# Patient Record
Sex: FEMALE | Race: WHITE | Employment: FULL TIME | URBAN - METROPOLITAN AREA
[De-identification: names, ages, dates, MRNs, and addresses within clinical notes are randomized per-mention and may not be internally consistent; named-entity substitution may affect disease eponyms.]

---

## 2017-12-11 ENCOUNTER — OFFICE VISIT (OUTPATIENT)
Dept: FAMILY MEDICINE CLINIC | Facility: CLINIC | Age: 52
End: 2017-12-11

## 2017-12-11 VITALS
DIASTOLIC BLOOD PRESSURE: 80 MMHG | HEIGHT: 61.5 IN | TEMPERATURE: 98 F | BODY MASS INDEX: 20.13 KG/M2 | OXYGEN SATURATION: 98 % | RESPIRATION RATE: 16 BRPM | SYSTOLIC BLOOD PRESSURE: 110 MMHG | WEIGHT: 108 LBS | HEART RATE: 60 BPM

## 2017-12-11 DIAGNOSIS — Z01.419 WELL FEMALE EXAM WITH ROUTINE GYNECOLOGICAL EXAM: Primary | ICD-10-CM

## 2017-12-11 DIAGNOSIS — K92.1 BLOOD IN STOOL: ICD-10-CM

## 2017-12-11 DIAGNOSIS — Z12.39 SCREENING FOR BREAST CANCER: ICD-10-CM

## 2017-12-11 DIAGNOSIS — Z13.0 SCREENING FOR ENDOCRINE, NUTRITIONAL, METABOLIC AND IMMUNITY DISORDER: ICD-10-CM

## 2017-12-11 DIAGNOSIS — Z13.21 SCREENING FOR ENDOCRINE, NUTRITIONAL, METABOLIC AND IMMUNITY DISORDER: ICD-10-CM

## 2017-12-11 DIAGNOSIS — Z12.4 ENCOUNTER FOR SCREENING FOR CERVICAL CANCER: ICD-10-CM

## 2017-12-11 DIAGNOSIS — Z80.0 FAMILY HISTORY OF COLON CANCER: ICD-10-CM

## 2017-12-11 DIAGNOSIS — Z13.29 SCREENING FOR ENDOCRINE, NUTRITIONAL, METABOLIC AND IMMUNITY DISORDER: ICD-10-CM

## 2017-12-11 DIAGNOSIS — Z13.228 SCREENING FOR ENDOCRINE, NUTRITIONAL, METABOLIC AND IMMUNITY DISORDER: ICD-10-CM

## 2017-12-11 DIAGNOSIS — F33.1 MODERATE EPISODE OF RECURRENT MAJOR DEPRESSIVE DISORDER (HCC): ICD-10-CM

## 2017-12-11 PROCEDURE — 87624 HPV HI-RISK TYP POOLED RSLT: CPT | Performed by: FAMILY MEDICINE

## 2017-12-11 PROCEDURE — 88175 CYTOPATH C/V AUTO FLUID REDO: CPT | Performed by: FAMILY MEDICINE

## 2017-12-11 PROCEDURE — 82272 OCCULT BLD FECES 1-3 TESTS: CPT | Performed by: FAMILY MEDICINE

## 2017-12-11 PROCEDURE — 87625 HPV TYPES 16 & 18 ONLY: CPT | Performed by: FAMILY MEDICINE

## 2017-12-11 PROCEDURE — 99386 PREV VISIT NEW AGE 40-64: CPT | Performed by: FAMILY MEDICINE

## 2017-12-11 RX ORDER — BUPROPION HYDROCHLORIDE 150 MG/1
150 TABLET ORAL DAILY
Qty: 30 TABLET | Refills: 0 | Status: SHIPPED | OUTPATIENT
Start: 2017-12-11 | End: 2018-01-11 | Stop reason: ALTCHOICE

## 2017-12-11 NOTE — PROGRESS NOTES
Anihs Bradley is a 47year old female.     CC:  Patient presents with:  Physical: NP physical, pap smear      HPI:  Tobacco Cessation Counseling 2 Years due on 05/25/1978  Annual Depression Screen due on 05/25/1978  Pneumococcal PPSV23 Medium Risk Ad Diagnosis Date   • Arthritis    • Depression       Past Surgical History:  No date: TUBAL LIGATION   Family History   Problem Relation Age of Onset   • Cancer Father      colon cancer   • Cancer Mother      lung cancer, brain cancer   • Cancer Maternal G Menopause).     GENERAL: well developed, well nourished, in no apparent distress  HEENT: atraumatic, normocephalic,ears and throat are clear  EYES:PERRL, EOMI, conjunctiva are clear  NECK: supple,no adenopathy, no thyromegaly  CHEST: no chest tenderness  BR cancer  - SURGERY - INTERNAL  - BLD OCLT PROXIDASE ACTV QUAL FECES 1 SPEC  patietn needs diagnostic colonoscopy given sx and history  No active bleeding  Will check cbc, cmp        Meds & Refills for this Visit:  Signed Prescriptions Disp Refills    BuPROP

## 2017-12-18 ENCOUNTER — APPOINTMENT (OUTPATIENT)
Dept: LAB | Age: 52
End: 2017-12-18
Attending: FAMILY MEDICINE
Payer: MEDICAID

## 2017-12-18 ENCOUNTER — TELEPHONE (OUTPATIENT)
Dept: OBGYN CLINIC | Facility: CLINIC | Age: 52
End: 2017-12-18

## 2017-12-18 ENCOUNTER — HOSPITAL ENCOUNTER (OUTPATIENT)
Dept: MAMMOGRAPHY | Age: 52
Discharge: HOME | End: 2017-12-18
Attending: FAMILY MEDICINE
Payer: MEDICAID

## 2017-12-18 ENCOUNTER — TELEPHONE (OUTPATIENT)
Dept: FAMILY MEDICINE CLINIC | Facility: CLINIC | Age: 52
End: 2017-12-18

## 2017-12-18 DIAGNOSIS — R87.610 ATYPICAL SQUAMOUS CELLS OF UNDETERMINED SIGNIFICANCE ON CYTOLOGIC SMEAR OF CERVIX (ASC-US): Primary | ICD-10-CM

## 2017-12-18 DIAGNOSIS — Z12.39 SCREENING FOR BREAST CANCER: ICD-10-CM

## 2017-12-18 DIAGNOSIS — B97.7 HPV IN FEMALE: ICD-10-CM

## 2017-12-18 PROCEDURE — 77067 SCR MAMMO BI INCL CAD: CPT | Performed by: FAMILY MEDICINE

## 2017-12-18 NOTE — TELEPHONE ENCOUNTER
PT is being referred to see Dr. Todd Zavala for abnormal PAP. Results in Epic. Please review and advise on what type of appt is needed.     Thanks

## 2017-12-18 NOTE — TELEPHONE ENCOUNTER
Pt notified of Pap & HPV results & below orders. Dr. Luis Eduardo Sawyer office info given. Referral placed in EPIC. All questions answered, pt expresses understanding.      Dr. Diony Burnett [address] [facility]   Phone: [phone]

## 2017-12-18 NOTE — TELEPHONE ENCOUNTER
----- Message from Frieda Webster MD sent at 12/15/2017  2:33 PM CST -----  Results reviewed. Please inform patient abnormal pap - ASCUS and HPV positive - refer to [facility] for colposcopy.

## 2017-12-19 ENCOUNTER — PATIENT MESSAGE (OUTPATIENT)
Dept: FAMILY MEDICINE CLINIC | Facility: CLINIC | Age: 52
End: 2017-12-19

## 2017-12-19 ENCOUNTER — OFFICE VISIT (OUTPATIENT)
Dept: SURGERY | Facility: CLINIC | Age: 52
End: 2017-12-19

## 2017-12-19 VITALS
OXYGEN SATURATION: 97 % | WEIGHT: 110 LBS | RESPIRATION RATE: 16 BRPM | BODY MASS INDEX: 20.77 KG/M2 | SYSTOLIC BLOOD PRESSURE: 110 MMHG | HEART RATE: 63 BPM | DIASTOLIC BLOOD PRESSURE: 70 MMHG | HEIGHT: 61 IN

## 2017-12-19 DIAGNOSIS — R19.4 ENCOUNTER FOR DIAGNOSTIC COLONOSCOPY DUE TO CHANGE IN BOWEL HABITS: Primary | ICD-10-CM

## 2017-12-19 DIAGNOSIS — Z80.0 FAMILY HISTORY OF COLON CANCER REQUIRING SCREENING COLONOSCOPY: ICD-10-CM

## 2017-12-19 PROCEDURE — 99243 OFF/OP CNSLTJ NEW/EST LOW 30: CPT | Performed by: SURGERY

## 2017-12-19 RX ORDER — TRAZODONE HYDROCHLORIDE 50 MG/1
50 TABLET ORAL NIGHTLY
Qty: 30 TABLET | Refills: 0 | Status: SHIPPED | OUTPATIENT
Start: 2017-12-19 | End: 2018-01-11

## 2017-12-19 RX ORDER — POLYETHYLENE GLYCOL 3350, SODIUM CHLORIDE, SODIUM BICARBONATE, POTASSIUM CHLORIDE 420; 11.2; 5.72; 1.48 G/4L; G/4L; G/4L; G/4L
POWDER, FOR SOLUTION ORAL
Qty: 1 BOTTLE | Refills: 0 | Status: SHIPPED | OUTPATIENT
Start: 2017-12-19 | End: 2018-05-10 | Stop reason: ALTCHOICE

## 2017-12-19 NOTE — TELEPHONE ENCOUNTER
Pt had sx prior to starting bupropion  Continue bupropion. Can try trazodone 50mg qhs to help with depression and sleep.  Then follow up in 1 mo  rx sent

## 2017-12-19 NOTE — TELEPHONE ENCOUNTER
Pap smear and mammogram released to My Chart. Please advise on sleep concern for patient. Thank you!

## 2017-12-19 NOTE — TELEPHONE ENCOUNTER
From: Gauri Cho  To: Rich Ortiz MD  Sent: 12/19/2017 6:18 AM CST  Subject: Test Results Question    I know my pap smear results came back I'm just wondering why they're not posted on here yet and also was wondering if you had an opportunity to

## 2017-12-19 NOTE — H&P
New Patient Visit Note       Active Problems      1. Encounter for diagnostic colonoscopy due to change in bowel habits    2.  Family history of colon cancer requiring screening colonoscopy        Chief Complaint   Patient presents with:  Colonoscopy: NW PT Cancer Father      colon cancer   • Cancer Mother      lung cancer, brain cancer   • Breast Cancer Maternal Grandmother    • Cancer Paternal Grandmother      colon cancer   • Breast Cancer Paternal Grandmother      Social History    Marital status: Divorce for behavioral problems and sleep disturbance. Physical Findings   /70   Pulse 63   Resp 16   Ht 61\"   Wt 110 lb   SpO2 97%   BMI 20.78 kg/m²   Physical Exam   Constitutional: She is oriented to person, place, and time.  She appears well-develo Left axillary: No pectoral and no lateral adenopathy present. Right: No inguinal and no supraclavicular adenopathy present. Left: No inguinal and no supraclavicular adenopathy present.    Neurological: She is alert and oriented to person, pl

## 2017-12-20 NOTE — TELEPHONE ENCOUNTER
Called and spoke with pt. Pt informed of MD message below. Pt states understanding and agrees to plan.

## 2018-01-11 ENCOUNTER — LABORATORY ENCOUNTER (OUTPATIENT)
Dept: LAB | Age: 53
End: 2018-01-11
Attending: FAMILY MEDICINE
Payer: MEDICAID

## 2018-01-11 DIAGNOSIS — Z13.29 SCREENING FOR ENDOCRINE, NUTRITIONAL, METABOLIC AND IMMUNITY DISORDER: ICD-10-CM

## 2018-01-11 DIAGNOSIS — Z13.228 SCREENING FOR ENDOCRINE, NUTRITIONAL, METABOLIC AND IMMUNITY DISORDER: ICD-10-CM

## 2018-01-11 DIAGNOSIS — Z13.21 SCREENING FOR ENDOCRINE, NUTRITIONAL, METABOLIC AND IMMUNITY DISORDER: ICD-10-CM

## 2018-01-11 DIAGNOSIS — Z13.0 SCREENING FOR ENDOCRINE, NUTRITIONAL, METABOLIC AND IMMUNITY DISORDER: ICD-10-CM

## 2018-01-11 LAB
25-HYDROXYVITAMIN D (TOTAL): 5.8 NG/ML (ref 30–100)
ALBUMIN SERPL-MCNC: 3.2 G/DL (ref 3.5–4.8)
ALP LIVER SERPL-CCNC: 74 U/L (ref 41–108)
ALT SERPL-CCNC: 21 U/L (ref 14–54)
AST SERPL-CCNC: 16 U/L (ref 15–41)
BASOPHILS # BLD AUTO: 0.03 X10(3) UL (ref 0–0.1)
BASOPHILS NFR BLD AUTO: 0.4 %
BILIRUB SERPL-MCNC: 0.3 MG/DL (ref 0.1–2)
BUN BLD-MCNC: 13 MG/DL (ref 8–20)
CALCIUM BLD-MCNC: 8.7 MG/DL (ref 8.3–10.3)
CHLORIDE: 109 MMOL/L (ref 101–111)
CHOLEST SMN-MCNC: 166 MG/DL (ref ?–200)
CO2: 24 MMOL/L (ref 22–32)
CREAT BLD-MCNC: 0.58 MG/DL (ref 0.55–1.02)
EOSINOPHIL # BLD AUTO: 0.11 X10(3) UL (ref 0–0.3)
EOSINOPHIL NFR BLD AUTO: 1.4 %
ERYTHROCYTE [DISTWIDTH] IN BLOOD BY AUTOMATED COUNT: 13.8 % (ref 11.5–16)
FREE T4: 1.3 NG/DL (ref 0.9–1.8)
GLUCOSE BLD-MCNC: 83 MG/DL (ref 70–99)
HCT VFR BLD AUTO: 41 % (ref 34–50)
HDLC SERPL-MCNC: 42 MG/DL (ref 45–?)
HDLC SERPL: 3.95 {RATIO} (ref ?–4.44)
HGB BLD-MCNC: 13.2 G/DL (ref 12–16)
IMMATURE GRANULOCYTE COUNT: 0.03 X10(3) UL (ref 0–1)
IMMATURE GRANULOCYTE RATIO %: 0.4 %
LDLC SERPL CALC-MCNC: 86 MG/DL (ref ?–130)
LYMPHOCYTES # BLD AUTO: 3.2 X10(3) UL (ref 0.9–4)
LYMPHOCYTES NFR BLD AUTO: 41.6 %
M PROTEIN MFR SERPL ELPH: 7.2 G/DL (ref 6.1–8.3)
MCH RBC QN AUTO: 29.8 PG (ref 27–33.2)
MCHC RBC AUTO-ENTMCNC: 32.2 G/DL (ref 31–37)
MCV RBC AUTO: 92.6 FL (ref 81–100)
MONOCYTES # BLD AUTO: 0.84 X10(3) UL (ref 0.1–0.6)
MONOCYTES NFR BLD AUTO: 10.9 %
NEUTROPHIL ABS PRELIM: 3.49 X10 (3) UL (ref 1.3–6.7)
NEUTROPHILS # BLD AUTO: 3.49 X10(3) UL (ref 1.3–6.7)
NEUTROPHILS NFR BLD AUTO: 45.3 %
NONHDLC SERPL-MCNC: 124 MG/DL (ref ?–130)
PLATELET # BLD AUTO: 427 10(3)UL (ref 150–450)
POTASSIUM SERPL-SCNC: 3.8 MMOL/L (ref 3.6–5.1)
RBC # BLD AUTO: 4.43 X10(6)UL (ref 3.8–5.1)
RED CELL DISTRIBUTION WIDTH-SD: 47.8 FL (ref 35.1–46.3)
SODIUM SERPL-SCNC: 140 MMOL/L (ref 136–144)
TRIGL SERPL-MCNC: 189 MG/DL (ref ?–150)
TSI SER-ACNC: <0.005 MIU/ML (ref 0.35–5.5)
VLDLC SERPL CALC-MCNC: 38 MG/DL (ref 5–40)
WBC # BLD AUTO: 7.7 X10(3) UL (ref 4–13)

## 2018-01-11 PROCEDURE — 82306 VITAMIN D 25 HYDROXY: CPT | Performed by: FAMILY MEDICINE

## 2018-01-11 PROCEDURE — 84439 ASSAY OF FREE THYROXINE: CPT | Performed by: FAMILY MEDICINE

## 2018-01-11 PROCEDURE — 36415 COLL VENOUS BLD VENIPUNCTURE: CPT | Performed by: FAMILY MEDICINE

## 2018-01-11 PROCEDURE — 80050 GENERAL HEALTH PANEL: CPT | Performed by: FAMILY MEDICINE

## 2018-01-11 PROCEDURE — 80061 LIPID PANEL: CPT | Performed by: FAMILY MEDICINE

## 2018-01-11 RX ORDER — FLUOXETINE HYDROCHLORIDE 20 MG/1
20 CAPSULE ORAL DAILY
Qty: 30 CAPSULE | Refills: 0 | OUTPATIENT
Start: 2018-01-11 | End: 2018-03-12

## 2018-01-11 NOTE — TELEPHONE ENCOUNTER
Insurance will not cover Fluoxetine Tables, will only cover capsules.  OK per Dr Peter Lira to switch to capsules,

## 2018-01-11 NOTE — PROGRESS NOTES
[facility] Office Note  Chief Complaint:   Patient presents with:  Medication Follow-Up      HPI:   This is a 47year old female coming in for  HPI  1.  Follow up - depression  Started on bupropion 150mg daily  Also trazodone 50mg Respiratory: Negative for cough and shortness of breath. Cardiovascular: Negative for chest pain and palpitations. Gastrointestinal: Negative for abdominal pain, nausea and vomiting. Genitourinary: Negative for dysuria. Skin: Negative for rash. Prescriptions Disp Refills    TraZODone HCl 100 MG Oral Tab 30 tablet 0      Sig: Take 1 tablet (100 mg total) by mouth nightly. FLUoxetine HCl 20 MG Oral Tab 30 tablet 0      Sig: Take 1 tablet (20 mg total) by mouth daily.              Patient/Caregi

## 2018-01-15 ENCOUNTER — TELEPHONE (OUTPATIENT)
Dept: FAMILY MEDICINE CLINIC | Facility: CLINIC | Age: 53
End: 2018-01-15

## 2018-01-15 DIAGNOSIS — M79.10 MYALGIA: ICD-10-CM

## 2018-01-15 DIAGNOSIS — E05.90 HYPERTHYROIDISM: Primary | ICD-10-CM

## 2018-01-15 RX ORDER — ERGOCALCIFEROL 1.25 MG/1
50000 CAPSULE ORAL WEEKLY
Qty: 4 CAPSULE | Refills: 2 | Status: SHIPPED | OUTPATIENT
Start: 2018-01-15 | End: 2018-05-10 | Stop reason: ALTCHOICE

## 2018-01-15 NOTE — TELEPHONE ENCOUNTER
----- Message from Emile Lozada MD sent at 1/15/2018  7:42 AM CST -----  Results reviewed. Please inform patient abnormal thyroid labs - hyperthyroidism - order Thyroid US and refer to endo - Dr. Espinoza Bryan.    start vitamin D 78778 units weekly, x12 weeks, th

## 2018-01-15 NOTE — TELEPHONE ENCOUNTER
Discussed thyroid results, US and Endo follow up with patient. Also discussed diet, fish oil and Vitamin D with her. She had no further questions and voiced understanding.

## 2018-01-16 ENCOUNTER — TELEPHONE (OUTPATIENT)
Dept: FAMILY MEDICINE CLINIC | Facility: CLINIC | Age: 53
End: 2018-01-16

## 2018-01-16 ENCOUNTER — HOSPITAL ENCOUNTER (OUTPATIENT)
Dept: ULTRASOUND IMAGING | Age: 53
Discharge: HOME | End: 2018-01-16
Attending: FAMILY MEDICINE
Payer: MEDICAID

## 2018-01-16 DIAGNOSIS — E05.90 HYPERTHYROIDISM: ICD-10-CM

## 2018-01-16 PROCEDURE — 76536 US EXAM OF HEAD AND NECK: CPT | Performed by: FAMILY MEDICINE

## 2018-01-16 NOTE — TELEPHONE ENCOUNTER
I spoke with pt rik reiterated labs and Dr Deidra Hernandez recommendations. Pt c/ feeling like there is 'knots\" in both her calves. She describes it as a ache. It is always worse first thing in the am-\"cant hardly walk on them in the am\".   No recent plane or l

## 2018-01-16 NOTE — TELEPHONE ENCOUNTER
Pain worse in the AM - will screen for rheumatoid arthritis and autoimmune disorders. Muscle aches are not common symptom of hyperthyroidism, but could be related   Ok to get additional labs completed.

## 2018-01-17 ENCOUNTER — TELEPHONE (OUTPATIENT)
Dept: FAMILY MEDICINE CLINIC | Facility: CLINIC | Age: 53
End: 2018-01-17

## 2018-01-17 DIAGNOSIS — E04.2 MULTIPLE THYROID NODULES: Primary | ICD-10-CM

## 2018-01-17 RX ORDER — TRAZODONE HYDROCHLORIDE 100 MG/1
100 TABLET ORAL NIGHTLY
Qty: 30 TABLET | Refills: 5 | Status: SHIPPED | OUTPATIENT
Start: 2018-01-17 | End: 2018-05-10 | Stop reason: ALTCHOICE

## 2018-01-17 NOTE — TELEPHONE ENCOUNTER
Notes Recorded by Ashlie Rayo MD on 1/17/2018 at 4:51 PM CST  Multinodular thyroid. Some nodules are than 1cm and should be biopsied.  FNA recommended to be completed by IR

## 2018-01-17 NOTE — TELEPHONE ENCOUNTER
I called and tried pt again. Pt answered and I verified 2 patient identifiers: name & . I discussed results and recommendations at length with patient. All orders placed. All questions answered. ENT info sent threw [address]

## 2018-01-17 NOTE — TELEPHONE ENCOUNTER
Pt already has the endocrinologist appt scheduled (1/24/18)and its ok to keep that but pt should get Bx done as soon as she can. If pathology is abnormal; she will need to see Dr Tarry Leyden to discuss thyroidectomy and endo for med management.   If p

## 2018-01-20 ENCOUNTER — PATIENT MESSAGE (OUTPATIENT)
Dept: FAMILY MEDICINE CLINIC | Facility: CLINIC | Age: 53
End: 2018-01-20

## 2018-01-20 DIAGNOSIS — Z13.21 SCREENING FOR ENDOCRINE, NUTRITIONAL, METABOLIC AND IMMUNITY DISORDER: Primary | ICD-10-CM

## 2018-01-20 DIAGNOSIS — Z13.0 SCREENING FOR ENDOCRINE, NUTRITIONAL, METABOLIC AND IMMUNITY DISORDER: Primary | ICD-10-CM

## 2018-01-20 DIAGNOSIS — R79.89 ABNORMAL TSH: ICD-10-CM

## 2018-01-20 DIAGNOSIS — Z13.228 SCREENING FOR ENDOCRINE, NUTRITIONAL, METABOLIC AND IMMUNITY DISORDER: Primary | ICD-10-CM

## 2018-01-20 DIAGNOSIS — Z13.29 SCREENING FOR ENDOCRINE, NUTRITIONAL, METABOLIC AND IMMUNITY DISORDER: Primary | ICD-10-CM

## 2018-01-22 NOTE — TELEPHONE ENCOUNTER
From: Cassandra Hernandez  To: Katarina Espinoza MD  Sent: 1/20/2018 11:21 PM CST  Subject: Test Results Question    Dr. Bon Higginbotham,    I need to come in for some more blood work in regards to the rheumatology   I would also like to request the following tests if po

## 2018-01-22 NOTE — TELEPHONE ENCOUNTER
US dept call to confirm what nodules should be biopsied. There are 4 nodules over 10mm. Dr Ken Santizo states all nodules over 10mm. US dept aware.

## 2018-01-22 NOTE — TELEPHONE ENCOUNTER
Labs ordered   Had expected endo to check - pending work up for hyperthyroidism. b12 and folate would be screening labs - she is not anemic and has normal MCV.

## 2018-01-23 ENCOUNTER — HOSPITAL ENCOUNTER (OUTPATIENT)
Dept: ULTRASOUND IMAGING | Facility: HOSPITAL | Age: 53
Discharge: HOME | End: 2018-01-23
Attending: FAMILY MEDICINE
Payer: MEDICAID

## 2018-01-23 DIAGNOSIS — E04.2 MULTIPLE THYROID NODULES: ICD-10-CM

## 2018-01-23 PROCEDURE — 76942 ECHO GUIDE FOR BIOPSY: CPT | Performed by: FAMILY MEDICINE

## 2018-01-23 PROCEDURE — 88173 CYTOPATH EVAL FNA REPORT: CPT | Performed by: FAMILY MEDICINE

## 2018-01-23 PROCEDURE — 10022 US FNA THYROID (CPT=10022/76942): CPT | Performed by: FAMILY MEDICINE

## 2018-01-23 NOTE — PROCEDURES
PROCEDURE: US FNA THYROID (CPT=10022/35503)    COMPARISON: Jennifer Mosley, US THYROID (HVD=[id number]), 1/16/2018, 11:01. 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  This inclu

## 2018-01-29 ENCOUNTER — TELEPHONE (OUTPATIENT)
Dept: FAMILY MEDICINE CLINIC | Facility: CLINIC | Age: 53
End: 2018-01-29

## 2018-01-30 NOTE — TELEPHONE ENCOUNTER
I called pt at [phone] and verified 2 patient identifiers: name & . I discussed results and recommendations at length with patient. All questions answered.

## 2018-02-01 ENCOUNTER — TELEPHONE (OUTPATIENT)
Dept: OBGYN CLINIC | Facility: CLINIC | Age: 53
End: 2018-02-01

## 2018-02-07 ENCOUNTER — PATIENT MESSAGE (OUTPATIENT)
Dept: FAMILY MEDICINE CLINIC | Facility: CLINIC | Age: 53
End: 2018-02-07

## 2018-02-07 NOTE — TELEPHONE ENCOUNTER
Medication(s) to Refill:   Pending Prescriptions Disp Refills    BUPROPION HCL ER, XL, 150 MG Oral Tablet 24 Hr [Pharmacy Med Name: BUPROPION XL 150MG TABLETS (24 H)] 30 tablet 0     Sig: TAKE 1 TABLET BY MOUTH DAILY      FLUOXETINE HCL 20 MG Oral Cap [Pha

## 2018-02-08 RX ORDER — BUPROPION HYDROCHLORIDE 150 MG/1
TABLET ORAL
Qty: 30 TABLET | Refills: 0 | OUTPATIENT
Start: 2018-02-08

## 2018-02-08 RX ORDER — FLUOXETINE HYDROCHLORIDE 20 MG/1
CAPSULE ORAL
Qty: 30 CAPSULE | Refills: 0 | Status: SHIPPED | OUTPATIENT
Start: 2018-02-08 | End: 2018-03-12

## 2018-02-08 NOTE — TELEPHONE ENCOUNTER
From: Anish Bradley  To: Karishma Conley MD  Sent: 2/7/2018 11:49 AM CST  Subject: Non-Urgent Medical Question     I requested two weeks off of work Leatha Coy of all this medical stuff and now my employer is telling me in order to return I'm going to need a d

## 2018-02-09 NOTE — TELEPHONE ENCOUNTER
Pt needs a note to return to work. She took 2 weeks off due to everything that was going on. She is not asking to excuse her but a note that its ok to return tomorrow. Ok with you?

## 2018-02-09 NOTE — TELEPHONE ENCOUNTER
Ok to return to tomorrow. Thank you   Please remind patient to reschedule colonoscopy with Dr. Juliane Estrada.

## 2018-03-05 ENCOUNTER — LABORATORY ENCOUNTER (OUTPATIENT)
Dept: LAB | Age: 53
End: 2018-03-05
Attending: SURGERY
Payer: MEDICAID

## 2018-03-05 DIAGNOSIS — R19.4 CHANGE IN BOWEL HABIT: ICD-10-CM

## 2018-03-05 DIAGNOSIS — Z80.0 FH: COLON CANCER: Primary | ICD-10-CM

## 2018-03-05 DIAGNOSIS — Z80.0 FAMILY HX OF COLON CANCER: Primary | ICD-10-CM

## 2018-03-05 PROCEDURE — 88305 TISSUE EXAM BY PATHOLOGIST: CPT

## 2018-03-12 RX ORDER — FLUOXETINE HYDROCHLORIDE 20 MG/1
CAPSULE ORAL
Qty: 30 CAPSULE | Refills: 0 | Status: SHIPPED | OUTPATIENT
Start: 2018-03-12 | End: 2018-04-10

## 2018-03-12 NOTE — TELEPHONE ENCOUNTER
Medication(s) to Refill:   Pending Prescriptions Disp Refills    FLUOXETINE HCL 20 MG Oral Cap [Pharmacy Med Name: FLUOXETINE 20MG CAPSULES] 30 capsule 0     Sig: TAKE 1 CAPSULE BY MOUTH EVERY DAY             Reason for Medication Refill being sent to [facility]

## 2018-03-14 ENCOUNTER — PATIENT OUTREACH (OUTPATIENT)
Dept: FAMILY MEDICINE CLINIC | Facility: CLINIC | Age: 53
End: 2018-03-14

## 2018-03-14 ENCOUNTER — TELEPHONE (OUTPATIENT)
Dept: FAMILY MEDICINE CLINIC | Facility: CLINIC | Age: 53
End: 2018-03-14

## 2018-03-14 NOTE — TELEPHONE ENCOUNTER
Pt wanted us to know she had her colonscopy with Dr. Lovely Alvarez, she received a call she is due so she wanted to update us on that info

## 2018-03-15 NOTE — TELEPHONE ENCOUNTER
SOOI below. Patient did have c-scope with Dr. Sarah Quezada on 3/5/18. HM not updated at this time since c-scope was abnormal.  Thank you!

## 2018-03-15 NOTE — TELEPHONE ENCOUNTER
LISA - Had noted abn colonoscopy - has follow up with Dr. Og Baig scheduled.    For now - just placed on 1 year cscope follow up - will adjust after appt with surgery

## 2018-03-20 ENCOUNTER — OFFICE VISIT (OUTPATIENT)
Dept: SURGERY | Facility: CLINIC | Age: 53
End: 2018-03-20

## 2018-03-20 VITALS
SYSTOLIC BLOOD PRESSURE: 100 MMHG | DIASTOLIC BLOOD PRESSURE: 70 MMHG | WEIGHT: 107 LBS | HEIGHT: 61 IN | BODY MASS INDEX: 20.2 KG/M2 | OXYGEN SATURATION: 97 % | RESPIRATION RATE: 16 BRPM | HEART RATE: 68 BPM

## 2018-03-20 DIAGNOSIS — D12.6 TUBULOVILLOUS ADENOMA OF COLON: Primary | ICD-10-CM

## 2018-03-20 PROCEDURE — 99212 OFFICE O/P EST SF 10 MIN: CPT | Performed by: SURGERY

## 2018-03-20 NOTE — TELEPHONE ENCOUNTER
The patient called out this because she is still receiving reminder calls that she is needing to have her colonoscopy done however it was already done.

## 2018-03-20 NOTE — TELEPHONE ENCOUNTER
Reviewed Dr Angeles Palafox exam and updated snapshot that c-scope was done and next die in 3 yrs. Shanon, who has called pt previously is aware.

## 2018-03-20 NOTE — PROGRESS NOTES
Follow Up Visit Note       Active Problems      1. Tubulovillous adenoma of colon          Chief Complaint   Patient presents with:  Colonoscopy: EST PT f/u after cscope on 3/5/18. PT denies any new issues or concerns.         History of Present Illness Paternal Grandmother      colon cancer   • Breast Cancer Paternal Grandmother      Social History    Marital status:             Spouse name:                       Years of education:                 Number of children:               Social History Does not bruise/bleed easily. Psychiatric/Behavioral: Negative for behavioral problems and sleep disturbance.         Physical Findings   /70   Pulse 68   Resp 16   Ht 61\"   Wt 107 lb   SpO2 97%   BMI 20.22 kg/m²   Physical Exam   Constitutional: S of the care plan. No orders of the defined types were placed in this encounter. Imaging & Referrals   None    Follow Up  Return in about 4 weeks (around 4/17/2018).     James Mar MD

## 2018-04-05 ENCOUNTER — TELEPHONE (OUTPATIENT)
Dept: FAMILY MEDICINE CLINIC | Facility: CLINIC | Age: 53
End: 2018-04-05

## 2018-04-05 NOTE — TELEPHONE ENCOUNTER
Left message for patient to call office. Want to remind patient about follow up Gyn for abnormal pap smear follow. If patient was seen by another Gyn please get contact information.

## 2018-04-10 NOTE — TELEPHONE ENCOUNTER
Medication(s) to Refill:   Pending Prescriptions Disp Refills    FLUOXETINE HCL 20 MG Oral Cap [Pharmacy Med Name: FLUOXETINE 20MG CAPSULES] 30 capsule 0     Sig: TAKE ONE CAPSULE BY MOUTH EVERY DAY             Reason for Medication Refill being sent to [facility]

## 2018-04-11 RX ORDER — FLUOXETINE HYDROCHLORIDE 20 MG/1
CAPSULE ORAL
Qty: 30 CAPSULE | Refills: 0 | Status: SHIPPED | OUTPATIENT
Start: 2018-04-11 | End: 2018-05-10 | Stop reason: ALTCHOICE

## 2018-04-17 RX ORDER — ERGOCALCIFEROL 1.25 MG/1
CAPSULE ORAL
Qty: 4 CAPSULE | Refills: 0 | OUTPATIENT
Start: 2018-04-17

## 2018-05-10 ENCOUNTER — APPOINTMENT (OUTPATIENT)
Dept: LAB | Age: 53
End: 2018-05-10
Attending: FAMILY MEDICINE
Payer: MEDICAID

## 2018-05-10 ENCOUNTER — OFFICE VISIT (OUTPATIENT)
Dept: FAMILY MEDICINE CLINIC | Facility: CLINIC | Age: 53
End: 2018-05-10

## 2018-05-10 VITALS
BODY MASS INDEX: 19 KG/M2 | HEART RATE: 88 BPM | DIASTOLIC BLOOD PRESSURE: 60 MMHG | RESPIRATION RATE: 22 BRPM | TEMPERATURE: 99 F | WEIGHT: 98.81 LBS | OXYGEN SATURATION: 98 % | SYSTOLIC BLOOD PRESSURE: 103 MMHG

## 2018-05-10 DIAGNOSIS — Z87.891 HISTORY OF SMOKING: ICD-10-CM

## 2018-05-10 DIAGNOSIS — Z91.09 ENVIRONMENTAL ALLERGIES: ICD-10-CM

## 2018-05-10 DIAGNOSIS — Z13.0 SCREENING FOR ENDOCRINE, NUTRITIONAL, METABOLIC AND IMMUNITY DISORDER: ICD-10-CM

## 2018-05-10 DIAGNOSIS — R79.89 ABNORMAL TSH: ICD-10-CM

## 2018-05-10 DIAGNOSIS — Z13.228 SCREENING FOR ENDOCRINE, NUTRITIONAL, METABOLIC AND IMMUNITY DISORDER: ICD-10-CM

## 2018-05-10 DIAGNOSIS — Z13.29 SCREENING FOR ENDOCRINE, NUTRITIONAL, METABOLIC AND IMMUNITY DISORDER: ICD-10-CM

## 2018-05-10 DIAGNOSIS — E05.90 HYPERTHYROIDISM: ICD-10-CM

## 2018-05-10 DIAGNOSIS — M79.10 MYALGIA: ICD-10-CM

## 2018-05-10 DIAGNOSIS — R05.9 COUGHING: Primary | ICD-10-CM

## 2018-05-10 DIAGNOSIS — Z13.21 SCREENING FOR ENDOCRINE, NUTRITIONAL, METABOLIC AND IMMUNITY DISORDER: ICD-10-CM

## 2018-05-10 PROCEDURE — 86038 ANTINUCLEAR ANTIBODIES: CPT

## 2018-05-10 PROCEDURE — 36415 COLL VENOUS BLD VENIPUNCTURE: CPT

## 2018-05-10 PROCEDURE — 86376 MICROSOMAL ANTIBODY EACH: CPT

## 2018-05-10 PROCEDURE — 85652 RBC SED RATE AUTOMATED: CPT

## 2018-05-10 PROCEDURE — 83520 IMMUNOASSAY QUANT NOS NONAB: CPT

## 2018-05-10 PROCEDURE — 82746 ASSAY OF FOLIC ACID SERUM: CPT

## 2018-05-10 PROCEDURE — 86140 C-REACTIVE PROTEIN: CPT

## 2018-05-10 PROCEDURE — 86800 THYROGLOBULIN ANTIBODY: CPT

## 2018-05-10 PROCEDURE — 82607 VITAMIN B-12: CPT

## 2018-05-10 PROCEDURE — 99213 OFFICE O/P EST LOW 20 MIN: CPT | Performed by: NURSE PRACTITIONER

## 2018-05-10 RX ORDER — ALBUTEROL SULFATE 90 UG/1
2 AEROSOL, METERED RESPIRATORY (INHALATION) EVERY 4 HOURS PRN
Qty: 1 INHALER | Refills: 0 | Status: SHIPPED | OUTPATIENT
Start: 2018-05-10 | End: 2018-11-28

## 2018-05-10 NOTE — PROGRESS NOTES
CHIEF COMPLAINT:   Patient presents with:  Cough: pt c\o of cough x2days     HPI:   Chika Humphreys is a 47year old female who presents for cough for  2  days. Pt was out in the yard weeding/gardening yesterday.  After that started coughing, watery eyes HENT: Atraumatic, normocephalic. TM's clear bilaterally. Nostrils patent, clear nasal mucus, mild erythema of the throat. NECK: supple, non-tender. LUNGS: Normal respiratory rate. Normal effort. Dry cough. no wheezing. No rales, crackles, or rhonchi. Controlling Allergens: In the Home  Even a clean home can be full of allergens, so take a moment to see what you can do to cut down on allergens in each room of your home. Try to avoid things like cigarette smoke and perfume.  They can irritate your eyes, n © 7214-3202 [employer] [address] [facility], [facility] [place]. All rights reserved. This information is not intended as a substitute for professional medical care. Always follow your healthcare professional's instructions.             The

## 2018-05-10 NOTE — PATIENT INSTRUCTIONS
Controlling Allergens: In the Home  Even a clean home can be full of allergens, so take a moment to see what you can do to cut down on allergens in each room of your home. Try to avoid things like cigarette smoke and perfume.  They can irritate your eyes, © 8442-9693 [employer] [address] [facility], [facility] [place]. All rights reserved. This information is not intended as a substitute for professional medical care. Always follow your healthcare professional's instructions.

## 2018-05-14 ENCOUNTER — TELEPHONE (OUTPATIENT)
Dept: FAMILY MEDICINE CLINIC | Facility: CLINIC | Age: 53
End: 2018-05-14

## 2018-05-14 DIAGNOSIS — R79.82 CRP ELEVATED: ICD-10-CM

## 2018-05-14 DIAGNOSIS — R70.0 ELEVATED SED RATE: Primary | ICD-10-CM

## 2018-05-14 NOTE — TELEPHONE ENCOUNTER
Patient called again to find out the status of her lab results.  Please call patient at [phone]

## 2018-05-14 NOTE — TELEPHONE ENCOUNTER
Notes recorded by Mai Barr MD on 5/14/2018 at 2:55 PM CDT  No thyroid abnormalities. Some inflammation, ok to see Oscar Benson for evaluation. Notified MD of increased WBC. Will continue to monitor.

## 2018-05-14 NOTE — TELEPHONE ENCOUNTER
Some labs have been released to [facility] but a physician has not reviewed them yet. Please review labs and advise.

## 2018-05-14 NOTE — TELEPHONE ENCOUNTER
I spoke to Dr Volodymyr Vail and informed her that pt was recently ill with a respiratory disorder and asked if that could be the cause of the elevation in the labs.  says yes it can and to recheck labs one pts is better.   Pt will have labs done 1 week post

## 2018-05-18 ENCOUNTER — TELEPHONE (OUTPATIENT)
Dept: FAMILY MEDICINE CLINIC | Facility: CLINIC | Age: 53
End: 2018-05-18

## 2018-05-18 DIAGNOSIS — K63.5 POLYP OF COLON, UNSPECIFIED PART OF COLON, UNSPECIFIED TYPE: Primary | ICD-10-CM

## 2018-05-18 NOTE — TELEPHONE ENCOUNTER
Patient requesting referral to Dr. Jose Anna for colonoscopy. It looks like she just had one done on 3/5/18 with Dr. Donnie Aceves.      I called and spoke to patient who states she had a colonoscopy with Dr. Donnie Aceves in March and he recommended she f/u with GI to

## 2018-05-18 NOTE — TELEPHONE ENCOUNTER
Referral Request APPT 05/22   Received: Today   Message Contents   [facility] Clinical Staff   Cc: [facility] Referral Pool   Phone Number: [phone]             .Reason for the order/referral: Referral Request APPT Tuesday!    PCP:

## 2018-05-22 ENCOUNTER — LAB ENCOUNTER (OUTPATIENT)
Dept: LAB | Age: 53
End: 2018-05-22
Attending: FAMILY MEDICINE
Payer: MEDICAID

## 2018-05-22 DIAGNOSIS — K90.9 DIARRHEA DUE TO MALABSORPTION: ICD-10-CM

## 2018-05-22 DIAGNOSIS — R70.0 ELEVATED SED RATE: ICD-10-CM

## 2018-05-22 DIAGNOSIS — R19.7 DIARRHEA DUE TO MALABSORPTION: ICD-10-CM

## 2018-05-22 DIAGNOSIS — R79.82 CRP ELEVATED: ICD-10-CM

## 2018-05-22 DIAGNOSIS — R63.4 WEIGHT LOSS, UNINTENTIONAL: ICD-10-CM

## 2018-05-22 DIAGNOSIS — Z86.010 PERSONAL HISTORY OF COLONIC POLYPS: ICD-10-CM

## 2018-05-22 PROCEDURE — 86141 C-REACTIVE PROTEIN HS: CPT

## 2018-05-22 PROCEDURE — 82306 VITAMIN D 25 HYDROXY: CPT

## 2018-05-22 PROCEDURE — 36415 COLL VENOUS BLD VENIPUNCTURE: CPT

## 2018-05-22 PROCEDURE — 83516 IMMUNOASSAY NONANTIBODY: CPT

## 2018-05-22 PROCEDURE — 83036 HEMOGLOBIN GLYCOSYLATED A1C: CPT

## 2018-05-22 PROCEDURE — 82607 VITAMIN B-12: CPT

## 2018-05-22 PROCEDURE — 84446 ASSAY OF VITAMIN E: CPT

## 2018-05-22 PROCEDURE — 85610 PROTHROMBIN TIME: CPT

## 2018-05-22 PROCEDURE — 86256 FLUORESCENT ANTIBODY TITER: CPT

## 2018-05-22 PROCEDURE — 82746 ASSAY OF FOLIC ACID SERUM: CPT

## 2018-05-22 PROCEDURE — 82784 ASSAY IGA/IGD/IGG/IGM EACH: CPT

## 2018-05-22 PROCEDURE — 84590 ASSAY OF VITAMIN A: CPT

## 2018-05-22 PROCEDURE — 85652 RBC SED RATE AUTOMATED: CPT

## 2018-05-29 ENCOUNTER — TELEPHONE (OUTPATIENT)
Dept: FAMILY MEDICINE CLINIC | Facility: CLINIC | Age: 53
End: 2018-05-29

## 2018-05-29 ENCOUNTER — PATIENT MESSAGE (OUTPATIENT)
Dept: FAMILY MEDICINE CLINIC | Facility: CLINIC | Age: 53
End: 2018-05-29

## 2018-05-30 NOTE — TELEPHONE ENCOUNTER
From: Jose Smalls  To: Luis Eduardo Calderon MD  Sent: 5/29/2018 9:09 PM CDT  Subject: Test Results Question    Just wondering if anybody is going to call me and let me know what these test results mean or do I have to call you? maybe I'm old school but I w

## 2018-05-30 NOTE — TELEPHONE ENCOUNTER
Pt called not understanding the results. I explained that they are general inflammatory markers that can detect things like rheumatoid arthritis.    I explained that they are normal.  The rest of the labs were ordered by Dr Chace Ruiz and she should get those

## 2018-08-14 ENCOUNTER — HOSPITAL (OUTPATIENT)
Dept: OTHER | Age: 53
End: 2018-08-14
Attending: INTERNAL MEDICINE

## 2018-08-14 ENCOUNTER — TELEPHONE (OUTPATIENT)
Dept: FAMILY MEDICINE CLINIC | Facility: CLINIC | Age: 53
End: 2018-08-14

## 2018-08-14 NOTE — TELEPHONE ENCOUNTER
LVM for pt that she was a no show for her appointment and that an $25.00 NSF was added to her account.

## 2018-08-21 ENCOUNTER — LAB ENCOUNTER (OUTPATIENT)
Dept: LAB | Age: 53
End: 2018-08-21
Attending: FAMILY MEDICINE
Payer: MEDICAID

## 2018-08-21 DIAGNOSIS — R63.4 WEIGHT LOSS: ICD-10-CM

## 2018-08-21 DIAGNOSIS — F41.9 ANXIETY: ICD-10-CM

## 2018-08-21 LAB
ALBUMIN SERPL-MCNC: 2.9 G/DL (ref 3.5–4.8)
ALBUMIN/GLOB SERPL: 0.7 {RATIO} (ref 1–2)
ALP LIVER SERPL-CCNC: 97 U/L (ref 41–108)
ALT SERPL-CCNC: 33 U/L (ref 14–54)
ANION GAP SERPL CALC-SCNC: 8 MMOL/L (ref 0–18)
AST SERPL-CCNC: 24 U/L (ref 15–41)
BASOPHILS # BLD AUTO: 0.02 X10(3) UL (ref 0–0.1)
BASOPHILS NFR BLD AUTO: 0.4 %
BILIRUB SERPL-MCNC: 0.3 MG/DL (ref 0.1–2)
BUN BLD-MCNC: 13 MG/DL (ref 8–20)
BUN/CREAT SERPL: 30.2 (ref 10–20)
CALCIUM BLD-MCNC: 8.8 MG/DL (ref 8.3–10.3)
CHLORIDE SERPL-SCNC: 111 MMOL/L (ref 101–111)
CO2 SERPL-SCNC: 25 MMOL/L (ref 22–32)
CREAT BLD-MCNC: 0.43 MG/DL (ref 0.55–1.02)
EOSINOPHIL # BLD AUTO: 0.09 X10(3) UL (ref 0–0.3)
EOSINOPHIL NFR BLD AUTO: 1.8 %
ERYTHROCYTE [DISTWIDTH] IN BLOOD BY AUTOMATED COUNT: 13.6 % (ref 11.5–16)
GLOBULIN PLAS-MCNC: 3.9 G/DL (ref 2.5–4)
GLUCOSE BLD-MCNC: 86 MG/DL (ref 70–99)
HCT VFR BLD AUTO: 40 % (ref 34–50)
HGB BLD-MCNC: 12.7 G/DL (ref 12–16)
IMMATURE GRANULOCYTE COUNT: 0.01 X10(3) UL (ref 0–1)
IMMATURE GRANULOCYTE RATIO %: 0.2 %
LYMPHOCYTES # BLD AUTO: 2.16 X10(3) UL (ref 0.9–4)
LYMPHOCYTES NFR BLD AUTO: 42.8 %
M PROTEIN MFR SERPL ELPH: 6.8 G/DL (ref 6.1–8.3)
MCH RBC QN AUTO: 28.3 PG (ref 27–33.2)
MCHC RBC AUTO-ENTMCNC: 31.8 G/DL (ref 31–37)
MCV RBC AUTO: 89.3 FL (ref 81–100)
MONOCYTES # BLD AUTO: 0.5 X10(3) UL (ref 0.1–1)
MONOCYTES NFR BLD AUTO: 9.9 %
NEUTROPHIL ABS PRELIM: 2.27 X10 (3) UL (ref 1.3–6.7)
NEUTROPHILS # BLD AUTO: 2.27 X10(3) UL (ref 1.3–6.7)
NEUTROPHILS NFR BLD AUTO: 44.9 %
OSMOLALITY SERPL CALC.SUM OF ELEC: 297 MOSM/KG (ref 275–295)
PLATELET # BLD AUTO: 385 10(3)UL (ref 150–450)
POTASSIUM SERPL-SCNC: 3.7 MMOL/L (ref 3.6–5.1)
RBC # BLD AUTO: 4.48 X10(6)UL (ref 3.8–5.1)
RED CELL DISTRIBUTION WIDTH-SD: 44.7 FL (ref 35.1–46.3)
SODIUM SERPL-SCNC: 144 MMOL/L (ref 136–144)
T4 FREE SERPL-MCNC: 2.4 NG/DL (ref 0.9–1.8)
TSI SER-ACNC: <0.005 MIU/ML (ref 0.35–5.5)
WBC # BLD AUTO: 5.1 X10(3) UL (ref 4–13)

## 2018-08-21 PROCEDURE — 85025 COMPLETE CBC W/AUTO DIFF WBC: CPT

## 2018-08-21 PROCEDURE — 80053 COMPREHEN METABOLIC PANEL: CPT

## 2018-08-21 PROCEDURE — 36415 COLL VENOUS BLD VENIPUNCTURE: CPT

## 2018-08-21 PROCEDURE — 84439 ASSAY OF FREE THYROXINE: CPT

## 2018-08-21 PROCEDURE — 84443 ASSAY THYROID STIM HORMONE: CPT

## 2018-08-21 NOTE — PATIENT INSTRUCTIONS
[facility]    Dr. Bill Flores  [address]  [address]  [address] [facility] [address]  [address] [facility]

## 2018-08-21 NOTE — PROGRESS NOTES
[facility] Family Medicine Office Note  Chief Complaint:   Patient presents with:  Hand Pain: x1 month, right hand, middle finger, \"locks\"      HPI:   This is a 47year old female coming in for  HPI  Wt Readings from Last 6 Encounters:  08/21/1 (two) times daily. Disp: 20 capsule Rfl: 0   clarithromycin 500 MG Oral Tab Take 1 tablet (500 mg total) by mouth 2 (two) times daily. Disp: 20 tablet Rfl: 0   amoxicillin 500 MG Oral Cap Take 2 capsules (1,000 mg total) by mouth 2 (two) times daily.  Disp: following:    Height as of this encounter: 61\". Weight as of this encounter: 88 lb. Vital signs reviewed. Appears stated age, well groomed. Physical Exam   Constitutional: She is oriented to person, place, and time.  She appears well-developed and wel Take 1 tablet (0.5 mg total) by mouth 2 (two) times daily as needed for Anxiety. Diclofenac Sodium 1 % Transdermal Gel 100 g 0      Sig: Apply 2 g topically 4 (four) times daily. Patient/Caregiver Education: Patient/Caregiver Education:  Dottie Rosado

## 2018-08-23 ENCOUNTER — TELEPHONE (OUTPATIENT)
Dept: FAMILY MEDICINE CLINIC | Facility: CLINIC | Age: 53
End: 2018-08-23

## 2018-08-23 RX ORDER — PROPRANOLOL HYDROCHLORIDE 20 MG/1
20 TABLET ORAL 3 TIMES DAILY
Qty: 90 TABLET | Refills: 0 | Status: SHIPPED | OUTPATIENT
Start: 2018-08-23 | End: 2018-09-20

## 2018-08-23 NOTE — TELEPHONE ENCOUNTER
Home # rings busy-attempted several times. On HIPPA is only pts cell [phone] listed but message states it doesn't accept incoming calls. [employer] message sent to call the office.     Pt needs to see ANY endo that will accept her ins asap (within a

## 2018-08-23 NOTE — TELEPHONE ENCOUNTER
----- Message from Cole Paul MD sent at 8/23/2018  8:58 AM CDT -----  Results reviewed. Please inform patient she needs to start propylthiouracil - pt was seeing endocrinology - did she stop? Needs urgent follow up.  Repeat tsh and t4 in 4weeks  Also sta

## 2018-08-24 NOTE — TELEPHONE ENCOUNTER
Home # rings busy. Attempted pts cell [phone] listed but message states it doesn't accept incoming calls, number did not ring. Pt did read my [employer] message.   Se below:  From  Richie Brunner, RN To  Melinda Hudson and Delivered  8/23/2

## 2018-08-27 NOTE — TELEPHONE ENCOUNTER
Pt hasn't returned our call but is active on [employer] so I sent another message. \"Your thyroid labs are abnormal.  Dr Bon Higginbotham states you need to start Propylthiouracil and Propranolol for your hyperthyroidism.   The prescriptions were sent to your pharmac

## 2018-08-28 ENCOUNTER — TELEPHONE (OUTPATIENT)
Dept: FAMILY MEDICINE CLINIC | Facility: CLINIC | Age: 53
End: 2018-08-28

## 2018-08-28 NOTE — TELEPHONE ENCOUNTER
The patient is saying she is having a lot of chest tightness and is wondering if Dr. Deysi Goel will order an inhaler with steroid. The patient had a chest x-ray was done and said Dr. Deysi Goel has to be the one to release the results.        [facility] in [place]

## 2018-08-28 NOTE — TELEPHONE ENCOUNTER
From: David Giron     Sent: 8/27/2018 10:15 AM CDT       To: Luigi GOOD  Subject: RE:Abnormal Test results    So are the prescriptions being called in for me at [facility] if so I will pick them up today and start them right away thank you Mayra Goldstein

## 2018-08-28 NOTE — TELEPHONE ENCOUNTER
Pts number rings busy. I was trying to contact her for days (on another TE 8/23/18) PSR: If she calls back, please update all #s and then schedule her an appt. Ok to lync me if needed/any urgent matters.

## 2018-08-29 ENCOUNTER — PATIENT MESSAGE (OUTPATIENT)
Dept: FAMILY MEDICINE CLINIC | Facility: CLINIC | Age: 53
End: 2018-08-29

## 2018-08-29 ENCOUNTER — APPOINTMENT (OUTPATIENT)
Dept: LAB | Age: 53
End: 2018-08-29
Attending: FAMILY MEDICINE
Payer: MEDICAID

## 2018-08-29 ENCOUNTER — OFFICE VISIT (OUTPATIENT)
Dept: FAMILY MEDICINE CLINIC | Facility: CLINIC | Age: 53
End: 2018-08-29
Payer: MEDICAID

## 2018-08-29 VITALS
TEMPERATURE: 98 F | HEART RATE: 85 BPM | BODY MASS INDEX: 16.24 KG/M2 | HEIGHT: 61 IN | OXYGEN SATURATION: 98 % | WEIGHT: 86 LBS | RESPIRATION RATE: 16 BRPM | SYSTOLIC BLOOD PRESSURE: 110 MMHG | DIASTOLIC BLOOD PRESSURE: 76 MMHG

## 2018-08-29 DIAGNOSIS — R59.0 LYMPHADENOPATHY, MEDIASTINAL: ICD-10-CM

## 2018-08-29 DIAGNOSIS — E05.90 HYPERTHYROIDISM: ICD-10-CM

## 2018-08-29 DIAGNOSIS — J44.1 COPD WITH ACUTE EXACERBATION (HCC): Primary | ICD-10-CM

## 2018-08-29 LAB
CRP SERPL-MCNC: 0.36 MG/DL (ref ?–1)
LDH: 264 U/L (ref 84–246)
SED RATE-ML: 27 MM/HR (ref 0–25)

## 2018-08-29 PROCEDURE — 86140 C-REACTIVE PROTEIN: CPT

## 2018-08-29 PROCEDURE — 36415 COLL VENOUS BLD VENIPUNCTURE: CPT

## 2018-08-29 PROCEDURE — 99214 OFFICE O/P EST MOD 30 MIN: CPT | Performed by: NURSE PRACTITIONER

## 2018-08-29 PROCEDURE — 85652 RBC SED RATE AUTOMATED: CPT

## 2018-08-29 PROCEDURE — 83615 LACTATE (LD) (LDH) ENZYME: CPT

## 2018-08-29 PROCEDURE — 94640 AIRWAY INHALATION TREATMENT: CPT | Performed by: NURSE PRACTITIONER

## 2018-08-29 RX ORDER — IPRATROPIUM BROMIDE AND ALBUTEROL SULFATE 2.5; .5 MG/3ML; MG/3ML
3 SOLUTION RESPIRATORY (INHALATION) ONCE
Status: COMPLETED | OUTPATIENT
Start: 2018-08-29 | End: 2018-08-29

## 2018-08-29 RX ORDER — OMEGA-3 FATTY ACIDS CAP DELAYED RELEASE 1000 MG 1000 MG
CAPSULE DELAYED RELEASE ORAL
COMMUNITY
Start: 2018-01-16 | End: 2018-08-31 | Stop reason: ALTCHOICE

## 2018-08-29 RX ORDER — PREDNISONE 20 MG/1
40 TABLET ORAL DAILY
Qty: 10 TABLET | Refills: 0 | Status: SHIPPED | OUTPATIENT
Start: 2018-08-29 | End: 2018-09-03

## 2018-08-29 RX ORDER — IPRATROPIUM BROMIDE AND ALBUTEROL SULFATE 2.5; .5 MG/3ML; MG/3ML
3 SOLUTION RESPIRATORY (INHALATION) EVERY 6 HOURS PRN
Qty: 75 VIAL | Refills: 2 | Status: SHIPPED | OUTPATIENT
Start: 2018-08-29 | End: 2018-09-28

## 2018-08-29 RX ORDER — BUDESONIDE AND FORMOTEROL FUMARATE DIHYDRATE 160; 4.5 UG/1; UG/1
2 AEROSOL RESPIRATORY (INHALATION) 2 TIMES DAILY
Qty: 10.2 INHALER | Refills: 2 | Status: SHIPPED | OUTPATIENT
Start: 2018-08-29 | End: 2018-09-28

## 2018-08-29 RX ORDER — BUDESONIDE AND FORMOTEROL FUMARATE DIHYDRATE 160; 4.5 UG/1; UG/1
2 AEROSOL RESPIRATORY (INHALATION) 2 TIMES DAILY
Qty: 3 INHALER | Refills: 0 | COMMUNITY
Start: 2018-08-29 | End: 2018-11-28

## 2018-08-29 RX ADMIN — IPRATROPIUM BROMIDE AND ALBUTEROL SULFATE 3 ML: 2.5; .5 SOLUTION RESPIRATORY (INHALATION) at 15:23:00

## 2018-08-29 NOTE — PROGRESS NOTES
[facility]   PROGRESS NOTE  Chief Complaint:   Patient presents with:  Cough: started about a week ago       HPI:   This is a 47year old female coming in for cough and congestion    Cough: Patient 47 y female presents with worsening productive c -American 135 >=60   AST 24 15 - 41 U/L   Alt 33 14 - 54 U/L   Alkaline Phosphatase 97 41 - 108 U/L   Bilirubin, Total 0.3 0.1 - 2.0 mg/dL   Total Protein 6.8 6.1 - 8.3 g/dL   Albumin 2.9 (L) 3.5 - 4.8 g/dL   Globulin  3.9 2.5 - 4.0 g/dL   A/G Ratio Outpatient Prescriptions:  Omega-3 Fatty Acids (FISH OIL) 1000 MG Oral Capsule Delayed Release  Disp:  Rfl:    propylthiouracil 50 MG Oral Tab Take 2 tablets (100 mg total) by mouth 3 (three) times daily.  Disp: 180 tablet Rfl: 0   Propranolol HCl 20 MG Ora Height as of this encounter: 61\". Weight as of this encounter: 86 lb. Vital signs reviewed. Appears stated age, well groomed. Physical Exam:  GEN:  Patient is alert, awake and oriented, thin , in  no apparent distress.   HEENT:  Head:  Normocephalic, Budesonide-Formoterol Fumarate (SYMBICORT) 160-4.5 MCG/ACT Inhalation Aerosol; Inhale 2 puffs into the lungs 2 (two) times daily.         Meds & Refills for this Visit:    Signed Prescriptions Disp Refills    predniSONE 20 MG Oral Tab 10 tablet 0      S

## 2018-08-29 NOTE — PATIENT INSTRUCTIONS
Rebekah Stauffer MD   Hematology/Medical Oncology         Phone: [address] [facility] [place] [address] [place] [address]   Hyperthyroidism    You have hyperthyroidism.  This means you have a thyroid directed. · Take your medicine at the same times each day. · To decrease the chance of drug interactions, check with your pharmacist before using over-the-counter medicines with your prescribed medicines.   · Use a pillbox labeled with the days of the wee symptoms most of the time. In a flare-up, your symptoms get worse.  These symptoms may mean you are having a flare-up:  · Shortness of breath, shallow or rapid breathing, or wheezing that gets worse  · Lung infection  · Cough that gets worse  · More mucus, with your doctor about getting a flu shot every year. Also find out if you need a pneumonia shot. · If there is a weather advisory warning to stay indoors, try to stay inside when possible. · Try to eat healthy and get plenty of sleep.   · Try to avoid th

## 2018-08-29 NOTE — TELEPHONE ENCOUNTER
Pt called back and Felisa updated her numbers. Pt has a very hoarse voice, frequent coughing-its wet but unable to cough anything up due to chest tightness, feels ventolin isn't helping. Pt states she has been sick for 4-5 days.   Appt made for 2:40pm with

## 2018-08-29 NOTE — TELEPHONE ENCOUNTER
CT results reviewed  1. Enlarged thoracic LN - concerning may need biopsy   Refer to Heme    2.  Pt with signs of COPD   With smoking hx - will likely need inhaled steroid or ICS/LABA  Current sx may be COPD exacerbation  abx and oral steroids might be need

## 2018-08-29 NOTE — TELEPHONE ENCOUNTER
Pt was seen today and states she is calling Dr Arianna Sellers for an appt,  If he cant get her in in a timely manner, pt will call us to see if we can move it up.

## 2018-08-29 NOTE — TELEPHONE ENCOUNTER
Discussed with Michael Ford and she discussed all below with pt. Pt will call Dr Jasmin Juárez and make an appt. She will let us know if she can not get in in a timely matter and we can try if needed.

## 2018-08-29 NOTE — TELEPHONE ENCOUNTER
Home [phone] # on snap shot rings busy. 331# on HIPAA does not accept incoming calls. [employer] message sent as pt does read and respond thru [employer]:    \"Clemencia,   We have no good contact numbers for you.   [phone] rings busy and your [phone] does n

## 2018-08-30 ENCOUNTER — PATIENT MESSAGE (OUTPATIENT)
Dept: FAMILY MEDICINE CLINIC | Facility: CLINIC | Age: 53
End: 2018-08-30

## 2018-08-30 NOTE — TELEPHONE ENCOUNTER
From: Bárbara Fisher  To: Evin Hernandez MD  Sent: 8/29/2018 11:45 PM CDT  Subject: Test Results Question    Do the results of my CT scan indicate that I possibly have ovarian cancer?  I'm super scared and extremely worried about the results especially b

## 2018-08-30 NOTE — TELEPHONE ENCOUNTER
Patient sent message looking for CT chest/abdomen results ordered by [facility] (appears they have already relayed to her).  I referred her back to him but patient insistent that she is supposed to talk to you about the results regarding the chest. Please a

## 2018-08-30 NOTE — TELEPHONE ENCOUNTER
Spoke with BRYSON Hermosillo to call for sooner appointment. I called the office and rescheduled patient for September 6th at 11:00-[place] location. Will notify patient.

## 2018-08-30 NOTE — TELEPHONE ENCOUNTER
Patient called and is concern, worried and upset that she is unable to get the full report of her CT chest and abdomen from the doctor that ordered the test. Patient states that she is still waiting for the CT chest results she said that she was told to co

## 2018-08-30 NOTE — TELEPHONE ENCOUNTER
From: Skyler Galindo  To: Nicki Amanda MD  Sent: 8/30/2018 7:36 AM CDT  Subject: Test Results Question    wow no communication whatsoever between you doctors but who suffers in this scenario the patient.  I'm sick but I don't know how sick because my d

## 2018-08-30 NOTE — TELEPHONE ENCOUNTER
Patient has OV on 9/13 with  at the [facility]. Does patient need a sooner appointment than that? Please advise, thanks.

## 2018-08-30 NOTE — TELEPHONE ENCOUNTER
Discussed with . CT scan was discussed at her OV yesterday w/ Devan Morgan 8/29/18. She will message patient.

## 2018-08-30 NOTE — TELEPHONE ENCOUNTER
"Pt c/o low back pain radiates to groin onset today after lifting boxes weighing over 50 #.Pt. States,\" I  felt a pop like an explosion to my lower back and the pain is not getting any better. Pt states he has hx of DDD and bulging disc and is seen by Gigi Hoyos. Pt states he has an appt with pain mgt next week and he just needs something to help with the pain today. Denies loss of B&B, paralsyis, does c/O: paraesthesia to right thigh region , skin color WNL, w/d, and pulses present to BLE no edema noted.     Anitha Ling RN  10/17/17 0087    " See my chart message 8/30/18.

## 2018-09-05 ENCOUNTER — PATIENT MESSAGE (OUTPATIENT)
Dept: FAMILY MEDICINE CLINIC | Facility: CLINIC | Age: 53
End: 2018-09-05

## 2018-09-05 ENCOUNTER — TELEPHONE (OUTPATIENT)
Dept: FAMILY MEDICINE CLINIC | Facility: CLINIC | Age: 53
End: 2018-09-05

## 2018-09-05 NOTE — TELEPHONE ENCOUNTER
Gina Chávez MD Physician Signed  Creation Time: 09/05/2018 12:10 PM   Bookmark Copy       Notify patient due to missing appointments she cannot be seen by Dr. Lotus Horton - she needs to find an endocrinologist - she should call insurance co to find provider

## 2018-09-06 ENCOUNTER — OFFICE VISIT (OUTPATIENT)
Dept: HEMATOLOGY/ONCOLOGY | Age: 53
End: 2018-09-06
Attending: NURSE PRACTITIONER
Payer: MEDICAID

## 2018-09-06 VITALS
HEART RATE: 62 BPM | WEIGHT: 90 LBS | SYSTOLIC BLOOD PRESSURE: 125 MMHG | BODY MASS INDEX: 17 KG/M2 | TEMPERATURE: 97 F | DIASTOLIC BLOOD PRESSURE: 75 MMHG | OXYGEN SATURATION: 99 % | RESPIRATION RATE: 16 BRPM

## 2018-09-06 DIAGNOSIS — R93.89 ABNORMAL CT OF THE CHEST: Primary | ICD-10-CM

## 2018-09-06 PROCEDURE — 99245 OFF/OP CONSLTJ NEW/EST HI 55: CPT | Performed by: INTERNAL MEDICINE

## 2018-09-06 NOTE — TELEPHONE ENCOUNTER
RNs  Please call this patient and ask her to cancel the appointment on September 28. I will not be able to see her. If there is any way to communicate with the scheduling supervisors to not make an appointment with me for her, I would appreciated.   She w

## 2018-09-06 NOTE — TELEPHONE ENCOUNTER
From: Otoniel Lara  To: Devan Dowell MD  Sent: 9/5/2018 5:13 PM CDT  Subject: Other    I just wanted to let Hannah Chávez know that I spoke to Dr Kb Atkinson and I only had one cancellation and they are going to see me on September 28th that was a mis

## 2018-09-06 NOTE — CONSULTS
Cancer Center Report of Consultation    Patient Name: Brenda Velasquez   YOB: 1965   Medical Record Number: [id number]   CSN: [id number]   Consulting Physician: Kath Medina M.D.    Referring Physician: Renuka Newton    Date of Consult status:   Spouse name: N/A    Years of education: N/A  Number of children: N/A     Occupational History  None on file     Social History Main Topics   Smoking status: Current Every Day Smoker  0.50 Packs/day  For 40.00 Years     Last attempt to [facility] Shortness of Breath., Disp: 1 Inhaler, Rfl: 0    Allergies:    Gluten Flour            OTHER (SEE COMMENTS)    Comment:Celiac disease  Gluten Meal             OTHER (SEE COMMENTS)    Comment:Celiac disease  Shrimp                  RASH     Review of System Hematologic/Lymphatic Normal - No petechiae or purpura. No tender or palpable lymph nodes in the cervical, supraclavicular, axillary or inguinal area. Respiratory Normal - Lungs are clear to auscultation without rhonchi or wheezing.    Cardiovascular N 0.1 - 2.0 mg/dL 0.3    GLOBULIN, TOTAL Latest Ref Range: 2.5 - 4.0 g/dL 3.9    LDH Latest Ref Range: 84 - 246 U/L  264 (H)   A/G RATIO Latest Ref Range: 1.0 - 2.0  0.7 (L)    TOTAL PROTEIN Latest Ref Range: 6.1 - 8.3 g/dL 6.8    Albumin Latest Ref Range: 3 calcifications, accompanied by mild-moderate thymic hyperplasia,  without discrete hyperenhancing thymic lesions; detailed laboratory correlation is recommended,  including the complete blood count and the serum ESR, CRP, and LDH levels, as the CT differen etiology. I reviewed the images with the patient. While the nodes are prominent, there is no uli adenopathy. These changes are likely secondary to inflammation. None of the nodes are large enough to biopsy.   At this point, with her overall health imp

## 2018-09-10 ENCOUNTER — ANESTHESIA EVENT (OUTPATIENT)
Dept: ENDOSCOPY | Facility: HOSPITAL | Age: 53
End: 2018-09-10
Payer: MEDICAID

## 2018-09-11 ENCOUNTER — HOSPITAL ENCOUNTER (OUTPATIENT)
Facility: HOSPITAL | Age: 53
Setting detail: HOSPITAL OUTPATIENT SURGERY
Discharge: HOME | End: 2018-09-11
Attending: INTERNAL MEDICINE | Admitting: INTERNAL MEDICINE
Payer: MEDICAID

## 2018-09-11 ENCOUNTER — ANESTHESIA (OUTPATIENT)
Dept: ENDOSCOPY | Facility: HOSPITAL | Age: 53
End: 2018-09-11
Payer: MEDICAID

## 2018-09-11 VITALS
HEART RATE: 59 BPM | WEIGHT: 90 LBS | HEIGHT: 61 IN | OXYGEN SATURATION: 99 % | BODY MASS INDEX: 16.99 KG/M2 | TEMPERATURE: 98 F | DIASTOLIC BLOOD PRESSURE: 85 MMHG | SYSTOLIC BLOOD PRESSURE: 111 MMHG | RESPIRATION RATE: 16 BRPM

## 2018-09-11 DIAGNOSIS — Z86.010 HISTORY OF COLON POLYPS: ICD-10-CM

## 2018-09-11 DIAGNOSIS — B96.81 CHRONIC GASTRIC ULCER DUE TO HELICOBACTER PYLORI: ICD-10-CM

## 2018-09-11 DIAGNOSIS — K25.7 CHRONIC GASTRIC ULCER DUE TO HELICOBACTER PYLORI: ICD-10-CM

## 2018-09-11 PROCEDURE — 0DBN8ZX EXCISION OF SIGMOID COLON, VIA NATURAL OR ARTIFICIAL OPENING ENDOSCOPIC, DIAGNOSTIC: ICD-10-PCS | Performed by: INTERNAL MEDICINE

## 2018-09-11 PROCEDURE — 88305 TISSUE EXAM BY PATHOLOGIST: CPT | Performed by: INTERNAL MEDICINE

## 2018-09-11 PROCEDURE — 3E0H8GC INTRODUCTION OF OTHER THERAPEUTIC SUBSTANCE INTO LOWER GI, VIA NATURAL OR ARTIFICIAL OPENING ENDOSCOPIC: ICD-10-PCS | Performed by: INTERNAL MEDICINE

## 2018-09-11 RX ORDER — NALOXONE HYDROCHLORIDE 0.4 MG/ML
80 INJECTION, SOLUTION INTRAMUSCULAR; INTRAVENOUS; SUBCUTANEOUS AS NEEDED
Status: DISCONTINUED | OUTPATIENT
Start: 2018-09-11 | End: 2018-09-11

## 2018-09-11 RX ORDER — MORPHINE SULFATE 4 MG/ML
2 INJECTION, SOLUTION INTRAMUSCULAR; INTRAVENOUS EVERY 5 MIN PRN
Status: DISCONTINUED | OUTPATIENT
Start: 2018-09-11 | End: 2018-09-11

## 2018-09-11 RX ORDER — SODIUM CHLORIDE, SODIUM LACTATE, POTASSIUM CHLORIDE, CALCIUM CHLORIDE 600; 310; 30; 20 MG/100ML; MG/100ML; MG/100ML; MG/100ML
INJECTION, SOLUTION INTRAVENOUS CONTINUOUS
Status: DISCONTINUED | OUTPATIENT
Start: 2018-09-11 | End: 2018-09-11

## 2018-09-11 NOTE — H&P
History & Physical Examination    Patient Name: Brenda Velasquez  MRN: [id number]  [facility]: [id number]  YOB: 1965    Diagnosis: History of colon polyps [Z86.010]  Chronic gastric ulcer due to Helicobacter pylori [W41.8, B96.81]      Present Illn • Cancer Paternal Grandmother         colon cancer   • Breast Cancer Paternal Grandmother      Social History    Tobacco Use      Smoking status: Current Every Day Smoker        Packs/day: 0.50        Years: 40.00        Pack years: 21        Quit date:

## 2018-09-11 NOTE — ANESTHESIA PREPROCEDURE EVALUATION
PRE-OP EVALUATION    Patient Name: Otoniel Lara    Pre-op Diagnosis: History of colon polyps [Z86.010]  Chronic gastric ulcer due to Helicobacter pylori [E63.7, B96.81]    Procedure(s):  Flexible Sigmoidoscopy    Surgeon(s) and Role:     * Magui Crews GI/Hepatic/Renal      (+) GERD                           Cardiovascular    Negative cardiovascular ROS. ECG reviewed.   Exercise tolerance: good     MET: >4                                           Endo/Other             (+) hyperthyroidism Risks and plan d/w pt - all questions answered.     Plan/risks discussed with: patient                Present on Admission:  **None**

## 2018-09-11 NOTE — OPERATIVE REPORT
OPERATIVE REPORT   PATIENT NAME: Tere Solano  MRN: [id number]  DATE OF OPERATION: 9/11/2018  PREOPERATIVE DIAGNOSIS: History of tubular adenoma with high-grade dysplasia; here for anoscopic mucosal resection  POSTOPERATIVE DIAGNOSES   1. Large 1.5 cm grasped with a small 13 mm hexagonal Tubular snare and removed with hot snare technique. The polyp was retrieved in 1 piece by securing it within the over the scope.   A smaller 8 mm polyp just distal to the previous polyp was removed with cold snare techn

## 2018-09-11 NOTE — ANESTHESIA POSTPROCEDURE EVALUATION
[facility] Patient Status:  Hospital Outpatient Surgery   Age/Gender 47year old female MRN [id number]   [facility] ENDOSCOPY Attending Liang Bañuelos MD   Hosp Day # 0 PCP Iqra Hodgson MD       Anesthesia Post-

## 2018-09-13 ENCOUNTER — TELEPHONE (OUTPATIENT)
Dept: FAMILY MEDICINE CLINIC | Facility: CLINIC | Age: 53
End: 2018-09-13

## 2018-09-13 ENCOUNTER — APPOINTMENT (OUTPATIENT)
Dept: HEMATOLOGY/ONCOLOGY | Age: 53
End: 2018-09-13
Attending: INTERNAL MEDICINE
Payer: MEDICAID

## 2018-09-13 DIAGNOSIS — E05.90 HYPERTHYROIDISM: Primary | ICD-10-CM

## 2018-09-20 ENCOUNTER — PATIENT MESSAGE (OUTPATIENT)
Dept: FAMILY MEDICINE CLINIC | Facility: CLINIC | Age: 53
End: 2018-09-20

## 2018-09-20 RX ORDER — PROPRANOLOL HYDROCHLORIDE 20 MG/1
20 TABLET ORAL 3 TIMES DAILY
Qty: 90 TABLET | Refills: 1 | Status: SHIPPED | OUTPATIENT
Start: 2018-09-20 | End: 2018-11-01 | Stop reason: ALTCHOICE

## 2018-09-20 RX ORDER — BUSPIRONE HYDROCHLORIDE 5 MG/1
5 TABLET ORAL 3 TIMES DAILY
Qty: 90 TABLET | Refills: 0 | Status: SHIPPED | OUTPATIENT
Start: 2018-09-20 | End: 2018-10-29

## 2018-09-20 RX ORDER — BUDESONIDE AND FORMOTEROL FUMARATE DIHYDRATE 160; 4.5 UG/1; UG/1
2 AEROSOL RESPIRATORY (INHALATION) 2 TIMES DAILY
Qty: 3 INHALER | Refills: 0 | OUTPATIENT
Start: 2018-09-20

## 2018-09-20 RX ORDER — PROPYLTHIOURACIL 50 MG/1
100 TABLET ORAL 3 TIMES DAILY
Qty: 180 TABLET | Refills: 1 | Status: SHIPPED | OUTPATIENT
Start: 2018-09-20 | End: 2018-09-28

## 2018-09-20 NOTE — TELEPHONE ENCOUNTER
Please read pt message below. Pt will be running out of propylthiouracil 50 and Propranolol HCl 20 soon. Please advise.  Thank you

## 2018-09-20 NOTE — TELEPHONE ENCOUNTER
Medication(s) to Refill:   Requested Prescriptions     Pending Prescriptions Disp Refills   • BusPIRone HCl 5 MG Oral Tab 90 tablet 0     Sig: Take 1 tablet (5 mg total) by mouth 3 (three) times daily.          Reason for Medication Refill being sent to [facility]

## 2018-09-20 NOTE — TELEPHONE ENCOUNTER
From: Ashvin Madison  To: Mario Hughes MD  Sent: 9/20/2018 5:47 AM CDT  Subject: Prescription Question    I was wondering about the two thyroid medications I was prescribed they are almost gone and I don't see the thyroid doctor until the 28th and one

## 2018-09-25 ENCOUNTER — LAB ENCOUNTER (OUTPATIENT)
Dept: LAB | Age: 53
End: 2018-09-25
Attending: FAMILY MEDICINE
Payer: MEDICAID

## 2018-09-25 DIAGNOSIS — R63.4 WEIGHT LOSS: ICD-10-CM

## 2018-09-25 DIAGNOSIS — R93.89 ABNORMAL CT OF THE CHEST: ICD-10-CM

## 2018-09-25 DIAGNOSIS — E05.90 HYPERTHYROIDISM: ICD-10-CM

## 2018-09-25 LAB
ALBUMIN SERPL-MCNC: 3.1 G/DL (ref 3.5–4.8)
ALBUMIN/GLOB SERPL: 0.8 {RATIO} (ref 1–2)
ALP LIVER SERPL-CCNC: 158 U/L (ref 41–108)
ALT SERPL-CCNC: 44 U/L (ref 14–54)
AMPHET UR QL SCN: NEGATIVE
ANION GAP SERPL CALC-SCNC: 9 MMOL/L (ref 0–18)
AST SERPL-CCNC: 25 U/L (ref 15–41)
BARBITURATES UR QL SCN: NEGATIVE
BASOPHILS # BLD AUTO: 0.03 X10(3) UL (ref 0–0.1)
BASOPHILS NFR BLD AUTO: 0.3 %
BENZODIAZ UR QL SCN: NEGATIVE
BILIRUB SERPL-MCNC: 0.2 MG/DL (ref 0.1–2)
BUN BLD-MCNC: 8 MG/DL (ref 8–20)
BUN/CREAT SERPL: 14.8 (ref 10–20)
CALCIUM BLD-MCNC: 8.4 MG/DL (ref 8.3–10.3)
CANNABINOIDS UR QL SCN: NEGATIVE
CHLORIDE SERPL-SCNC: 108 MMOL/L (ref 101–111)
CO2 SERPL-SCNC: 23 MMOL/L (ref 22–32)
COCAINE UR QL: NEGATIVE
CREAT BLD-MCNC: 0.54 MG/DL (ref 0.55–1.02)
EOSINOPHIL # BLD AUTO: 0.22 X10(3) UL (ref 0–0.3)
EOSINOPHIL NFR BLD AUTO: 2.5 %
ERYTHROCYTE [DISTWIDTH] IN BLOOD BY AUTOMATED COUNT: 14.6 % (ref 11.5–16)
ETHANOL UR-MCNC: NEGATIVE MG/DL
GLOBULIN PLAS-MCNC: 4.1 G/DL (ref 2.5–4)
GLUCOSE BLD-MCNC: 91 MG/DL (ref 70–99)
HCT VFR BLD AUTO: 38.3 % (ref 34–50)
HGB BLD-MCNC: 12.6 G/DL (ref 12–16)
IMMATURE GRANULOCYTE COUNT: 0.02 X10(3) UL (ref 0–1)
IMMATURE GRANULOCYTE RATIO %: 0.2 %
LDH: 201 U/L (ref 84–246)
LYMPHOCYTES # BLD AUTO: 4.2 X10(3) UL (ref 0.9–4)
LYMPHOCYTES NFR BLD AUTO: 48.1 %
M PROTEIN MFR SERPL ELPH: 7.2 G/DL (ref 6.1–8.3)
MCH RBC QN AUTO: 28.6 PG (ref 27–33.2)
MCHC RBC AUTO-ENTMCNC: 32.9 G/DL (ref 31–37)
MCV RBC AUTO: 87 FL (ref 81–100)
MONOCYTES # BLD AUTO: 0.57 X10(3) UL (ref 0.1–1)
MONOCYTES NFR BLD AUTO: 6.5 %
NEUTROPHIL ABS PRELIM: 3.7 X10 (3) UL (ref 1.3–6.7)
NEUTROPHILS # BLD AUTO: 3.7 X10(3) UL (ref 1.3–6.7)
NEUTROPHILS NFR BLD AUTO: 42.4 %
OPIATE URINE: NEGATIVE
OSMOLALITY SERPL CALC.SUM OF ELEC: 288 MOSM/KG (ref 275–295)
PCP URINE: NEGATIVE
PLATELET # BLD AUTO: 343 10(3)UL (ref 150–450)
POTASSIUM SERPL-SCNC: 3.6 MMOL/L (ref 3.6–5.1)
RBC # BLD AUTO: 4.4 X10(6)UL (ref 3.8–5.1)
RED CELL DISTRIBUTION WIDTH-SD: 46.4 FL (ref 35.1–46.3)
SODIUM SERPL-SCNC: 140 MMOL/L (ref 136–144)
T4 FREE SERPL-MCNC: 0.8 NG/DL (ref 0.9–1.8)
TSI SER-ACNC: <0.005 MIU/ML (ref 0.35–5.5)
WBC # BLD AUTO: 8.7 X10(3) UL (ref 4–13)

## 2018-09-25 PROCEDURE — 85025 COMPLETE CBC W/AUTO DIFF WBC: CPT

## 2018-09-25 PROCEDURE — 83615 LACTATE (LD) (LDH) ENZYME: CPT

## 2018-09-25 PROCEDURE — 84443 ASSAY THYROID STIM HORMONE: CPT

## 2018-09-25 PROCEDURE — 84439 ASSAY OF FREE THYROXINE: CPT

## 2018-09-25 PROCEDURE — 80307 DRUG TEST PRSMV CHEM ANLYZR: CPT

## 2018-09-25 PROCEDURE — 80053 COMPREHEN METABOLIC PANEL: CPT

## 2018-09-25 PROCEDURE — 36415 COLL VENOUS BLD VENIPUNCTURE: CPT

## 2018-09-26 ENCOUNTER — TELEPHONE (OUTPATIENT)
Dept: FAMILY MEDICINE CLINIC | Facility: CLINIC | Age: 53
End: 2018-09-26

## 2018-09-26 DIAGNOSIS — E05.90 HYPERTHYROIDISM: Primary | ICD-10-CM

## 2018-09-26 NOTE — TELEPHONE ENCOUNTER
----- Message from Grecia Carlton MD sent at 9/25/2018  5:07 PM CDT -----  Ok to stop propranolol - t4 improving  Repeat tsh and t4 in 6 weeks

## 2018-09-26 NOTE — TELEPHONE ENCOUNTER
Unable to reach pt on any number listed in pt's chart. mychart msg sent. Will melissa for follow up. Repeat lab placed for 6 week recheck.

## 2018-09-28 PROBLEM — E04.1 THYROID NODULE: Status: ACTIVE | Noted: 2018-09-28

## 2018-09-28 PROBLEM — E05.90 HYPERTHYROIDISM: Status: ACTIVE | Noted: 2018-09-28

## 2018-10-01 NOTE — TELEPHONE ENCOUNTER
OK for patient to forgo 6 week recheck of thyroid labs as instructed by you? She saw . He said he will manage her condition and wants her to have her levels rechecked in 3 months. Please advise, thanks.

## 2018-10-07 NOTE — PROGRESS NOTES
Your flexible sigmoidoscopy a polyp in the colon that was removed completely. Here are the  biopsy/pathology findings from your recent Colonoscopy : Adenomatous polyp(s) was(were) found in the colon.   These polyps are benign but can carry cancer risk

## 2018-10-08 ENCOUNTER — PATIENT MESSAGE (OUTPATIENT)
Dept: FAMILY MEDICINE CLINIC | Facility: CLINIC | Age: 53
End: 2018-10-08

## 2018-10-08 NOTE — TELEPHONE ENCOUNTER
From: Te Carlos  To: Johnie Conn MD  Sent: 10/8/2018 7:41 AM CDT  Subject: Test Results Question    I got a message saying that there was a new test results available to me and I don't see it on here.  I also want to know if I can get the test re

## 2018-10-30 RX ORDER — BUSPIRONE HYDROCHLORIDE 5 MG/1
TABLET ORAL
Qty: 90 TABLET | Refills: 2 | Status: SHIPPED | OUTPATIENT
Start: 2018-10-30 | End: 2018-12-17

## 2018-10-31 ENCOUNTER — HOSPITAL ENCOUNTER (OUTPATIENT)
Dept: CT IMAGING | Age: 53
Discharge: HOME | End: 2018-10-31
Attending: INTERNAL MEDICINE
Payer: MEDICAID

## 2018-10-31 DIAGNOSIS — R93.89 ABNORMAL CT OF THE CHEST: ICD-10-CM

## 2018-10-31 PROCEDURE — 82565 ASSAY OF CREATININE: CPT

## 2018-10-31 PROCEDURE — 71260 CT THORAX DX C+: CPT | Performed by: INTERNAL MEDICINE

## 2018-11-01 ENCOUNTER — OFFICE VISIT (OUTPATIENT)
Dept: HEMATOLOGY/ONCOLOGY | Age: 53
End: 2018-11-01
Attending: NURSE PRACTITIONER
Payer: MEDICAID

## 2018-11-01 VITALS
BODY MASS INDEX: 19 KG/M2 | SYSTOLIC BLOOD PRESSURE: 115 MMHG | RESPIRATION RATE: 16 BRPM | TEMPERATURE: 96 F | DIASTOLIC BLOOD PRESSURE: 80 MMHG | HEART RATE: 74 BPM | WEIGHT: 98.38 LBS | OXYGEN SATURATION: 99 %

## 2018-11-01 DIAGNOSIS — R59.0 LYMPHADENOPATHY, MEDIASTINAL: Primary | ICD-10-CM

## 2018-11-01 DIAGNOSIS — D72.820 LYMPHOCYTOSIS: ICD-10-CM

## 2018-11-01 PROCEDURE — 99214 OFFICE O/P EST MOD 30 MIN: CPT | Performed by: INTERNAL MEDICINE

## 2018-11-01 NOTE — PROGRESS NOTES
Cancer Center Progress Note  Patient Name: Philmore Sandifer   YOB: 1965   Medical Record Number: [id number]   CSN: [id number]   Attending Physician: Maricel Ledbetter M.D.        Date of Visit: 11/1/2018     Chief Complaint:  Patient pres lung cancer, brain cancer   • Breast Cancer Maternal Grandmother    • Cancer Paternal Grandmother         colon cancer   • Breast Cancer Paternal Grandmother        Social History:  Social History    Socioeconomic History      Marital status:  Sulfate HFA (VENTOLIN HFA) 108 (90 Base) MCG/ACT Inhalation Aero Soln, Inhale 2 puffs into the lungs every 4 (four) hours as needed for Wheezing or Shortness of Breath., Disp: 1 Inhaler, Rfl: 0    Allergies:    Gluten Flour            OTHER (SEE COMMENTS) murmurs, gallops or rubs. Abdomen Normal - Non-tender, non-distended, no masses, ascites or hepatosplenomegaly. Extremities Normal - No visible deformities, no cyanosis, clubbing or edema.     Integumentary Normal - No rashes, No Jaundice   Neurologic lymphadenopathy or residual thymic tissue. Multiple nonenlarged AP window and peritracheal lymph nodes are noted. CARDIAC:  No enlargement, pericardial thickening, or significant calcification. PLEURA:  No mass or effusion.     THORACIC AORTA:  No aneury

## 2018-11-07 ENCOUNTER — TELEPHONE (OUTPATIENT)
Dept: HEMATOLOGY/ONCOLOGY | Facility: HOSPITAL | Age: 53
End: 2018-11-07

## 2018-11-07 NOTE — TELEPHONE ENCOUNTER
Pt called wanting to know test results, let her know some are still in process. Pt would like to discuss once they are back.

## 2018-11-27 ENCOUNTER — PATIENT MESSAGE (OUTPATIENT)
Dept: FAMILY MEDICINE CLINIC | Facility: CLINIC | Age: 53
End: 2018-11-27

## 2018-11-27 DIAGNOSIS — Z87.891 HISTORY OF SMOKING: ICD-10-CM

## 2018-11-27 DIAGNOSIS — R05.9 COUGHING: ICD-10-CM

## 2018-11-27 DIAGNOSIS — Z91.09 ENVIRONMENTAL ALLERGIES: ICD-10-CM

## 2018-11-28 ENCOUNTER — TELEPHONE (OUTPATIENT)
Dept: FAMILY MEDICINE CLINIC | Facility: CLINIC | Age: 53
End: 2018-11-28

## 2018-11-28 RX ORDER — ALBUTEROL SULFATE 90 UG/1
2 AEROSOL, METERED RESPIRATORY (INHALATION) EVERY 4 HOURS PRN
Qty: 1 INHALER | Refills: 0 | Status: SHIPPED | OUTPATIENT
Start: 2018-11-28 | End: 2019-10-16 | Stop reason: ALTCHOICE

## 2018-11-28 RX ORDER — BUSPIRONE HYDROCHLORIDE 5 MG/1
5 TABLET ORAL 3 TIMES DAILY
Qty: 90 TABLET | Refills: 2 | OUTPATIENT
Start: 2018-11-28

## 2018-11-28 RX ORDER — BUDESONIDE AND FORMOTEROL FUMARATE DIHYDRATE 160; 4.5 UG/1; UG/1
2 AEROSOL RESPIRATORY (INHALATION) 2 TIMES DAILY
Qty: 3 INHALER | Refills: 0 | OUTPATIENT
Start: 2018-11-28

## 2018-11-28 RX ORDER — BUDESONIDE AND FORMOTEROL FUMARATE DIHYDRATE 160; 4.5 UG/1; UG/1
2 AEROSOL RESPIRATORY (INHALATION) 2 TIMES DAILY
Qty: 1 INHALER | Refills: 0 | Status: SHIPPED | OUTPATIENT
Start: 2018-11-28 | End: 2018-12-29

## 2018-11-28 NOTE — TELEPHONE ENCOUNTER
From: Chika Humphreys  To: Rome Ortega MD  Sent: 11/27/2018 10:51 PM CST  Subject: Prescription Question    Am I able to get a rescue inhaler I really need one and I see the albuterol it's saying that you can't refill it or if I can even get some Albu

## 2018-11-28 NOTE — TELEPHONE ENCOUNTER
Medication(s) to Refill:   Requested Prescriptions     Pending Prescriptions Disp Refills   • BusPIRone HCl 5 MG Oral Tab 90 tablet 2     Sig: Take 1 tablet (5 mg total) by mouth 3 (three) times daily.          Reason for Medication Refill being sent to [facility]

## 2018-11-28 NOTE — TELEPHONE ENCOUNTER
Flores Ruba     11/28/18 1:33 PM   Note      See TE 11/28/18 patient called back. She has no car to come in and just wants a refill. Pt states she has emphysema. Please advise.

## 2018-11-30 ENCOUNTER — TELEPHONE (OUTPATIENT)
Dept: FAMILY MEDICINE CLINIC | Facility: CLINIC | Age: 53
End: 2018-11-30

## 2018-11-30 NOTE — TELEPHONE ENCOUNTER
Prior authorization was approved. Called pharmacy and let them know they reran it and it went through. They will fill the medication and call the patient when it is ready for .

## 2018-12-17 ENCOUNTER — OFFICE VISIT (OUTPATIENT)
Dept: FAMILY MEDICINE CLINIC | Facility: CLINIC | Age: 53
End: 2018-12-17
Payer: MEDICAID

## 2018-12-17 VITALS
BODY MASS INDEX: 19.63 KG/M2 | SYSTOLIC BLOOD PRESSURE: 110 MMHG | DIASTOLIC BLOOD PRESSURE: 60 MMHG | HEIGHT: 61 IN | TEMPERATURE: 98 F | RESPIRATION RATE: 16 BRPM | OXYGEN SATURATION: 99 % | HEART RATE: 58 BPM | WEIGHT: 104 LBS

## 2018-12-17 DIAGNOSIS — Z13.21 SCREENING FOR ENDOCRINE, NUTRITIONAL, METABOLIC AND IMMUNITY DISORDER: ICD-10-CM

## 2018-12-17 DIAGNOSIS — Z82.69 FAMILY HISTORY OF SCLERODERMA: ICD-10-CM

## 2018-12-17 DIAGNOSIS — G89.29 CHRONIC JOINT PAIN: ICD-10-CM

## 2018-12-17 DIAGNOSIS — Z13.29 SCREENING FOR ENDOCRINE, NUTRITIONAL, METABOLIC AND IMMUNITY DISORDER: ICD-10-CM

## 2018-12-17 DIAGNOSIS — K90.0 CELIAC DISEASE: ICD-10-CM

## 2018-12-17 DIAGNOSIS — R87.619 ABNORMAL CERVICAL PAPANICOLAOU SMEAR, UNSPECIFIED ABNORMAL PAP FINDING: ICD-10-CM

## 2018-12-17 DIAGNOSIS — R23.2 HOT FLASHES: ICD-10-CM

## 2018-12-17 DIAGNOSIS — F41.9 ANXIETY: ICD-10-CM

## 2018-12-17 DIAGNOSIS — Z13.0 SCREENING FOR ENDOCRINE, NUTRITIONAL, METABOLIC AND IMMUNITY DISORDER: ICD-10-CM

## 2018-12-17 DIAGNOSIS — M25.50 CHRONIC JOINT PAIN: ICD-10-CM

## 2018-12-17 DIAGNOSIS — E05.90 HYPERTHYROIDISM: ICD-10-CM

## 2018-12-17 DIAGNOSIS — B97.7 HPV IN FEMALE: ICD-10-CM

## 2018-12-17 DIAGNOSIS — R53.82 CHRONIC FATIGUE: ICD-10-CM

## 2018-12-17 DIAGNOSIS — Z01.419 WELL FEMALE EXAM WITH ROUTINE GYNECOLOGICAL EXAM: Primary | ICD-10-CM

## 2018-12-17 DIAGNOSIS — Z12.4 ENCOUNTER FOR SCREENING FOR CERVICAL CANCER: ICD-10-CM

## 2018-12-17 DIAGNOSIS — F33.1 MODERATE EPISODE OF RECURRENT MAJOR DEPRESSIVE DISORDER (HCC): ICD-10-CM

## 2018-12-17 DIAGNOSIS — Z13.228 SCREENING FOR ENDOCRINE, NUTRITIONAL, METABOLIC AND IMMUNITY DISORDER: ICD-10-CM

## 2018-12-17 PROCEDURE — 88175 CYTOPATH C/V AUTO FLUID REDO: CPT | Performed by: FAMILY MEDICINE

## 2018-12-17 PROCEDURE — 87624 HPV HI-RISK TYP POOLED RSLT: CPT | Performed by: FAMILY MEDICINE

## 2018-12-17 PROCEDURE — 87625 HPV TYPES 16 & 18 ONLY: CPT | Performed by: FAMILY MEDICINE

## 2018-12-17 PROCEDURE — 99396 PREV VISIT EST AGE 40-64: CPT | Performed by: FAMILY MEDICINE

## 2018-12-17 RX ORDER — BUSPIRONE HYDROCHLORIDE 10 MG/1
10 TABLET ORAL 3 TIMES DAILY
Qty: 90 TABLET | Refills: 5 | Status: SHIPPED | OUTPATIENT
Start: 2018-12-17 | End: 2019-06-15

## 2018-12-17 NOTE — PROGRESS NOTES
Vee Moreno is a 47year old female. CC:  Patient presents with:   Well Adult: annual visit, and pap      HPI:  Tobacco Cessation Counseling 2 Years due on 05/25/1978  Pneumococcal PPSV23 Medium Risk Adult(1 of 1 - PPSV23) due on 05/25/1985  Inf Transdermal Gel Apply 2 g topically 4 (four) times daily. Disp: 100 g Rfl: 0     No current facility-administered medications on file prior to visit.       History:  Past Medical History:   Diagnosis Date   • Arthritis    • Celiac disease    • COPD (chronic no visual complaints or deficits  HEENT: denies nasal congestion, sinus pain or sore throat; hearing loss negative,   RESPIRATORY: denies shortness of breath, wheezing or cough   CARDIOVASCULAR: denies chest pain, edema, orthopnea, or palpitations   GI: de FERRITIN; Future  - IRON AND TIBC; Future    3. Screening for endocrine, nutritional, metabolic and immunity disorder  - LIPID PANEL; Future    4. Abnormal cervical Papanicolaou smear, unspecified abnormal pap finding  5. HPV in female  6.  Encounter for sc

## 2018-12-19 ENCOUNTER — TELEPHONE (OUTPATIENT)
Dept: FAMILY MEDICINE CLINIC | Facility: CLINIC | Age: 53
End: 2018-12-19

## 2018-12-19 ENCOUNTER — HOSPITAL ENCOUNTER (OUTPATIENT)
Dept: MAMMOGRAPHY | Age: 53
Discharge: HOME | End: 2018-12-19
Attending: FAMILY MEDICINE
Payer: MEDICAID

## 2018-12-19 DIAGNOSIS — Z12.31 ENCOUNTER FOR SCREENING MAMMOGRAM FOR MALIGNANT NEOPLASM OF BREAST: ICD-10-CM

## 2018-12-19 PROCEDURE — 77063 BREAST TOMOSYNTHESIS BI: CPT | Performed by: FAMILY MEDICINE

## 2018-12-19 PROCEDURE — 77067 SCR MAMMO BI INCL CAD: CPT | Performed by: FAMILY MEDICINE

## 2018-12-19 NOTE — TELEPHONE ENCOUNTER
----- Message from Jessica Warner MD sent at 12/19/2018  4:14 PM CST -----  Results reviewed. Please call patient patient and inform her she needs an appt with Gyn ([facility]) for a colposcopy - she had needed to do last year, but did not. Must complete now.

## 2018-12-19 NOTE — TELEPHONE ENCOUNTER
I called pt at [phone] and verified 2 patient identifiers: name & . I discussed results and recommendations at length with patient. Stressed importance of going to gyn for colposcopy.   I discussed the progression from ASCUS w/HPV+ to now HSIL wit

## 2018-12-20 ENCOUNTER — TELEPHONE (OUTPATIENT)
Dept: OBGYN CLINIC | Facility: CLINIC | Age: 53
End: 2018-12-20

## 2018-12-20 RX ORDER — HYDROXYZINE HYDROCHLORIDE 25 MG/1
25 TABLET, FILM COATED ORAL EVERY 8 HOURS PRN
Qty: 30 TABLET | Refills: 0 | Status: SHIPPED | OUTPATIENT
Start: 2018-12-20 | End: 2019-02-06

## 2018-12-20 NOTE — TELEPHONE ENCOUNTER
Pt scheduled appt on  Future Appointments   Date Time Provider Lori Crump   1/16/2019 11:30 AM Violeta Melton MD [facility] P [facility] [address]   2/21/2019  3:00 PM Beth Scherer MD PF [facility] ONC [place]

## 2018-12-31 NOTE — TELEPHONE ENCOUNTER
Medication(s) to Refill:   Requested Prescriptions     Pending Prescriptions Disp Refills   • SYMBICORT 160-4.5 MCG/ACT Inhalation Aerosol [Pharmacy Med Name: SYMBICORT 160/4. 5MCG (120 ORAL INH)] 1 Inhaler 0     Sig: USE 2 PUFFS INTO THE LINGS TWICE DAILY

## 2019-01-01 RX ORDER — BUDESONIDE AND FORMOTEROL FUMARATE DIHYDRATE 160; 4.5 UG/1; UG/1
AEROSOL RESPIRATORY (INHALATION)
Qty: 1 INHALER | Refills: 0 | Status: SHIPPED | OUTPATIENT
Start: 2019-01-01 | End: 2019-01-16 | Stop reason: ALTCHOICE

## 2019-01-15 ENCOUNTER — PATIENT MESSAGE (OUTPATIENT)
Dept: FAMILY MEDICINE CLINIC | Facility: CLINIC | Age: 54
End: 2019-01-15

## 2019-01-15 DIAGNOSIS — R92.2 DENSE BREAST TISSUE ON MAMMOGRAM: Primary | ICD-10-CM

## 2019-01-15 NOTE — TELEPHONE ENCOUNTER
From: Jose Smalls  To: Luis Eduardo Calderon MD  Sent: 1/15/2019 2:05 AM CST  Subject: Test Results Question    Do I need an ultrasound of my breasts? Just curious!     Thanks  [facility]

## 2019-01-15 NOTE — TELEPHONE ENCOUNTER
Dr Tam Hawkins, please advise. Mammo results below:  CONCLUSION:       1. Benign findings. Patient has extremely dense breast tissue, she would benefit from whole bilateral breast ultrasound.   2. Your patient's answers to the health and family history questio

## 2019-01-16 ENCOUNTER — OFFICE VISIT (OUTPATIENT)
Dept: OBGYN CLINIC | Facility: CLINIC | Age: 54
End: 2019-01-16
Payer: MEDICAID

## 2019-01-16 VITALS
HEART RATE: 92 BPM | HEIGHT: 61 IN | BODY MASS INDEX: 19.26 KG/M2 | WEIGHT: 102 LBS | DIASTOLIC BLOOD PRESSURE: 64 MMHG | SYSTOLIC BLOOD PRESSURE: 100 MMHG

## 2019-01-16 DIAGNOSIS — R87.613 PAPANICOLAOU SMEAR OF CERVIX WITH HIGH GRADE SQUAMOUS INTRAEPITHELIAL LESION (HGSIL): Primary | ICD-10-CM

## 2019-01-16 DIAGNOSIS — Z01.812 PRE-OPERATIVE LABORATORY EXAMINATION: ICD-10-CM

## 2019-01-16 DIAGNOSIS — R87.810 CERVICAL HIGH RISK HUMAN PAPILLOMAVIRUS (HPV) DNA TEST POSITIVE: ICD-10-CM

## 2019-01-16 LAB — CONTROL LINE PRESENT WITH A CLEAR BACKGROUND (YES/NO): YES YES/NO

## 2019-01-16 PROCEDURE — 57452 EXAM OF CERVIX W/SCOPE: CPT | Performed by: OBSTETRICS & GYNECOLOGY

## 2019-01-16 PROCEDURE — 99203 OFFICE O/P NEW LOW 30 MIN: CPT | Performed by: OBSTETRICS & GYNECOLOGY

## 2019-01-16 PROCEDURE — 81025 URINE PREGNANCY TEST: CPT | Performed by: OBSTETRICS & GYNECOLOGY

## 2019-01-16 NOTE — PROGRESS NOTES
New gynecology visit. 47year old G 6 P 3124 white female. Patient's last menstrual period was 07/05/2011. Clayton August      Here for colposcopy for abnormal pap smear in December suggesting HGSIL changes with HPV and positive co-testing for HPV 16. Pap smear in 2017 Take 1 tablet (5 mg total) by mouth daily. MUST BE SEEN IN CLINIC FOR FURTHER REFILLS. Disp: 90 tablet Rfl: 0   HydrOXYzine HCl 25 MG Oral Tab Take 1 tablet (25 mg total) by mouth every 8 (eight) hours as needed for Anxiety (insomnia).  Disp: 30 tablet Rfl:

## 2019-01-17 ENCOUNTER — TELEPHONE (OUTPATIENT)
Dept: OBGYN CLINIC | Facility: CLINIC | Age: 54
End: 2019-01-17

## 2019-01-17 NOTE — TELEPHONE ENCOUNTER
Called patient back and confirmed 2/11/19 TF 0900 case. Date/time was offered by Dr. Slick Montgomery. Post op appt scheduled. Form faxed and added to calendar. Call to [facility]; no PA required for CPT code 22781. Call reference number [id number].

## 2019-01-17 NOTE — TELEPHONE ENCOUNTER
Left message on answering machine to call back. Available date for procedure of 2/11/19 at 10:30 a.m.

## 2019-02-06 RX ORDER — HYDROXYZINE HYDROCHLORIDE 25 MG/1
TABLET, FILM COATED ORAL
Qty: 90 TABLET | Refills: 2 | Status: SHIPPED | OUTPATIENT
Start: 2019-02-06 | End: 2021-02-03

## 2019-02-06 NOTE — TELEPHONE ENCOUNTER
Medication(s) to Refill:   Requested Prescriptions     Pending Prescriptions Disp Refills   • HYDROXYZINE HCL 25 MG Oral Tab [Pharmacy Med Name: HYDROXYZINE HCL 25MG TABS (WHITE)] 30 tablet 0     Sig: TAKE 1 TABLET BY MOUTH EVERY 8 HOURS AS NEEDED FOR Horace Medellin

## 2019-02-07 ENCOUNTER — HOSPITAL ENCOUNTER (OUTPATIENT)
Dept: ULTRASOUND IMAGING | Age: 54
Discharge: HOME | End: 2019-02-07
Attending: FAMILY MEDICINE
Payer: MEDICAID

## 2019-02-07 DIAGNOSIS — R92.2 DENSE BREAST TISSUE ON MAMMOGRAM: ICD-10-CM

## 2019-02-07 PROCEDURE — 76641 ULTRASOUND BREAST COMPLETE: CPT | Performed by: FAMILY MEDICINE

## 2019-02-07 RX ORDER — SODIUM CHLORIDE, SODIUM LACTATE, POTASSIUM CHLORIDE, CALCIUM CHLORIDE 600; 310; 30; 20 MG/100ML; MG/100ML; MG/100ML; MG/100ML
INJECTION, SOLUTION INTRAVENOUS CONTINUOUS
Status: CANCELLED | OUTPATIENT
Start: 2019-02-07

## 2019-02-07 RX ORDER — CHLORAL HYDRATE 500 MG
1000 CAPSULE ORAL DAILY
COMMUNITY
End: 2019-10-16 | Stop reason: ALTCHOICE

## 2019-02-07 NOTE — H&P
[facility]    PATIENT'S NAME: Atif Tiesha   ATTENDING PHYSICIAN: David Nair M.D.    PATIENT ACCOUNT#:   [de-identified]    LOCATION:    MEDICAL RECORD #:   [id number]       YOB: 1965  ADMISSION DATE:       02/11/2019    HISTORY intraepithelial lesion Pap smear with human papillomavirus 16 and negative colposcopy. PLAN:  Proceed with LEEP of cervix and endocervical curettage.   Risks of the operative procedure discussed with patient and accepted including, but not limited to, bl

## 2019-02-08 ENCOUNTER — TELEPHONE (OUTPATIENT)
Dept: OBGYN CLINIC | Facility: CLINIC | Age: 54
End: 2019-02-08

## 2019-02-08 NOTE — TELEPHONE ENCOUNTER
Patient is scheduled for LEEP with Dr. Dex Ham on 2/11/19    Call to patient; no answer. Left message to call back.

## 2019-02-11 ENCOUNTER — HOSPITAL ENCOUNTER (OUTPATIENT)
Facility: HOSPITAL | Age: 54
Setting detail: HOSPITAL OUTPATIENT SURGERY
Discharge: HOME | End: 2019-02-11
Attending: OBSTETRICS & GYNECOLOGY | Admitting: OBSTETRICS & GYNECOLOGY
Payer: MEDICAID

## 2019-02-11 ENCOUNTER — ANESTHESIA (OUTPATIENT)
Dept: SURGERY | Facility: HOSPITAL | Age: 54
End: 2019-02-11
Payer: MEDICAID

## 2019-02-11 ENCOUNTER — ANESTHESIA EVENT (OUTPATIENT)
Dept: SURGERY | Facility: HOSPITAL | Age: 54
End: 2019-02-11
Payer: MEDICAID

## 2019-02-11 VITALS
HEIGHT: 61 IN | WEIGHT: 105.81 LBS | TEMPERATURE: 98 F | SYSTOLIC BLOOD PRESSURE: 115 MMHG | DIASTOLIC BLOOD PRESSURE: 82 MMHG | RESPIRATION RATE: 20 BRPM | OXYGEN SATURATION: 100 % | HEART RATE: 68 BPM | BODY MASS INDEX: 19.98 KG/M2

## 2019-02-11 DIAGNOSIS — R87.619 ABNORMAL PAP SMEAR OF CERVIX: Primary | ICD-10-CM

## 2019-02-11 PROCEDURE — 0UBC7ZX EXCISION OF CERVIX, VIA NATURAL OR ARTIFICIAL OPENING, DIAGNOSTIC: ICD-10-PCS | Performed by: OBSTETRICS & GYNECOLOGY

## 2019-02-11 PROCEDURE — 57522 CONIZATION OF CERVIX: CPT | Performed by: OBSTETRICS & GYNECOLOGY

## 2019-02-11 RX ORDER — ACETAMINOPHEN 325 MG/1
650 TABLET ORAL EVERY 6 HOURS PRN
Status: CANCELLED | OUTPATIENT
Start: 2019-02-11

## 2019-02-11 RX ORDER — ONDANSETRON 4 MG/1
4 TABLET, FILM COATED ORAL EVERY 8 HOURS PRN
Status: CANCELLED | OUTPATIENT
Start: 2019-02-11

## 2019-02-11 RX ORDER — SODIUM CHLORIDE, SODIUM LACTATE, POTASSIUM CHLORIDE, CALCIUM CHLORIDE 600; 310; 30; 20 MG/100ML; MG/100ML; MG/100ML; MG/100ML
INJECTION, SOLUTION INTRAVENOUS CONTINUOUS
Status: DISCONTINUED | OUTPATIENT
Start: 2019-02-11 | End: 2019-02-11

## 2019-02-11 RX ORDER — HYDROCODONE BITARTRATE AND ACETAMINOPHEN 5; 325 MG/1; MG/1
2 TABLET ORAL EVERY 6 HOURS PRN
Status: CANCELLED | OUTPATIENT
Start: 2019-02-11

## 2019-02-11 RX ORDER — ONDANSETRON 2 MG/ML
4 INJECTION INTRAMUSCULAR; INTRAVENOUS EVERY 8 HOURS PRN
Status: CANCELLED | OUTPATIENT
Start: 2019-02-11

## 2019-02-11 RX ORDER — MORPHINE SULFATE 4 MG/ML
2 INJECTION, SOLUTION INTRAMUSCULAR; INTRAVENOUS EVERY 5 MIN PRN
Status: DISCONTINUED | OUTPATIENT
Start: 2019-02-11 | End: 2019-02-11

## 2019-02-11 RX ORDER — HYDROCODONE BITARTRATE AND ACETAMINOPHEN 5; 325 MG/1; MG/1
2 TABLET ORAL AS NEEDED
Status: DISCONTINUED | OUTPATIENT
Start: 2019-02-11 | End: 2019-02-11

## 2019-02-11 RX ORDER — ONDANSETRON 2 MG/ML
4 INJECTION INTRAMUSCULAR; INTRAVENOUS AS NEEDED
Status: DISCONTINUED | OUTPATIENT
Start: 2019-02-11 | End: 2019-02-11

## 2019-02-11 RX ORDER — HYDROCODONE BITARTRATE AND ACETAMINOPHEN 5; 325 MG/1; MG/1
1 TABLET ORAL EVERY 6 HOURS PRN
Status: CANCELLED | OUTPATIENT
Start: 2019-02-11

## 2019-02-11 RX ORDER — NALOXONE HYDROCHLORIDE 0.4 MG/ML
80 INJECTION, SOLUTION INTRAMUSCULAR; INTRAVENOUS; SUBCUTANEOUS AS NEEDED
Status: DISCONTINUED | OUTPATIENT
Start: 2019-02-11 | End: 2019-02-11

## 2019-02-11 RX ORDER — METOCLOPRAMIDE HYDROCHLORIDE 5 MG/ML
10 INJECTION INTRAMUSCULAR; INTRAVENOUS AS NEEDED
Status: DISCONTINUED | OUTPATIENT
Start: 2019-02-11 | End: 2019-02-11

## 2019-02-11 RX ORDER — ACETAMINOPHEN 500 MG
1000 TABLET ORAL ONCE
Status: DISCONTINUED | OUTPATIENT
Start: 2019-02-11 | End: 2019-02-11 | Stop reason: HOSPADM

## 2019-02-11 RX ORDER — ACETAMINOPHEN 500 MG
1000 TABLET ORAL EVERY 6 HOURS PRN
COMMUNITY
End: 2019-10-16 | Stop reason: ALTCHOICE

## 2019-02-11 RX ORDER — HYDROMORPHONE HYDROCHLORIDE 1 MG/ML
0.5 INJECTION, SOLUTION INTRAMUSCULAR; INTRAVENOUS; SUBCUTANEOUS EVERY 5 MIN PRN
Status: DISCONTINUED | OUTPATIENT
Start: 2019-02-11 | End: 2019-02-11

## 2019-02-11 RX ORDER — SODIUM CHLORIDE, SODIUM LACTATE, POTASSIUM CHLORIDE, CALCIUM CHLORIDE 600; 310; 30; 20 MG/100ML; MG/100ML; MG/100ML; MG/100ML
INJECTION, SOLUTION INTRAVENOUS CONTINUOUS
Status: CANCELLED | OUTPATIENT
Start: 2019-02-11

## 2019-02-11 RX ORDER — HYDROCODONE BITARTRATE AND ACETAMINOPHEN 5; 325 MG/1; MG/1
1 TABLET ORAL AS NEEDED
Status: DISCONTINUED | OUTPATIENT
Start: 2019-02-11 | End: 2019-02-11

## 2019-02-11 RX ORDER — KETOROLAC TROMETHAMINE 30 MG/ML
30 INJECTION, SOLUTION INTRAMUSCULAR; INTRAVENOUS ONCE
Status: DISCONTINUED | OUTPATIENT
Start: 2019-02-11 | End: 2019-02-11

## 2019-02-11 NOTE — ANESTHESIA POSTPROCEDURE EVALUATION
[facility] Patient Status:  Hospital Outpatient Surgery   Age/Gender 47year old female MRN [id number]   [facility] SURGERY Attending Marni Moe MD   Hosp Day # 0 PCP Sangeeta Wilder MD       Anesthesia Post-op N

## 2019-02-11 NOTE — OPERATIVE REPORT
[facility]    PATIENT'S NAME: Sandee Joseph   ATTENDING PHYSICIAN: Yvonne Maurer M.D. OPERATING PHYSICIAN: Yvonne Maurer M.D.    PATIENT ACCOUNT#:   [de-identified]    LOCATION:  [address] [place] [address]  MEDICAL RECORD #:   [id number] posterior aspect of the cervical crater. Ball electrode was then used to perform hemostasis of the rest of the crater. Monsel solution was applied.   The patient was awakened in the operating room and transferred to the same-day surgical area under the ca

## 2019-02-11 NOTE — ANESTHESIA PREPROCEDURE EVALUATION
PRE-OP EVALUATION    Patient Name: Vee Moreno    Pre-op Diagnosis: ABNORMAL HIGH GRADE PAP SMEAR WITH NEGATIVE COLPOSCOPY    Procedure(s):  LOOP ELECTRICAL EXCISION PROCEDURE OF CERVIX    Surgeon(s) and Role:     * Kim Holly MD - Primary SIGMOIDOSCOPY N/A 9/11/2018    Performed by Joan Schmid MD at [facility] ENDOSCOPY   • TUBAL LIGATION     • UPPER GI ENDOSCOPY,EXAM       Social History    Tobacco Use      Smoking status: Current Every Day Smoker        Packs/day: 0.50        Years: 40.00

## 2019-02-11 NOTE — BRIEF OP NOTE
Pre-Operative Diagnosis: ABNORMAL HIGH GRADE PAP SMEAR WITH NEGATIVE COLPOSCOPY     Post-Operative Diagnosis: ABNORMAL HIGH GRADE PAP SMEAR WITH NEGATIVE COLPOSCOPY      Procedure Performed:   Procedure(s):  LOOP ELECTRICAL EXCISION PROCEDURE OF CERVIX, EN

## 2019-02-13 ENCOUNTER — HOSPITAL ENCOUNTER (OUTPATIENT)
Dept: CT IMAGING | Age: 54
Discharge: HOME | End: 2019-02-13
Attending: INTERNAL MEDICINE
Payer: MEDICAID

## 2019-02-13 ENCOUNTER — TELEPHONE (OUTPATIENT)
Dept: HEMATOLOGY/ONCOLOGY | Facility: HOSPITAL | Age: 54
End: 2019-02-13

## 2019-02-13 DIAGNOSIS — D72.820 LYMPHOCYTOSIS: ICD-10-CM

## 2019-02-13 DIAGNOSIS — R59.0 LYMPHADENOPATHY, MEDIASTINAL: ICD-10-CM

## 2019-02-13 LAB — CREAT SERPL-MCNC: 0.4 MG/DL (ref 0.55–1.02)

## 2019-02-13 PROCEDURE — 71260 CT THORAX DX C+: CPT | Performed by: INTERNAL MEDICINE

## 2019-02-13 PROCEDURE — 82565 ASSAY OF CREATININE: CPT

## 2019-02-13 NOTE — TELEPHONE ENCOUNTER
Returned pt call. Notified pt per Dr. Montserrat Veliz- CT scan stable. F/U with MD on 2/21 as scheduled. Pt verbalized understanding.

## 2019-02-14 NOTE — PROGRESS NOTES
Severe dysplasia on pathology. Confirms pap smear findings. Excised completely with clear surgical margins.  Endocervical tissue normal.

## 2019-02-21 ENCOUNTER — TELEPHONE (OUTPATIENT)
Dept: HEMATOLOGY/ONCOLOGY | Facility: HOSPITAL | Age: 54
End: 2019-02-21

## 2019-07-26 ENCOUNTER — OFFICE VISIT (OUTPATIENT)
Dept: FAMILY MEDICINE CLINIC | Facility: CLINIC | Age: 54
End: 2019-07-26
Payer: MEDICAID

## 2019-07-26 VITALS
WEIGHT: 98 LBS | TEMPERATURE: 98 F | SYSTOLIC BLOOD PRESSURE: 118 MMHG | HEIGHT: 61 IN | HEART RATE: 86 BPM | RESPIRATION RATE: 16 BRPM | DIASTOLIC BLOOD PRESSURE: 62 MMHG | OXYGEN SATURATION: 97 % | BODY MASS INDEX: 18.5 KG/M2

## 2019-07-26 DIAGNOSIS — J44.1 COPD WITH EXACERBATION (HCC): Primary | ICD-10-CM

## 2019-07-26 PROCEDURE — 99213 OFFICE O/P EST LOW 20 MIN: CPT | Performed by: PHYSICIAN ASSISTANT

## 2019-07-26 PROCEDURE — 94640 AIRWAY INHALATION TREATMENT: CPT | Performed by: PHYSICIAN ASSISTANT

## 2019-07-26 RX ORDER — IPRATROPIUM BROMIDE AND ALBUTEROL SULFATE 2.5; .5 MG/3ML; MG/3ML
3 SOLUTION RESPIRATORY (INHALATION) ONCE
Status: COMPLETED | OUTPATIENT
Start: 2019-07-26 | End: 2019-07-26

## 2019-07-26 RX ORDER — PREDNISONE 20 MG/1
TABLET ORAL
Qty: 10 TABLET | Refills: 0 | Status: SHIPPED | OUTPATIENT
Start: 2019-07-26 | End: 2019-08-06 | Stop reason: ALTCHOICE

## 2019-07-26 RX ORDER — AZITHROMYCIN 250 MG/1
TABLET, FILM COATED ORAL
Qty: 6 TABLET | Refills: 0 | Status: SHIPPED | OUTPATIENT
Start: 2019-07-26 | End: 2019-08-06 | Stop reason: ALTCHOICE

## 2019-07-26 RX ORDER — ALBUTEROL SULFATE 90 UG/1
AEROSOL, METERED RESPIRATORY (INHALATION)
Qty: 1 INHALER | Refills: 0 | Status: SHIPPED | OUTPATIENT
Start: 2019-07-26 | End: 2019-08-06

## 2019-07-26 RX ADMIN — IPRATROPIUM BROMIDE AND ALBUTEROL SULFATE 3 ML: 2.5; .5 SOLUTION RESPIRATORY (INHALATION) at 13:18:00

## 2019-07-26 NOTE — PROGRESS NOTES
CHIEF COMPLAINT:   Patient presents with:  Cough: stabbing pain in chest with cough, wheezing, weak & tired x 5 days        HPI:   Otoniel Lara is a 49year old female who presents for cough for five days. Cough is sometimes productive.  Associated sy Diagnosis Date   • Anxiety    • Arthritis    • Celiac disease    • COPD (chronic obstructive pulmonary disease) ([facility] [address]    • Depression    • Disorder of thyroid    • Fibroids    • History of stomach ulcers    • Human papilloma virus infection    • Prediabe edema. Nebulizer treatment given to patient in clinic. Patient subjectively improved. Lungs: Coarse breath sounds bilaterally. Improved movement of air. Heart RRR.       Discussed evaluation at higher level of care due to rib discomfort and patient de

## 2019-07-26 NOTE — PATIENT INSTRUCTIONS
1. Z radha  2. Prednisone- No NSAIDs  3. Albuterol  4. Follow up with PCP  5.  If worsening symptoms seek treatment at a higher level of care

## 2019-08-06 ENCOUNTER — OFFICE VISIT (OUTPATIENT)
Dept: FAMILY MEDICINE CLINIC | Facility: CLINIC | Age: 54
End: 2019-08-06
Payer: MEDICAID

## 2019-08-06 VITALS
HEIGHT: 61 IN | RESPIRATION RATE: 16 BRPM | DIASTOLIC BLOOD PRESSURE: 70 MMHG | TEMPERATURE: 98 F | OXYGEN SATURATION: 98 % | HEART RATE: 80 BPM | SYSTOLIC BLOOD PRESSURE: 110 MMHG | WEIGHT: 100 LBS | BODY MASS INDEX: 18.88 KG/M2

## 2019-08-06 DIAGNOSIS — Z13.29 SCREENING FOR ENDOCRINE, NUTRITIONAL, METABOLIC AND IMMUNITY DISORDER: ICD-10-CM

## 2019-08-06 DIAGNOSIS — Z13.21 SCREENING FOR ENDOCRINE, NUTRITIONAL, METABOLIC AND IMMUNITY DISORDER: ICD-10-CM

## 2019-08-06 DIAGNOSIS — Z13.228 SCREENING FOR ENDOCRINE, NUTRITIONAL, METABOLIC AND IMMUNITY DISORDER: ICD-10-CM

## 2019-08-06 DIAGNOSIS — Z00.00 LABORATORY EXAMINATION ORDERED AS PART OF A ROUTINE GENERAL MEDICAL EXAMINATION: ICD-10-CM

## 2019-08-06 DIAGNOSIS — Z87.891 HISTORY OF SMOKING: ICD-10-CM

## 2019-08-06 DIAGNOSIS — H53.8 BLURRED VISION, BILATERAL: Primary | ICD-10-CM

## 2019-08-06 DIAGNOSIS — Z86.19 HISTORY OF HELICOBACTER PYLORI INFECTION: ICD-10-CM

## 2019-08-06 DIAGNOSIS — F17.210 CIGARETTE SMOKER: ICD-10-CM

## 2019-08-06 DIAGNOSIS — Z13.0 SCREENING FOR ENDOCRINE, NUTRITIONAL, METABOLIC AND IMMUNITY DISORDER: ICD-10-CM

## 2019-08-06 DIAGNOSIS — E05.90 HYPERTHYROIDISM: ICD-10-CM

## 2019-08-06 PROCEDURE — 99214 OFFICE O/P EST MOD 30 MIN: CPT | Performed by: FAMILY MEDICINE

## 2019-08-06 RX ORDER — ALBUTEROL SULFATE 90 UG/1
2 AEROSOL, METERED RESPIRATORY (INHALATION) EVERY 4 HOURS PRN
Qty: 1 INHALER | Refills: 0 | Status: CANCELLED | OUTPATIENT
Start: 2019-08-06

## 2019-08-06 RX ORDER — ALBUTEROL SULFATE 90 UG/1
AEROSOL, METERED RESPIRATORY (INHALATION)
Qty: 1 INHALER | Refills: 2 | Status: SHIPPED | OUTPATIENT
Start: 2019-08-06 | End: 2019-10-16 | Stop reason: ALTCHOICE

## 2019-08-06 RX ORDER — METHIMAZOLE 5 MG/1
5 TABLET ORAL DAILY
Qty: 90 TABLET | Refills: 0 | Status: CANCELLED | OUTPATIENT
Start: 2019-08-06

## 2019-08-06 RX ORDER — BUPROPION HYDROCHLORIDE 150 MG/1
TABLET, EXTENDED RELEASE ORAL
Qty: 180 TABLET | Refills: 1 | Status: SHIPPED | OUTPATIENT
Start: 2019-08-06 | End: 2020-02-05

## 2019-08-06 RX ORDER — PROPYLTHIOURACIL 50 MG/1
100 TABLET ORAL 3 TIMES DAILY
Qty: 180 TABLET | Refills: 0 | Status: CANCELLED | OUTPATIENT
Start: 2019-08-06 | End: 2019-10-05

## 2019-08-06 RX ORDER — METHIMAZOLE 5 MG/1
5 TABLET ORAL DAILY
Qty: 90 TABLET | Refills: 0 | Status: SHIPPED | OUTPATIENT
Start: 2019-08-06 | End: 2019-11-10

## 2019-08-06 RX ORDER — PROPRANOLOL HYDROCHLORIDE 20 MG/1
20 TABLET ORAL 3 TIMES DAILY
Qty: 90 TABLET | Refills: 1 | Status: CANCELLED | OUTPATIENT
Start: 2019-08-06

## 2019-08-06 NOTE — PROGRESS NOTES
[facility] Family Medicine Office Note  Chief Complaint:   Patient presents with:  Medication Follow-Up  Smoking: wants to discuss tips on quitting smoking      HPI:   This is a 49year old female coming in for  HPI  Smoking   40 year   States sh Age of Onset   • Cancer Father         colon cancer   • Cancer Mother         lung cancer, brain cancer   • Breast Cancer Maternal Grandmother    • Cancer Paternal Grandmother         colon cancer   • Breast Cancer Paternal Grandmother      Allergies:    G Respiratory: Negative for cough and shortness of breath. Cardiovascular: Negative for chest pain and palpitations. Gastrointestinal: Negative for abdominal pain, nausea and vomiting. Genitourinary: Negative for dysuria, frequency and urgency.    Mu methimazole  Needs follow up with endo    3. History of smoking  4. Cigarette smoker  - buPROPion HCl ER, SR, 150 MG Oral Tablet 12 Hr; Take 1 tablet (150 mg total) by mouth daily for 3 days, THEN 1 tablet (150 mg total) 2 (two) times daily.   Dispense: 180 There are no barriers to learning. Medical education done. Outcome: Patient verbalizes understanding. Patient is notified to call with any questions, complications, allergies, or worsening or changing symptoms.   Patient is to call with any side effects o

## 2019-08-06 NOTE — PATIENT INSTRUCTIONS
1. Complete stool test  2. Call and schedule routine eye exam   3. Complete labs on Saturday   4. Call and schedule appointment with endocrinology   5.  Start Wellbutrin for smoking cessation       Quitting Smoking    Quitting smoking is the most important A quit-smoking contract gives you a goal. Write out the contract and sign it. Have it witnessed, if you like. Then keep the contract where you’ll see it often, or carry it with you. Read the contract when you’re tempted to smoke.   Take action  On the day y © 5729-3580 [employer] [address]. [facility] [facility], [address]. All rights reserved. This information is not intended as a substitute for professional medical care. Always follow your healthcare professional's instructions.       ·

## 2019-08-22 ENCOUNTER — APPOINTMENT (OUTPATIENT)
Dept: GENERAL RADIOLOGY | Age: 54
End: 2019-08-22
Attending: PHYSICIAN ASSISTANT
Payer: OTHER MISCELLANEOUS

## 2019-08-22 ENCOUNTER — HOSPITAL ENCOUNTER (EMERGENCY)
Age: 54
Discharge: HOME | End: 2019-08-22
Attending: EMERGENCY MEDICINE
Payer: OTHER MISCELLANEOUS

## 2019-08-22 VITALS
HEART RATE: 78 BPM | OXYGEN SATURATION: 98 % | WEIGHT: 101.44 LBS | SYSTOLIC BLOOD PRESSURE: 106 MMHG | TEMPERATURE: 99 F | RESPIRATION RATE: 16 BRPM | BODY MASS INDEX: 19 KG/M2 | DIASTOLIC BLOOD PRESSURE: 65 MMHG

## 2019-08-22 DIAGNOSIS — S39.012A STRAIN OF LUMBAR REGION, INITIAL ENCOUNTER: Primary | ICD-10-CM

## 2019-08-22 PROCEDURE — 99283 EMERGENCY DEPT VISIT LOW MDM: CPT

## 2019-08-22 PROCEDURE — 72110 X-RAY EXAM L-2 SPINE 4/>VWS: CPT | Performed by: PHYSICIAN ASSISTANT

## 2019-08-22 PROCEDURE — 96372 THER/PROPH/DIAG INJ SC/IM: CPT

## 2019-08-22 RX ORDER — CYCLOBENZAPRINE HCL 5 MG
5 TABLET ORAL 3 TIMES DAILY PRN
Qty: 12 TABLET | Refills: 0 | Status: SHIPPED | OUTPATIENT
Start: 2019-08-22 | End: 2019-10-16 | Stop reason: ALTCHOICE

## 2019-08-22 RX ORDER — KETOROLAC TROMETHAMINE 30 MG/ML
30 INJECTION, SOLUTION INTRAMUSCULAR; INTRAVENOUS ONCE
Status: COMPLETED | OUTPATIENT
Start: 2019-08-22 | End: 2019-08-22

## 2019-08-23 NOTE — ED INITIAL ASSESSMENT (HPI)
Injured her lower back at work this am lifting a box.  Works for [employer] in [place] [employer] [facility]

## 2019-08-23 NOTE — ED PROVIDER NOTES
Patient Seen in: THE [facility] In [place]    History   Patient presents with:  Back Pain (musculoskeletal)    Stated Complaint: back pain     MAURICIO    Taras Lara is a 20-year-old female who presents today for evaluation of low back pain.   She Alcohol use: Yes      Comment: rare - 1 drink 3 times a year    Drug use: Yes      Types: Cannabis      Comment: CBD oil  no longer uses             Review of Systems    Positive for stated complaint: back pain   Other systems are as noted in HPI.   Constit Normal.  No significant spondylosis, scoliosis, fracture, or visible bony lesion. DISC SPACES:  Mild disc space narrowing at L3-L4. PARASPINOUS:  Negative. No paraspinous abnormality is seen. OTHER:  Negative. CONCLUSION:  1. No acute fracture.    Rupa Clark

## 2019-09-25 ENCOUNTER — APPOINTMENT (OUTPATIENT)
Dept: GENERAL RADIOLOGY | Age: 54
End: 2019-09-25
Attending: EMERGENCY MEDICINE
Payer: MEDICAID

## 2019-09-25 ENCOUNTER — HOSPITAL ENCOUNTER (EMERGENCY)
Age: 54
Discharge: HOME | End: 2019-09-25
Attending: EMERGENCY MEDICINE
Payer: MEDICAID

## 2019-09-25 VITALS
DIASTOLIC BLOOD PRESSURE: 72 MMHG | HEIGHT: 61 IN | HEART RATE: 79 BPM | WEIGHT: 100 LBS | SYSTOLIC BLOOD PRESSURE: 108 MMHG | TEMPERATURE: 98 F | OXYGEN SATURATION: 99 % | BODY MASS INDEX: 18.88 KG/M2 | RESPIRATION RATE: 18 BRPM

## 2019-09-25 DIAGNOSIS — J45.901 EXACERBATION OF PERSISTENT ASTHMA, UNSPECIFIED ASTHMA SEVERITY: Primary | ICD-10-CM

## 2019-09-25 PROCEDURE — 99284 EMERGENCY DEPT VISIT MOD MDM: CPT

## 2019-09-25 PROCEDURE — 94644 CONT INHLJ TX 1ST HOUR: CPT

## 2019-09-25 PROCEDURE — 71046 X-RAY EXAM CHEST 2 VIEWS: CPT | Performed by: EMERGENCY MEDICINE

## 2019-09-25 RX ORDER — PREDNISONE 20 MG/1
60 TABLET ORAL ONCE
Status: COMPLETED | OUTPATIENT
Start: 2019-09-25 | End: 2019-09-25

## 2019-09-25 RX ORDER — PREDNISONE 20 MG/1
20 TABLET ORAL 2 TIMES DAILY
Qty: 10 TABLET | Refills: 0 | Status: SHIPPED | OUTPATIENT
Start: 2019-09-25 | End: 2019-09-30

## 2019-09-25 RX ORDER — AZITHROMYCIN 250 MG/1
TABLET, FILM COATED ORAL
Qty: 1 PACKAGE | Refills: 0 | Status: SHIPPED | OUTPATIENT
Start: 2019-09-25 | End: 2019-09-30 | Stop reason: ALTCHOICE

## 2019-09-25 RX ORDER — ALBUTEROL SULFATE 90 UG/1
2 AEROSOL, METERED RESPIRATORY (INHALATION) EVERY 4 HOURS PRN
Qty: 1 INHALER | Refills: 0 | Status: SHIPPED | OUTPATIENT
Start: 2019-09-25 | End: 2019-10-16

## 2019-09-25 RX ORDER — ALBUTEROL SULFATE 2.5 MG/3ML
2.5 SOLUTION RESPIRATORY (INHALATION) EVERY 4 HOURS PRN
Qty: 30 AMPULE | Refills: 0 | Status: SHIPPED | OUTPATIENT
Start: 2019-09-25 | End: 2019-10-16

## 2019-09-25 NOTE — ED PROVIDER NOTES
Patient Seen in: THE [facility] Emergency Department In [place]      History   Patient presents with:  Cough/URI    Stated Complaint: sob, cough    HPI    Patient  has a history of COPD. She has no longer a smoker.   Patient reports about 2 days ago, she bega Systems    Positive for stated complaint: sob, cough  Other systems are as noted in HPI. Constitutional and vital signs reviewed. All other systems reviewed and negative except as noted above.     Physical Exam     ED Triage Vitals [09/25/19 1745]   B comfortably  Chest x-ray  CONCLUSION:    Interstitial markings are accentuated, may reflect chronic smoking history.  Advise correlation with history.  Lungs also are mildly hyperinflated.  Consider COPD.  No sign of pneumonia, pleural effusion, pneumothor

## 2019-09-27 ENCOUNTER — TELEPHONE (OUTPATIENT)
Dept: FAMILY MEDICINE CLINIC | Facility: CLINIC | Age: 54
End: 2019-09-27

## 2019-09-30 ENCOUNTER — LAB ENCOUNTER (OUTPATIENT)
Dept: LAB | Age: 54
End: 2019-09-30
Attending: FAMILY MEDICINE
Payer: MEDICAID

## 2019-09-30 ENCOUNTER — OFFICE VISIT (OUTPATIENT)
Dept: FAMILY MEDICINE CLINIC | Facility: CLINIC | Age: 54
End: 2019-09-30
Payer: MEDICAID

## 2019-09-30 VITALS
BODY MASS INDEX: 19.07 KG/M2 | DIASTOLIC BLOOD PRESSURE: 70 MMHG | OXYGEN SATURATION: 98 % | RESPIRATION RATE: 16 BRPM | HEART RATE: 70 BPM | SYSTOLIC BLOOD PRESSURE: 110 MMHG | TEMPERATURE: 98 F | WEIGHT: 101 LBS | HEIGHT: 61 IN

## 2019-09-30 DIAGNOSIS — Z13.29 SCREENING FOR ENDOCRINE, NUTRITIONAL, METABOLIC AND IMMUNITY DISORDER: ICD-10-CM

## 2019-09-30 DIAGNOSIS — Z13.228 SCREENING FOR ENDOCRINE, NUTRITIONAL, METABOLIC AND IMMUNITY DISORDER: ICD-10-CM

## 2019-09-30 DIAGNOSIS — E05.90 HYPERTHYROIDISM: ICD-10-CM

## 2019-09-30 DIAGNOSIS — Z87.891 HISTORY OF SMOKING: ICD-10-CM

## 2019-09-30 DIAGNOSIS — Z00.00 LABORATORY EXAMINATION ORDERED AS PART OF A ROUTINE GENERAL MEDICAL EXAMINATION: ICD-10-CM

## 2019-09-30 DIAGNOSIS — Z13.21 SCREENING FOR ENDOCRINE, NUTRITIONAL, METABOLIC AND IMMUNITY DISORDER: ICD-10-CM

## 2019-09-30 DIAGNOSIS — Z13.0 SCREENING FOR ENDOCRINE, NUTRITIONAL, METABOLIC AND IMMUNITY DISORDER: ICD-10-CM

## 2019-09-30 DIAGNOSIS — H53.8 BLURRED VISION, BILATERAL: ICD-10-CM

## 2019-09-30 DIAGNOSIS — R53.82 CHRONIC FATIGUE: ICD-10-CM

## 2019-09-30 DIAGNOSIS — J44.1 COPD WITH ACUTE EXACERBATION (HCC): Primary | ICD-10-CM

## 2019-09-30 PROCEDURE — 83550 IRON BINDING TEST: CPT

## 2019-09-30 PROCEDURE — 84481 FREE ASSAY (FT-3): CPT

## 2019-09-30 PROCEDURE — 84443 ASSAY THYROID STIM HORMONE: CPT

## 2019-09-30 PROCEDURE — 84439 ASSAY OF FREE THYROXINE: CPT

## 2019-09-30 PROCEDURE — 99213 OFFICE O/P EST LOW 20 MIN: CPT | Performed by: NURSE PRACTITIONER

## 2019-09-30 PROCEDURE — 83540 ASSAY OF IRON: CPT

## 2019-09-30 PROCEDURE — 82728 ASSAY OF FERRITIN: CPT

## 2019-09-30 PROCEDURE — 80053 COMPREHEN METABOLIC PANEL: CPT

## 2019-09-30 PROCEDURE — 85025 COMPLETE CBC W/AUTO DIFF WBC: CPT

## 2019-09-30 PROCEDURE — 80061 LIPID PANEL: CPT

## 2019-09-30 PROCEDURE — 36415 COLL VENOUS BLD VENIPUNCTURE: CPT

## 2019-09-30 RX ORDER — CETIRIZINE HYDROCHLORIDE 10 MG/1
10 TABLET ORAL DAILY
Qty: 30 TABLET | Refills: 0 | Status: SHIPPED | OUTPATIENT
Start: 2019-09-30 | End: 2019-11-17

## 2019-09-30 RX ORDER — PREDNISONE 20 MG/1
40 TABLET ORAL DAILY
Qty: 4 TABLET | Refills: 0 | Status: SHIPPED | OUTPATIENT
Start: 2019-09-30 | End: 2019-10-02

## 2019-09-30 RX ORDER — MONTELUKAST SODIUM 10 MG/1
10 TABLET ORAL NIGHTLY
Qty: 30 TABLET | Refills: 0 | Status: SHIPPED | OUTPATIENT
Start: 2019-09-30 | End: 2019-10-16

## 2019-09-30 RX ORDER — BUDESONIDE AND FORMOTEROL FUMARATE DIHYDRATE 160; 4.5 UG/1; UG/1
2 AEROSOL RESPIRATORY (INHALATION) 2 TIMES DAILY
Qty: 1 INHALER | Refills: 0 | COMMUNITY
Start: 2019-09-30 | End: 2019-10-16 | Stop reason: ALTCHOICE

## 2019-09-30 NOTE — PATIENT INSTRUCTIONS
Chronic Lung Disease: Coping Tips for Caregivers    When someone you love has chronic lung disease, such as chronic obstructive pulmonary disease (COPD), it can change both of your lives. As a caregiver, you may have to support your loved one in new ways · Try to get away, even if it's just for a short time. Go to a friend's house, or take a drive or a long walk. Accepting help  You may need help with caregiving. Knowing you can count on others can be a relief.  Keep these tips in mind:  · Make a list of t People with COPD may have symptoms most of the time. In a flare-up, your symptoms get worse.  These symptoms may mean you are having a flare-up:  · Shortness of breath, shallow or rapid breathing, or wheezing that gets worse  · Lung infection  · Cough that · Take your medicines as discussed with your healthcare provider. · Talk with your provider about getting a flu shot every year. Also find out if you need a pneumonia shot.   · If there is a weather advisory warning to stay indoors, try to stay inside when

## 2019-09-30 NOTE — PROGRESS NOTES
Chief Complaint:   Patient presents with:  ER F/U: asthma    HPI:   This is a 49year old female presenting for ED f/u. Went to \[facility]\"" ED on 9/25/19 for cough and SOB. Cxr revealed mildly hyperinflated lungs.  No evidence of pneumonia, pleural effusion, pne Every Day Smoker        Packs/day: 0.50        Years: 40.00        Pack years: 21        Quit date: 2018        Years since quittin.0      Smokeless tobacco: Never Used      Tobacco comment: trying quit    Alcohol use: Yes      Comment: rare - 1 d acetaminophen 500 MG Oral Tab Take 1,000 mg by mouth every 6 (six) hours as needed for Pain. Disp:  Rfl:    omega-3 fatty acids 1000 MG Oral Cap Take 1,000 mg by mouth daily.  Disp:  Rfl:    Multiple Vitamins-Minerals (HAIR SKIN AND NAILS FORMULA OR) Take well groomed. Physical Exam    Constitutional: She is oriented to person, place, and time and thin. She appears well-developed and well-nourished. HENT:   Head: Normocephalic and atraumatic.    Right Ear: Tympanic membrane and ear canal normal. Tympani

## 2019-10-02 ENCOUNTER — TELEPHONE (OUTPATIENT)
Dept: FAMILY MEDICINE CLINIC | Facility: CLINIC | Age: 54
End: 2019-10-02

## 2019-10-02 ENCOUNTER — PATIENT MESSAGE (OUTPATIENT)
Dept: FAMILY MEDICINE CLINIC | Facility: CLINIC | Age: 54
End: 2019-10-02

## 2019-10-02 NOTE — TELEPHONE ENCOUNTER
----- Message from Jayy Mcgee MD sent at 10/1/2019  8:47 AM CDT -----  Results reviewed. Tests show no significant abnormalities.  TSH still very suppressed but T3, T4 normal. Continue methimazole, but I need her to find an endocrinologist. Call insurance

## 2019-10-03 NOTE — TELEPHONE ENCOUNTER
Original letter was excused from 9/23/19-10/1/19 with return to work 10/2/19. Pt wants to know if you're okay to update it to have her return to work on Friday 10/4/19. Okay to update letter?

## 2019-10-03 NOTE — TELEPHONE ENCOUNTER
From: Adilene Metz  To: RONAL Gonzalez FNP-C  Sent: 10/2/2019 3:55 PM CDT  Subject: Non-Urgent Medical Question    Thank you for seeing me the other day on such a short notice.  I really don't want to go back to work until Friday with all the cou

## 2019-10-07 ENCOUNTER — TELEPHONE (OUTPATIENT)
Dept: FAMILY MEDICINE CLINIC | Facility: CLINIC | Age: 54
End: 2019-10-07

## 2019-10-07 NOTE — TELEPHONE ENCOUNTER
Patient called regarding note for work, she would like to know if Doctor can change the note to say return on Monday instead of last Friday, she still was not feeling well on Friday so she returned today.  She asked if this could be faxed or emailed asap be

## 2019-10-07 NOTE — TELEPHONE ENCOUNTER
I attempted to call pt 3 times. Phone number [phone]  does not ring. I attmpted to call 3 times. I sent the work note to pts [employer]. I could fax it if she provides a fax number. I sent pt a [employer] message.

## 2019-10-16 ENCOUNTER — TELEPHONE (OUTPATIENT)
Dept: FAMILY MEDICINE CLINIC | Facility: CLINIC | Age: 54
End: 2019-10-16

## 2019-10-16 ENCOUNTER — HOSPITAL ENCOUNTER (OUTPATIENT)
Dept: GENERAL RADIOLOGY | Age: 54
Discharge: HOME | End: 2019-10-16
Attending: FAMILY MEDICINE
Payer: MEDICAID

## 2019-10-16 ENCOUNTER — OFFICE VISIT (OUTPATIENT)
Dept: FAMILY MEDICINE CLINIC | Facility: CLINIC | Age: 54
End: 2019-10-16
Payer: MEDICAID

## 2019-10-16 VITALS
HEIGHT: 61 IN | BODY MASS INDEX: 19.45 KG/M2 | DIASTOLIC BLOOD PRESSURE: 60 MMHG | OXYGEN SATURATION: 96 % | SYSTOLIC BLOOD PRESSURE: 100 MMHG | RESPIRATION RATE: 16 BRPM | WEIGHT: 103 LBS | TEMPERATURE: 98 F | HEART RATE: 80 BPM

## 2019-10-16 DIAGNOSIS — R05.3 PERSISTENT COUGH: Primary | ICD-10-CM

## 2019-10-16 DIAGNOSIS — K63.5 HYPERPLASTIC POLYP OF LARGE INTESTINE: ICD-10-CM

## 2019-10-16 DIAGNOSIS — J42 CHRONIC BRONCHITIS, UNSPECIFIED CHRONIC BRONCHITIS TYPE (HCC): ICD-10-CM

## 2019-10-16 DIAGNOSIS — R05.3 PERSISTENT COUGH: ICD-10-CM

## 2019-10-16 DIAGNOSIS — Z12.39 SCREENING FOR BREAST CANCER: ICD-10-CM

## 2019-10-16 PROCEDURE — 99214 OFFICE O/P EST MOD 30 MIN: CPT | Performed by: FAMILY MEDICINE

## 2019-10-16 PROCEDURE — 71046 X-RAY EXAM CHEST 2 VIEWS: CPT | Performed by: FAMILY MEDICINE

## 2019-10-16 RX ORDER — BUDESONIDE AND FORMOTEROL FUMARATE DIHYDRATE 160; 4.5 UG/1; UG/1
2 AEROSOL RESPIRATORY (INHALATION) 2 TIMES DAILY
Qty: 1 INHALER | Refills: 0 | COMMUNITY
Start: 2019-10-16 | End: 2020-03-20

## 2019-10-16 RX ORDER — BUDESONIDE AND FORMOTEROL FUMARATE DIHYDRATE 160; 4.5 UG/1; UG/1
2 AEROSOL RESPIRATORY (INHALATION) 2 TIMES DAILY
Qty: 1 INHALER | Refills: 5 | Status: SHIPPED | OUTPATIENT
Start: 2019-10-16 | End: 2020-03-20

## 2019-10-16 RX ORDER — ALBUTEROL SULFATE 2.5 MG/3ML
2.5 SOLUTION RESPIRATORY (INHALATION) EVERY 4 HOURS PRN
Qty: 30 AMPULE | Refills: 1 | Status: SHIPPED | OUTPATIENT
Start: 2019-10-16 | End: 2020-03-20

## 2019-10-16 RX ORDER — PREDNISONE 20 MG/1
20 TABLET ORAL DAILY
Qty: 5 TABLET | Refills: 0 | Status: SHIPPED | OUTPATIENT
Start: 2019-10-16 | End: 2019-10-21

## 2019-10-16 RX ORDER — LEVOFLOXACIN 500 MG/1
500 TABLET, FILM COATED ORAL DAILY
Qty: 7 TABLET | Refills: 0 | Status: SHIPPED | OUTPATIENT
Start: 2019-10-16 | End: 2019-10-23

## 2019-10-16 NOTE — PROGRESS NOTES
[facility] [employer] [facility] Family Medicine Office Note  Chief Complaint:   Patient presents with:  Cough: f/u from last visit, no change   Shortness Of Breath      HPI:   This is a 49year old female coming in for  HPI  Cough   Patient complains of cough for abo 2018        Years since quittin.1      Smokeless tobacco: Never Used      Tobacco comment: trying quit    Alcohol use: Yes      Comment: rare - 1 drink 3 times a year    Drug use: Yes      Types: Cannabis      Comment: CBD oil  no longer uses    F Answered  Comment: trying quit       REVIEW OF SYSTEMS:   Review of Systems   Constitutional: Positive for fatigue and fever. Negative for chills. HENT: Negative for rhinorrhea and sinus pressure. Eyes: Negative for pain and visual disturbance.    Resp LAT CHEST (CPT=71046); Future  - SPUTUM CULTURE; Future    3. Screening for breast cancer  - [facility] APRIL 2D+3D SCREENING BILAT (CPT=77067/56907); Future    4.  Hyperplastic polyp of large intestine  - SURGERY - INTERNAL      Discussed the use of Symbicort, clara 12/11/2018  Colonoscopy due on 09/11/2019  Mammogram due on 12/19/2019  Influenza Vaccine(1) due on 10/16/2020  Annual Physical due on 12/17/2019  Annual Depression Screen due on 08/06/2020  Pap Smear,3 Years due on 12/17/2021    Patient/Caregiver Huan Dasilva

## 2019-10-16 NOTE — PATIENT INSTRUCTIONS
Start using Mucinex 1200mg twice a day for 1-2 weeks    Start Symbicort, 2 puffs twice a day, make sure to rinse out your mouth with water after use. Use this every day regardless of how you feel.      Use the albuterol nebulizer every 4-6 hours for the ne more fluids than normal during a flare-up, unless your healthcare provider has told you not to because of heart and kidney problems. More fluids can help loosen the mucus. · Use your inhalers and nebulizer, if you have one, as you have been told to.   · If relief  · Fever of 100.4°F (38ºC) or higher, or as directed by your healthcare provider  · Coughing up lots of dark-colored or bloody mucus (sputum)  · Chest pain with each breath  · You don't start to get better within 24 hours  · Swelling of your ankles

## 2019-10-16 NOTE — TELEPHONE ENCOUNTER
Signs of lung disease are seen on the x-ray, likely related to her history of smoking. Given the cough and wheezing we will continue to treat this as a COPD exacerbation, and I will send a prescription for levofloxacin to the pharmacy.   I will give her a

## 2019-10-16 NOTE — TELEPHONE ENCOUNTER
I called pt at [phone] and verified 2 patient identifiers: name & . I discussed results and recommendations at length with patient. All questions answered.

## 2019-11-10 DIAGNOSIS — E05.90 HYPERTHYROIDISM: Primary | ICD-10-CM

## 2019-11-11 RX ORDER — METHIMAZOLE 5 MG/1
TABLET ORAL
Qty: 30 TABLET | Refills: 0 | Status: SHIPPED | OUTPATIENT
Start: 2019-11-11 | End: 2020-04-15

## 2019-11-11 NOTE — TELEPHONE ENCOUNTER
Medication(s) to Refill:   Requested Prescriptions     Pending Prescriptions Disp Refills   • METHIMAZOLE 5 MG Oral Tab [Pharmacy Med Name: METHIMAZOLE 5MG TABLETS] 90 tablet 0     Sig: TAKE 1 TABLET BY MOUTH EVERY DAY         Reason for Medication Refill

## 2019-11-12 NOTE — TELEPHONE ENCOUNTER
Please call patient and let her know that she is due for repeat of thyroid labs and needs to follow up with endocrinology.

## 2019-11-17 DIAGNOSIS — J44.1 COPD WITH ACUTE EXACERBATION (HCC): ICD-10-CM

## 2019-11-18 RX ORDER — CETIRIZINE HYDROCHLORIDE 10 MG/1
TABLET ORAL
Qty: 30 TABLET | Refills: 5 | Status: SHIPPED | OUTPATIENT
Start: 2019-11-18 | End: 2020-07-11

## 2019-11-18 RX ORDER — MONTELUKAST SODIUM 10 MG/1
TABLET ORAL
Qty: 30 TABLET | Refills: 5 | Status: SHIPPED | OUTPATIENT
Start: 2019-11-18 | End: 2020-09-10

## 2019-11-18 NOTE — TELEPHONE ENCOUNTER
Medication(s) to Refill:   Requested Prescriptions     Pending Prescriptions Disp Refills   • MONTELUKAST SODIUM 10 MG Oral Tab [Pharmacy Med Name: MONTELUKAST 10MG TABLETS] 30 tablet 0     Sig: TAKE 1 TABLET BY MOUTH EVERY EVENING.    • CETIRIZINE 10 MG Or

## 2019-11-27 ENCOUNTER — PATIENT MESSAGE (OUTPATIENT)
Dept: FAMILY MEDICINE CLINIC | Facility: CLINIC | Age: 54
End: 2019-11-27

## 2019-11-27 DIAGNOSIS — Z12.11 SCREENING FOR COLON CANCER: ICD-10-CM

## 2019-11-27 DIAGNOSIS — K63.5 HYPERPLASTIC POLYP OF LARGE INTESTINE: Primary | ICD-10-CM

## 2019-11-27 NOTE — TELEPHONE ENCOUNTER
Refer back to Dr Macy Garcia again? It appears they have tried to contact her to schedule. \"Dear Otoniel Lara,    Dr. Kathleen Tran referred you to consult with us regarding a hyperplastic polyp of the large intestine.   We've attempted to contact you williams

## 2019-11-27 NOTE — TELEPHONE ENCOUNTER
From: Sapphire Schafer  To: Jannifer Crigler, MD  Sent: 11/27/2019 2:49 PM CST  Subject: Other    I was wondering if there's a doctor you would like me to go see or you can refer me to for the colonoscopy

## 2020-03-20 ENCOUNTER — TELEPHONE (OUTPATIENT)
Dept: FAMILY MEDICINE CLINIC | Facility: CLINIC | Age: 55
End: 2020-03-20

## 2020-03-20 RX ORDER — BUDESONIDE AND FORMOTEROL FUMARATE DIHYDRATE 160; 4.5 UG/1; UG/1
2 AEROSOL RESPIRATORY (INHALATION) 2 TIMES DAILY
Qty: 1 INHALER | Refills: 5 | Status: SHIPPED | OUTPATIENT
Start: 2020-03-20 | End: 2020-03-24

## 2020-03-20 RX ORDER — ALBUTEROL SULFATE 2.5 MG/3ML
2.5 SOLUTION RESPIRATORY (INHALATION) EVERY 4 HOURS PRN
Qty: 2 BOX | Refills: 1 | Status: SHIPPED | OUTPATIENT
Start: 2020-03-20 | End: 2020-04-19

## 2020-03-20 RX ORDER — ALBUTEROL SULFATE 90 UG/1
2 AEROSOL, METERED RESPIRATORY (INHALATION)
Qty: 1 INHALER | Refills: 2 | Status: SHIPPED | OUTPATIENT
Start: 2020-03-20 | End: 2020-04-19

## 2020-03-20 NOTE — TELEPHONE ENCOUNTER
Patient with wheezing and dyspnea   Needs refill of inhaler and nebulizer   Also on last refill of symbicort   No fevers   Has been social distancing for 1.5 weeks. No known COVID-19 exposure. ER precautions reviewed.

## 2020-03-23 ENCOUNTER — TELEPHONE (OUTPATIENT)
Dept: FAMILY MEDICINE CLINIC | Facility: CLINIC | Age: 55
End: 2020-03-23

## 2020-03-24 NOTE — TELEPHONE ENCOUNTER
I received benefit determination for budesonide, medication has been denied. Preferred drugs are dulera, symbicort 160/4.5 or 80/4.5 -package size 10.2 grams and wixela. Please advise.

## 2020-03-29 ENCOUNTER — HOSPITAL ENCOUNTER (EMERGENCY)
Age: 55
Discharge: HOME | End: 2020-03-29
Attending: EMERGENCY MEDICINE
Payer: MEDICAID

## 2020-03-29 ENCOUNTER — APPOINTMENT (OUTPATIENT)
Dept: GENERAL RADIOLOGY | Age: 55
End: 2020-03-29
Attending: EMERGENCY MEDICINE
Payer: MEDICAID

## 2020-03-29 ENCOUNTER — TELEPHONE (OUTPATIENT)
Dept: FAMILY MEDICINE CLINIC | Facility: CLINIC | Age: 55
End: 2020-03-29

## 2020-03-29 ENCOUNTER — PATIENT MESSAGE (OUTPATIENT)
Dept: FAMILY MEDICINE CLINIC | Facility: CLINIC | Age: 55
End: 2020-03-29

## 2020-03-29 VITALS
TEMPERATURE: 99 F | RESPIRATION RATE: 18 BRPM | DIASTOLIC BLOOD PRESSURE: 70 MMHG | BODY MASS INDEX: 19 KG/M2 | SYSTOLIC BLOOD PRESSURE: 124 MMHG | OXYGEN SATURATION: 97 % | WEIGHT: 102.94 LBS | HEART RATE: 76 BPM

## 2020-03-29 DIAGNOSIS — J44.1 COPD EXACERBATION (HCC): Primary | ICD-10-CM

## 2020-03-29 DIAGNOSIS — E87.6 HYPOKALEMIA: ICD-10-CM

## 2020-03-29 LAB
ALBUMIN SERPL-MCNC: 3.1 G/DL (ref 3.4–5)
ALBUMIN/GLOB SERPL: 0.7 {RATIO} (ref 1–2)
ALP LIVER SERPL-CCNC: 102 U/L (ref 41–108)
ALT SERPL-CCNC: 32 U/L (ref 13–56)
ANION GAP SERPL CALC-SCNC: 7 MMOL/L (ref 0–18)
AST SERPL-CCNC: 19 U/L (ref 15–37)
BASOPHILS # BLD AUTO: 0.06 X10(3) UL (ref 0–0.2)
BASOPHILS NFR BLD AUTO: 0.7 %
BILIRUB SERPL-MCNC: 0.4 MG/DL (ref 0.1–2)
BUN BLD-MCNC: 16 MG/DL (ref 7–18)
BUN/CREAT SERPL: 34.8 (ref 10–20)
CALCIUM BLD-MCNC: 8.4 MG/DL (ref 8.5–10.1)
CHLORIDE SERPL-SCNC: 104 MMOL/L (ref 98–112)
CO2 SERPL-SCNC: 27 MMOL/L (ref 21–32)
CREAT BLD-MCNC: 0.46 MG/DL (ref 0.55–1.02)
DEPRECATED RDW RBC AUTO: 44.6 FL (ref 35.1–46.3)
EOSINOPHIL # BLD AUTO: 0.48 X10(3) UL (ref 0–0.7)
EOSINOPHIL NFR BLD AUTO: 5.4 %
ERYTHROCYTE [DISTWIDTH] IN BLOOD BY AUTOMATED COUNT: 13.6 % (ref 11–15)
GLOBULIN PLAS-MCNC: 4.3 G/DL (ref 2.8–4.4)
GLUCOSE BLD-MCNC: 104 MG/DL (ref 70–99)
HCT VFR BLD AUTO: 38 % (ref 35–48)
HGB BLD-MCNC: 12.7 G/DL (ref 12–16)
IMM GRANULOCYTES # BLD AUTO: 0.01 X10(3) UL (ref 0–1)
IMM GRANULOCYTES NFR BLD: 0.1 %
LYMPHOCYTES # BLD AUTO: 4.87 X10(3) UL (ref 1–4)
LYMPHOCYTES NFR BLD AUTO: 54.4 %
M PROTEIN MFR SERPL ELPH: 7.4 G/DL (ref 6.4–8.2)
MCH RBC QN AUTO: 30 PG (ref 26–34)
MCHC RBC AUTO-ENTMCNC: 33.4 G/DL (ref 31–37)
MCV RBC AUTO: 89.6 FL (ref 80–100)
MONOCYTES # BLD AUTO: 0.95 X10(3) UL (ref 0.1–1)
MONOCYTES NFR BLD AUTO: 10.6 %
NEUTROPHILS # BLD AUTO: 2.58 X10 (3) UL (ref 1.5–7.7)
NEUTROPHILS # BLD AUTO: 2.58 X10(3) UL (ref 1.5–7.7)
NEUTROPHILS NFR BLD AUTO: 28.8 %
OSMOLALITY SERPL CALC.SUM OF ELEC: 287 MOSM/KG (ref 275–295)
PLATELET # BLD AUTO: 357 10(3)UL (ref 150–450)
POTASSIUM SERPL-SCNC: 3.1 MMOL/L (ref 3.5–5.1)
RBC # BLD AUTO: 4.24 X10(6)UL (ref 3.8–5.3)
SODIUM SERPL-SCNC: 138 MMOL/L (ref 136–145)
WBC # BLD AUTO: 9 X10(3) UL (ref 4–11)

## 2020-03-29 PROCEDURE — 99284 EMERGENCY DEPT VISIT MOD MDM: CPT

## 2020-03-29 PROCEDURE — 71045 X-RAY EXAM CHEST 1 VIEW: CPT | Performed by: EMERGENCY MEDICINE

## 2020-03-29 PROCEDURE — 85025 COMPLETE CBC W/AUTO DIFF WBC: CPT | Performed by: EMERGENCY MEDICINE

## 2020-03-29 PROCEDURE — 80053 COMPREHEN METABOLIC PANEL: CPT | Performed by: EMERGENCY MEDICINE

## 2020-03-29 PROCEDURE — 96374 THER/PROPH/DIAG INJ IV PUSH: CPT

## 2020-03-29 RX ORDER — PREDNISONE 20 MG/1
40 TABLET ORAL DAILY
Qty: 10 TABLET | Refills: 0 | Status: SHIPPED | OUTPATIENT
Start: 2020-03-29 | End: 2020-04-03

## 2020-03-29 RX ORDER — POTASSIUM CHLORIDE 20 MEQ/1
40 TABLET, EXTENDED RELEASE ORAL ONCE
Status: COMPLETED | OUTPATIENT
Start: 2020-03-29 | End: 2020-03-29

## 2020-03-29 RX ORDER — METHYLPREDNISOLONE SODIUM SUCCINATE 125 MG/2ML
125 INJECTION, POWDER, LYOPHILIZED, FOR SOLUTION INTRAMUSCULAR; INTRAVENOUS ONCE
Status: COMPLETED | OUTPATIENT
Start: 2020-03-29 | End: 2020-03-29

## 2020-03-29 RX ORDER — AZITHROMYCIN 250 MG/1
TABLET, FILM COATED ORAL
Qty: 1 PACKAGE | Refills: 0 | Status: SHIPPED | OUTPATIENT
Start: 2020-03-29 | End: 2020-04-03

## 2020-03-29 NOTE — ED PROVIDER NOTES
Patient Seen in: THE [facility] Emergency Department In [place]      History   Patient presents with:  Dyspnea ANTELMO SOB    Stated Complaint: DIFF BREATHING/COPD    HPI    Patient is a 60-year-old female with a history of COPD, despite using her nebulizer treat Smoking status: Former Smoker        Packs/day: 0.50        Years: 40.00        Pack years: 21        Quit date: 2018        Years since quittin.5      Smokeless tobacco: Never Used      Tobacco comment: trying quit    Alcohol use: Yes      Commen Distal pulses normal and symmetric. No calf swelling, asymmetry, tenderness or cords. Skin: No masses or nodules or abnormalities.   Psych: Normal interaction, cooperative with exam       ED Course     Labs Reviewed   COMP METABOLIC PANEL (14) - Abnormal Persistent mild diffuse     increased interstitial opacities. Slight asymmetric bronchovascular     opacities involve the right lower lobe may represent atelectasis or early     consolidative process. No pleural     effusion or pneumothorax.          CONC days  Qty: 1 Package Refills: 0

## 2020-03-29 NOTE — TELEPHONE ENCOUNTER
MD ON CALL NOTES    SOB  X few days  Coughing a lot  No CP. C/O gen fatigue   started with runny nose and congestion approx 1 week ago. Was Rx'd Symbicort. and Neb treatment  States that she is still SOB after neb Tx    + History of  COPD  Non smoker.  Quit

## 2020-03-29 NOTE — TELEPHONE ENCOUNTER
From: Francesco López  To: Ekta Reyes MD  Sent: 3/29/2020 5:52 AM CDT  Subject: Scotty Dame Dr. Axel Skiff thank you for calling in the prescriptions for me the other day but I'm still having major breathing issues I'm very nervous and sca

## 2020-03-29 NOTE — ED INITIAL ASSESSMENT (HPI)
States she has COPD and has felt SOB for 2 days. Unknown if febrile. Slight cough. C/o bilat calf pain L>R.  H/o calf pains in past. No DVT or PEs

## 2020-03-30 ENCOUNTER — TELEPHONE (OUTPATIENT)
Dept: FAMILY MEDICINE CLINIC | Facility: CLINIC | Age: 55
End: 2020-03-30

## 2020-03-30 ENCOUNTER — PATIENT MESSAGE (OUTPATIENT)
Dept: FAMILY MEDICINE CLINIC | Facility: CLINIC | Age: 55
End: 2020-03-30

## 2020-03-30 NOTE — TELEPHONE ENCOUNTER
Patient was in ER yesterday and advised to follow up with PCP. Pt would like to know if she needs to come in person, or is phone visit OK. Also, Pt called for results of her chest x-ray and labs drawn in ER. Thank you!

## 2020-03-31 ENCOUNTER — VIRTUAL PHONE E/M (OUTPATIENT)
Dept: FAMILY MEDICINE CLINIC | Facility: CLINIC | Age: 55
End: 2020-03-31
Payer: MEDICAID

## 2020-03-31 ENCOUNTER — TELEPHONE (OUTPATIENT)
Dept: FAMILY MEDICINE CLINIC | Facility: CLINIC | Age: 55
End: 2020-03-31

## 2020-03-31 DIAGNOSIS — R05.9 COUGH: Primary | ICD-10-CM

## 2020-03-31 DIAGNOSIS — R93.89 ABNORMAL CHEST X-RAY: ICD-10-CM

## 2020-03-31 DIAGNOSIS — R06.02 SHORTNESS OF BREATH: ICD-10-CM

## 2020-03-31 PROCEDURE — 99214 OFFICE O/P EST MOD 30 MIN: CPT | Performed by: FAMILY MEDICINE

## 2020-03-31 NOTE — TELEPHONE ENCOUNTER
From: Jovanni Blair  To: Marcia Pacheco MD  Sent: 3/30/2020 10:05 PM CDT  Subject: Test Results Question    I was kind of surprised nobody called me back I sent a message and I left a message at your office in regards to these test results they're very

## 2020-03-31 NOTE — TELEPHONE ENCOUNTER
Spoke with patient scheduled phone visit for ER Follow up COPD, and  review labs. Patient is scheduled at 9:20am on 3/31/20.

## 2020-03-31 NOTE — PROGRESS NOTES
Virtual/Telephone Check-In    Tere Solano verbally consents to a Virtual/Telephone Check-In service on 03/31/20. Patient understands and accepts financial responsibility for any deductible, co-insurance and/or co-pays associated with this service.

## 2020-03-31 NOTE — TELEPHONE ENCOUNTER
From: Tere Solano  To: Petyt Alegre MD  Sent: 3/30/2020 4:57 PM CDT  Subject: Test Results Question    Hi Dr. Aries Mjeia I'm sure you're really busy but I was wondering if maybe somebody can explain to me what the chest x-ray I meant do I have a partiall

## 2020-04-14 ENCOUNTER — APPOINTMENT (OUTPATIENT)
Dept: LAB | Age: 55
End: 2020-04-14
Attending: FAMILY MEDICINE
Payer: MEDICAID

## 2020-04-14 DIAGNOSIS — E05.90 HYPERTHYROIDISM: ICD-10-CM

## 2020-04-14 PROCEDURE — 84439 ASSAY OF FREE THYROXINE: CPT

## 2020-04-14 PROCEDURE — 36415 COLL VENOUS BLD VENIPUNCTURE: CPT

## 2020-04-14 PROCEDURE — 84481 FREE ASSAY (FT-3): CPT

## 2020-04-14 PROCEDURE — 84443 ASSAY THYROID STIM HORMONE: CPT

## 2020-04-15 ENCOUNTER — TELEPHONE (OUTPATIENT)
Dept: FAMILY MEDICINE CLINIC | Facility: CLINIC | Age: 55
End: 2020-04-15

## 2020-04-15 ENCOUNTER — PATIENT MESSAGE (OUTPATIENT)
Dept: FAMILY MEDICINE CLINIC | Facility: CLINIC | Age: 55
End: 2020-04-15

## 2020-04-15 DIAGNOSIS — E05.90 HYPERTHYROIDISM: Primary | ICD-10-CM

## 2020-04-15 DIAGNOSIS — H53.2 DOUBLE VISION: ICD-10-CM

## 2020-04-15 DIAGNOSIS — H53.8 BLURRY VISION: Primary | ICD-10-CM

## 2020-04-15 RX ORDER — METHIMAZOLE 5 MG/1
5 TABLET ORAL 3 TIMES DAILY
Qty: 30 TABLET | Refills: 1 | Status: SHIPPED | OUTPATIENT
Start: 2020-04-15 | End: 2020-07-11

## 2020-04-15 NOTE — TELEPHONE ENCOUNTER
Spoke to patient with instructions and she verbalized understanding. Sent the endo info to pt.  Via her my chart

## 2020-04-15 NOTE — TELEPHONE ENCOUNTER
From: Onelia Murray  To: Inga Myers MD  Sent: 4/15/2020 4:26 PM CDT  Subject: Referral Request    Good evening Dr. Denis Chao I spoke to the [facility] eye clinic and they're going to see me and April 21 but they need a referral from my primary care physici

## 2020-04-15 NOTE — TELEPHONE ENCOUNTER
Repeat labs ordered - due in 6 weeks    Methimazole 5mg daily sent - start with this and we will inc based on next labs    Refer to Dr. Regina Zamora or Dr. Negrita Cortez

## 2020-04-15 NOTE — TELEPHONE ENCOUNTER
----- Message from Briana Forman MD sent at 4/14/2020  8:55 PM CDT -----  Patient with uncontrolled hyperthyroidism - is she still taking methimazole? If still taking 5mg daily then inc to 10mg daily. She needs to follow up with endo on consistent basis.

## 2020-04-15 NOTE — TELEPHONE ENCOUNTER
Called patient and spoke with her. Advised her of results below. Patient states that she has been without her medication for over 1 month. Patient states that she is having a hard time with Dr. Ashley Horowitz office.   They cancelled her appointment because t

## 2020-04-16 NOTE — TELEPHONE ENCOUNTER
Referral and recent thyroid labs faxed as requested. Fax confirmation received. Patient notified above.

## 2020-04-19 ENCOUNTER — PATIENT MESSAGE (OUTPATIENT)
Dept: FAMILY MEDICINE CLINIC | Facility: CLINIC | Age: 55
End: 2020-04-19

## 2020-04-20 NOTE — TELEPHONE ENCOUNTER
From: Chandra Frias  To: Kieran Marlow MD  Sent: 4/19/2020 12:16 PM CDT  Subject: Test Results Question    I am not sure if I sent you this message correctly before but I am it's Li Foley and I wanted to let you know that I do take biotin every

## 2020-04-21 ENCOUNTER — PATIENT MESSAGE (OUTPATIENT)
Dept: FAMILY MEDICINE CLINIC | Facility: CLINIC | Age: 55
End: 2020-04-21

## 2020-04-21 NOTE — TELEPHONE ENCOUNTER
Yes - she should ask insurance. She can also check with optometry clinics like [facility], [facility], etc. Maybe [facility] or [facility]    My suspicion is however the thyroid condition.  And it may take time for it to correct because she had stopped her m

## 2020-04-21 NOTE — TELEPHONE ENCOUNTER
From: Skyler Galindo  To: Korey Hernandez MD  Sent: 4/21/2020 12:55 PM CDT  Subject: Other    Dear Dr. Sharon Cuellar I was supposed to have an appointment this morning at the [facility] eye clinic but apparently they don't consider me having double vision for the la

## 2020-05-07 ENCOUNTER — PATIENT MESSAGE (OUTPATIENT)
Dept: FAMILY MEDICINE CLINIC | Facility: CLINIC | Age: 55
End: 2020-05-07

## 2020-05-07 ENCOUNTER — VIRTUAL PHONE E/M (OUTPATIENT)
Dept: FAMILY MEDICINE CLINIC | Facility: CLINIC | Age: 55
End: 2020-05-07
Payer: MEDICAID

## 2020-05-07 ENCOUNTER — E-VISIT (OUTPATIENT)
Dept: FAMILY MEDICINE CLINIC | Facility: CLINIC | Age: 55
End: 2020-05-07

## 2020-05-07 DIAGNOSIS — Z02.9 ENCOUNTER FOR ADMINISTRATIVE EXAMINATIONS, UNSPECIFIED: Primary | ICD-10-CM

## 2020-05-07 DIAGNOSIS — Z02.9 ENCOUNTERS FOR ADMINISTRATIVE PURPOSES: Primary | ICD-10-CM

## 2020-05-07 DIAGNOSIS — J44.1 COPD WITH ACUTE EXACERBATION (HCC): Primary | ICD-10-CM

## 2020-05-07 PROCEDURE — 99214 OFFICE O/P EST MOD 30 MIN: CPT | Performed by: FAMILY MEDICINE

## 2020-05-07 RX ORDER — LEVOFLOXACIN 750 MG/1
750 TABLET ORAL DAILY
Qty: 7 TABLET | Refills: 0 | Status: SHIPPED | OUTPATIENT
Start: 2020-05-07 | End: 2020-05-14

## 2020-05-07 RX ORDER — PREDNISONE 20 MG/1
40 TABLET ORAL DAILY
Qty: 10 TABLET | Refills: 0 | Status: SHIPPED | OUTPATIENT
Start: 2020-05-07 | End: 2020-05-12

## 2020-05-07 NOTE — TELEPHONE ENCOUNTER
She did not log in to [employer] for video visit. I tried contacting her via [employer] but she did not answer.

## 2020-05-07 NOTE — TELEPHONE ENCOUNTER
I am feeling a little bit of tightness in my chest and I'm just coughing a lot and I don't Adenike Hernandez go into work like this it's very strenuous I have to run up and down stairs and it's just too much for me when I'm having issues with my breathing       *-*

## 2020-05-07 NOTE — PROGRESS NOTES
Virtual Telephone Check-In    Patito Morrissey verbally consents to a Virtual/Telephone Check-In visit on 05/07/20. Virtual visit conducted in lieu of face to face encounter due to Covid-19 pandemic.     Patient understands and accepts financial respon

## 2020-05-07 NOTE — TELEPHONE ENCOUNTER
From: Ashvin Madison  To: Mary Orozco MD  Sent: 5/7/2020 6:15 AM CDT  Subject: Other    Dr. Tarah Covarrubias I am having some issues with my breathing again and I'm supposed to go to work today is there anyway you can send me a note because I work at [employer] and

## 2020-05-11 ENCOUNTER — PATIENT MESSAGE (OUTPATIENT)
Dept: FAMILY MEDICINE CLINIC | Facility: CLINIC | Age: 55
End: 2020-05-11

## 2020-05-11 NOTE — TELEPHONE ENCOUNTER
From: Chika Humphreys  To: Anay Pizarro MD  Sent: 5/11/2020 11:01 AM CDT  Subject: Other    Hi Dr. Mariaelena Clark just wanted to let you know that I am feeling much better finally today I wasn't able to get some Mucinex until the next day after I picked up the o

## 2020-05-11 NOTE — PROGRESS NOTES
E-visit most likely created in error. PT had visit with PCP same day as this e-visit. Did not respond to e-visit questions. Will cancel e-visit. No charge.

## 2020-05-18 ENCOUNTER — PATIENT MESSAGE (OUTPATIENT)
Dept: FAMILY MEDICINE CLINIC | Facility: CLINIC | Age: 55
End: 2020-05-18

## 2020-05-18 NOTE — TELEPHONE ENCOUNTER
From: Brenda Velasquez  To: Catarino Lai MD  Sent: 5/18/2020 12:39 PM CDT  Subject: Other    I was also wondering if I could take Chantix to quit smoking I really need to quit and I'm having a hard time doing it on my own could you see if Dr. Mauro Holter could

## 2020-05-18 NOTE — TELEPHONE ENCOUNTER
From: John Shore  To: Rios Torrez MD  Sent: 5/18/2020 12:34 PM CDT  Subject: Other    Can you please give me your fax number and I will have them fax you over the after visit summary so Dr. Branden Owusu has it in my file and then I can also give them the

## 2020-05-18 NOTE — TELEPHONE ENCOUNTER
I'd like her to wait until we see some improvement in hyperthyroidism bc chantix can have some cardiac sx as side effect as well as mood disorder. She can complete lab work in next 1-2 weeks and have follow up visit at that time.

## 2020-05-19 ENCOUNTER — HOSPITAL ENCOUNTER (OUTPATIENT)
Dept: MAMMOGRAPHY | Age: 55
Discharge: HOME | End: 2020-05-19
Attending: FAMILY MEDICINE
Payer: MEDICAID

## 2020-05-19 DIAGNOSIS — Z12.39 SCREENING FOR BREAST CANCER: ICD-10-CM

## 2020-05-19 PROCEDURE — 77063 BREAST TOMOSYNTHESIS BI: CPT | Performed by: FAMILY MEDICINE

## 2020-05-19 PROCEDURE — 77067 SCR MAMMO BI INCL CAD: CPT | Performed by: FAMILY MEDICINE

## 2020-05-20 ENCOUNTER — PATIENT MESSAGE (OUTPATIENT)
Dept: FAMILY MEDICINE CLINIC | Facility: CLINIC | Age: 55
End: 2020-05-20

## 2020-05-20 NOTE — TELEPHONE ENCOUNTER
From: Ramsey Rubinstein  To: Yanni Tran MD  Sent: 5/20/2020 10:14 AM CDT  Subject: Test Results Question    Hi Dr. Dmitri Garcia I was just wondering if somebody could tell me what my test results were for the mammogram and I know I got to go back but they said

## 2020-05-21 NOTE — TELEPHONE ENCOUNTER
Please let patient know this is a common thing to happen. Some women have dense breast tissue and so we want to be extra careful that an area that is dense is not cancerous.     For women who have fatty breast tissue - a denser area would be suspicious, but

## 2020-05-24 ENCOUNTER — HOSPITAL ENCOUNTER (EMERGENCY)
Age: 55
Discharge: HOME | End: 2020-05-24
Attending: EMERGENCY MEDICINE
Payer: MEDICAID

## 2020-05-24 ENCOUNTER — PATIENT MESSAGE (OUTPATIENT)
Dept: FAMILY MEDICINE CLINIC | Facility: CLINIC | Age: 55
End: 2020-05-24

## 2020-05-24 ENCOUNTER — APPOINTMENT (OUTPATIENT)
Dept: GENERAL RADIOLOGY | Age: 55
End: 2020-05-24
Attending: EMERGENCY MEDICINE
Payer: MEDICAID

## 2020-05-24 VITALS
RESPIRATION RATE: 26 BRPM | SYSTOLIC BLOOD PRESSURE: 102 MMHG | DIASTOLIC BLOOD PRESSURE: 68 MMHG | HEIGHT: 61 IN | HEART RATE: 53 BPM | BODY MASS INDEX: 20.77 KG/M2 | WEIGHT: 110 LBS | OXYGEN SATURATION: 97 % | TEMPERATURE: 98 F

## 2020-05-24 DIAGNOSIS — J44.1 COPD EXACERBATION (HCC): Primary | ICD-10-CM

## 2020-05-24 PROCEDURE — 93005 ELECTROCARDIOGRAM TRACING: CPT

## 2020-05-24 PROCEDURE — 93010 ELECTROCARDIOGRAM REPORT: CPT

## 2020-05-24 PROCEDURE — 99285 EMERGENCY DEPT VISIT HI MDM: CPT

## 2020-05-24 PROCEDURE — 99284 EMERGENCY DEPT VISIT MOD MDM: CPT

## 2020-05-24 PROCEDURE — 71045 X-RAY EXAM CHEST 1 VIEW: CPT | Performed by: EMERGENCY MEDICINE

## 2020-05-24 PROCEDURE — 96374 THER/PROPH/DIAG INJ IV PUSH: CPT

## 2020-05-24 PROCEDURE — 85025 COMPLETE CBC W/AUTO DIFF WBC: CPT | Performed by: EMERGENCY MEDICINE

## 2020-05-24 PROCEDURE — 80053 COMPREHEN METABOLIC PANEL: CPT | Performed by: EMERGENCY MEDICINE

## 2020-05-24 RX ORDER — PREDNISONE 20 MG/1
40 TABLET ORAL DAILY
Qty: 8 TABLET | Refills: 0 | Status: SHIPPED | OUTPATIENT
Start: 2020-05-24 | End: 2020-05-24

## 2020-05-24 RX ORDER — ALBUTEROL SULFATE 90 UG/1
AEROSOL, METERED RESPIRATORY (INHALATION) EVERY 6 HOURS PRN
COMMUNITY
End: 2020-10-19

## 2020-05-24 RX ORDER — METHYLPREDNISOLONE SODIUM SUCCINATE 125 MG/2ML
125 INJECTION, POWDER, LYOPHILIZED, FOR SOLUTION INTRAMUSCULAR; INTRAVENOUS ONCE
Status: COMPLETED | OUTPATIENT
Start: 2020-05-24 | End: 2020-05-24

## 2020-05-24 RX ORDER — DICLOFENAC SODIUM 75 MG/1
75 TABLET, DELAYED RELEASE ORAL 2 TIMES DAILY
COMMUNITY
End: 2020-11-16 | Stop reason: ALTCHOICE

## 2020-05-24 RX ORDER — PREDNISONE 20 MG/1
40 TABLET ORAL DAILY
Qty: 8 TABLET | Refills: 0 | Status: SHIPPED | OUTPATIENT
Start: 2020-05-25 | End: 2020-05-29

## 2020-05-24 NOTE — ED PROVIDER NOTES
Patient Seen in: [facility] Emergency Department In [place]      History   Patient presents with:  Dyspnea ANTELMO SOB    Stated Complaint: diff breathing    HPI    Patient presents complaining of worsening breathing and coughing consistent with her baseline for stated complaint: diff breathing  Other systems are as noted in HPI. Constitutional and vital signs reviewed. All other systems reviewed and negative except as noted above.     Physical Exam     ED Triage Vitals [05/24/20 1228]   /88   Pulse RDW-SD 49.9 (*)     All other components within normal limits   CBC WITH DIFFERENTIAL WITH PLATELET    Narrative: The following orders were created for panel order CBC WITH DIFFERENTIAL WITH PLATELET.   Procedure                               Abnorma increasingly recurrent.               Disposition and Plan     Clinical Impression:  COPD exacerbation ([facility] [address]  (primary encounter diagnosis)    Disposition:  Discharge  5/24/2020  2:48 pm    Follow-up:  Dipti Encarnacion [address]  6

## 2020-05-24 NOTE — ED INITIAL ASSESSMENT (HPI)
PT HAS A HX OF AMAZON AND ANTELMO, PT STARTED HAVING SOB 2 DAYS AGO.  PT HAS BEEN DOING NEBS AT HOME, [address] SOB NOW

## 2020-05-26 NOTE — TELEPHONE ENCOUNTER
From: Jovanni Blair  To: Marcia Pacheco MD  Sent: 5/24/2020 5:27 PM CDT  Subject: Test Results Question    I had a chest x-ray done I was wondering if you could let me know does that mean my lungs are maybe a little bit better than what they were last t

## 2020-05-26 NOTE — TELEPHONE ENCOUNTER
The XR's are different and so it doesn't mean that either is better or worse. The XR from March noted possible pneumonia (which can happen with COPD); the XR from this weekend showed general COPD changes.

## 2020-06-01 ENCOUNTER — HOSPITAL ENCOUNTER (OUTPATIENT)
Dept: MAMMOGRAPHY | Age: 55
Discharge: HOME | End: 2020-06-01
Attending: FAMILY MEDICINE
Payer: MEDICAID

## 2020-06-01 ENCOUNTER — HOSPITAL ENCOUNTER (OUTPATIENT)
Dept: ULTRASOUND IMAGING | Age: 55
Discharge: HOME | End: 2020-06-01
Attending: FAMILY MEDICINE
Payer: MEDICAID

## 2020-06-01 DIAGNOSIS — R92.2 INCONCLUSIVE MAMMOGRAM: ICD-10-CM

## 2020-06-01 PROCEDURE — 77062 BREAST TOMOSYNTHESIS BI: CPT | Performed by: FAMILY MEDICINE

## 2020-06-01 PROCEDURE — 77066 DX MAMMO INCL CAD BI: CPT | Performed by: FAMILY MEDICINE

## 2020-06-01 PROCEDURE — 76642 ULTRASOUND BREAST LIMITED: CPT | Performed by: FAMILY MEDICINE

## 2020-06-03 ENCOUNTER — MED REC SCAN ONLY (OUTPATIENT)
Dept: FAMILY MEDICINE CLINIC | Facility: CLINIC | Age: 55
End: 2020-06-03

## 2020-07-11 DIAGNOSIS — J44.1 COPD WITH ACUTE EXACERBATION (HCC): ICD-10-CM

## 2020-07-13 ENCOUNTER — PATIENT MESSAGE (OUTPATIENT)
Dept: FAMILY MEDICINE CLINIC | Facility: CLINIC | Age: 55
End: 2020-07-13

## 2020-07-13 RX ORDER — CETIRIZINE HYDROCHLORIDE 10 MG/1
10 TABLET ORAL DAILY
Qty: 30 TABLET | Refills: 5 | Status: SHIPPED | OUTPATIENT
Start: 2020-07-13 | End: 2021-06-08

## 2020-07-13 RX ORDER — METHIMAZOLE 5 MG/1
5 TABLET ORAL 3 TIMES DAILY
Qty: 30 TABLET | Refills: 1 | OUTPATIENT
Start: 2020-07-13

## 2020-07-13 RX ORDER — METHIMAZOLE 5 MG/1
5 TABLET ORAL 3 TIMES DAILY
Qty: 90 TABLET | Refills: 0 | Status: SHIPPED | OUTPATIENT
Start: 2020-07-13 | End: 2020-08-05

## 2020-07-13 NOTE — TELEPHONE ENCOUNTER
She is overdue for labs - please have her schedule asap.   Dosing of thyroid medication depends on her lab values

## 2020-07-13 NOTE — TELEPHONE ENCOUNTER
From: Ignacio Cedillo  To: Sangeeta Wilder MD  Sent: 7/13/2020 1:20 PM CDT  Subject: Prescription Question    OK I'm sorry I did not know you received two request I wonder if the pharmacy sent one because I requested one on Saturday thank you so much for a

## 2020-07-13 NOTE — TELEPHONE ENCOUNTER
From: Skyler Galindo  To: Korey Hernandez MD  Sent: 7/13/2020 12:47 PM CDT  Subject: Prescription Question    I am out of my thyroid medication and it saying that it was denied by the doctor

## 2020-08-05 RX ORDER — METHIMAZOLE 5 MG/1
TABLET ORAL
Qty: 90 TABLET | Refills: 0 | Status: SHIPPED | OUTPATIENT
Start: 2020-08-05 | End: 2020-09-10

## 2020-08-06 NOTE — TELEPHONE ENCOUNTER
Methimazole refilled  Patient should follow up with Endocrinology and she was referred in the past  Further refills per endocrinology  Pls inform patient

## 2020-09-10 ENCOUNTER — LAB ENCOUNTER (OUTPATIENT)
Dept: LAB | Age: 55
End: 2020-09-10
Attending: FAMILY MEDICINE
Payer: MEDICAID

## 2020-09-10 DIAGNOSIS — J44.1 COPD WITH ACUTE EXACERBATION (HCC): ICD-10-CM

## 2020-09-10 DIAGNOSIS — E05.90 HYPERTHYROIDISM: ICD-10-CM

## 2020-09-10 LAB
T3FREE SERPL-MCNC: 2.77 PG/ML (ref 2.4–4.2)
T4 FREE SERPL-MCNC: 0.9 NG/DL (ref 0.8–1.7)
TSI SER-ACNC: 2.43 MIU/ML (ref 0.36–3.74)

## 2020-09-10 PROCEDURE — 84439 ASSAY OF FREE THYROXINE: CPT

## 2020-09-10 PROCEDURE — 36415 COLL VENOUS BLD VENIPUNCTURE: CPT

## 2020-09-10 PROCEDURE — 84443 ASSAY THYROID STIM HORMONE: CPT

## 2020-09-10 PROCEDURE — 84481 FREE ASSAY (FT-3): CPT

## 2020-09-10 RX ORDER — METHIMAZOLE 5 MG/1
5 TABLET ORAL 3 TIMES DAILY
Qty: 90 TABLET | Refills: 0 | OUTPATIENT
Start: 2020-09-10

## 2020-09-10 RX ORDER — METHIMAZOLE 5 MG/1
5 TABLET ORAL 3 TIMES DAILY
Qty: 90 TABLET | Refills: 0 | Status: SHIPPED | OUTPATIENT
Start: 2020-09-10 | End: 2020-12-02

## 2020-09-11 ENCOUNTER — TELEPHONE (OUTPATIENT)
Dept: FAMILY MEDICINE CLINIC | Facility: CLINIC | Age: 55
End: 2020-09-11

## 2020-09-11 RX ORDER — BUDESONIDE AND FORMOTEROL FUMARATE DIHYDRATE 160; 4.5 UG/1; UG/1
2 AEROSOL RESPIRATORY (INHALATION) 2 TIMES DAILY
COMMUNITY
Start: 2020-04-17 | End: 2020-09-14

## 2020-09-11 RX ORDER — MONTELUKAST SODIUM 10 MG/1
10 TABLET ORAL EVERY EVENING
Qty: 30 TABLET | Refills: 0 | Status: SHIPPED | OUTPATIENT
Start: 2020-09-11 | End: 2021-05-20

## 2020-09-11 NOTE — PROGRESS NOTES
Received a PA to do for Symbicort but pt is on Dulera as of 9/10/20. Is pt to be on both? Reconciled the symbicort from the reconcile list.    Please advise.

## 2020-09-11 NOTE — TELEPHONE ENCOUNTER
Patient with COPD.   On Dulera  Cannot find documentation of change in medication from Symbicort to [facility] but clinically would not make sense to be taking both  Patient has upcoming appointment scheduled with ARNIE Chandelr, have her verify her medica

## 2020-09-14 NOTE — TELEPHONE ENCOUNTER
See TE 3/23/20 - pt was switched to [facility] by PCP, symbicort was not covered by insurance at that time. Called and spoke with Supriya Burks at [employer], states that she will remove Symbicort from list so that PA requests are not generated.      Medica

## 2020-09-16 ENCOUNTER — OFFICE VISIT (OUTPATIENT)
Dept: FAMILY MEDICINE CLINIC | Facility: CLINIC | Age: 55
End: 2020-09-16
Payer: MEDICAID

## 2020-09-16 VITALS
HEIGHT: 61 IN | HEART RATE: 60 BPM | WEIGHT: 115 LBS | DIASTOLIC BLOOD PRESSURE: 80 MMHG | SYSTOLIC BLOOD PRESSURE: 110 MMHG | RESPIRATION RATE: 16 BRPM | TEMPERATURE: 98 F | BODY MASS INDEX: 21.71 KG/M2

## 2020-09-16 DIAGNOSIS — Z00.00 LABORATORY EXAM ORDERED AS PART OF ROUTINE GENERAL MEDICAL EXAMINATION: ICD-10-CM

## 2020-09-16 DIAGNOSIS — Z00.00 ROUTINE GENERAL MEDICAL EXAMINATION AT A HEALTH CARE FACILITY: Primary | ICD-10-CM

## 2020-09-16 DIAGNOSIS — J44.9 CHRONIC OBSTRUCTIVE PULMONARY DISEASE, UNSPECIFIED COPD TYPE (HCC): ICD-10-CM

## 2020-09-16 DIAGNOSIS — E05.90 HYPERTHYROIDISM: ICD-10-CM

## 2020-09-16 DIAGNOSIS — F41.9 ANXIETY: ICD-10-CM

## 2020-09-16 DIAGNOSIS — F33.1 MODERATE EPISODE OF RECURRENT MAJOR DEPRESSIVE DISORDER (HCC): ICD-10-CM

## 2020-09-16 PROCEDURE — 3079F DIAST BP 80-89 MM HG: CPT | Performed by: NURSE PRACTITIONER

## 2020-09-16 PROCEDURE — 99396 PREV VISIT EST AGE 40-64: CPT | Performed by: NURSE PRACTITIONER

## 2020-09-16 PROCEDURE — 3074F SYST BP LT 130 MM HG: CPT | Performed by: NURSE PRACTITIONER

## 2020-09-16 PROCEDURE — 3008F BODY MASS INDEX DOCD: CPT | Performed by: NURSE PRACTITIONER

## 2020-09-16 RX ORDER — ALPRAZOLAM 0.25 MG/1
0.25 TABLET ORAL NIGHTLY PRN
Qty: 30 TABLET | Refills: 0 | Status: SHIPPED | OUTPATIENT
Start: 2020-09-16 | End: 2020-10-12

## 2020-09-16 NOTE — PROGRESS NOTES
Calvin Ramsey is a 48year old female who presents for a complete physical exam, no gyn. HPI:     Patient presents with:  Wellness Visit      Patient feels well, dental visit up to date, no hearing problem.     Depression / Insomnia - unable to slee [facility])    • Depression    • Disorder of thyroid    • Fibroids    • History of stomach ulcers    • Human papilloma virus infection    • Prediabetes     Diet control - no meds or blood surgar checking at home   • Sexually transmitted disease    • Thyroid dise complaint  HEMATOLOGY: denies hx anemia; denies bruising or excessive bleeding  ENDOCRINE: denies excessive thirst or urination; denies unexpected wt gain or wt loss  ALLERGY/IMM.: denies food or seasonal allergies  PSYCH: no symptoms of depression or anxi ([facility]  -     ALPRAZolam (XANAX) 0.25 MG Oral Tab; Take 1 tablet (0.25 mg total) by mouth nightly as needed. Anxiety  -     ALPRAZolam (XANAX) 0.25 MG Oral Tab; Take 1 tablet (0.25 mg total) by mouth nightly as needed.     Hyperthyroidism  -     T4(THYROX

## 2020-09-22 ENCOUNTER — LABORATORY ENCOUNTER (OUTPATIENT)
Dept: LAB | Age: 55
End: 2020-09-22
Attending: NURSE PRACTITIONER
Payer: MEDICAID

## 2020-09-22 DIAGNOSIS — E05.90 HYPERTHYROIDISM: ICD-10-CM

## 2020-09-22 DIAGNOSIS — Z00.00 LABORATORY EXAM ORDERED AS PART OF ROUTINE GENERAL MEDICAL EXAMINATION: ICD-10-CM

## 2020-09-22 LAB
ALBUMIN SERPL-MCNC: 3.4 G/DL (ref 3.4–5)
ALBUMIN/GLOB SERPL: 0.8 {RATIO} (ref 1–2)
ALP LIVER SERPL-CCNC: 105 U/L
ALT SERPL-CCNC: 19 U/L
ANION GAP SERPL CALC-SCNC: 5 MMOL/L (ref 0–18)
AST SERPL-CCNC: 14 U/L (ref 15–37)
BASOPHILS # BLD AUTO: 0.06 X10(3) UL (ref 0–0.2)
BASOPHILS NFR BLD AUTO: 0.6 %
BILIRUB SERPL-MCNC: 0.3 MG/DL (ref 0.1–2)
BUN BLD-MCNC: 18 MG/DL (ref 7–18)
BUN/CREAT SERPL: 26.5 (ref 10–20)
CALCIUM BLD-MCNC: 9.2 MG/DL (ref 8.5–10.1)
CHLORIDE SERPL-SCNC: 106 MMOL/L (ref 98–112)
CHOLEST SMN-MCNC: 223 MG/DL (ref ?–200)
CO2 SERPL-SCNC: 27 MMOL/L (ref 21–32)
CREAT BLD-MCNC: 0.68 MG/DL
DEPRECATED RDW RBC AUTO: 47 FL (ref 35.1–46.3)
EOSINOPHIL # BLD AUTO: 0.16 X10(3) UL (ref 0–0.7)
EOSINOPHIL NFR BLD AUTO: 1.7 %
ERYTHROCYTE [DISTWIDTH] IN BLOOD BY AUTOMATED COUNT: 13.7 % (ref 11–15)
GLOBULIN PLAS-MCNC: 4.2 G/DL (ref 2.8–4.4)
GLUCOSE BLD-MCNC: 94 MG/DL (ref 70–99)
HCT VFR BLD AUTO: 41.1 %
HDLC SERPL-MCNC: 58 MG/DL (ref 40–59)
HGB BLD-MCNC: 13.3 G/DL
IMM GRANULOCYTES # BLD AUTO: 0.02 X10(3) UL (ref 0–1)
IMM GRANULOCYTES NFR BLD: 0.2 %
LDLC SERPL CALC-MCNC: 144 MG/DL (ref ?–100)
LYMPHOCYTES # BLD AUTO: 3.04 X10(3) UL (ref 1–4)
LYMPHOCYTES NFR BLD AUTO: 32.5 %
M PROTEIN MFR SERPL ELPH: 7.6 G/DL (ref 6.4–8.2)
MCH RBC QN AUTO: 30.3 PG (ref 26–34)
MCHC RBC AUTO-ENTMCNC: 32.4 G/DL (ref 31–37)
MCV RBC AUTO: 93.6 FL
MONOCYTES # BLD AUTO: 0.48 X10(3) UL (ref 0.1–1)
MONOCYTES NFR BLD AUTO: 5.1 %
NEUTROPHILS # BLD AUTO: 5.58 X10 (3) UL (ref 1.5–7.7)
NEUTROPHILS # BLD AUTO: 5.58 X10(3) UL (ref 1.5–7.7)
NEUTROPHILS NFR BLD AUTO: 59.9 %
NONHDLC SERPL-MCNC: 165 MG/DL (ref ?–130)
OSMOLALITY SERPL CALC.SUM OF ELEC: 288 MOSM/KG (ref 275–295)
PATIENT FASTING Y/N/NP: YES
PATIENT FASTING Y/N/NP: YES
PLATELET # BLD AUTO: 386 10(3)UL (ref 150–450)
POTASSIUM SERPL-SCNC: 4 MMOL/L (ref 3.5–5.1)
RBC # BLD AUTO: 4.39 X10(6)UL
SODIUM SERPL-SCNC: 138 MMOL/L (ref 136–145)
T3 SERPL-MCNC: 98 NG/DL (ref 60–181)
T4 SERPL-MCNC: 6.5 UG/DL
THYROGLOB SERPL-MCNC: 15 U/ML (ref ?–60)
THYROPEROXIDASE AB SERPL-ACNC: 36 U/ML (ref ?–60)
TRIGL SERPL-MCNC: 107 MG/DL (ref 30–149)
TSI SER-ACNC: 1.62 MIU/ML (ref 0.36–3.74)
VIT D+METAB SERPL-MCNC: 17.6 NG/ML (ref 30–100)
VLDLC SERPL CALC-MCNC: 21 MG/DL (ref 0–30)
WBC # BLD AUTO: 9.3 X10(3) UL (ref 4–11)

## 2020-09-22 PROCEDURE — 84480 ASSAY TRIIODOTHYRONINE (T3): CPT

## 2020-09-22 PROCEDURE — 80061 LIPID PANEL: CPT

## 2020-09-22 PROCEDURE — 82306 VITAMIN D 25 HYDROXY: CPT

## 2020-09-22 PROCEDURE — 84436 ASSAY OF TOTAL THYROXINE: CPT

## 2020-09-22 PROCEDURE — 86376 MICROSOMAL ANTIBODY EACH: CPT

## 2020-09-22 PROCEDURE — 86800 THYROGLOBULIN ANTIBODY: CPT

## 2020-09-22 PROCEDURE — 83520 IMMUNOASSAY QUANT NOS NONAB: CPT

## 2020-09-22 PROCEDURE — 84443 ASSAY THYROID STIM HORMONE: CPT

## 2020-09-22 PROCEDURE — 80053 COMPREHEN METABOLIC PANEL: CPT

## 2020-09-22 PROCEDURE — 85025 COMPLETE CBC W/AUTO DIFF WBC: CPT

## 2020-09-22 PROCEDURE — 36415 COLL VENOUS BLD VENIPUNCTURE: CPT

## 2020-09-25 LAB — TSH RECEPTOR AB: 0.9 IU/L

## 2020-09-26 ENCOUNTER — PATIENT MESSAGE (OUTPATIENT)
Dept: FAMILY MEDICINE CLINIC | Facility: CLINIC | Age: 55
End: 2020-09-26

## 2020-09-26 DIAGNOSIS — E78.00 ELEVATED CHOLESTEROL: Primary | ICD-10-CM

## 2020-09-28 NOTE — TELEPHONE ENCOUNTER
From: Silvia Crisostomo  To: ARNIE Lovett  Sent: 9/26/2020 6:12 PM CDT  Subject: Test Results Question    Thank you for sending me the test results can you please advise me if I need to start taking vitamin D or something else I don't know really h

## 2020-09-28 NOTE — TELEPHONE ENCOUNTER
----- Message from ARNIE Brandt sent at 9/28/2020  7:39 AM CDT -----  Labs are good , Vitamin D is low sent new prescription for Vitamin D -take it once per week, cholesterol is elevated -continue low carb , low cholesterol diet , exercise - will re

## 2020-09-28 NOTE — TELEPHONE ENCOUNTER
From: Nela Rothman  To: ARNIE Woods  Sent: 9/26/2020 11:05 AM CDT  Subject: Visit Follow-up Question    Good morning I was under the impression that I was getting a full blood work up because it was a physical. I'm just curious did I get that

## 2020-10-07 ENCOUNTER — PATIENT MESSAGE (OUTPATIENT)
Dept: FAMILY MEDICINE CLINIC | Facility: CLINIC | Age: 55
End: 2020-10-07

## 2020-10-07 DIAGNOSIS — H53.8 BLURRED VISION, BILATERAL: Primary | ICD-10-CM

## 2020-10-07 DIAGNOSIS — R53.82 CHRONIC FATIGUE: ICD-10-CM

## 2020-10-07 NOTE — TELEPHONE ENCOUNTER
From: Daksha Avila  To: Amos Flores MD  Sent: 10/7/2020 12:44 PM CDT  Subject: Test Results Question    Just wondering if my vitamin B/folate was checked if so could you give me the results I don't really know how to read these results!  Also can I g

## 2020-10-08 NOTE — TELEPHONE ENCOUNTER
Pt requesting for Vitamin B/Folate to be drawn along with getting her lab work done monthly since her eyes are not improving . Please advise. Thank you.

## 2020-10-10 DIAGNOSIS — F33.1 MODERATE EPISODE OF RECURRENT MAJOR DEPRESSIVE DISORDER (HCC): ICD-10-CM

## 2020-10-10 DIAGNOSIS — F41.9 ANXIETY: ICD-10-CM

## 2020-10-12 RX ORDER — ALPRAZOLAM 0.25 MG/1
TABLET ORAL
Qty: 30 TABLET | Refills: 0 | Status: SHIPPED | OUTPATIENT
Start: 2020-10-12 | End: 2021-02-03

## 2020-10-12 NOTE — TELEPHONE ENCOUNTER
Medication(s) to Refill:   Requested Prescriptions     Pending Prescriptions Disp Refills   • ALPRAZOLAM 0.25 MG Oral Tab [Pharmacy Med Name: ALPRAZOLAM 0.25MG TABLETS] 30 tablet 0     Sig: TAKE 1 TABLET(0.25 MG) BY MOUTH EVERY NIGHT AS NEEDED         Reas

## 2020-10-13 ENCOUNTER — LAB ENCOUNTER (OUTPATIENT)
Dept: LAB | Age: 55
End: 2020-10-13
Attending: NURSE PRACTITIONER
Payer: MEDICAID

## 2020-10-13 DIAGNOSIS — H53.8 BLURRED VISION, BILATERAL: ICD-10-CM

## 2020-10-13 DIAGNOSIS — R53.82 CHRONIC FATIGUE: ICD-10-CM

## 2020-10-13 PROCEDURE — 82607 VITAMIN B-12: CPT

## 2020-10-13 PROCEDURE — 82746 ASSAY OF FOLIC ACID SERUM: CPT

## 2020-10-13 PROCEDURE — 36415 COLL VENOUS BLD VENIPUNCTURE: CPT

## 2020-10-14 ENCOUNTER — TELEPHONE (OUTPATIENT)
Dept: FAMILY MEDICINE CLINIC | Facility: CLINIC | Age: 55
End: 2020-10-14

## 2020-10-14 NOTE — TELEPHONE ENCOUNTER
Spoke with patient via phone. Notified patient of below order for patient to schedule follow-up with PCP due to abnormal lab. Patient verbalized understanding. No questions voiced at this time. Transferred patient to scheduling.

## 2020-10-14 NOTE — TELEPHONE ENCOUNTER
----- Message from Ezio Garcia MD sent at 10/13/2020 10:59 PM CDT -----  Please have her schedule a f-u appt with Dr. Karon Woodson to review abnormal lab results. Thanks.

## 2020-10-19 ENCOUNTER — TELEPHONE (OUTPATIENT)
Dept: FAMILY MEDICINE CLINIC | Facility: CLINIC | Age: 55
End: 2020-10-19

## 2020-10-19 ENCOUNTER — OFFICE VISIT (OUTPATIENT)
Dept: FAMILY MEDICINE CLINIC | Facility: CLINIC | Age: 55
End: 2020-10-19
Payer: MEDICAID

## 2020-10-19 VITALS
SYSTOLIC BLOOD PRESSURE: 108 MMHG | DIASTOLIC BLOOD PRESSURE: 78 MMHG | TEMPERATURE: 97 F | HEIGHT: 61 IN | RESPIRATION RATE: 18 BRPM | BODY MASS INDEX: 22.66 KG/M2 | OXYGEN SATURATION: 95 % | HEART RATE: 65 BPM | WEIGHT: 120 LBS

## 2020-10-19 DIAGNOSIS — E53.8 FOLIC ACID DEFICIENCY: Primary | ICD-10-CM

## 2020-10-19 DIAGNOSIS — J44.9 CHRONIC OBSTRUCTIVE PULMONARY DISEASE, UNSPECIFIED COPD TYPE (HCC): ICD-10-CM

## 2020-10-19 DIAGNOSIS — K90.0 CELIAC DISEASE: ICD-10-CM

## 2020-10-19 PROCEDURE — 3008F BODY MASS INDEX DOCD: CPT | Performed by: FAMILY MEDICINE

## 2020-10-19 PROCEDURE — 99214 OFFICE O/P EST MOD 30 MIN: CPT | Performed by: FAMILY MEDICINE

## 2020-10-19 PROCEDURE — 3078F DIAST BP <80 MM HG: CPT | Performed by: FAMILY MEDICINE

## 2020-10-19 PROCEDURE — 3074F SYST BP LT 130 MM HG: CPT | Performed by: FAMILY MEDICINE

## 2020-10-19 RX ORDER — FOLIC ACID 5 MG
1 CAPSULE ORAL DAILY
Qty: 30 CAPSULE | Refills: 2 | Status: SHIPPED | OUTPATIENT
Start: 2020-10-19 | End: 2020-10-19 | Stop reason: CLARIF

## 2020-10-19 RX ORDER — FOLIC ACID 1 MG/1
1 TABLET ORAL DAILY
Qty: 30 TABLET | Refills: 2 | Status: SHIPPED | OUTPATIENT
Start: 2020-10-19 | End: 2021-01-08

## 2020-10-19 RX ORDER — ALBUTEROL SULFATE 90 UG/1
2 AEROSOL, METERED RESPIRATORY (INHALATION) EVERY 6 HOURS PRN
Qty: 18 G | Refills: 5 | Status: SHIPPED | OUTPATIENT
Start: 2020-10-19 | End: 2020-12-01

## 2020-10-19 NOTE — PROGRESS NOTES
[facility] Family Medicine Office Note  Chief Complaint:   Patient presents with: Follow - Up: labs      HPI:   This is a 48year old female coming in for lab follow up  Pt had folate and vit b12 ordered as she was feeling fatigued.  Vit b12 norm Allergies:    Gluten Flour            OTHER (SEE COMMENTS)    Comment:Celiac disease  Gluten Meal             OTHER (SEE COMMENTS)    Comment:Celiac disease  Shrimp                  RASH  Current Meds:  Current Outpatient Medications   Medication Sig D adenopathy. EXAM:   /78 (BP Location: Left arm, Patient Position: Sitting)   Pulse 65   Temp 97.1 °F (36.2 °C) (Temporal)   Resp 18   Ht 61\"   Wt 120 lb (54.4 kg)   LMP 07/05/2011   SpO2 95%   BMI 22.67 kg/m²  Estimated body mass index is 22. obstructive pulmonary disease, unspecified COPD type ([facility]  Stable, desires refills. Continue present management  - Albuterol Sulfate  (90 Base) MCG/ACT Inhalation Aero Soln; Inhale 2 puffs into the lungs every 6 (six) hours as needed for Wheezing.

## 2020-10-19 NOTE — TELEPHONE ENCOUNTER
[facility] calling, having questions regarding  Folic Acid 5 MG Oral Cap.  Pharmacy states they do not come in 5mg capsules only 1mg  Please advise

## 2020-10-19 NOTE — TELEPHONE ENCOUNTER
[facility] called and states they do no have Folic Acid 5mg Cap. They have Folic Acid 1mg. Please advise. Thank you.

## 2020-10-19 NOTE — PATIENT INSTRUCTIONS
1. Follow up with Gastroenterology    2. Start folic acid daily. We will repeat folic acid levels in 3 months. 3. Follow up with dietician.

## 2020-11-09 NOTE — TELEPHONE ENCOUNTER
From: Sandee Arteaga  To: Wolf Elizabeth MD  Sent: 8/29/2018 11:51 PM CDT  Subject: Other    Also I have an appointment with dr. Cece Hinojosa at the [address] [place] [address] in [place] on 9/13 at 1.  I was wondering if there's any way we can get that moved u Initial SW/CM Assessment/Plan of Care Note     Baseline Assessment  65 year old admitted 11/8/2020 as Inpatient with a diagnosis of Acute respiratory failure w/ hypoxia likely due to COVID19  Severe sepsis  COVID19 Pneumonia  MI type 2 . Prior to admission patient was living with daughter and residing at Apartment(2nd floor).  Patient does not  have a Power of  for Healthcare.     Patient’s Primary Care Provider is Unknown Pcp [facility].     Medical History  Past Medical History:   Diagnosis Date   • CHF (congestive heart failure) (CMS/[facility])    • Diabetes mellitus (CMS/[facility])    • Hypertension    • RAD (reactive airway disease)        Prior to Admission Status  Functional Status  Ambulation: Independent/Self  Bathing: Independent/Self  Dressing: Independent/Self  Toileting: Independent/Self  Meal Preparation: Independent/Self  Shopping: Independent/Self  Medication Preparation: Independent/Self  Medication Administration: Independent/Self  Housekeeping: Independent/Self  Laundry: Independent/Self  Transportation: Independent/Self  Functional Status Comments: 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    Agency/Support  Type of Services Prior to Hospitalization: None  Support Systems: Family members  Home Devices/Equipment:    Mobility Assist Devices:    Sensory Support Devices:      Current Status  PT Ambulation Tips:    PT Transfer Tips:     OT Bathing Tips:    OT Dressing Tips:    OT Toileting Tips:    OT Feeding Tips:    SLP Swallow/Feeding Tips:    SLP Comm/Cog Tips:    Current Mental Status: (Patient currently on continuous BiPap)  Stressors:      Insurance  Primary: N/A  Secondary: N/A    Barriers to Discharge  Identified Barriers to Discharge/Transition Planning:      Progress Note  CM contacted patient's daughter, Altagracia Solo ([phone]) to complete discharge assessment. Patient is currently on continuous BiPap and is noted to be confused at this time. CM updated  patient's home address. Discharge plans to be determined.    Plan  SW/CM - Recommendations for Discharge:     PT - Recommendations for Discharge:      OT - Recommendations for Discharge:      SLP - Recommendations for Discharge:     Anticipate patient will need post-hospital services. Necessary services are available.  Anticipate patient can return to the environment from which patient entered the hospital.   Anticipate patient can provide self-care at discharge.    Refer to / Flowsheet for Goals and objective data.

## 2020-11-12 ENCOUNTER — PATIENT MESSAGE (OUTPATIENT)
Dept: FAMILY MEDICINE CLINIC | Facility: CLINIC | Age: 55
End: 2020-11-12

## 2020-11-12 NOTE — TELEPHONE ENCOUNTER
From: Skyler Galindo  To: Korey Hernandez MD  Sent: 11/12/2020 4:15 AM CST  Subject: Non-Urgent Medical Question    I have been in the hospital since Sunday and have tested positive for COVID-19 can somebody please send me your email address cause I'm goi

## 2020-11-16 ENCOUNTER — TELEPHONE (OUTPATIENT)
Dept: FAMILY MEDICINE CLINIC | Facility: CLINIC | Age: 55
End: 2020-11-16

## 2020-11-16 RX ORDER — DICLOFENAC POTASSIUM 50 MG/1
50 TABLET, FILM COATED ORAL 2 TIMES DAILY
Status: ON HOLD | COMMUNITY
Start: 2020-10-10 | End: 2021-02-23

## 2020-11-16 RX ORDER — ALPRAZOLAM 0.5 MG/1
0.5 TABLET ORAL NIGHTLY PRN
Qty: 10 TABLET | Refills: 0 | Status: SHIPPED | OUTPATIENT
Start: 2020-11-16 | End: 2021-02-03

## 2020-11-16 NOTE — TELEPHONE ENCOUNTER
More information needed   When did she test positive? Xanax for short term relief. If she has daily anxiety due to stress she would benefit from SSRI instead of higher dose of xanax.

## 2020-11-16 NOTE — TELEPHONE ENCOUNTER
Spoke with the patient via phone. Notified the patient of below response by PCP. Patient stated that she was admitted to [facility] on 11/8/2020 and tested positive for Covid.  Patient stated that she was discharged from the hospital on 11/13/2020

## 2020-11-16 NOTE — TELEPHONE ENCOUNTER
Pt requesting an increase in her xanax from . 25 to . 5. Pt tested positive for covid, and wasn't able to start her new job, and concerned about financial issues.

## 2020-11-16 NOTE — TELEPHONE ENCOUNTER
Spoke with the patient via phone. Notified the patient of the below response by PCP. Patient verbalized understanding. 1.Patient agreed to plan of Xanax 0.5mg for one week.    2. Patient stated that she will contact [facility] tomorrow to confirm

## 2020-11-16 NOTE — TELEPHONE ENCOUNTER
I will give her a week of xanax 0.5mg to bridge her to see psychiatry, but we will not refill.      Regarding COVID  We need to see discharge summary to know more about what she may need to be on long term    It is good for her to use aspirin indefinitely -

## 2020-11-17 ENCOUNTER — VIRTUAL PHONE E/M (OUTPATIENT)
Dept: FAMILY MEDICINE CLINIC | Facility: CLINIC | Age: 55
End: 2020-11-17
Payer: MEDICAID

## 2020-11-17 DIAGNOSIS — U07.1 COVID-19 VIRUS INFECTION: Primary | ICD-10-CM

## 2020-11-17 DIAGNOSIS — F41.9 ANXIETY: ICD-10-CM

## 2020-11-17 DIAGNOSIS — E55.9 VITAMIN D DEFICIENCY: ICD-10-CM

## 2020-11-17 DIAGNOSIS — E05.90 HYPERTHYROIDISM: ICD-10-CM

## 2020-11-17 PROCEDURE — 99214 OFFICE O/P EST MOD 30 MIN: CPT | Performed by: FAMILY MEDICINE

## 2020-11-17 NOTE — PROGRESS NOTES
Virtual Telephone Check-In    Bárbara Fisher verbally consents to a Virtual/Telephone Check-In visit on 11/17/20. Patient has been referred to the [facility] website at [email or web address] to review the yearly Consent to Treat document.     Patient undfahad

## 2020-11-17 NOTE — TELEPHONE ENCOUNTER
Triage: [facility] sent - it looks like it might require a PA so patient may need to go back to [facility].    Alprazolam rx sent x10 days     PSR: please schedule for follow up virtual visit Tuesday or Wednesday this week

## 2020-11-18 ENCOUNTER — TELEPHONE (OUTPATIENT)
Dept: FAMILY MEDICINE CLINIC | Facility: CLINIC | Age: 55
End: 2020-11-18

## 2020-11-18 RX ORDER — MOMETASONE FUROATE AND FORMOTEROL FUMARATE DIHYDRATE 200; 5 UG/1; UG/1
1 AEROSOL RESPIRATORY (INHALATION) 2 TIMES DAILY
Qty: 13 G | Refills: 5 | Status: SHIPPED | OUTPATIENT
Start: 2020-11-18

## 2020-11-18 NOTE — TELEPHONE ENCOUNTER
Message from the pharmacy Rizwana Rico is not covered, alternative medication include dulera/wixela/symbicort. Please advise.

## 2020-11-18 NOTE — TELEPHONE ENCOUNTER
Spoke with the patient via phone. Notified patient of below response by PCP. Patient verbalized understanding. Patient stated that she would call the office if medication is not covered. Answered all questions at this time.

## 2020-11-18 NOTE — TELEPHONE ENCOUNTER
Patient aware of POC in regards to inhalers. I did advise her of the new dose for the [facility] and advised her to  at that script. Verbalized understanding.

## 2020-11-18 NOTE — TELEPHONE ENCOUNTER
Expected this to be the case   During virtual visit yesterday, I explained it may not be covered, patient stated she would be ok going back on Dulera   Will increase her dose given her recent covid infection     Finish her breo inhaler once daily from hosp

## 2020-11-23 ENCOUNTER — PATIENT MESSAGE (OUTPATIENT)
Dept: FAMILY MEDICINE CLINIC | Facility: CLINIC | Age: 55
End: 2020-11-23

## 2020-11-23 NOTE — TELEPHONE ENCOUNTER
From: Vee Moreno  To: Jake Garcia MD  Sent: 11/23/2020 5:36 AM CST  Subject: Non-Urgent Medical Question    Did you ever receive my medical records from [facility]?

## 2020-11-30 DIAGNOSIS — J44.9 CHRONIC OBSTRUCTIVE PULMONARY DISEASE, UNSPECIFIED COPD TYPE (HCC): ICD-10-CM

## 2020-11-30 RX ORDER — ALPRAZOLAM 0.5 MG/1
0.5 TABLET ORAL NIGHTLY PRN
Qty: 10 TABLET | Refills: 0 | Status: CANCELLED | OUTPATIENT
Start: 2020-11-30

## 2020-11-30 NOTE — TELEPHONE ENCOUNTER
Medication(s) to Refill:   Requested Prescriptions     Pending Prescriptions Disp Refills   • ALPRAZolam 0.5 MG Oral Tab 10 tablet 0     Sig: Take 1 tablet (0.5 mg total) by mouth nightly as needed for Anxiety.    • Albuterol Sulfate  (90 Base) MCG/A

## 2020-12-01 RX ORDER — ALBUTEROL SULFATE 90 UG/1
2 AEROSOL, METERED RESPIRATORY (INHALATION) EVERY 6 HOURS PRN
Qty: 18 G | Refills: 5 | Status: SHIPPED | OUTPATIENT
Start: 2020-12-01 | End: 2021-07-06

## 2020-12-02 ENCOUNTER — LAB ENCOUNTER (OUTPATIENT)
Dept: LAB | Age: 55
End: 2020-12-02
Attending: FAMILY MEDICINE
Payer: MEDICAID

## 2020-12-02 DIAGNOSIS — E05.90 HYPERTHYROIDISM: Primary | ICD-10-CM

## 2020-12-02 DIAGNOSIS — E55.9 VITAMIN D DEFICIENCY: ICD-10-CM

## 2020-12-02 DIAGNOSIS — E05.90 HYPERTHYROIDISM: ICD-10-CM

## 2020-12-02 PROCEDURE — 82306 VITAMIN D 25 HYDROXY: CPT

## 2020-12-02 PROCEDURE — 84439 ASSAY OF FREE THYROXINE: CPT

## 2020-12-02 PROCEDURE — 36415 COLL VENOUS BLD VENIPUNCTURE: CPT

## 2020-12-02 PROCEDURE — 84443 ASSAY THYROID STIM HORMONE: CPT

## 2020-12-02 RX ORDER — METHIMAZOLE 5 MG/1
TABLET ORAL
Qty: 90 TABLET | Refills: 0 | Status: SHIPPED | OUTPATIENT
Start: 2020-12-02 | End: 2021-01-26

## 2020-12-02 NOTE — TELEPHONE ENCOUNTER
----- Message from Samia Almonte sent at 12/2/2020  2:09 PM CST -----  Regarding: Prescription Question  Contact: [phone]  The pharmacy said they have been trying to get confirmation and the thyroid medicine for a couple days can you make sure t

## 2020-12-03 ENCOUNTER — PATIENT MESSAGE (OUTPATIENT)
Dept: FAMILY MEDICINE CLINIC | Facility: CLINIC | Age: 55
End: 2020-12-03

## 2020-12-03 DIAGNOSIS — G93.3 POSTVIRAL FATIGUE SYNDROME: Primary | ICD-10-CM

## 2020-12-03 DIAGNOSIS — E05.90 HYPERTHYROIDISM: ICD-10-CM

## 2020-12-04 NOTE — TELEPHONE ENCOUNTER
From: Home Dixon  To: Latasha Andrews MD  Sent: 12/3/2020 1:41 PM CST  Subject: Non-Urgent Medical Question    I just wanted to ask a quick question I'm feeling very fatigued lately I mean since I had the Covid but sometimes I feel fine and then I'll

## 2020-12-07 ENCOUNTER — MED REC SCAN ONLY (OUTPATIENT)
Dept: FAMILY MEDICINE CLINIC | Facility: CLINIC | Age: 55
End: 2020-12-07

## 2020-12-07 NOTE — TELEPHONE ENCOUNTER
What patient describes as feeling fine and then lacking endurance with activities is considered typical with covid. It has taken some people 3-4 months to recover completely.    Add in her thyroid disease and her energy level is going to be thrown off as w

## 2020-12-17 ENCOUNTER — TELEMEDICINE (OUTPATIENT)
Dept: FAMILY MEDICINE CLINIC | Facility: CLINIC | Age: 55
End: 2020-12-17
Payer: MEDICAID

## 2020-12-17 DIAGNOSIS — L21.9 SEBORRHEIC DERMATITIS OF SCALP: Primary | ICD-10-CM

## 2020-12-17 DIAGNOSIS — G52.9 CRANIAL NERVE PALSY: ICD-10-CM

## 2020-12-17 DIAGNOSIS — H53.2 DIPLOPIA: ICD-10-CM

## 2020-12-17 PROCEDURE — 99214 OFFICE O/P EST MOD 30 MIN: CPT | Performed by: FAMILY MEDICINE

## 2020-12-17 RX ORDER — KETOCONAZOLE 20 MG/ML
SHAMPOO TOPICAL
Qty: 100 ML | Refills: 0 | Status: ON HOLD | OUTPATIENT
Start: 2020-12-17 | End: 2020-12-31

## 2020-12-17 NOTE — PROGRESS NOTES
This is a telemedicine visit with live, interactive video and audio. Patient understands and accepts financial responsibility for any deductible, co-insurance and/or co-pays associated with this service. SUBJECTIVE  Rash   Along hairline.  Noticed a Grandmother       Social History    Tobacco Use      Smoking status: Former Smoker        Packs/day: 0.50        Years: 40.00        Pack years: 21        Quit date: 2018        Years since quittin.3      Smokeless tobacco: Never Used      Tobacco HYDROXYZINE HCL 25 MG Oral Tab TAKE 1 TABLET BY MOUTH EVERY 8 HOURS AS NEEDED FOR ANXIETY 90 tablet 2       OBJECTIVE  Physical Exam:   alert, appears stated age and cooperative, Normocephalic, without obvious abnormality, atraumatic, Speaking in full sent

## 2020-12-24 ENCOUNTER — TELEPHONE (OUTPATIENT)
Dept: FAMILY MEDICINE CLINIC | Facility: CLINIC | Age: 55
End: 2020-12-24

## 2020-12-24 DIAGNOSIS — G52.9 CRANIAL NERVE PALSY: Primary | ICD-10-CM

## 2020-12-24 DIAGNOSIS — H53.2 DIPLOPIA: ICD-10-CM

## 2020-12-24 NOTE — TELEPHONE ENCOUNTER
Dr. Ezequiel Nuñez is OK with MRI orbits w & w/o but order not found in [facility]. Veronica Andrews in Referral Dept notified of this, pt has appt on 12/26.

## 2020-12-24 NOTE — TELEPHONE ENCOUNTER
Received a call from [facility] referral department, talked with Astrid Dowd who states pt is scheduled for an MRI [address] for 12/26. CPT code 38482 approved for the Orbits only. MRI Brain is not approved and requires a peer-peer.  To give peer-peer please call 80

## 2020-12-26 ENCOUNTER — HOSPITAL ENCOUNTER (EMERGENCY)
Facility: HOSPITAL | Age: 55
Discharge: HOME | End: 2020-12-26
Attending: EMERGENCY MEDICINE
Payer: MEDICAID

## 2020-12-26 ENCOUNTER — TELEPHONE (OUTPATIENT)
Dept: FAMILY MEDICINE CLINIC | Facility: CLINIC | Age: 55
End: 2020-12-26

## 2020-12-26 ENCOUNTER — HOSPITAL ENCOUNTER (OUTPATIENT)
Dept: CT IMAGING | Facility: HOSPITAL | Age: 55
Discharge: HOME | End: 2020-12-26
Attending: EMERGENCY MEDICINE
Payer: MEDICAID

## 2020-12-26 ENCOUNTER — HOSPITAL ENCOUNTER (OUTPATIENT)
Dept: MRI IMAGING | Age: 55
Discharge: HOME | End: 2020-12-26
Attending: FAMILY MEDICINE
Payer: MEDICAID

## 2020-12-26 VITALS
DIASTOLIC BLOOD PRESSURE: 97 MMHG | HEIGHT: 62 IN | BODY MASS INDEX: 22.07 KG/M2 | SYSTOLIC BLOOD PRESSURE: 122 MMHG | WEIGHT: 119.94 LBS | RESPIRATION RATE: 23 BRPM | TEMPERATURE: 98 F | OXYGEN SATURATION: 98 % | HEART RATE: 73 BPM

## 2020-12-26 DIAGNOSIS — G52.9 CRANIAL NERVE PALSY: ICD-10-CM

## 2020-12-26 DIAGNOSIS — I72.9 ANEURYSM (HCC): Primary | ICD-10-CM

## 2020-12-26 DIAGNOSIS — H53.2 DIPLOPIA: ICD-10-CM

## 2020-12-26 DIAGNOSIS — I72.9 ANEURYSM (HCC): ICD-10-CM

## 2020-12-26 PROCEDURE — 70496 CT ANGIOGRAPHY HEAD: CPT | Performed by: EMERGENCY MEDICINE

## 2020-12-26 PROCEDURE — 70543 MRI ORBT/FAC/NCK W/O &W/DYE: CPT | Performed by: FAMILY MEDICINE

## 2020-12-26 PROCEDURE — 70498 CT ANGIOGRAPHY NECK: CPT | Performed by: EMERGENCY MEDICINE

## 2020-12-26 PROCEDURE — A9575 INJ GADOTERATE MEGLUMI 0.1ML: HCPCS | Performed by: FAMILY MEDICINE

## 2020-12-26 PROCEDURE — 99204 OFFICE O/P NEW MOD 45 MIN: CPT | Performed by: NEUROLOGICAL SURGERY

## 2020-12-26 NOTE — ED PROVIDER NOTES
Patient Seen in: [facility] Emergency Department      History   Patient presents with:  Abnormal Result    Stated Complaint: Abnormal MRI brain - Aneurysm in right carotid - Vision changes, feels foggy    HPI    Patient had an outpatient test and ab Finalized by (CST): Wilian Hernández MD on 12/26/2020 at 9:18 AM     The patient states that the that she has been not feeling well for the last several months.   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 Constitutional and vital signs reviewed. All other systems reviewed and negative except as noted above.     Physical Exam     ED Triage Vi order CBC WITH DIFFERENTIAL WITH PLATELET.   Procedure                               Abnormality         Status                     ---------                               -----------         ------                     CBC W/ DIFFERENTIAL[[id number]] AM

## 2020-12-26 NOTE — CONSULTS
[facility]  Neurosurgery Consult    [facility] Patient Status:  Emergency    1965 MRN [id number]   Location [address] [facility] Attending Doroteo Ordonez MD   Hosp Day # 0 PCP Inga Myers MD     [address] [place] [address] TUBAL LIGATION     • UPPER GI ENDOSCOPY,EXAM         FAMILY HISTORY:  family history includes Breast Cancer in her maternal grandmother and paternal grandmother; Cancer in her father, mother, and paternal grandmother.     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  No evidence of recent hemorrhag

## 2020-12-26 NOTE — ED INITIAL ASSESSMENT (HPI)
Pt has been having vision and memory issues and was sent for MRI. It showed and aneurysm. Pt here for f/u.

## 2020-12-26 NOTE — TELEPHONE ENCOUNTER
Paged by radiology for stat MRI results. Spoke with radiologist, confirmed 1.1cm aneurysm in right carotid terminus. Relayed results over to patient and advised to present to ED urgently as she will need neurosurgerical evaluation.  Patient expressed unders

## 2020-12-27 ENCOUNTER — PATIENT MESSAGE (OUTPATIENT)
Dept: FAMILY MEDICINE CLINIC | Facility: CLINIC | Age: 55
End: 2020-12-27

## 2020-12-27 DIAGNOSIS — I72.9 ANEURYSM (HCC): Primary | ICD-10-CM

## 2020-12-27 DIAGNOSIS — E05.90 HYPERTHYROIDISM: ICD-10-CM

## 2020-12-27 DIAGNOSIS — E04.2 MULTIPLE THYROID NODULES: ICD-10-CM

## 2020-12-28 NOTE — TELEPHONE ENCOUNTER
From: Ignacio Cedillo  To: Sangeeta Wilder MD  Sent: 12/27/2020 9:04 AM CST  Subject: Test Results Question    Good morning Dr. Ayan Saenz I was hoping you could look at all the test that I had done over the weekend and call me as soon as possible as I am extre

## 2020-12-28 NOTE — TELEPHONE ENCOUNTER
Talked with patient   Discussed plan of care including NS referral     Regarding thyroid nodules - previously had biopsy of 5 nodules - which were benign   Ordered thyroid US - if nodule is new, then she will need FNA   Will send mri and cta to neuroophtha

## 2020-12-29 ENCOUNTER — PATIENT MESSAGE (OUTPATIENT)
Dept: FAMILY MEDICINE CLINIC | Facility: CLINIC | Age: 55
End: 2020-12-29

## 2020-12-29 NOTE — TELEPHONE ENCOUNTER
Is she taking aspirin 81 mg? If she has quit smoking, then she can stop the aspirin  If she is still smoking, I would continue this. But hold her diclofenac.

## 2020-12-29 NOTE — TELEPHONE ENCOUNTER
Patient stated that she is taking aspirin 81mg every morning. Patient stated that she quit smoking; however, she stated that she has a cigarette \"every now and again. \" Patient stated that the last cigarette she had was yesterday.   Stated that she has bee

## 2020-12-29 NOTE — TELEPHONE ENCOUNTER
From: Bárbara Fisher  To: Raghav Interiano MD  Sent: 12/29/2020 12:47 AM CST  Subject: Visit Follow-up Question    Should I stop taking the blood thinner aspirin that I'm taking?

## 2020-12-30 ENCOUNTER — APPOINTMENT (OUTPATIENT)
Dept: CT IMAGING | Age: 55
DRG: 093 | End: 2020-12-30
Attending: EMERGENCY MEDICINE
Payer: MEDICAID

## 2020-12-30 ENCOUNTER — HOSPITAL ENCOUNTER (INPATIENT)
Facility: HOSPITAL | Age: 55
LOS: 1 days | Discharge: HOME | DRG: 093 | End: 2020-12-31
Attending: EMERGENCY MEDICINE | Admitting: INTERNAL MEDICINE
Payer: MEDICAID

## 2020-12-30 ENCOUNTER — TELEPHONE (OUTPATIENT)
Dept: FAMILY MEDICINE CLINIC | Facility: CLINIC | Age: 55
End: 2020-12-30

## 2020-12-30 DIAGNOSIS — R51.9 NONINTRACTABLE HEADACHE, UNSPECIFIED CHRONICITY PATTERN, UNSPECIFIED HEADACHE TYPE: Primary | ICD-10-CM

## 2020-12-30 DIAGNOSIS — I67.1 INTRACEREBRAL ANEURYSM: ICD-10-CM

## 2020-12-30 PROBLEM — R79.89 AZOTEMIA: Status: ACTIVE | Noted: 2020-12-30

## 2020-12-30 LAB
ALBUMIN SERPL-MCNC: 3.7 G/DL (ref 3.4–5)
ALBUMIN/GLOB SERPL: 1 {RATIO} (ref 1–2)
ALP LIVER SERPL-CCNC: 97 U/L
ALT SERPL-CCNC: 35 U/L
ANION GAP SERPL CALC-SCNC: 8 MMOL/L (ref 0–18)
APTT PPP: 30.6 SECONDS (ref 25.4–36.1)
AST SERPL-CCNC: 20 U/L (ref 15–37)
BASOPHILS # BLD AUTO: 0.06 X10(3) UL (ref 0–0.2)
BASOPHILS NFR BLD AUTO: 0.6 %
BILIRUB SERPL-MCNC: 0.3 MG/DL (ref 0.1–2)
BUN BLD-MCNC: 14 MG/DL (ref 7–18)
BUN/CREAT SERPL: 23.7 (ref 10–20)
CALCIUM BLD-MCNC: 9.1 MG/DL (ref 8.5–10.1)
CHLORIDE SERPL-SCNC: 106 MMOL/L (ref 98–112)
CLARITY CSF: CLEAR
CLARITY CSF: CLEAR
CO2 SERPL-SCNC: 24 MMOL/L (ref 21–32)
COLOR CSF: COLORLESS
COLOR CSF: COLORLESS
COUNT PERFORMED ON TUBE: 1
COUNT PERFORMED ON TUBE: 4
CREAT BLD-MCNC: 0.59 MG/DL
DEPRECATED RDW RBC AUTO: 50.6 FL (ref 35.1–46.3)
EOSINOPHIL # BLD AUTO: 0.2 X10(3) UL (ref 0–0.7)
EOSINOPHIL NFR BLD AUTO: 2.1 %
ERYTHROCYTE [DISTWIDTH] IN BLOOD BY AUTOMATED COUNT: 15.1 % (ref 11–15)
GLOBULIN PLAS-MCNC: 3.8 G/DL (ref 2.8–4.4)
GLUCOSE BLD-MCNC: 91 MG/DL (ref 70–99)
GLUCOSE CSF-MCNC: 48 MG/DL (ref 40–70)
HCT VFR BLD AUTO: 39 %
HGB BLD-MCNC: 13.1 G/DL
IMM GRANULOCYTES # BLD AUTO: 0.03 X10(3) UL (ref 0–1)
IMM GRANULOCYTES NFR BLD: 0.3 %
INR BLD: 0.93 (ref 0.88–1.11)
LYMPHOCYTES # BLD AUTO: 3.21 X10(3) UL (ref 1–4)
LYMPHOCYTES NFR BLD AUTO: 33.2 %
M PROTEIN MFR SERPL ELPH: 7.5 G/DL (ref 6.4–8.2)
MCH RBC QN AUTO: 30.6 PG (ref 26–34)
MCHC RBC AUTO-ENTMCNC: 33.6 G/DL (ref 31–37)
MCV RBC AUTO: 91.1 FL
MONOCYTES # BLD AUTO: 0.61 X10(3) UL (ref 0.1–1)
MONOCYTES NFR BLD AUTO: 6.3 %
NEUTROPHILS # BLD AUTO: 5.57 X10 (3) UL (ref 1.5–7.7)
NEUTROPHILS # BLD AUTO: 5.57 X10(3) UL (ref 1.5–7.7)
NEUTROPHILS NFR BLD AUTO: 57.5 %
OSMOLALITY SERPL CALC.SUM OF ELEC: 286 MOSM/KG (ref 275–295)
PLATELET # BLD AUTO: 426 10(3)UL (ref 150–450)
POTASSIUM SERPL-SCNC: 3.9 MMOL/L (ref 3.5–5.1)
PROT PATTERN CSF ELPH-IMP: 43.1 MG/DL (ref 15–45)
PSA SERPL DL<=0.01 NG/ML-MCNC: 12.4 SECONDS (ref 12–14.3)
RBC # BLD AUTO: 4.28 X10(6)UL
RBC # CSF: 1 /MM3
RBC # CSF: 91 /MM3
SARS-COV-2 RNA RESP QL NAA+PROBE: NOT DETECTED
SODIUM SERPL-SCNC: 138 MMOL/L (ref 136–145)
TOTAL VOLUME CSF: 4 ML
WBC # BLD AUTO: 9.7 X10(3) UL (ref 4–11)
WBC # CSF: 3 /MM3 (ref 0–5)

## 2020-12-30 PROCEDURE — 70450 CT HEAD/BRAIN W/O DYE: CPT | Performed by: EMERGENCY MEDICINE

## 2020-12-30 PROCEDURE — 009U3ZX DRAINAGE OF SPINAL CANAL, PERCUTANEOUS APPROACH, DIAGNOSTIC: ICD-10-PCS | Performed by: EMERGENCY MEDICINE

## 2020-12-30 RX ORDER — KETOROLAC TROMETHAMINE 30 MG/ML
30 INJECTION, SOLUTION INTRAMUSCULAR; INTRAVENOUS ONCE
Status: COMPLETED | OUTPATIENT
Start: 2020-12-30 | End: 2020-12-30

## 2020-12-30 RX ORDER — SODIUM CHLORIDE 9 MG/ML
INJECTION, SOLUTION INTRAVENOUS CONTINUOUS
Status: ACTIVE | OUTPATIENT
Start: 2020-12-31 | End: 2020-12-31

## 2020-12-30 RX ORDER — SODIUM CHLORIDE 9 MG/ML
INJECTION, SOLUTION INTRAVENOUS ONCE
Status: COMPLETED | OUTPATIENT
Start: 2020-12-30 | End: 2020-12-30

## 2020-12-30 RX ORDER — ONDANSETRON 2 MG/ML
4 INJECTION INTRAMUSCULAR; INTRAVENOUS EVERY 4 HOURS PRN
Status: DISCONTINUED | OUTPATIENT
Start: 2020-12-30 | End: 2020-12-31

## 2020-12-30 RX ORDER — ONDANSETRON 2 MG/ML
4 INJECTION INTRAMUSCULAR; INTRAVENOUS EVERY 4 HOURS PRN
Status: DISCONTINUED | OUTPATIENT
Start: 2020-12-30 | End: 2020-12-30

## 2020-12-30 NOTE — TELEPHONE ENCOUNTER
Spoke with the patient via phone. Patient stated that she has a headache and \"eye issues\" for approximately the last 3 hours. Headache- Denies the headache being the worst headache of her life. States that the headache is progressively getting worse.  Jenane Santiago

## 2020-12-31 ENCOUNTER — APPOINTMENT (OUTPATIENT)
Dept: INTERVENTIONAL RADIOLOGY/VASCULAR | Facility: HOSPITAL | Age: 55
DRG: 093 | End: 2020-12-31
Attending: NURSE PRACTITIONER
Payer: MEDICAID

## 2020-12-31 ENCOUNTER — TELEPHONE (OUTPATIENT)
Dept: SURGERY | Facility: CLINIC | Age: 55
End: 2020-12-31

## 2020-12-31 VITALS
HEIGHT: 62 IN | TEMPERATURE: 98 F | HEART RATE: 74 BPM | SYSTOLIC BLOOD PRESSURE: 114 MMHG | OXYGEN SATURATION: 97 % | BODY MASS INDEX: 23.9 KG/M2 | DIASTOLIC BLOOD PRESSURE: 74 MMHG | WEIGHT: 129.88 LBS | RESPIRATION RATE: 12 BRPM

## 2020-12-31 PROCEDURE — 3008F BODY MASS INDEX DOCD: CPT | Performed by: INTERNAL MEDICINE

## 2020-12-31 PROCEDURE — 99254 IP/OBS CNSLTJ NEW/EST MOD 60: CPT | Performed by: OTHER

## 2020-12-31 PROCEDURE — B31RYZZ FLUOROSCOPY OF INTRACRANIAL ARTERIES USING OTHER CONTRAST: ICD-10-PCS

## 2020-12-31 PROCEDURE — 99222 1ST HOSP IP/OBS MODERATE 55: CPT | Performed by: NEUROLOGICAL SURGERY

## 2020-12-31 PROCEDURE — B316YZZ FLUOROSCOPY OF RIGHT INTERNAL CAROTID ARTERY USING OTHER CONTRAST: ICD-10-PCS

## 2020-12-31 PROCEDURE — 99223 1ST HOSP IP/OBS HIGH 75: CPT | Performed by: INTERNAL MEDICINE

## 2020-12-31 PROCEDURE — 3074F SYST BP LT 130 MM HG: CPT | Performed by: INTERNAL MEDICINE

## 2020-12-31 PROCEDURE — 3078F DIAST BP <80 MM HG: CPT | Performed by: INTERNAL MEDICINE

## 2020-12-31 RX ORDER — LIDOCAINE AND PRILOCAINE 25; 25 MG/G; MG/G
CREAM TOPICAL
Status: COMPLETED
Start: 2020-12-31 | End: 2020-12-31

## 2020-12-31 RX ORDER — MONTELUKAST SODIUM 10 MG/1
10 TABLET ORAL EVERY EVENING
Status: DISCONTINUED | OUTPATIENT
Start: 2020-12-31 | End: 2020-12-31

## 2020-12-31 RX ORDER — METHIMAZOLE 5 MG/1
5 TABLET ORAL 2 TIMES DAILY
Status: DISCONTINUED | OUTPATIENT
Start: 2020-12-31 | End: 2020-12-31

## 2020-12-31 RX ORDER — IBUPROFEN 400 MG/1
400 TABLET ORAL
Status: DISCONTINUED | OUTPATIENT
Start: 2020-12-31 | End: 2020-12-31

## 2020-12-31 RX ORDER — ONDANSETRON 2 MG/ML
4 INJECTION INTRAMUSCULAR; INTRAVENOUS EVERY 6 HOURS PRN
Status: DISCONTINUED | OUTPATIENT
Start: 2020-12-31 | End: 2020-12-31

## 2020-12-31 RX ORDER — LIDOCAINE HYDROCHLORIDE 10 MG/ML
INJECTION, SOLUTION INFILTRATION; PERINEURAL
Status: COMPLETED
Start: 2020-12-31 | End: 2020-12-31

## 2020-12-31 RX ORDER — ALBUTEROL SULFATE 90 UG/1
2 AEROSOL, METERED RESPIRATORY (INHALATION) EVERY 6 HOURS PRN
Status: DISCONTINUED | OUTPATIENT
Start: 2020-12-31 | End: 2020-12-31

## 2020-12-31 RX ORDER — METHIMAZOLE 5 MG/1
2.5 TABLET ORAL DAILY
Status: DISCONTINUED | OUTPATIENT
Start: 2020-12-31 | End: 2020-12-31

## 2020-12-31 RX ORDER — MELATONIN
3 NIGHTLY PRN
Status: DISCONTINUED | OUTPATIENT
Start: 2020-12-31 | End: 2020-12-31

## 2020-12-31 RX ORDER — NICOTINE 21 MG/24HR
1 PATCH, TRANSDERMAL 24 HOURS TRANSDERMAL EVERY 24 HOURS
Status: DISCONTINUED | OUTPATIENT
Start: 2020-12-31 | End: 2020-12-31

## 2020-12-31 RX ORDER — HYDROXYZINE HYDROCHLORIDE 25 MG/1
25 TABLET, FILM COATED ORAL EVERY 8 HOURS PRN
Status: DISCONTINUED | OUTPATIENT
Start: 2020-12-31 | End: 2020-12-31

## 2020-12-31 RX ORDER — FOLIC ACID 1 MG/1
1 TABLET ORAL DAILY
Status: DISCONTINUED | OUTPATIENT
Start: 2020-12-31 | End: 2020-12-31

## 2020-12-31 RX ORDER — ALPRAZOLAM 0.25 MG/1
0.25 TABLET ORAL NIGHTLY PRN
Status: DISCONTINUED | OUTPATIENT
Start: 2020-12-31 | End: 2020-12-31

## 2020-12-31 RX ORDER — SODIUM CHLORIDE 9 MG/ML
INJECTION, SOLUTION INTRAVENOUS CONTINUOUS
Status: DISCONTINUED | OUTPATIENT
Start: 2020-12-31 | End: 2020-12-31

## 2020-12-31 RX ORDER — MIDAZOLAM HYDROCHLORIDE 1 MG/ML
INJECTION INTRAMUSCULAR; INTRAVENOUS
Status: DISCONTINUED
Start: 2020-12-31 | End: 2020-12-31 | Stop reason: WASHOUT

## 2020-12-31 RX ORDER — BUTALBITAL, ACETAMINOPHEN AND CAFFEINE 50; 325; 40 MG/1; MG/1; MG/1
1 TABLET ORAL EVERY 4 HOURS PRN
Status: DISCONTINUED | OUTPATIENT
Start: 2020-12-31 | End: 2020-12-31

## 2020-12-31 RX ORDER — HEPARIN SODIUM 5000 [USP'U]/ML
INJECTION, SOLUTION INTRAVENOUS; SUBCUTANEOUS
Status: COMPLETED
Start: 2020-12-31 | End: 2020-12-31

## 2020-12-31 NOTE — PLAN OF CARE
Assumed care at 0700  Patient alert, oriented x4  Neurologically intact, no changes  Angiogram completed this evening  R radial incision c/d/i, no bleeding. Will continue to monitor  All consults cleared for d/c.  Will monitor for 2-3 hours before d/c landenig

## 2020-12-31 NOTE — CONSULTS
[id number] Marilou Horowitz Interventional Neuro Radiology Consult    [address] Patient Status:  Inpatient    1965 MRN [id number]   [facility] 7NE-A Attending Mervat Goldstein MD   Hosp Day # 1 PCP Natalie Shields MD     [facility] hearing loss, vertigo, tinnitus, hoarseness, dysphagia, or alternations in taste. The patient denies upper/lower extremity weakness or paresthesias.   The patient denies dizziness, imbalance, falls, difficulty with ambulation, coordination, dysarthria, or afternoon and 1 tablet in the PM, Disp: 90 tablet, Rfl: 0, 12/31/2020 at Unknown time    •  Albuterol Sulfate  (90 Base) MCG/ACT Inhalation Aero Soln, Inhale 2 puffs into the lungs every 6 (six) hours as needed for Wheezing., Disp: 18 g, Rfl: 5, 12/ Oral, Q8H PRN    •  methimazole (TAPAZOLE) tab 5 mg, 5 mg, Oral, BID    •  methimazole (TAPAZOLE) tab 2.5 mg, 2.5 mg, Oral, Daily    •  Montelukast Sodium (SINGULAIR) tab 10 mg, 10 mg, Oral, QPM    •  Fluticasone Furoate-Vilanterol (BREO ELLIPTA) 200-25 MC 12/30/2020    TP 7.5 12/30/2020    AST 20 12/30/2020    ALT 35 12/30/2020    PTT 30.6 12/30/2020    INR 0.93 12/30/2020    PTP 12.4 12/30/2020     LP 12/30/2020  Component      Latest Ref Rng & Units 12/30/2020   TOTAL VOLUME CSF      mL 4.0   CSF TUBE #

## 2020-12-31 NOTE — TELEPHONE ENCOUNTER
Patient was seen in hospital with incidental R ICA aneurysm. Angiogram with Dr. Roby Pardo was completed. Plan to present case at [facility] conference and discuss treatment planning. She needs follow up in office with Dr. Roby Pardo in the next 3-4 weeks.

## 2020-12-31 NOTE — ED PROVIDER NOTES
Patient Seen in: [facility] Emergency Department In [place]      History   Patient presents with:  Headache    Stated Complaint: headache, hx of aneurysms, headache started x6 hours ago, instructed to come to*    HPI    Patient is a 52-year-old female pre 0.50        Years: 40.00        Pack years: 21        Quit date: 2018        Years since quittin.3      Smokeless tobacco: Never Used      Tobacco comment: trying quit    Alcohol use: Yes      Comment: rare - 1 drink 3 times a year    Drug use: Kourtney Adam Motor strength is 5 over 5 in all 4 extremities. There are no gross motor or sensory deficits appreciated. Cranial nerves II through XII are intact. Patient is answering all questions appropriately.           ED Course     Labs Reviewed   COMP METABOLIC PA evening. Admission disposition: 12/30/2020  8:45 PM         Patient had an IV line established blood work drawn including a CBC, chemistries, BUN/creatinine, and blood sugar all of which are unremarkable. Liver function tests are unremarkable.   Patient floor for further care. The patient was admitted with no further new complaints. Patient case discussed with Dr. Jakob Peterson as well and all consultants as noted above.                        Disposition and Plan     Clinical Impression:  Nonintractable heada

## 2020-12-31 NOTE — CONSULTS
Neurology H&P    Orquidea Reyes Patient Status:  Inpatient    1965 MRN [id number]   [facility] 7NE-A Attending Fidel Moreno MD   Hosp Day # 1 PCP Michell Massey MD     Subjective:  Orquidea Reyes is a(n) 48year old female with headache, unspecified chronicity pattern, unspecified headache type     Intracerebral aneurysm      PMHx:  Past Medical History:   Diagnosis Date   • Anxiety    • Arthritis    • Celiac disease    • COPD (chronic [facility]) [facility]    • Stacie Domínguez 07/05/2011, SpO2 93 %.     Gen: Awake and in no apparent distress  HEENT: moist mucus membranes  Neck: Supple  Cardiovascular: Regular rate and rhythm, no murmur  Pulm: CTAB  GI: non-tender, normal bowel sounds  Skin: normal, dry  Extremities: No clubbing o Neurologic exam She states that she only has a mild 2/10 headache today and declines any medications for headache and is feeling ok.       Plan:  1. R ICA aneurysm  - NI and NSGY on consult  - Plan for angiogram today  - LP results reviewed  - Pt is no long

## 2020-12-31 NOTE — CONSULTS
[facility]  Neurosurgery Consult    [address] Patient Status:  Inpatient    1965 MRN [id number]   [facility] 7NE-A Attending Lindsey Saravia MD   Hosp Day # 1 PCP Yanni Tran MD     REASON FOR CONSULTATION:  ICA terminus reports that she quit smoking about 2 years ago. She has a 20.00 pack-year smoking history. She has never used smokeless tobacco. She reports current alcohol use. She reports current drug use. Drug: Cannabis.     ALLERGIES:    Gluten Flour            OTHER NIGHT AS NEEDED, Disp: 30 tablet, Rfl: 0    •  ergocalciferol 1.25 MG (74346 UT) Oral Cap, Take 1 capsule (50,000 Units total) by mouth once a week., Disp: 4 capsule, Rfl: 3        •  ALPRAZolam (XANAX) tab 0.25 mg, 0.25 mg, Oral, Nightly PRN    •  Albuter Gait deferred.          DIAGNOSTIC DATA:   Lab Results   Component Value Date    WBC 9.7 12/30/2020    HGB 13.1 12/30/2020    HCT 39.0 12/30/2020    .0 12/30/2020    CREATSERUM 0.59 12/30/2020    BUN 14 12/30/2020     12/30/2020    K 3.9 12/30/ no large branch occlusion. VENTRICLES:  Normal for age. No enlargement or displacement. CEREBRUM:  Normal for age. No excessive atrophy, mass, or hemorrhage, or abnormal enhancement. CEREBELLUM:  Normal for age.   No excessive atrophy, mass, or hemor are normal in size. INTRACRANIAL:  There is a 1.2 x 0.9 cm aneurysm arising in the region of the right carotid terminus. This appears stable when compared to 12/26/2020 CT a. No acute intracranial hemorrhage. There is no midline shift.   No significant

## 2020-12-31 NOTE — PROGRESS NOTES
Admitted pt for c/o frontal headache/eye pain. Pt received from [place] [facility] A/O x 4, No neuro deficits noted. Still c/o mild  headache relieved with medication. Pt has remained afebrile. No episode of ANTELMO. Pts VSS.  Pt monitored

## 2021-01-01 NOTE — PLAN OF CARE
NURSING DISCHARGE NOTE    Discharged Home via Ambulatory. Accompanied by Friend  Belongings Taken by patient/family.     Pt a&ox4   No neuro deficits   R radial incision c/d/i  No complaints of pain

## 2021-01-01 NOTE — PROCEDURES
[facility]  Interventional Neuroradiology Procedure Note      Procedure: Diagnostic Cerebral Angiogram    Pre-Op Diagnosis:  Intracranial aneurysm    Post-Op Diagnosis: same    Surgeon: Efren Bain MD, PhD    Technique/Findings:    Natan Stewart

## 2021-01-02 ENCOUNTER — PATIENT MESSAGE (OUTPATIENT)
Dept: FAMILY MEDICINE CLINIC | Facility: CLINIC | Age: 56
End: 2021-01-02

## 2021-01-02 ENCOUNTER — HOSPITAL ENCOUNTER (OUTPATIENT)
Dept: ULTRASOUND IMAGING | Age: 56
Discharge: HOME | End: 2021-01-02
Attending: FAMILY MEDICINE
Payer: MEDICAID

## 2021-01-02 DIAGNOSIS — E04.2 MULTIPLE THYROID NODULES: ICD-10-CM

## 2021-01-02 DIAGNOSIS — E05.90 HYPERTHYROIDISM: ICD-10-CM

## 2021-01-04 ENCOUNTER — PATIENT OUTREACH (OUTPATIENT)
Dept: CASE MANAGEMENT | Age: 56
End: 2021-01-04

## 2021-01-04 ENCOUNTER — VIRTUAL PHONE E/M (OUTPATIENT)
Dept: FAMILY MEDICINE CLINIC | Facility: CLINIC | Age: 56
End: 2021-01-04
Payer: MEDICAID

## 2021-01-04 DIAGNOSIS — R51.9 GENERALIZED HEADACHES: ICD-10-CM

## 2021-01-04 DIAGNOSIS — E05.90 HYPERTHYROIDISM: ICD-10-CM

## 2021-01-04 DIAGNOSIS — I72.9 ANEURYSM (HCC): Primary | ICD-10-CM

## 2021-01-04 PROCEDURE — 99214 OFFICE O/P EST MOD 30 MIN: CPT | Performed by: FAMILY MEDICINE

## 2021-01-04 NOTE — PROGRESS NOTES
Telehealth outside of [address] Verbal Consent   I conducted a telehealth visit with Silvia Crisostomo today, 01/10/21, which was completed using two-way, real-time interactive audio and/or video communication.  This has been done in good tate t discharge first initiated on Date: 1/4/20    During the visit, the following was completed:  ? Obtained and reviewed discharge information  and continuity of care documents  ?  Reviewed need for follow-up on pending tests, need for follow-up on diagnostic t ergocalciferol 1.25 MG (78241 UT) Oral Cap, Take 1 capsule (50,000 Units total) by mouth once a week. •  Montelukast Sodium 10 MG Oral Tab, Take 1 tablet (10 mg total) by mouth every evening.     •  cetirizine 10 MG Oral Tab, Take 1 tablet (10 mg total) kg/m² as calculated from the following:    Height as of 12/30/20: 5' 2\" (1.575 m). Weight as of 12/30/20: 129 lb 13.6 oz (58.9 kg).    LMP 07/05/2011    GENERAL: in no apparent distress  NEURO: Oriented times three, cranial nerves are intact, motor and

## 2021-01-04 NOTE — PAYOR COMM NOTE
--------------  DISCHARGE REVIEW    Payor: Eligio Montoya #:  [id number]  Authorization Number: [id number]    Admit date: 12/30/20  Admit time:  2356  Discharge Date: 12/31/2020  8:02 PM     Admitting Physician: Gina Nguyễn

## 2021-01-04 NOTE — TELEPHONE ENCOUNTER
Scheduled patient for VV today with PCP for HFU and questions regarding tests. Patient requesting VV versus OV due to transportation issues.

## 2021-01-04 NOTE — PROGRESS NOTES
[facility] planned to contact patient for HFU call, however, a Hospital FU appointment was completed on 1/4/2021. [facility] closing encounter.

## 2021-01-04 NOTE — TELEPHONE ENCOUNTER
From: Herman Brunner  To: Dinh Thacker MD  Sent: 1/2/2021 7:19 AM CST  Subject: Other    Hello I was in the hospital and they did a couple of more tests on me and I was told that I need to have a follow up visit with you I really didn’t want to have to

## 2021-01-06 ENCOUNTER — HOSPITAL ENCOUNTER (OUTPATIENT)
Dept: CV DIAGNOSTICS | Age: 56
Discharge: HOME | End: 2021-01-06
Attending: FAMILY MEDICINE
Payer: MEDICAID

## 2021-01-06 DIAGNOSIS — E05.90 HYPERTHYROIDISM: ICD-10-CM

## 2021-01-06 DIAGNOSIS — G93.3 POSTVIRAL FATIGUE SYNDROME: ICD-10-CM

## 2021-01-06 PROCEDURE — 93306 TTE W/DOPPLER COMPLETE: CPT | Performed by: FAMILY MEDICINE

## 2021-01-07 ENCOUNTER — PATIENT MESSAGE (OUTPATIENT)
Dept: FAMILY MEDICINE CLINIC | Facility: CLINIC | Age: 56
End: 2021-01-07

## 2021-01-07 NOTE — TELEPHONE ENCOUNTER
From: Roldan Gregg  To: Ann Hernandez MD  Sent: 1/7/2021 12:51 PM CST  Subject: Non-Urgent Medical Question    Would it be possible for you to send my bloodwork and a copy of my physical to my psychiatrist office?  The fax number is [phone] and it

## 2021-01-08 RX ORDER — FOLIC ACID 1 MG/1
TABLET ORAL
Qty: 30 TABLET | Refills: 2 | Status: SHIPPED | OUTPATIENT
Start: 2021-01-08 | End: 2021-04-12

## 2021-01-08 NOTE — TELEPHONE ENCOUNTER
Medication(s) to Refill:   Requested Prescriptions     Pending Prescriptions Disp Refills   • FOLIC ACID 1 MG Oral Tab [Pharmacy Med Name: FOLIC ACID 1MG TABLETS] 30 tablet 2     Sig: TAKE 1 TABLET(1 MG) BY MOUTH DAILY         Reason for Medication Refill

## 2021-01-11 ENCOUNTER — PATIENT MESSAGE (OUTPATIENT)
Dept: SURGERY | Facility: CLINIC | Age: 56
End: 2021-01-11

## 2021-01-11 ENCOUNTER — PATIENT MESSAGE (OUTPATIENT)
Dept: FAMILY MEDICINE CLINIC | Facility: CLINIC | Age: 56
End: 2021-01-11

## 2021-01-11 ENCOUNTER — HOSPITAL ENCOUNTER (OUTPATIENT)
Dept: ULTRASOUND IMAGING | Age: 56
Discharge: HOME | End: 2021-01-11
Attending: FAMILY MEDICINE
Payer: MEDICAID

## 2021-01-11 PROCEDURE — 76536 US EXAM OF HEAD AND NECK: CPT | Performed by: FAMILY MEDICINE

## 2021-01-12 ENCOUNTER — TELEPHONE (OUTPATIENT)
Dept: FAMILY MEDICINE CLINIC | Facility: CLINIC | Age: 56
End: 2021-01-12

## 2021-01-12 NOTE — TELEPHONE ENCOUNTER
----- Message from Severo Gomez MD sent at 1/10/2021  6:24 PM CST -----  Results reviewed. Tests show no significant abnormalities. Please inform patient.

## 2021-01-12 NOTE — TELEPHONE ENCOUNTER
From: Daksha Avila  To: Amos Flores MD  Sent: 1/11/2021 6:33 PM CST  Subject: Test Results Question    I have a question about Echocardiogram resulted on 1/6/21, 4:16 PM.    Is everything ok with the results of this test

## 2021-01-20 ENCOUNTER — PATIENT MESSAGE (OUTPATIENT)
Dept: FAMILY MEDICINE CLINIC | Facility: CLINIC | Age: 56
End: 2021-01-20

## 2021-01-20 DIAGNOSIS — E04.2 MULTIPLE THYROID NODULES: Primary | ICD-10-CM

## 2021-01-20 NOTE — TELEPHONE ENCOUNTER
----- Message from Carlos Alberto Marquez sent at 1/20/2021 12:19 PM CST -----  Regarding: RE: Test Results Question  Contact: [phone]  OK well your information is incorrect because there's not even an order for that I can't schedule a procedure without

## 2021-01-21 NOTE — TELEPHONE ENCOUNTER
We do not have any updates   Some aneurysms can sometimes be monitored over years. Has she noticed any improvement with divalproex? Has she continue to be smoke-free?

## 2021-01-25 NOTE — TELEPHONE ENCOUNTER
Medication(s) to Refill:   Requested Prescriptions     Pending Prescriptions Disp Refills   • methimazole 5 MG Oral Tab [Pharmacy Med Name: METHIMAZOLE 5MG TABLETS] 90 tablet 0     Sig: TAKE 1 TABLET BY MOUTH EVERY MORNING AND TAKE ONE-HALF TABLET DAILY IN

## 2021-01-26 RX ORDER — METHIMAZOLE 5 MG/1
TABLET ORAL
Qty: 90 TABLET | Refills: 0 | Status: ON HOLD | OUTPATIENT
Start: 2021-01-26 | End: 2021-02-23

## 2021-01-28 ENCOUNTER — TELEPHONE (OUTPATIENT)
Dept: SURGERY | Facility: CLINIC | Age: 56
End: 2021-01-28

## 2021-01-28 NOTE — TELEPHONE ENCOUNTER
Jj matos. I am wondering why Jorge Sanders is scheduled to see Dr. Lexy Nation as a NP on 2/8. We have already done a consultation with the patient and her angio is completed.   She is seeing Dr. Ron De La Cruz to discuss clipping since this was the recommended t

## 2021-01-29 ENCOUNTER — TELEPHONE (OUTPATIENT)
Dept: SURGERY | Facility: CLINIC | Age: 56
End: 2021-01-29

## 2021-01-29 DIAGNOSIS — E55.9 VITAMIN D DEFICIENCY: ICD-10-CM

## 2021-01-29 RX ORDER — ERGOCALCIFEROL 1.25 MG/1
CAPSULE ORAL
Qty: 4 CAPSULE | Refills: 3 | OUTPATIENT
Start: 2021-01-29

## 2021-02-03 ENCOUNTER — TELEPHONE (OUTPATIENT)
Dept: SURGERY | Facility: CLINIC | Age: 56
End: 2021-02-03

## 2021-02-03 ENCOUNTER — OFFICE VISIT (OUTPATIENT)
Dept: SURGERY | Facility: CLINIC | Age: 56
End: 2021-02-03
Payer: MEDICAID

## 2021-02-03 VITALS
HEIGHT: 62 IN | SYSTOLIC BLOOD PRESSURE: 110 MMHG | WEIGHT: 129 LBS | DIASTOLIC BLOOD PRESSURE: 72 MMHG | BODY MASS INDEX: 23.74 KG/M2 | HEART RATE: 76 BPM

## 2021-02-03 DIAGNOSIS — I67.1 CEREBRAL ANEURYSM WITHOUT RUPTURE: Primary | ICD-10-CM

## 2021-02-03 PROCEDURE — 99213 OFFICE O/P EST LOW 20 MIN: CPT | Performed by: NEUROLOGICAL SURGERY

## 2021-02-03 PROCEDURE — 3078F DIAST BP <80 MM HG: CPT | Performed by: NEUROLOGICAL SURGERY

## 2021-02-03 PROCEDURE — 3008F BODY MASS INDEX DOCD: CPT | Performed by: NEUROLOGICAL SURGERY

## 2021-02-03 PROCEDURE — 3074F SYST BP LT 130 MM HG: CPT | Performed by: NEUROLOGICAL SURGERY

## 2021-02-03 RX ORDER — DIVALPROEX SODIUM 250 MG/1
250 TABLET, DELAYED RELEASE ORAL 2 TIMES DAILY
COMMUNITY
End: 2021-02-08

## 2021-02-03 RX ORDER — SELENIUM 100 MCG
100 TABLET ORAL 2 TIMES DAILY
COMMUNITY

## 2021-02-03 NOTE — PROGRESS NOTES
Still having HA behind eyes  A lot of fatigue, not sure if it's related, or since she's post Covid few months ago

## 2021-02-03 NOTE — TELEPHONE ENCOUNTER
You are scheduled for craniotomy with Dr's Severo Berkeley and Dr. Oren Brito. Pre-op instructions discussed with patient and surgical packet provided:  · PCP clearance is needed. We have faxed a letter requesting medical clearance to their office.   Pl will be in the Intensive Care Unit overnight, it is an average 3-5 days inpatient stay. ·   · You can expect to have some facial swelling on side of surgical site, which may migrate to opposite side.  This is normal.   ·   · Post operative appointment to carmita

## 2021-02-03 NOTE — TELEPHONE ENCOUNTER
Patient is scheduled for craniotomy on 2/19/21 with Jakub Moeller and Dr. Carlyle Pakrs.     Surgery order signed---  Placed sx on surgery sheet X  Placed on outlook calendar X   Shopliment message sent to patient with sx instructions X  Faxed pre-op clearance request

## 2021-02-03 NOTE — PROGRESS NOTES
[facility]  Neurosurgery Clinic Visit      HISTORY OF PRESENT ILLNESS:  Herman Brunner is a(n) 48year old female here for follow-up after angiogram, multidisciplinary discussion with recommendation for clipping of right ICA terminu REVIEW OF SYSTEMS:  A 10-point system was reviewed. Pertinent positives and negatives are noted in HPI.       PHYSICAL EXAMINATION:  VITAL SIGNS: /72 (BP Location: Left arm, Patient Position: Sitting, Cuff Size: adult)   Pulse 76   Ht 62\"   Wt

## 2021-02-05 NOTE — TELEPHONE ENCOUNTER
Spoke with Dr. Sada Ware who stated that he will keep nursing informed of any future surgery date adjustment. Surgery order signed.   Routed to PA team.

## 2021-02-08 NOTE — TELEPHONE ENCOUNTER
In routing comment per Dr. Mahad Alexander:    I was notified this morning that it is set for 9 AM on the 19th

## 2021-02-09 ENCOUNTER — LABORATORY ENCOUNTER (OUTPATIENT)
Dept: LAB | Age: 56
End: 2021-02-09
Attending: NEUROLOGICAL SURGERY
Payer: MEDICAID

## 2021-02-09 ENCOUNTER — OFFICE VISIT (OUTPATIENT)
Dept: FAMILY MEDICINE CLINIC | Facility: CLINIC | Age: 56
End: 2021-02-09
Payer: MEDICAID

## 2021-02-09 ENCOUNTER — HOSPITAL ENCOUNTER (OUTPATIENT)
Dept: GENERAL RADIOLOGY | Age: 56
Discharge: HOME | End: 2021-02-09
Attending: NEUROLOGICAL SURGERY
Payer: MEDICAID

## 2021-02-09 VITALS
BODY MASS INDEX: 24.29 KG/M2 | WEIGHT: 132 LBS | HEIGHT: 62 IN | SYSTOLIC BLOOD PRESSURE: 100 MMHG | TEMPERATURE: 97 F | OXYGEN SATURATION: 98 % | RESPIRATION RATE: 16 BRPM | DIASTOLIC BLOOD PRESSURE: 60 MMHG | HEART RATE: 73 BPM

## 2021-02-09 DIAGNOSIS — I67.1 INTRACRANIAL ANEURYSM: ICD-10-CM

## 2021-02-09 DIAGNOSIS — I72.9 ANEURYSM (HCC): ICD-10-CM

## 2021-02-09 DIAGNOSIS — G93.3 POSTVIRAL FATIGUE SYNDROME: ICD-10-CM

## 2021-02-09 DIAGNOSIS — R51.9 FREQUENT HEADACHES: ICD-10-CM

## 2021-02-09 DIAGNOSIS — E05.90 HYPERTHYROIDISM: ICD-10-CM

## 2021-02-09 DIAGNOSIS — J44.9 CHRONIC OBSTRUCTIVE PULMONARY DISEASE, UNSPECIFIED COPD TYPE (HCC): ICD-10-CM

## 2021-02-09 DIAGNOSIS — Z01.818 PREOP GENERAL PHYSICAL EXAM: Primary | ICD-10-CM

## 2021-02-09 LAB
ALBUMIN SERPL-MCNC: 3.6 G/DL (ref 3.4–5)
ALBUMIN/GLOB SERPL: 0.8 {RATIO} (ref 1–2)
ALP LIVER SERPL-CCNC: 100 U/L
ALT SERPL-CCNC: 23 U/L
ANION GAP SERPL CALC-SCNC: 4 MMOL/L (ref 0–18)
ANTIBODY SCREEN: NEGATIVE
APTT PPP: 29.5 SECONDS (ref 25.4–36.1)
AST SERPL-CCNC: 15 U/L (ref 15–37)
BASOPHILS # BLD AUTO: 0.04 X10(3) UL (ref 0–0.2)
BASOPHILS NFR BLD AUTO: 0.5 %
BILIRUB SERPL-MCNC: 0.3 MG/DL (ref 0.1–2)
BUN BLD-MCNC: 16 MG/DL (ref 7–18)
BUN/CREAT SERPL: 28.6 (ref 10–20)
CALCIUM BLD-MCNC: 8.8 MG/DL (ref 8.5–10.1)
CHLORIDE SERPL-SCNC: 109 MMOL/L (ref 98–112)
CO2 SERPL-SCNC: 26 MMOL/L (ref 21–32)
CREAT BLD-MCNC: 0.56 MG/DL
DEPRECATED RDW RBC AUTO: 47.2 FL (ref 35.1–46.3)
EOSINOPHIL # BLD AUTO: 0.15 X10(3) UL (ref 0–0.7)
EOSINOPHIL NFR BLD AUTO: 1.9 %
ERYTHROCYTE [DISTWIDTH] IN BLOOD BY AUTOMATED COUNT: 13.5 % (ref 11–15)
GLOBULIN PLAS-MCNC: 4.4 G/DL (ref 2.8–4.4)
GLUCOSE BLD-MCNC: 93 MG/DL (ref 70–99)
HCT VFR BLD AUTO: 41 %
HGB BLD-MCNC: 13.2 G/DL
IMM GRANULOCYTES # BLD AUTO: 0.02 X10(3) UL (ref 0–1)
IMM GRANULOCYTES NFR BLD: 0.3 %
INR BLD: 0.96 (ref 0.88–1.11)
LYMPHOCYTES # BLD AUTO: 2.8 X10(3) UL (ref 1–4)
LYMPHOCYTES NFR BLD AUTO: 35 %
M PROTEIN MFR SERPL ELPH: 8 G/DL (ref 6.4–8.2)
MCH RBC QN AUTO: 30.6 PG (ref 26–34)
MCHC RBC AUTO-ENTMCNC: 32.2 G/DL (ref 31–37)
MCV RBC AUTO: 94.9 FL
MONOCYTES # BLD AUTO: 0.64 X10(3) UL (ref 0.1–1)
MONOCYTES NFR BLD AUTO: 8 %
NEUTROPHILS # BLD AUTO: 4.34 X10 (3) UL (ref 1.5–7.7)
NEUTROPHILS # BLD AUTO: 4.34 X10(3) UL (ref 1.5–7.7)
NEUTROPHILS NFR BLD AUTO: 54.3 %
OSMOLALITY SERPL CALC.SUM OF ELEC: 289 MOSM/KG (ref 275–295)
PATIENT FASTING Y/N/NP: YES
PLATELET # BLD AUTO: 364 10(3)UL (ref 150–450)
POTASSIUM SERPL-SCNC: 4.3 MMOL/L (ref 3.5–5.1)
PSA SERPL DL<=0.01 NG/ML-MCNC: 12.7 SECONDS (ref 12–14.3)
RBC # BLD AUTO: 4.32 X10(6)UL
RH BLOOD TYPE: POSITIVE
SODIUM SERPL-SCNC: 139 MMOL/L (ref 136–145)
WBC # BLD AUTO: 8 X10(3) UL (ref 4–11)

## 2021-02-09 PROCEDURE — 80053 COMPREHEN METABOLIC PANEL: CPT

## 2021-02-09 PROCEDURE — 85610 PROTHROMBIN TIME: CPT

## 2021-02-09 PROCEDURE — 3074F SYST BP LT 130 MM HG: CPT | Performed by: FAMILY MEDICINE

## 2021-02-09 PROCEDURE — 99214 OFFICE O/P EST MOD 30 MIN: CPT | Performed by: FAMILY MEDICINE

## 2021-02-09 PROCEDURE — 86901 BLOOD TYPING SEROLOGIC RH(D): CPT

## 2021-02-09 PROCEDURE — 85730 THROMBOPLASTIN TIME PARTIAL: CPT

## 2021-02-09 PROCEDURE — 86900 BLOOD TYPING SEROLOGIC ABO: CPT

## 2021-02-09 PROCEDURE — 71046 X-RAY EXAM CHEST 2 VIEWS: CPT | Performed by: NEUROLOGICAL SURGERY

## 2021-02-09 PROCEDURE — 3078F DIAST BP <80 MM HG: CPT | Performed by: FAMILY MEDICINE

## 2021-02-09 PROCEDURE — 85025 COMPLETE CBC W/AUTO DIFF WBC: CPT

## 2021-02-09 PROCEDURE — 3008F BODY MASS INDEX DOCD: CPT | Performed by: FAMILY MEDICINE

## 2021-02-09 PROCEDURE — 86850 RBC ANTIBODY SCREEN: CPT

## 2021-02-09 PROCEDURE — 36415 COLL VENOUS BLD VENIPUNCTURE: CPT

## 2021-02-09 NOTE — PROGRESS NOTES
Nela Rothman is a 48year old female who presents for a pre-operative physical exam.   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   SKIN: no unusual skin lesions or rashes  EYES: no changes in vision, discharge or pain  HEENT: no nasal congestion, sinus pain or sore throat  RESPIRATORY:no shortness of breath, wheezing or cough   CARDIOVASCULAR: denies chest pain, palpitation 02/09/2021 01:32 PM    K 4.3 02/09/2021 01:32 PM     02/09/2021 01:32 PM    CO2 26.0 02/09/2021 01:32 PM    CREATSERUM 0.56 02/09/2021 01:32 PM    CA 8.8 02/09/2021 01:32 PM    ALB 3.6 02/09/2021 01:32 PM    TP 8.0 02/09/2021 01:32 PM    ALKPHO 100 0

## 2021-02-10 NOTE — TELEPHONE ENCOUNTER
Message below noted. Patient was also seen yesterday by PCP. PCP clearance note pending. PA and PCP clearance pending.

## 2021-02-10 NOTE — TELEPHONE ENCOUNTER
Prior Authorization for inpaient surgery initiated with Aden Holstein with On license of [facility]  Requesting coverage for:right craniotomy  Date of Service: 2/19/2021   Inpatient days requested:3  CPT codes: 19977,86506,54443    Request for surgery pending review, pend

## 2021-02-11 ENCOUNTER — LAB ENCOUNTER (OUTPATIENT)
Dept: LAB | Age: 56
End: 2021-02-11
Attending: FAMILY MEDICINE
Payer: MEDICAID

## 2021-02-11 ENCOUNTER — ANESTHESIA EVENT (OUTPATIENT)
Dept: SURGERY | Facility: HOSPITAL | Age: 56
DRG: 025 | End: 2021-02-11
Payer: MEDICAID

## 2021-02-11 ENCOUNTER — TELEPHONE (OUTPATIENT)
Dept: SURGERY | Facility: CLINIC | Age: 56
End: 2021-02-11

## 2021-02-11 DIAGNOSIS — H05.243 CONSTANT EXOPHTHALMOS OF BOTH EYES: ICD-10-CM

## 2021-02-11 DIAGNOSIS — E05.00 THYROID EYE DISEASE: Primary | ICD-10-CM

## 2021-02-11 DIAGNOSIS — H53.2 DIPLOPIA: ICD-10-CM

## 2021-02-11 LAB
T3FREE SERPL-MCNC: 1.83 PG/ML (ref 2.4–4.2)
T4 FREE SERPL-MCNC: 0.8 NG/DL (ref 0.8–1.7)
TSI SER-ACNC: 2.46 MIU/ML (ref 0.36–3.74)

## 2021-02-11 PROCEDURE — 84481 FREE ASSAY (FT-3): CPT

## 2021-02-11 PROCEDURE — 84439 ASSAY OF FREE THYROXINE: CPT

## 2021-02-11 PROCEDURE — 87081 CULTURE SCREEN ONLY: CPT

## 2021-02-11 PROCEDURE — 84443 ASSAY THYROID STIM HORMONE: CPT

## 2021-02-11 PROCEDURE — 36415 COLL VENOUS BLD VENIPUNCTURE: CPT

## 2021-02-11 NOTE — TELEPHONE ENCOUNTER
Prior authorization request completed for: right craniotomy  Authorization #[id number]  Authorization dates: 2/19/21-2/22/21  CPT codes approved: 13105,24024,60969  Number of visits/dates of service approved: 3  Physician: Dr Nicolle Yates

## 2021-02-11 NOTE — TELEPHONE ENCOUNTER
Treatments & exam notes from pt's appt with Dr. Pb Barahona @ [facility] on 01/29/21 received, endorsed to provider for review

## 2021-02-12 NOTE — TELEPHONE ENCOUNTER
Called Dr. Mckeon Diss office and spoke with Rachel Marin and informed her that doctors note dated 2/9/21 is still pending. Rachel Marin states she will inform provider to have completed.

## 2021-02-15 RX ORDER — DIVALPROEX SODIUM 250 MG/1
250 TABLET, DELAYED RELEASE ORAL 2 TIMES DAILY
Qty: 60 TABLET | Refills: 2 | Status: SHIPPED | OUTPATIENT
Start: 2021-02-15 | End: 2021-05-18

## 2021-02-15 NOTE — TELEPHONE ENCOUNTER
Per Dr. Maldonado Bad note 2/9/21    \"For this high risk procedure the patient is a good surgical candidate and is of acceptable risk for surgery. The patient may proceed without further testing. \"     Nothing further needed

## 2021-02-16 ENCOUNTER — TELEPHONE (OUTPATIENT)
Dept: FAMILY MEDICINE CLINIC | Facility: CLINIC | Age: 56
End: 2021-02-16

## 2021-02-16 ENCOUNTER — LAB ENCOUNTER (OUTPATIENT)
Dept: LAB | Age: 56
End: 2021-02-16
Attending: NEUROLOGICAL SURGERY
Payer: MEDICAID

## 2021-02-16 DIAGNOSIS — I67.1 INTRACRANIAL ANEURYSM: ICD-10-CM

## 2021-02-17 LAB — SARS-COV-2 RNA RESP QL NAA+PROBE: NOT DETECTED

## 2021-02-18 NOTE — ANESTHESIA PREPROCEDURE EVALUATION
PRE-OP EVALUATION    Patient Name: Anish Bradley    Pre-op Diagnosis: Cerebral aneurysm without rupture [I67.1]    Procedure(s):  right craniotomy for clipping of Internal carotid artery terminus aneurysm, possible clipping of Middle cerebral artery b times a year      Drug use: Unknown   Comment: former cocaine history-Has used CBD oil     Available pre-op labs reviewed.   Lab Results   Component Value Date    WBC 8.0 02/09/2021    RBC 4.32 02/09/2021    HGB 13.2 02/09/2021    HCT 41.0 02/09/2021    MCV

## 2021-02-19 ENCOUNTER — ANESTHESIA (OUTPATIENT)
Dept: SURGERY | Facility: HOSPITAL | Age: 56
DRG: 025 | End: 2021-02-19
Payer: MEDICAID

## 2021-02-19 ENCOUNTER — HOSPITAL ENCOUNTER (INPATIENT)
Facility: HOSPITAL | Age: 56
LOS: 4 days | Discharge: HOME | DRG: 025 | End: 2021-02-23
Attending: NEUROLOGICAL SURGERY | Admitting: NEUROLOGICAL SURGERY
Payer: MEDICAID

## 2021-02-19 ENCOUNTER — APPOINTMENT (OUTPATIENT)
Dept: CT IMAGING | Facility: HOSPITAL | Age: 56
DRG: 025 | End: 2021-02-19
Attending: NEUROLOGICAL SURGERY
Payer: MEDICAID

## 2021-02-19 ENCOUNTER — APPOINTMENT (OUTPATIENT)
Dept: GENERAL RADIOLOGY | Facility: HOSPITAL | Age: 56
DRG: 025 | End: 2021-02-19
Attending: ANESTHESIOLOGY
Payer: MEDICAID

## 2021-02-19 DIAGNOSIS — I67.1 INTRACRANIAL ANEURYSM: Primary | ICD-10-CM

## 2021-02-19 DIAGNOSIS — I67.1 CEREBRAL ANEURYSM WITHOUT RUPTURE: ICD-10-CM

## 2021-02-19 PROCEDURE — 3075F SYST BP GE 130 - 139MM HG: CPT | Performed by: NURSE PRACTITIONER

## 2021-02-19 PROCEDURE — 03VG0CZ RESTRICTION OF INTRACRANIAL ARTERY WITH EXTRALUMINAL DEVICE, OPEN APPROACH: ICD-10-PCS | Performed by: NEUROLOGICAL SURGERY

## 2021-02-19 PROCEDURE — 02HV33Z INSERTION OF INFUSION DEVICE INTO SUPERIOR VENA CAVA, PERCUTANEOUS APPROACH: ICD-10-PCS | Performed by: ANESTHESIOLOGY

## 2021-02-19 PROCEDURE — B548ZZA ULTRASONOGRAPHY OF SUPERIOR VENA CAVA, GUIDANCE: ICD-10-PCS | Performed by: ANESTHESIOLOGY

## 2021-02-19 PROCEDURE — 99254 IP/OBS CNSLTJ NEW/EST MOD 60: CPT | Performed by: INTERNAL MEDICINE

## 2021-02-19 PROCEDURE — 76942 ECHO GUIDE FOR BIOPSY: CPT | Performed by: ANESTHESIOLOGY

## 2021-02-19 PROCEDURE — 3080F DIAST BP >= 90 MM HG: CPT | Performed by: NURSE PRACTITIONER

## 2021-02-19 PROCEDURE — 3008F BODY MASS INDEX DOCD: CPT | Performed by: NURSE PRACTITIONER

## 2021-02-19 PROCEDURE — 99291 CRITICAL CARE FIRST HOUR: CPT | Performed by: NURSE PRACTITIONER

## 2021-02-19 PROCEDURE — 70496 CT ANGIOGRAPHY HEAD: CPT | Performed by: NEUROLOGICAL SURGERY

## 2021-02-19 PROCEDURE — P9045 ALBUMIN (HUMAN), 5%, 250 ML: HCPCS | Performed by: ANESTHESIOLOGY

## 2021-02-19 PROCEDURE — 71045 X-RAY EXAM CHEST 1 VIEW: CPT | Performed by: ANESTHESIOLOGY

## 2021-02-19 DEVICE — SCREW, AXS, SELF-DRILLING
Type: IMPLANTABLE DEVICE | Site: HEAD | Status: FUNCTIONAL
Brand: UNIVERSAL NEURO 3

## 2021-02-19 DEVICE — IMPLANTABLE DEVICE: Type: IMPLANTABLE DEVICE | Site: HEAD | Status: FUNCTIONAL

## 2021-02-19 DEVICE — BURR HOLE COVER, WITH TAB, 10MM
Type: IMPLANTABLE DEVICE | Site: HEAD | Status: FUNCTIONAL
Brand: UNIVERSAL NEURO 3

## 2021-02-19 DEVICE — PATCH DURAL DURAMATRIX 3X3: Type: IMPLANTABLE DEVICE | Site: HEAD | Status: FUNCTIONAL

## 2021-02-19 RX ORDER — LEVETIRACETAM 500 MG/5ML
1000 INJECTION, SOLUTION, CONCENTRATE INTRAVENOUS
Status: DISCONTINUED | OUTPATIENT
Start: 2021-02-19 | End: 2021-02-19 | Stop reason: HOSPADM

## 2021-02-19 RX ORDER — ONDANSETRON 2 MG/ML
4 INJECTION INTRAMUSCULAR; INTRAVENOUS EVERY 6 HOURS PRN
Status: DISCONTINUED | OUTPATIENT
Start: 2021-02-19 | End: 2021-02-23

## 2021-02-19 RX ORDER — ASPIRIN 81 MG/1
81 TABLET ORAL DAILY
Status: ON HOLD | COMMUNITY
End: 2021-02-23

## 2021-02-19 RX ORDER — LABETALOL HYDROCHLORIDE 5 MG/ML
10 INJECTION, SOLUTION INTRAVENOUS AS NEEDED
Status: DISCONTINUED | OUTPATIENT
Start: 2021-02-19 | End: 2021-02-19

## 2021-02-19 RX ORDER — VERAPAMIL HYDROCHLORIDE 2.5 MG/ML
INJECTION, SOLUTION INTRAVENOUS AS NEEDED
Status: DISCONTINUED | OUTPATIENT
Start: 2021-02-19 | End: 2021-02-19 | Stop reason: HOSPADM

## 2021-02-19 RX ORDER — MIDAZOLAM HYDROCHLORIDE 1 MG/ML
INJECTION INTRAMUSCULAR; INTRAVENOUS AS NEEDED
Status: DISCONTINUED | OUTPATIENT
Start: 2021-02-19 | End: 2021-02-19 | Stop reason: SURG

## 2021-02-19 RX ORDER — MORPHINE SULFATE 2 MG/ML
2 INJECTION, SOLUTION INTRAMUSCULAR; INTRAVENOUS EVERY 2 HOUR PRN
Status: DISCONTINUED | OUTPATIENT
Start: 2021-02-19 | End: 2021-02-23

## 2021-02-19 RX ORDER — SELENIUM 100 MCG
100 TABLET ORAL 2 TIMES DAILY
Status: DISCONTINUED | OUTPATIENT
Start: 2021-02-19 | End: 2021-02-19

## 2021-02-19 RX ORDER — NEOSTIGMINE METHYLSULFATE 1 MG/ML
INJECTION INTRAVENOUS AS NEEDED
Status: DISCONTINUED | OUTPATIENT
Start: 2021-02-19 | End: 2021-02-19 | Stop reason: SURG

## 2021-02-19 RX ORDER — MONTELUKAST SODIUM 10 MG/1
10 TABLET ORAL EVERY EVENING
Status: DISCONTINUED | OUTPATIENT
Start: 2021-02-19 | End: 2021-02-23

## 2021-02-19 RX ORDER — DIVALPROEX SODIUM 250 MG/1
250 TABLET, DELAYED RELEASE ORAL 2 TIMES DAILY
Status: DISCONTINUED | OUTPATIENT
Start: 2021-02-19 | End: 2021-02-19

## 2021-02-19 RX ORDER — MANNITOL 250 MG/ML
INJECTION, SOLUTION INTRAVENOUS AS NEEDED
Status: DISCONTINUED | OUTPATIENT
Start: 2021-02-19 | End: 2021-02-19 | Stop reason: SURG

## 2021-02-19 RX ORDER — HYDROCODONE BITARTRATE AND ACETAMINOPHEN 5; 325 MG/1; MG/1
1 TABLET ORAL EVERY 4 HOURS PRN
Status: DISCONTINUED | OUTPATIENT
Start: 2021-02-19 | End: 2021-02-20

## 2021-02-19 RX ORDER — SODIUM CHLORIDE 9 MG/ML
INJECTION, SOLUTION INTRAVENOUS CONTINUOUS
Status: DISCONTINUED | OUTPATIENT
Start: 2021-02-19 | End: 2021-02-20

## 2021-02-19 RX ORDER — DEXAMETHASONE SODIUM PHOSPHATE 4 MG/ML
VIAL (ML) INJECTION AS NEEDED
Status: DISCONTINUED | OUTPATIENT
Start: 2021-02-19 | End: 2021-02-19 | Stop reason: SURG

## 2021-02-19 RX ORDER — GLYCOPYRROLATE 0.2 MG/ML
INJECTION, SOLUTION INTRAMUSCULAR; INTRAVENOUS AS NEEDED
Status: DISCONTINUED | OUTPATIENT
Start: 2021-02-19 | End: 2021-02-19 | Stop reason: SURG

## 2021-02-19 RX ORDER — ALBUTEROL SULFATE 90 UG/1
2 AEROSOL, METERED RESPIRATORY (INHALATION) EVERY 6 HOURS PRN
Status: DISCONTINUED | OUTPATIENT
Start: 2021-02-19 | End: 2021-02-23

## 2021-02-19 RX ORDER — FAMOTIDINE 20 MG/1
20 TABLET ORAL 2 TIMES DAILY
Status: DISCONTINUED | OUTPATIENT
Start: 2021-02-19 | End: 2021-02-20

## 2021-02-19 RX ORDER — CEFAZOLIN SODIUM/WATER 2 G/20 ML
2 SYRINGE (ML) INTRAVENOUS ONCE
Status: COMPLETED | OUTPATIENT
Start: 2021-02-19 | End: 2021-02-19

## 2021-02-19 RX ORDER — SODIUM CHLORIDE 9 MG/ML
INJECTION, SOLUTION INTRAVENOUS CONTINUOUS
Status: DISCONTINUED | OUTPATIENT
Start: 2021-02-19 | End: 2021-02-19

## 2021-02-19 RX ORDER — ALBUMIN, HUMAN INJ 5% 5 %
SOLUTION INTRAVENOUS CONTINUOUS PRN
Status: DISCONTINUED | OUTPATIENT
Start: 2021-02-19 | End: 2021-02-19 | Stop reason: SURG

## 2021-02-19 RX ORDER — METHIMAZOLE 5 MG/1
5 TABLET ORAL 2 TIMES DAILY
Status: DISCONTINUED | OUTPATIENT
Start: 2021-02-19 | End: 2021-02-20

## 2021-02-19 RX ORDER — INDOCYANINE GREEN AND WATER 25 MG
KIT INJECTION AS NEEDED
Status: DISCONTINUED | OUTPATIENT
Start: 2021-02-19 | End: 2021-02-19 | Stop reason: SURG

## 2021-02-19 RX ORDER — MORPHINE SULFATE 4 MG/ML
4 INJECTION, SOLUTION INTRAMUSCULAR; INTRAVENOUS EVERY 2 HOUR PRN
Status: DISCONTINUED | OUTPATIENT
Start: 2021-02-19 | End: 2021-02-23

## 2021-02-19 RX ORDER — METOCLOPRAMIDE HYDROCHLORIDE 5 MG/ML
INJECTION INTRAMUSCULAR; INTRAVENOUS AS NEEDED
Status: DISCONTINUED | OUTPATIENT
Start: 2021-02-19 | End: 2021-02-19 | Stop reason: SURG

## 2021-02-19 RX ORDER — ONDANSETRON 2 MG/ML
INJECTION INTRAMUSCULAR; INTRAVENOUS AS NEEDED
Status: DISCONTINUED | OUTPATIENT
Start: 2021-02-19 | End: 2021-02-19 | Stop reason: SURG

## 2021-02-19 RX ORDER — SODIUM CHLORIDE, SODIUM LACTATE, POTASSIUM CHLORIDE, CALCIUM CHLORIDE 600; 310; 30; 20 MG/100ML; MG/100ML; MG/100ML; MG/100ML
INJECTION, SOLUTION INTRAVENOUS CONTINUOUS PRN
Status: DISCONTINUED | OUTPATIENT
Start: 2021-02-19 | End: 2021-02-19 | Stop reason: SURG

## 2021-02-19 RX ORDER — LEVETIRACETAM 500 MG/5ML
INJECTION, SOLUTION, CONCENTRATE INTRAVENOUS AS NEEDED
Status: DISCONTINUED | OUTPATIENT
Start: 2021-02-19 | End: 2021-02-19 | Stop reason: SURG

## 2021-02-19 RX ORDER — 0.9 % SODIUM CHLORIDE 0.9 %
VIAL (ML) INJECTION AS NEEDED
Status: DISCONTINUED | OUTPATIENT
Start: 2021-02-19 | End: 2021-02-19 | Stop reason: HOSPADM

## 2021-02-19 RX ORDER — ACETAMINOPHEN 500 MG
1000 TABLET ORAL ONCE
Status: ON HOLD | COMMUNITY
End: 2021-02-23

## 2021-02-19 RX ORDER — ACETAMINOPHEN 500 MG
1000 TABLET ORAL ONCE
Status: DISCONTINUED | OUTPATIENT
Start: 2021-02-19 | End: 2021-02-19

## 2021-02-19 RX ORDER — PHENYLEPHRINE HCL IN 0.9% NACL 50MG/250ML
PLASTIC BAG, INJECTION (ML) INTRAVENOUS CONTINUOUS
Status: DISCONTINUED | OUTPATIENT
Start: 2021-02-19 | End: 2021-02-22

## 2021-02-19 RX ORDER — FOLIC ACID 1 MG/1
1 TABLET ORAL DAILY
Status: DISCONTINUED | OUTPATIENT
Start: 2021-02-20 | End: 2021-02-23

## 2021-02-19 RX ORDER — ROCURONIUM BROMIDE 10 MG/ML
INJECTION, SOLUTION INTRAVENOUS AS NEEDED
Status: DISCONTINUED | OUTPATIENT
Start: 2021-02-19 | End: 2021-02-19 | Stop reason: SURG

## 2021-02-19 RX ORDER — CEFAZOLIN SODIUM/WATER 2 G/20 ML
2 SYRINGE (ML) INTRAVENOUS EVERY 8 HOURS
Status: COMPLETED | OUTPATIENT
Start: 2021-02-20 | End: 2021-02-22

## 2021-02-19 RX ORDER — LIDOCAINE HYDROCHLORIDE AND EPINEPHRINE 10; 10 MG/ML; UG/ML
INJECTION, SOLUTION INFILTRATION; PERINEURAL AS NEEDED
Status: DISCONTINUED | OUTPATIENT
Start: 2021-02-19 | End: 2021-02-19 | Stop reason: HOSPADM

## 2021-02-19 RX ORDER — NICOTINE 21 MG/24HR
1 PATCH, TRANSDERMAL 24 HOURS TRANSDERMAL EVERY 24 HOURS
Status: DISCONTINUED | OUTPATIENT
Start: 2021-02-19 | End: 2021-02-23

## 2021-02-19 RX ORDER — HYDROCODONE BITARTRATE AND ACETAMINOPHEN 5; 325 MG/1; MG/1
2 TABLET ORAL EVERY 4 HOURS PRN
Status: DISCONTINUED | OUTPATIENT
Start: 2021-02-19 | End: 2021-02-20

## 2021-02-19 RX ORDER — FAMOTIDINE 10 MG/ML
20 INJECTION, SOLUTION INTRAVENOUS 2 TIMES DAILY
Status: DISCONTINUED | OUTPATIENT
Start: 2021-02-19 | End: 2021-02-20

## 2021-02-19 RX ORDER — CETIRIZINE HYDROCHLORIDE 10 MG/1
10 TABLET ORAL NIGHTLY
Status: DISCONTINUED | OUTPATIENT
Start: 2021-02-19 | End: 2021-02-23

## 2021-02-19 RX ORDER — LABETALOL HYDROCHLORIDE 5 MG/ML
10 INJECTION, SOLUTION INTRAVENOUS AS NEEDED
Status: DISCONTINUED | OUTPATIENT
Start: 2021-02-19 | End: 2021-02-23

## 2021-02-19 RX ORDER — MORPHINE SULFATE 2 MG/ML
1 INJECTION, SOLUTION INTRAMUSCULAR; INTRAVENOUS EVERY 2 HOUR PRN
Status: DISCONTINUED | OUTPATIENT
Start: 2021-02-19 | End: 2021-02-23

## 2021-02-19 RX ORDER — DOCUSATE SODIUM 100 MG/1
100 CAPSULE, LIQUID FILLED ORAL 2 TIMES DAILY
Status: DISCONTINUED | OUTPATIENT
Start: 2021-02-19 | End: 2021-02-20

## 2021-02-19 RX ORDER — METHIMAZOLE 5 MG/1
2.5 TABLET ORAL
Status: DISCONTINUED | OUTPATIENT
Start: 2021-02-20 | End: 2021-02-20

## 2021-02-19 RX ADMIN — ALBUMIN, HUMAN INJ 5%: 5 SOLUTION INTRAVENOUS at 15:57:00

## 2021-02-19 RX ADMIN — LEVETIRACETAM 1000 MG: 500 INJECTION, SOLUTION, CONCENTRATE INTRAVENOUS at 10:03:00

## 2021-02-19 RX ADMIN — CEFAZOLIN SODIUM/WATER 2 G: 2 G/20 ML SYRINGE (ML) INTRAVENOUS at 09:45:00

## 2021-02-19 RX ADMIN — INDOCYANINE GREEN AND WATER 12.5 MG: 25 MG KIT INJECTION at 17:22:00

## 2021-02-19 RX ADMIN — SODIUM CHLORIDE: 9 INJECTION, SOLUTION INTRAVENOUS at 10:09:00

## 2021-02-19 RX ADMIN — SODIUM CHLORIDE, SODIUM LACTATE, POTASSIUM CHLORIDE, CALCIUM CHLORIDE: 600; 310; 30; 20 INJECTION, SOLUTION INTRAVENOUS at 08:44:00

## 2021-02-19 RX ADMIN — GLYCOPYRROLATE 0.4 MG: 0.2 INJECTION, SOLUTION INTRAMUSCULAR; INTRAVENOUS at 18:17:00

## 2021-02-19 RX ADMIN — CEFAZOLIN SODIUM/WATER 2 G: 2 G/20 ML SYRINGE (ML) INTRAVENOUS at 13:44:00

## 2021-02-19 RX ADMIN — ROCURONIUM BROMIDE 50 MG: 10 INJECTION, SOLUTION INTRAVENOUS at 09:00:00

## 2021-02-19 RX ADMIN — NEOSTIGMINE METHYLSULFATE 3 MG: 1 INJECTION INTRAVENOUS at 18:17:00

## 2021-02-19 RX ADMIN — INDOCYANINE GREEN AND WATER 12.5 MG: 25 MG KIT INJECTION at 16:42:00

## 2021-02-19 RX ADMIN — MIDAZOLAM HYDROCHLORIDE 2 MG: 1 INJECTION INTRAMUSCULAR; INTRAVENOUS at 08:47:00

## 2021-02-19 RX ADMIN — MANNITOL 50 ML: 250 INJECTION, SOLUTION INTRAVENOUS at 10:03:00

## 2021-02-19 RX ADMIN — SODIUM CHLORIDE: 9 INJECTION, SOLUTION INTRAVENOUS at 13:44:00

## 2021-02-19 RX ADMIN — METOCLOPRAMIDE HYDROCHLORIDE 10 MG: 5 INJECTION INTRAMUSCULAR; INTRAVENOUS at 09:00:00

## 2021-02-19 RX ADMIN — SODIUM CHLORIDE: 9 INJECTION, SOLUTION INTRAVENOUS at 19:42:00

## 2021-02-19 RX ADMIN — INDOCYANINE GREEN AND WATER 12.5 MG: 25 MG KIT INJECTION at 16:36:00

## 2021-02-19 RX ADMIN — SODIUM CHLORIDE: 9 INJECTION, SOLUTION INTRAVENOUS at 08:47:00

## 2021-02-19 RX ADMIN — ONDANSETRON 4 MG: 2 INJECTION INTRAMUSCULAR; INTRAVENOUS at 18:17:00

## 2021-02-19 RX ADMIN — CEFAZOLIN SODIUM/WATER 2 G: 2 G/20 ML SYRINGE (ML) INTRAVENOUS at 17:45:00

## 2021-02-19 RX ADMIN — DEXAMETHASONE SODIUM PHOSPHATE 10 MG: 4 MG/ML VIAL (ML) INJECTION at 09:00:00

## 2021-02-19 NOTE — CONSULTS
[facility]  Neurocritical Care Consult Note    Adilene Isaías Patient Status:  Inpatient    1965 MRN [id number]   [facility] SURGERY Attending Reji Zuleta MD   Hosp Day # 0 PCP Charlie Mc MD       Reason fo tab, Take 1 tablet (250 mg total) by mouth 2 (two) times daily. , Disp: 60 tablet, Rfl: 2, 2/18/2021 at 2100    •  ZINC GLUCONATE OR, Take by mouth daily. , Disp: , Rfl: , 2/11/2021    •  Selenium 100 MCG Oral Tab, Take 100 mcg by mouth 2 (two) times daily. , file    Tobacco Use      Smoking status: Current Some Day Smoker        Packs/day: 0.50        Years: 40.00        Pack years: 20      Smokeless tobacco: Never Used      Tobacco comment: 2-3 cigarettes/week    Substance and Sexual Activity      Alcohol use regards and follows commands, oriented x3, mild dysarthria  · Cranial nerves- pupils equally round and reactive to light, extraocular muscles intact, face symmetric, visual fields full  · Motor- 5/5 on R, 4/5 on L  · Sens-  Intact to light touch    Diagnos

## 2021-02-19 NOTE — ANESTHESIA PROCEDURE NOTES
Airway  Urgency: elective      General Information and Staff    Patient location during procedure: OR  Anesthesiologist: Joshua Bailey MD  Performed: anesthesiologist     Indications and Patient Condition  Indications for airway management: anesthesia  Sed

## 2021-02-19 NOTE — H&P
HISTORY OF PRESENT ILLNESS:  Ignacio Cedillo is a(n) 48year old female here for follow-up after angiogram, multidisciplinary discussion with recommendation for clipping of right ICA terminus aneurysm.  She still gets some pressure headache behind her ey visit.         REVIEW OF SYSTEMS:  A 10-point system was reviewed.   Pertinent positives and negatives are noted in HPI.       PHYSICAL EXAMINATION:  VITAL SIGNS: /72 (BP Location: Left arm, Patient Position: Sitting, Cuff Size: adult)   Pulse 76   Ht

## 2021-02-19 NOTE — ANESTHESIA PROCEDURE NOTES
Central Line  Performed by: Virginia Fortune MD  Authorized by: Virginia Fortune MD     General Information and Staff    Procedure Start:   Anesthesiologist: Virginia Fortune MD  Performed by:   Anesthesiologist  Patient Location:  OR  Indication: central venous acc

## 2021-02-19 NOTE — OR NURSING
Dr. Deanne Aviles made aware patient took 2 doses of diclofenac yesterday at noon and 1630. No new orders.

## 2021-02-19 NOTE — ANESTHESIA PROCEDURE NOTES
Arterial Line  Performed by: Shannon Rodas MD  Authorized by: Shannon Rodas MD     General Information and Staff    Procedure Start:   Anesthesiologist: Shannon Rodas MD  Performed By:  Anesthesiologist  Patient Location:  OR  Indication: continuous bloo

## 2021-02-20 PROCEDURE — 99232 SBSQ HOSP IP/OBS MODERATE 35: CPT | Performed by: INTERNAL MEDICINE

## 2021-02-20 PROCEDURE — 99292 CRITICAL CARE ADDL 30 MIN: CPT | Performed by: INTERNAL MEDICINE

## 2021-02-20 PROCEDURE — 99291 CRITICAL CARE FIRST HOUR: CPT | Performed by: INTERNAL MEDICINE

## 2021-02-20 RX ORDER — SODIUM CHLORIDE, SODIUM LACTATE, POTASSIUM CHLORIDE, CALCIUM CHLORIDE 600; 310; 30; 20 MG/100ML; MG/100ML; MG/100ML; MG/100ML
INJECTION, SOLUTION INTRAVENOUS CONTINUOUS
Status: DISCONTINUED | OUTPATIENT
Start: 2021-02-20 | End: 2021-02-22

## 2021-02-20 RX ORDER — ALBUMIN, HUMAN INJ 5% 5 %
250 SOLUTION INTRAVENOUS ONCE
Status: COMPLETED | OUTPATIENT
Start: 2021-02-20 | End: 2021-02-20

## 2021-02-20 RX ORDER — KETOROLAC TROMETHAMINE 15 MG/ML
15 INJECTION, SOLUTION INTRAMUSCULAR; INTRAVENOUS EVERY 6 HOURS PRN
Status: DISPENSED | OUTPATIENT
Start: 2021-02-20 | End: 2021-02-22

## 2021-02-20 RX ORDER — NIMODIPINE 30 MG/1
60 CAPSULE, LIQUID FILLED ORAL
Status: DISCONTINUED | OUTPATIENT
Start: 2021-02-20 | End: 2021-02-20

## 2021-02-20 RX ORDER — HYDROCODONE BITARTRATE AND ACETAMINOPHEN 5; 325 MG/1; MG/1
1 TABLET ORAL EVERY 4 HOURS PRN
Status: DISCONTINUED | OUTPATIENT
Start: 2021-02-21 | End: 2021-02-23

## 2021-02-20 RX ORDER — ACETAMINOPHEN 10 MG/ML
1000 INJECTION, SOLUTION INTRAVENOUS EVERY 6 HOURS
Status: COMPLETED | OUTPATIENT
Start: 2021-02-20 | End: 2021-02-21

## 2021-02-20 RX ORDER — POTASSIUM CHLORIDE 29.8 MG/ML
40 INJECTION INTRAVENOUS ONCE
Status: COMPLETED | OUTPATIENT
Start: 2021-02-20 | End: 2021-02-20

## 2021-02-20 RX ORDER — METHIMAZOLE 10 MG/1
7.5 TABLET ORAL ONCE
Status: COMPLETED | OUTPATIENT
Start: 2021-02-20 | End: 2021-02-20

## 2021-02-20 RX ORDER — HYDROCODONE BITARTRATE AND ACETAMINOPHEN 5; 325 MG/1; MG/1
2 TABLET ORAL EVERY 4 HOURS PRN
Status: DISCONTINUED | OUTPATIENT
Start: 2021-02-21 | End: 2021-02-23

## 2021-02-20 NOTE — OPERATIVE REPORT
[facility]    OPERATIVE REPORT    Patient:  Sam Sanchez;  YOB: 1965     CSN:  [id number]; Medical Record Number:  [id number]    Admission Date:  2/19/2021 Operation Date:  2/19/2021    . ..........................     Operating Phys the the frontal zygomatic process and keyhole. The myocutaneous flap was retracted with heavy sutures tied to a rubber band, which was secured to a kerlix roll tied to the end of the bed with saravanan clamps.   A sponge was placed under the skin flap to preve as needed. The dura was closed with 4-0 neurolon sutures. A dural onlay was placed. The bone flap was secured with titanium plates and screws. A hemovac drain was placed in the subgaleal space below the temporalis muscle.      The temporalis fascia was c

## 2021-02-20 NOTE — PROGRESS NOTES
Neurosurgery updated on status and exam this AM.  Continued to have improved mentation, gaze and left side movement overnight. Patient currently on Jaquan gtt at 100mcg/min with NS @ 80ml/h as ordered. AM Labs reviewed.   Na and Cl levels elevated-- IVF ch

## 2021-02-20 NOTE — PHYSICAL THERAPY NOTE
PT eval orders received, chart reviewed. Per RN PT to hold today per MD order and start 2/21/21. Will check back 2/21 and complete PT eval as appropriate.

## 2021-02-20 NOTE — ANESTHESIA POSTPROCEDURE EVALUATION
[address] [facility] Patient Status:  Inpatient   Age/Gender 48year old female MRN [id number]   [facility] 6NE-A Attending Stephanie Gillette MD   [address] Day # 0 PCP Timothy Calix MD       Anesthesia Post-op Note    Procedure(s):  r

## 2021-02-20 NOTE — PROGRESS NOTES
[facility]  Neurosurgery Progress Note    Chika Humphreys Patient Status:  Inpatient    1965 MRN [id number]   [facility] 6NE-A Attending Jake Devi MD   Hosp Day # 1 PCP Anay Pizarro MD     Chief Complaint:  R ICA/MCA aneur a 2nd aneurysm clip treating a right MCA trifurcation aneurysm which is completely   resolved. When comparing to the previous study the 3 branches of the MCA trifurcation are patent.   However, the more anterior branch within the sylvian fissure appears to The visualized soft tissues of the neck are also unremarkable.                        =====  CONCLUSION:       1. CTA demonstrates moderate spasm involving 2 of the 3 branches of the right MCA trifurcation within the sylvian fissure.      2. Ther

## 2021-02-20 NOTE — SLP NOTE
ADULT SWALLOWING EVALUATION    ASSESSMENT    ASSESSMENT/OVERALL IMPRESSION:  Patient is a 48year-old female who was admitted on 2/19/2021 with Cerebral aneurysm without rupture [I67.1].  Patient had recent workup in December for headaches and blurry vision efficient, however, slight pain reported around temple area/masseter muscle close to surgery site due to suspected swelling. No evidence of oral residuals. Please see below objective section for details.     Recommend temporary modified diet with implementa Regular; Thin liquids  Dysphagia History: none  Imaging Results:   CTA Brain: CONCLUSION:     1. CTA demonstrates moderate spasm involving 2 of the 3 branches of the right MCA trifurcation within the sylvian fissure.    2. There is complete resolution of the consistency and thin liquids without overt signs or symptoms of aspiration with 100 % accuracy over 2-3 session(s).   Goal Established     Goal #2 Reassess for regular consistencies once pain subsides    Goal Established     Goal #3 Cog-comm eval if demo re

## 2021-02-20 NOTE — ANESTHESIA PROCEDURE NOTES
Regional Block  Performed by: Dana Falk MD  Authorized by: Dana Falk MD       General Information and Staff    Start Time:   Anesthesiologist: Dana Falk MD  Performed by:   Anesthesiologist  Patient Location:  OR    Block Placement: Prsaanth Carrillo

## 2021-02-20 NOTE — BRIEF OP NOTE
Pre-Operative Diagnosis: Cerebral aneurysm without rupture [I67.1]     Post-Operative Diagnosis: Cerebral aneurysm without rupture [I67.1]      Procedure Performed:   Procedure(s):  right craniotomy for clipping of Internal carotid artery terminus aneurysm

## 2021-02-20 NOTE — PROGRESS NOTES
[facility]  Progress Note     Saritha Doran Patient Status:  Inpatient    1965 MRN [id number]   [facility] 6NE-A Attending Viry Arteaga MD   Hosp Day # 1 PCP Natalie Shields MD     Reason for consult: med management     Req in the last 168 hours. Imaging: Imaging data reviewed in Epic.     Medications:   • acetaminophen  1,000 mg Intravenous Q6H   • [START ON 2/21/2021] methimazole  12.5 mg Oral Daily   • methimazole  7.5 mg Oral Once   • cetirizine  10 mg Oral Nightly

## 2021-02-20 NOTE — PLAN OF CARE
Patient neuro Q1hr. LUE regain some improved movement. Deficits improved throughout the night. CTA done last night with spasms some indication of trickling from clip site,APN and Dr. Simran Españaate notified of results.   Patient SBP parameters maintain with JORGE g

## 2021-02-20 NOTE — ADDENDUM NOTE
Addendum  created 02/19/21 1947 by Abdias Estevez MD    Child order released for a procedure order, Clinical Note Signed, Intraprocedure Blocks edited

## 2021-02-20 NOTE — CONSULTS
[facility]  CONSULT     [address] Patient Status:  Inpatient    1965 MRN [id number]   [facility] 6NE-A Attending Baudilio Sloan MD   Hosp Day # 0 PCP Jannifer Crigler, MD     Reason for consult: med management    Reque Onset   • Cancer Father         colon cancer   • Cancer Mother         lung cancer, brain cancer   • Breast Cancer Maternal Grandmother    • Cancer Paternal Grandmother         colon cancer   • Breast Cancer Paternal Grandmother         Allergies:   Gluten comprehensive 14 point review of systems was completed. Pertinent positives and negatives noted in the HPI.     Physical Exam:    /66   Pulse 89   Temp 98.6 °F (37 °C)   Resp 16   Ht 157.5 cm (5' 2\")   Wt 134 lb 0.6 oz (60.8 kg)   LMP 07/05/2011 Nadia Travis MD  2/19/2021

## 2021-02-20 NOTE — PROGRESS NOTES
[facility]  Neurocritical Care APRN Progress Note    NAME: Jose Smalls - ROOM: 7203/1450-O - MRN: [id number] - Age: 48year old - :1965    History Of Present Illness:  Jose Smalls is a 48year old female with PMHx s disease) [facility]    • Depression    • Disorder of thyroid    • Fibroids    • History of stomach ulcers    • Human papilloma virus infection    • Prediabetes     Diet control - no meds or blood surgar checking at home   • Sexually transmitted disease    • Thy organomegaly  Extremities: Extremities normal, atraumatic, no cyanosis or edema, capillary refill <3 sec.     Pulses: 2+ and symmetric all extremities  Skin: Skin color, texture, turgor normal for ethnicity, incision intact to right scalp/hairline w/ Rick Lang gaze deviation--slowly improving  5. Headache  6. Hx COPD--Breo for home Dulera, prn albuterol  7.   Hx Hyperthyroidism--methimazole as at home    -Neuro ICU   -Neuro Critical Care consulted  -Neurosurgery following  -Neuro checks q1h  -Maintain -1

## 2021-02-21 PROCEDURE — 99291 CRITICAL CARE FIRST HOUR: CPT | Performed by: INTERNAL MEDICINE

## 2021-02-21 PROCEDURE — 99233 SBSQ HOSP IP/OBS HIGH 50: CPT | Performed by: INTERNAL MEDICINE

## 2021-02-21 RX ORDER — POTASSIUM CHLORIDE 20 MEQ/1
40 TABLET, EXTENDED RELEASE ORAL EVERY 4 HOURS
Status: COMPLETED | OUTPATIENT
Start: 2021-02-21 | End: 2021-02-21

## 2021-02-21 NOTE — PLAN OF CARE
Patient neuro status improved and stable. Q1hr continue. Intermittent headache and back pain continue relieved with pain meds. Jaquan gtt continues but is titrated down. SBP within parameters. Loya removed, A-line remains for SBP monitoring.   Tolerated u

## 2021-02-21 NOTE — PROGRESS NOTES
[facility]  Neurosurgery Progress Note    Brenda Velasquez Patient Status:  Inpatient    1965 MRN [id number]   [facility] 6NE-A Attending Mariel Luna MD   Hosp Day # 2 PCP Catarino Lai MD     Chief Complaint:  R ICA/MCA aneur RONAL [facility]  2/21/2021, 8:49 AM      I have seen and examined this patient and agree with the findings documented above. Doing well, was able to ambulate and work with PT today.   Wean off hselton, continue IVF at current rat

## 2021-02-21 NOTE — PHYSICAL THERAPY NOTE
PHYSICAL THERAPY EVALUATION - INPATIENT     Room Number: 7108/4350-F  Evaluation Date: 2/21/2021  Type of Evaluation: Initial  Physician Order: PT Eval and Treat    Presenting Problem: s/p R ICA aneurysm clipping 2/19  Reason for Therapy: Mobility Dy Yes  Patient Owned Equipment: None  Patient Regularly Uses: Glasses    Prior Level of [place]: PTA reports completely Ind without device just limited by headches    SUBJECTIVE  \"I don't feel weak\"    Patient self-stated goal is get better    OBJECTI 94  SPO2% Ambulation on Room Air: 92            AM-PAC '6-Clicks' [address]  How much difficulty does the patient currently have. ..  -   Turning over in bed (including adjusting bedclothes, sheets and blankets)?: A Little   - artery terminus aneurysm, clipping of middle cerebral artery bifurcation aneurysm post op speech impaired & L sided weakness with r gaze preference CTA + vasospasm placed on vasopressors .   Pertinent comorbidities and personal factors impacting therapy inc

## 2021-02-21 NOTE — PROGRESS NOTES
[facility]  Neurocritical Care Progress Note     Chika Humphreys Patient Status:  Inpatient    1965 MRN [id number]   [facility] 6NE-A Attending Jake Devi MD   Hosp Day # 2 PCP Anay Pizarro MD     Subjective: (BREO ELLIPTA) 100-25 MCG/INH inhaler 1 puff, 1 puff, Inhalation, Daily    •  Montelukast Sodium (SINGULAIR) tab 10 mg, 10 mg, Oral, QPM    •  nicotine (NICODERM CQ) 21 MG/24HR 1 patch, 1 patch, Transdermal, Q24H    •  morphINE sulfate (PF) 2 MG/ML injecti improved, cont neurochecks/pt/ot, cont keppra for seizure prophylaxis  HA- cont home vpa  Cardiac:  Induced HTN- wean sbp goal to 100-160 as exam continues to improve, wean shelton gtt as tolerated  Pulmonary:  COPD- stable on RA, cont home meds  Renal:  IVFs,

## 2021-02-21 NOTE — PROGRESS NOTES
[facility]  Progress Note     Roldan Plants Patient Status:  Inpatient    1965 MRN [id number]   [facility] 6NE-A Attending Aidan Canales MD   Hosp Day # 2 PCP Ann Hernandez MD     Reason for consult: med management     Req results for input(s): PTP, INR in the last 168 hours. No results for input(s): TROP, CK in the last 168 hours. Imaging: Imaging data reviewed in Epic.     Medications:   • Potassium Chloride ER  40 mEq Oral Q4H   • methimazole  12.5 mg Oral Daily

## 2021-02-22 PROCEDURE — 99291 CRITICAL CARE FIRST HOUR: CPT | Performed by: OTHER

## 2021-02-22 PROCEDURE — 99232 SBSQ HOSP IP/OBS MODERATE 35: CPT | Performed by: INTERNAL MEDICINE

## 2021-02-22 RX ORDER — DIVALPROEX SODIUM 250 MG/1
250 TABLET, DELAYED RELEASE ORAL EVERY 12 HOURS SCHEDULED
Status: DISCONTINUED | OUTPATIENT
Start: 2021-02-22 | End: 2021-02-23

## 2021-02-22 RX ORDER — LEVETIRACETAM 500 MG/1
500 TABLET ORAL 2 TIMES DAILY
Status: DISCONTINUED | OUTPATIENT
Start: 2021-02-22 | End: 2021-02-23

## 2021-02-22 RX ORDER — POTASSIUM CHLORIDE 20 MEQ/1
40 TABLET, EXTENDED RELEASE ORAL ONCE
Status: COMPLETED | OUTPATIENT
Start: 2021-02-22 | End: 2021-02-22

## 2021-02-22 RX ORDER — DOCUSATE SODIUM 100 MG/1
100 CAPSULE, LIQUID FILLED ORAL 2 TIMES DAILY
Status: DISCONTINUED | OUTPATIENT
Start: 2021-02-22 | End: 2021-02-23

## 2021-02-22 NOTE — PLAN OF CARE
Patient drowsy but oriented, follows commands, converses appropriately. LUE slightly weaker then the right BLE equal. Sensation intact. Only blurred vision noted in the periphery, especially when doing EOM's.  Vision intact when looking straight ahead, best

## 2021-02-22 NOTE — PROGRESS NOTES
[facility]  Progress Note     Boom Brunner Patient Status:  Inpatient    1965 MRN [id number]   [facility] 6NE-A Attending Warden Damien MD   Hosp Day # 3 PCP Dinh Thacker MD     Reason for consult: med management     Req Oral Nightly   • folic acid  1 mg Oral Daily   • Fluticasone Furoate-Vilanterol  1 puff Inhalation Daily   • Montelukast Sodium  10 mg Oral QPM   • nicotine  1 patch Transdermal Q24H   • ceFAZolin  2 g Intravenous Q8H       ASSESSMENT / PLAN:     1. R ICA Plan:  48year old female with PMHX of COPD, depression, anxiety and R ICA terminus and irregular R MCA bifurcation unruptured aneurysm who was admitted for craniotomy.  She underwent aneurysm clipping on 2/19, CTA with moderate spasms of R MCA and small R

## 2021-02-22 NOTE — SLP NOTE
SPEECH DAILY NOTE - INPATIENT    ASSESSMENT & PLAN   ASSESSMENT  Pt seen for dysphagia tx to assess tolerance with recommended diet, ensure proper utilization of aspiration precautions and provide pt/family education.   Patient alert and up in bed, recently return of symtoms  discontinued     FOLLOW UP  Follow Up Needed: No  SLP Follow-up Date: 02/22/21  Number of Visits to Meet Established Goals: 3    Session: 1 of 3    If you have any questions, please contact Cody Regan

## 2021-02-22 NOTE — PROGRESS NOTES
INPATIENT PROGRESS NOTE    Assessment:   Chandra Frias in room 0877/9969-O, is a 48year old female, Hospital Day ( LOS: 3 days ) hospitalized for <principal problem not specified>.     Post-Op Day 3 Days Post-Op s/p Procedure(s):  CRANIOTOMY FOR ANEUR and ecchymosis over the right orbital region, not unexpected given postop craniotomy right side. Good strength of bilateral upper extremities, improving left upper extremity strength. .  Moving bilateral lower extremities spontaneously to full resistance.

## 2021-02-22 NOTE — OCCUPATIONAL THERAPY NOTE
OCCUPATIONAL THERAPY QUICK EVALUATION - INPATIENT    Room Number: 9798/2531-H  Evaluation Date: 2/22/2021     Type of Evaluation: Quick Eval  Presenting Problem: R aneurysm clip 2/19     Physician Order: IP Consult to Occupational Therapy  Reason for Thera UPPER GI ENDOSCOPY,EXAM         OCCUPATIONAL PROFILE    HOME SITUATION  Type of Home: House  Home Layout: Two level; Able to live on main level  Lives With: Daughter                     Drives: Yes  Patient Regularly Uses: Glasses    Prior Level of Function Independent  Sit to Stand: Independent    Skilled Therapy Provided: Pt was approached for therapy in bed with call light on and daughter in the room. Pt reports \"really needing to go\" and got out of bed in a rush.  Pt required cueing for pacing and safety comorbidities nor modifications of tasks    Clinical Decision Making  LOW - Analysis of occupational profile, problem-focused assessments, limited treatment options    Overall Complexity  LOW     OT Discharge Recommendations: Home       PLAN   Patient has

## 2021-02-22 NOTE — PAYOR COMM NOTE
--------------  CONTINUED STAY REVIEW    Payor: Eligio Montoya #:  [id number]  Authorization Number: [id number]    Admit date: 2/19/21  Admit time: Rocco Kamara 23    Admitting Physician: Eric Ordaz MD  Attending Physician: Stable off of pressors and IVFs   Pulmonary:  COPD- stable on RA, cont home meds  Renal:  IVFs, monitor BUN/Cr.  Dc IVFs today   GI:  PO as tolerated  Atonic Constipation - increase bowel regimen today   Heme/ID:  Afebrile,  leukocytosis resolved  Endocrine (FOLVITE) tab 1 mg     Date Action Dose Route User    2/22/2021 9953 Given 1 mg Oral Leslie Parham RN      HYDROcodone-acetaminophen [facility]) 5-325 MG per tab 2 tablet     Date Action Dose Route User    2/22/2021 2771 Given 2 tablet Oral Leslie Parham,

## 2021-02-22 NOTE — PROGRESS NOTES
Subgaleal drain removed. Catheter tip intact. Site approximated with staple. Antibiotic ointment placed.  Okay to leave DANIE

## 2021-02-22 NOTE — PHYSICAL THERAPY NOTE
PHYSICAL THERAPY TREATMENT NOTE - INPATIENT    Room Number: 5129/3331-T     Session: 1   Number of Visits to Meet Established Goals: 5    Presenting Problem: s/p R ICA aneurysm clipping 2/19     History related to current admission: Admitted for elective pain)  Location: headache  Management Techniques:  Activity promotion;Repositioning    BALANCE                                                                                                                     Static Sitting: Good  Dynamic Sitting: Good independence. Modified Stephenson Balance Test=24/28  Fall Risk: no      1. Sitting to standing                                                                   4  2. Standing unsupported with eyes closed                                 4  3.  Reaching for Impairment: Cannot change speeds, or loses balance and has to reach for wall or be caught.     3. Gait with horizontal head turns: 1  (3)  Normal: Performs head turns smoothly with no change in gait  (2)  Mild Impairment: Performs head turns smoothly with s Treatment Plan: Bed mobility; Coordination; Endurance; Energy conservation;Patient education; Family education;Gait training;Neuromuscular re-educate;Strengthening;Stair training;Transfer training;Balance training  Rehab Potential : Good  Frequency (Obs): 5x/w

## 2021-02-23 VITALS
DIASTOLIC BLOOD PRESSURE: 90 MMHG | WEIGHT: 136 LBS | SYSTOLIC BLOOD PRESSURE: 132 MMHG | OXYGEN SATURATION: 100 % | HEIGHT: 62 IN | TEMPERATURE: 98 F | HEART RATE: 86 BPM | BODY MASS INDEX: 25.03 KG/M2 | RESPIRATION RATE: 16 BRPM

## 2021-02-23 PROCEDURE — 99232 SBSQ HOSP IP/OBS MODERATE 35: CPT | Performed by: INTERNAL MEDICINE

## 2021-02-23 RX ORDER — LEVETIRACETAM 500 MG/1
500 TABLET ORAL 2 TIMES DAILY
Qty: 8 TABLET | Refills: 0 | Status: SHIPPED | OUTPATIENT
Start: 2021-02-23 | End: 2021-02-27

## 2021-02-23 RX ORDER — METHIMAZOLE 10 MG/1
10 TABLET ORAL DAILY
Qty: 30 TABLET | Refills: 0 | Status: SHIPPED | COMMUNITY
Start: 2021-02-24 | End: 2021-03-31

## 2021-02-23 RX ORDER — POLYETHYLENE GLYCOL 3350 17 G/17G
17 POWDER, FOR SOLUTION ORAL DAILY
Status: DISCONTINUED | OUTPATIENT
Start: 2021-02-23 | End: 2021-02-23

## 2021-02-23 RX ORDER — HYDROCODONE BITARTRATE AND ACETAMINOPHEN 5; 325 MG/1; MG/1
1-2 TABLET ORAL EVERY 4 HOURS PRN
Qty: 40 TABLET | Refills: 0 | Status: SHIPPED | OUTPATIENT
Start: 2021-02-23 | End: 2021-07-06 | Stop reason: ALTCHOICE

## 2021-02-23 RX ORDER — BISACODYL 10 MG
10 SUPPOSITORY, RECTAL RECTAL
Status: DISCONTINUED | OUTPATIENT
Start: 2021-02-23 | End: 2021-02-23

## 2021-02-23 NOTE — PLAN OF CARE
Patient discharge home with family member present. Follow up appts, meds and precautions reveiwed verbally and written on dc paperwork. RXs reviewed for . PT RX given and pt verbalized instructions with PT. IVs removed without incident.   Home randa

## 2021-02-23 NOTE — PLAN OF CARE
Patient alert and oriented, still with mild LUE weakness. Surgical site open to air, clean, dry, and intact. Tolerating PO well, voiding without difficulty, started on stool softener since no BM yet. Ambulating in bullard without difficulty, gait steady.  Pain

## 2021-02-23 NOTE — PROGRESS NOTES
[facility] HOSPITAL[facility]  Progress Note     Skyler Galindo Patient Status:  Inpatient    1965 MRN [id number]   [facility] 6NE-A Attending Dwight Goff MD   Hosp Day # 4 PCP Korey Hernandez MD     Reason for consult: med management     Req Fluticasone Furoate-Vilanterol  1 puff Inhalation Daily   • Montelukast Sodium  10 mg Oral QPM   • nicotine  1 patch Transdermal Q24H       ASSESSMENT / PLAN:     1. 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  CTA with moderate spasms of R MCA and shelton off today, drain removed 2/22, BP's and neurochecks remain stable.  Okay for discharge today from [facility], DO

## 2021-02-23 NOTE — PROGRESS NOTES
[facility]  Neurosurgery Progress Note    Anais Garcia Patient Status:  Inpatient    1965 MRN [id number]   [facility] 6NE-A Attending Jeffrey Camacho MD   Hosp Day # 4 PCP Julissa Cohen MD     Chief Complaint:  R ICA/MCA aneur

## 2021-02-23 NOTE — PROGRESS NOTES
Resumed care at 0730. Cooperative. C/o headache controlled with norco.  Ambulating hallway with standby assist.  Right facial swelling improving. Appetite poor but patient states related to selection and options. Eating food brought by daughter.   Mundo Nichols

## 2021-02-23 NOTE — DISCHARGE SUMMARY
[facility]  Discharge Summary    Orquidea Reyes Patient Status:  Inpatient    1965 MRN [id number]   [facility] 6NE-A Attending Shai Benitez MD   Hosp Day # 4 PCP Michell Massey MD     Date of Admission: 2021    Date of Jah Phelps procedure as planned. Her surgery was noted to be without complication. She was admitted to the CNICU post op with a subgaleal drain.   She was noted to have left sided weakness and right gaze deviation and so a CTA was obtained which showed possible vaso Inhalation Aerosol  Inhale 1 puff into the lungs 2 (two) times a day. Qty: 13 g Refills: 5    Montelukast Sodium 10 MG Oral Tab  Take 1 tablet (10 mg total) by mouth every evening.   Qty: 30 tablet Refills: 0  Associated Diagnoses:COPD with acute exacerbat immobility  Wound Care: Ok to shower, do not scrub incision. Call with questions or concerns regarding wound    Other Discharge Instructions:  F/u as scheduled.   Call or go to ED should you develop fever, shortness of breath, chest pain, or leg swelling

## 2021-02-24 ENCOUNTER — PATIENT OUTREACH (OUTPATIENT)
Dept: CASE MANAGEMENT | Age: 56
End: 2021-02-24

## 2021-02-24 ENCOUNTER — TELEPHONE (OUTPATIENT)
Dept: SURGERY | Facility: CLINIC | Age: 56
End: 2021-02-24

## 2021-02-24 DIAGNOSIS — Z02.9 ENCOUNTERS FOR ADMINISTRATIVE PURPOSE: ICD-10-CM

## 2021-02-24 NOTE — TELEPHONE ENCOUNTER
Spoke with patient's daughter, currently resting, neurologically doing well.  Will add ibuprofen for pain and keep us updated

## 2021-02-24 NOTE — PAYOR COMM NOTE
--------------  DISCHARGE REVIEW    Payor: Eligio Montoya #:  [id number]  Authorization Number: [id number]    Admit date: 2/19/21  Admit time:  9319  Discharge Date: 2/23/2021  1:49 PM     Admitting Physician: Remy Ledesma Consult to Hospitalist      Consult to Respiratory Care      Consult to Neuro Critical Care      Reason for Admission: R ICA terminus aneurysm, R MCA aneurysm    Procedures: Procedure(s):  right craniotomy for clipping of Internal carotid artery terminus 0      CONTINUE these medications which have CHANGED    methimazole 10 MG Oral Tab  Take 1.25 tablets (12.5 mg total) by mouth daily.   Qty: 30 tablet Refills: 0      CONTINUE these medications which have NOT CHANGED    divalproex Sodium 250 MG Oral Tab EC Em)        May 10, 2021 10:45 AM CDT Follow Up Visit with Petar Diaz MD Endocrinology - [facility] - [address] [place], [place] [address])    For the safety of our patients, visitors and care team, we are asking patients not to bring anyone

## 2021-02-24 NOTE — TELEPHONE ENCOUNTER
Patient underwent procedure with Dr. 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     -How is patient feeling i

## 2021-02-24 NOTE — PROGRESS NOTES
Left message on mailbox for patient to call NCM back for HFU/condition update. NCM contact information included in message.

## 2021-02-25 ENCOUNTER — ORDER TRANSCRIPTION (OUTPATIENT)
Dept: PHYSICAL THERAPY | Facility: HOSPITAL | Age: 56
End: 2021-02-25

## 2021-02-25 DIAGNOSIS — I67.1 CEREBRAL ANEURYSM WITHOUT RUPTURE: ICD-10-CM

## 2021-02-25 DIAGNOSIS — I67.1 INTRACRANIAL ANEURYSM: Primary | ICD-10-CM

## 2021-02-25 NOTE — PROGRESS NOTES
Initial Post Discharge Follow Up   Discharge Date: 2/23/21  Contact Date: 2/24/2021    Consent Verification:  Assessment Completed With: Patient  HIPAA Verified?   Yes    Discharge Dx:   Cerebral aneurysm without rupture    Was TCC ordered: no          G daily.     • Selenium 100 MCG Oral Tab Take 100 mcg by mouth 2 (two) times daily. • Ergocalciferol (VITAMIN D2 OR) Take by mouth.      • FOLIC ACID 1 MG Oral Tab TAKE 1 TABLET(1 MG) BY MOUTH DAILY 30 tablet 2   • Albuterol Sulfate  (90 Base) MCG/ Date & Time Appointment Department [facility])    Mar 05, 2021 10:00 AM CST Post Op Visit with Mynor Thomas, [address] [place] [address] ([facility])        Apr 14, 2021  1:00 PM CDT Post Op Visit with Sylvester Ramos outpatient medications with patient,  and orders reviewed and discussed. Any changes or updates to medications and or orders sent to PCP.

## 2021-03-02 ENCOUNTER — OFFICE VISIT (OUTPATIENT)
Dept: PHYSICAL THERAPY | Age: 56
End: 2021-03-02
Attending: NEUROLOGICAL SURGERY
Payer: MEDICAID

## 2021-03-02 DIAGNOSIS — I67.1 CEREBRAL ANEURYSM WITHOUT RUPTURE: ICD-10-CM

## 2021-03-02 DIAGNOSIS — I67.1 INTRACRANIAL ANEURYSM: ICD-10-CM

## 2021-03-02 PROCEDURE — 97162 PT EVAL MOD COMPLEX 30 MIN: CPT

## 2021-03-02 PROCEDURE — 97110 THERAPEUTIC EXERCISES: CPT

## 2021-03-02 NOTE — PROGRESS NOTES
NEUROLOGICAL EVALUATION:   Referring Physician: Dr. Mira Dent  Diagnosis: intracranial aneurysm/R lumbar radiculopathy  Date of Service: 3/2/2021     PATIENT SUMMARY   Silvia Crisostomo is a 48year old female who presents to therapy today following an ope exercises, and cares for 11 y/o daughter. Pt goals include alleviate pain and increase flexibility and strength so she can climb stairs, don/doff socks and shoes, and return to work. Past medical history was reviewed with Clemencia.  Significant findings inclu -    Accessory Motion: WNL L1-L5 mobility but pain at L5   Palpation: Increased tone in R lumbar paraspinals, gluteus, piriformis muscles    RICK UE AROM and Strength WNL   Hip AROM MMT (-/5)     Flexion WNL 4   Knee AROM MMT (-/5)     Flexion WNL 4     Ext weeks.  · Pt will improve SLS to >15s RICK to reduce risk of falls while ascending/descending stairs.   · Pt will be independent and compliant with comprehensive HEP to maintain progress achieved in PT    Frequency / Duration: Patient will be seen for 2 x/we

## 2021-03-03 ENCOUNTER — OFFICE VISIT (OUTPATIENT)
Dept: FAMILY MEDICINE CLINIC | Facility: CLINIC | Age: 56
End: 2021-03-03
Payer: MEDICAID

## 2021-03-03 VITALS
OXYGEN SATURATION: 99 % | HEIGHT: 62 IN | DIASTOLIC BLOOD PRESSURE: 60 MMHG | HEART RATE: 70 BPM | TEMPERATURE: 97 F | WEIGHT: 127 LBS | SYSTOLIC BLOOD PRESSURE: 120 MMHG | RESPIRATION RATE: 16 BRPM | BODY MASS INDEX: 23.37 KG/M2

## 2021-03-03 DIAGNOSIS — Z98.890 S/P CEREBRAL ANEURYSM REPAIR: ICD-10-CM

## 2021-03-03 DIAGNOSIS — Z86.79 S/P CEREBRAL ANEURYSM REPAIR: ICD-10-CM

## 2021-03-03 DIAGNOSIS — E05.90 HYPERTHYROIDISM: ICD-10-CM

## 2021-03-03 DIAGNOSIS — I67.1 CEREBRAL ANEURYSM WITHOUT RUPTURE: Primary | ICD-10-CM

## 2021-03-03 PROCEDURE — 3074F SYST BP LT 130 MM HG: CPT | Performed by: FAMILY MEDICINE

## 2021-03-03 PROCEDURE — 3008F BODY MASS INDEX DOCD: CPT | Performed by: FAMILY MEDICINE

## 2021-03-03 PROCEDURE — 99214 OFFICE O/P EST MOD 30 MIN: CPT | Performed by: FAMILY MEDICINE

## 2021-03-03 PROCEDURE — 3078F DIAST BP <80 MM HG: CPT | Performed by: FAMILY MEDICINE

## 2021-03-03 NOTE — PROGRESS NOTES
HPI:    Sam Sanchez is a 48year old female here today for hospital follow up.    She was discharged from Inpatient hospital, [facility] to Home   Admit Date: 2/19/21  Discharge Date: 2/23/21  Hospital Discharge Diagnosis:    Cerebral aneurysm w daily.  ZINC GLUCONATE OR, Take by mouth daily. Selenium 100 MCG Oral Tab, Take 100 mcg by mouth 2 (two) times daily.   FOLIC ACID 1 MG Oral Tab, TAKE 1 TABLET(1 MG) BY MOUTH DAILY  Albuterol Sulfate  (90 Base) MCG/ACT Inhalation Aero Soln, Inhale 2 palpitations  GI: denies abdominal pain, denies heartburn, denies diarrhea  MUSCULOSKELETAL: denies pain, normal range of motion of extremities  NEURO: denies headaches, denies dizziness, denies weakness  PSYCHE: denies depression or anxiety  HEMATOLOGIC: Transitional Care Management Certification:  I certify that the following are true:  Communication with the patient was made within 2 business days of discharge on date above   Medical Decision Making- Based on service period of discharge to 30 days:

## 2021-03-04 ENCOUNTER — OFFICE VISIT (OUTPATIENT)
Dept: PHYSICAL THERAPY | Age: 56
End: 2021-03-04
Attending: NEUROLOGICAL SURGERY
Payer: MEDICAID

## 2021-03-04 DIAGNOSIS — I67.1 INTRACRANIAL ANEURYSM: ICD-10-CM

## 2021-03-04 DIAGNOSIS — I67.1 CEREBRAL ANEURYSM WITHOUT RUPTURE: ICD-10-CM

## 2021-03-04 PROCEDURE — 97112 NEUROMUSCULAR REEDUCATION: CPT

## 2021-03-04 PROCEDURE — 97110 THERAPEUTIC EXERCISES: CPT

## 2021-03-04 NOTE — PROGRESS NOTES
Dx: intracranial aneurysm, R lumbar radiculopathy         Insurance (Authorized # of Visits):  [facility] (8)           Authorizing Physician: Dr. Sydney Peres MD visit: none scheduled  Fall Risk: standard         Precautions: n/a             Subjective: Repo holds  Supine bent knee fall-out x10 (good)  Supine marches x20 (good)  Quadruped alt LE ext 2x10 (fair, kleenex box)                     HEP: piriformis stretch, sciatic nerve glides, standing back extension, prone on elbows, bridges, quadruped LE ext, TA

## 2021-03-05 ENCOUNTER — OFFICE VISIT (OUTPATIENT)
Dept: SURGERY | Facility: CLINIC | Age: 56
End: 2021-03-05
Payer: MEDICAID

## 2021-03-05 VITALS
HEART RATE: 64 BPM | SYSTOLIC BLOOD PRESSURE: 98 MMHG | BODY MASS INDEX: 23.37 KG/M2 | HEIGHT: 62 IN | DIASTOLIC BLOOD PRESSURE: 66 MMHG | WEIGHT: 127 LBS

## 2021-03-05 DIAGNOSIS — Z86.79 S/P CLAMPING OF CEREBRAL ANEURYSM: ICD-10-CM

## 2021-03-05 DIAGNOSIS — I67.1 CEREBRAL ANEURYSM WITHOUT RUPTURE: Primary | ICD-10-CM

## 2021-03-05 DIAGNOSIS — Z98.890 S/P CLAMPING OF CEREBRAL ANEURYSM: ICD-10-CM

## 2021-03-05 PROCEDURE — 3074F SYST BP LT 130 MM HG: CPT | Performed by: PHYSICIAN ASSISTANT

## 2021-03-05 PROCEDURE — 3078F DIAST BP <80 MM HG: CPT | Performed by: PHYSICIAN ASSISTANT

## 2021-03-05 PROCEDURE — 3008F BODY MASS INDEX DOCD: CPT | Performed by: PHYSICIAN ASSISTANT

## 2021-03-05 PROCEDURE — 99024 POSTOP FOLLOW-UP VISIT: CPT | Performed by: PHYSICIAN ASSISTANT

## 2021-03-05 NOTE — PROGRESS NOTES
Patient here for 2-week postop follow-up sx 02/19/21. Has headaches daily lasting for a few hours. Rates 6/10. Notes not as bad as prior procedure. New right leg pain since after surgery. Feels like pinched nerve, like sciatic pain. Rates pain 8/10.

## 2021-03-05 NOTE — PROGRESS NOTES
Neurosurgery Clinic Visit  3/5/2021    Ignacio Cedillo PCP:  Sangeeta Wilder MD    1965 MRN [id number]       CC:  S/P R Craniotomy for aneurysm clip x 2 21 Dr. Zenia Garibay and Dr. Yola Shea    HPI:    Chuck Culver is here for 2 week post op appt.   She is do       Spouse name: Not on file      Number of children: Not on file      Years of education: Not on file      Highest education level: Not on file    Tobacco Use      Smoking status: Current Some Day Smoker        Packs/day: 0.00        Years: 36. 0 5 5 5 5 5     A/P:    1. Cerebral aneurysm without rupture    2. S/P clamping of cerebral aneurysm      Clemencia is recovering well from surgery. OK to drive short distances if not taking pain medications. Continue PT.   Monitor lumbar radiculopathy, if not

## 2021-03-09 ENCOUNTER — TELEPHONE (OUTPATIENT)
Dept: PHYSICAL THERAPY | Facility: HOSPITAL | Age: 56
End: 2021-03-09

## 2021-03-09 ENCOUNTER — APPOINTMENT (OUTPATIENT)
Dept: PHYSICAL THERAPY | Age: 56
End: 2021-03-09
Attending: NEUROLOGICAL SURGERY
Payer: MEDICAID

## 2021-03-16 ENCOUNTER — OFFICE VISIT (OUTPATIENT)
Dept: PHYSICAL THERAPY | Age: 56
End: 2021-03-16
Attending: NEUROLOGICAL SURGERY
Payer: MEDICAID

## 2021-03-16 DIAGNOSIS — I67.1 INTRACRANIAL ANEURYSM: ICD-10-CM

## 2021-03-16 DIAGNOSIS — I67.1 CEREBRAL ANEURYSM WITHOUT RUPTURE: ICD-10-CM

## 2021-03-16 PROCEDURE — 97140 MANUAL THERAPY 1/> REGIONS: CPT

## 2021-03-16 PROCEDURE — 97112 NEUROMUSCULAR REEDUCATION: CPT

## 2021-03-16 PROCEDURE — 97110 THERAPEUTIC EXERCISES: CPT

## 2021-03-16 NOTE — PROGRESS NOTES
Dx: intracranial aneurysm, R lumbar radiculopathy         Insurance (Authorized # of Visits):  [address] (8)           Authorizing Physician: Dr. Nikunj Gilliam  Next MD visit: none scheduled  Fall Risk: standard         Precautions: n/a             Subjective: Nithin Loeraely stairs.   · Pt will be independent and compliant with comprehensive HEP to maintain progress achieved in PT    Plan: Progress balance (SLS, bosu) and core stability (SB)  Date: 3/4/2021  TX#: 2/8 Date: 3/16/2021           TX#: 3/8 Date:                 TX#:

## 2021-03-18 ENCOUNTER — APPOINTMENT (OUTPATIENT)
Dept: PHYSICAL THERAPY | Age: 56
End: 2021-03-18
Attending: NEUROLOGICAL SURGERY
Payer: MEDICAID

## 2021-03-18 ENCOUNTER — TELEPHONE (OUTPATIENT)
Dept: PHYSICAL THERAPY | Age: 56
End: 2021-03-18

## 2021-03-18 NOTE — TELEPHONE ENCOUNTER
No show, called to follow-up. Pt reports not feeling well after last session which was made worse after working yesterday. Confirmed next session for next week.

## 2021-03-22 ENCOUNTER — TELEPHONE (OUTPATIENT)
Dept: PHYSICAL THERAPY | Facility: HOSPITAL | Age: 56
End: 2021-03-22

## 2021-03-22 ENCOUNTER — APPOINTMENT (OUTPATIENT)
Dept: PHYSICAL THERAPY | Age: 56
End: 2021-03-22
Attending: NEUROLOGICAL SURGERY
Payer: MEDICAID

## 2021-03-23 ENCOUNTER — PATIENT MESSAGE (OUTPATIENT)
Dept: FAMILY MEDICINE CLINIC | Facility: CLINIC | Age: 56
End: 2021-03-23

## 2021-03-24 ENCOUNTER — TELEPHONE (OUTPATIENT)
Dept: PHYSICAL THERAPY | Age: 56
End: 2021-03-24

## 2021-03-24 ENCOUNTER — TELEMEDICINE (OUTPATIENT)
Dept: FAMILY MEDICINE CLINIC | Facility: CLINIC | Age: 56
End: 2021-03-24

## 2021-03-24 ENCOUNTER — APPOINTMENT (OUTPATIENT)
Dept: PHYSICAL THERAPY | Age: 56
End: 2021-03-24
Attending: NEUROLOGICAL SURGERY
Payer: MEDICAID

## 2021-03-24 DIAGNOSIS — I67.1 CEREBRAL ANEURYSM WITHOUT RUPTURE: ICD-10-CM

## 2021-03-24 DIAGNOSIS — R53.83 FATIGUE, UNSPECIFIED TYPE: Primary | ICD-10-CM

## 2021-03-24 PROCEDURE — 99213 OFFICE O/P EST LOW 20 MIN: CPT | Performed by: NURSE PRACTITIONER

## 2021-03-24 NOTE — TELEPHONE ENCOUNTER
From: Sam Sanchez  To: Bryon Helms MD  Sent: 3/23/2021 4:18 PM CDT  Subject: Non-Urgent Medical Question    Good afternoon Dr. Shanelle Moreno I am experiencing extreme fatigue. I don't know if this is normal but it seems to be worse.  Is there a vitamin or s

## 2021-03-24 NOTE — PROGRESS NOTES
Telehealth outside of [address] Verbal Consent   I conducted a telehealth visit with Calvin [place] today, 03/24/21, which was completed using two-way, real-time interactive  video communication.  This has been done in good tate to provide co Sig Dispense Refill   • HYDROcodone-acetaminophen 5-325 MG Oral Tab Take 1-2 tablets by mouth every 4 (four) hours as needed. 40 tablet 0   • methimazole 10 MG Oral Tab Take 10 mg by mouth daily.   30 tablet 0   • divalproex Sodium 250 MG Oral Tab EC DR tab EXCISION N/A 2/11/2019    Performed by Zaynab Lopez MD at [facility] MAIN OR   • OTHER      Thyroid polyp removal   • TUBAL LIGATION     • UPPER GI ENDOSCOPY,EXAM        Family History   Problem Relation Age of Onset   • Cancer Father         colon cancer   • Future    Cerebral aneurysm without rupture  -     CT BRAIN OR HEAD (71844);  Future         Return to Office in 2-3 days

## 2021-03-24 NOTE — TELEPHONE ENCOUNTER
Ovidio Benitez did not show for her appointment today. Called to follow-up. She reports not feeling well and is waiting test results for COVID-19. Cancelled Monday's appointment in case test is positive.  Ovidio Benitez agreed to call when she has her test results to deter

## 2021-03-29 ENCOUNTER — APPOINTMENT (OUTPATIENT)
Dept: PHYSICAL THERAPY | Age: 56
End: 2021-03-29
Attending: NEUROLOGICAL SURGERY
Payer: MEDICAID

## 2021-03-29 DIAGNOSIS — E05.90 HYPERTHYROIDISM: Primary | ICD-10-CM

## 2021-03-31 ENCOUNTER — APPOINTMENT (OUTPATIENT)
Dept: PHYSICAL THERAPY | Age: 56
End: 2021-03-31
Attending: NEUROLOGICAL SURGERY
Payer: MEDICAID

## 2021-03-31 ENCOUNTER — TELEPHONE (OUTPATIENT)
Dept: PHYSICAL THERAPY | Age: 56
End: 2021-03-31

## 2021-03-31 RX ORDER — METHIMAZOLE 5 MG/1
5 TABLET ORAL 2 TIMES DAILY
Qty: 60 TABLET | Refills: 0 | Status: SHIPPED | OUTPATIENT
Start: 2021-03-31 | End: 2021-05-07

## 2021-03-31 NOTE — TELEPHONE ENCOUNTER
No show. Called Clemencia to follow-up/offer later times today. Chris Bolsandywendy is still waiting for her COVID-19 results and requested to cancel today and next week's appointments. She agreed to call with the results as soon as she is able.  If Chris Estevez doesn't call by

## 2021-04-05 ENCOUNTER — APPOINTMENT (OUTPATIENT)
Dept: PHYSICAL THERAPY | Age: 56
End: 2021-04-05
Attending: NEUROLOGICAL SURGERY
Payer: MEDICAID

## 2021-04-07 ENCOUNTER — APPOINTMENT (OUTPATIENT)
Dept: PHYSICAL THERAPY | Age: 56
End: 2021-04-07
Attending: NEUROLOGICAL SURGERY
Payer: MEDICAID

## 2021-04-12 ENCOUNTER — APPOINTMENT (OUTPATIENT)
Dept: PHYSICAL THERAPY | Age: 56
End: 2021-04-12
Attending: NEUROLOGICAL SURGERY
Payer: MEDICAID

## 2021-04-12 ENCOUNTER — TELEPHONE (OUTPATIENT)
Dept: PHYSICAL THERAPY | Age: 56
End: 2021-04-12

## 2021-04-12 DIAGNOSIS — Z23 NEED FOR VACCINATION: ICD-10-CM

## 2021-04-12 RX ORDER — FOLIC ACID 1 MG/1
TABLET ORAL
Qty: 30 TABLET | Refills: 2 | Status: SHIPPED | OUTPATIENT
Start: 2021-04-12 | End: 2021-07-08

## 2021-04-14 ENCOUNTER — TELEPHONE (OUTPATIENT)
Dept: FAMILY MEDICINE CLINIC | Facility: CLINIC | Age: 56
End: 2021-04-14

## 2021-04-14 ENCOUNTER — TELEPHONE (OUTPATIENT)
Dept: SURGERY | Facility: CLINIC | Age: 56
End: 2021-04-14

## 2021-04-14 NOTE — TELEPHONE ENCOUNTER
Pt canceled 8 wk post op appt scheduled for today with Dr Boris Marcos due to not feeling well. Pt r/s to 4.28.21. She states she still has one staple remaining and wants to know if iit's ok to wait an additional two weeks before it's removed.    Pls call pt t

## 2021-04-14 NOTE — TELEPHONE ENCOUNTER
Called patient regarding her cancellation today for 8 week post op with Dr Nikunj Gilliam. She had a craniotomy for aneurysm clipping on 2-19-21. She stated in previous note that she still has staples in.     She states for the past week and a half she has been ex

## 2021-04-14 NOTE — TELEPHONE ENCOUNTER
Due to documentation below patient should not drive due to symptoms and only person home was under 18 called non-emergency police department in [place] and asked for a wellness check on the patient. Informed dispatcher of situation.   They will send

## 2021-04-14 NOTE — TELEPHONE ENCOUNTER
Carlyn Lee from Dr[facility] office called. Patient was to have her 8 week post-op appointment today. Patient called and cancelled this appointment. Carlyn Lee reached out to patient for follow-up and patient was falling asleep while on the phone.   Patient also re

## 2021-04-15 NOTE — TELEPHONE ENCOUNTER
Called [place] [employer] to follow up on wellness check requested yesterday. Per Officer Xavier Harris they were able to reach patient by phone and patient stated she was alright and her daughter was home with her.   Due to COVID risk considering patifahad

## 2021-04-28 ENCOUNTER — OFFICE VISIT (OUTPATIENT)
Dept: SURGERY | Facility: CLINIC | Age: 56
End: 2021-04-28
Payer: MEDICAID

## 2021-04-28 VITALS
BODY MASS INDEX: 24.55 KG/M2 | HEIGHT: 61 IN | DIASTOLIC BLOOD PRESSURE: 62 MMHG | HEART RATE: 66 BPM | SYSTOLIC BLOOD PRESSURE: 94 MMHG | WEIGHT: 130 LBS

## 2021-04-28 DIAGNOSIS — Z98.890 S/P CRANIOTOMY: ICD-10-CM

## 2021-04-28 DIAGNOSIS — I67.1 CEREBRAL ANEURYSM WITHOUT RUPTURE: Primary | ICD-10-CM

## 2021-04-28 PROCEDURE — 3074F SYST BP LT 130 MM HG: CPT | Performed by: NEUROLOGICAL SURGERY

## 2021-04-28 PROCEDURE — 99024 POSTOP FOLLOW-UP VISIT: CPT | Performed by: NEUROLOGICAL SURGERY

## 2021-04-28 PROCEDURE — 3078F DIAST BP <80 MM HG: CPT | Performed by: NEUROLOGICAL SURGERY

## 2021-04-28 PROCEDURE — 3008F BODY MASS INDEX DOCD: CPT | Performed by: NEUROLOGICAL SURGERY

## 2021-04-28 RX ORDER — DICLOFENAC POTASSIUM 50 MG/1
50 TABLET, FILM COATED ORAL 2 TIMES DAILY
COMMUNITY
Start: 2021-04-13

## 2021-04-28 NOTE — H&P
[facility]  Neurosurgery Clinic Visit      HISTORY OF PRESENT ILLNESS:  Sam Sanchez is a(n) 48year old female here for follow-up after 2/29/21 craniotomy for clipping of ICA terminus and MCA aneurysms.  She is doing well, denies Pertinent positives and negatives are noted in HPI. PHYSICAL EXAMINATION:  VITAL SIGNS: BP 94/62   Pulse 66   Ht 61\"   Wt 130 lb (59 kg)   LMP 07/05/2011   BMI 24.56 kg/m²   GENERAL:  Patient is a 48year old female in no acute distress.   Jesus Bradley

## 2021-04-28 NOTE — PROGRESS NOTES
Patient here for 8-week postop follow-up, sx 02/19/21 for: CRANIOTOMY FOR ANEURYSM. States has one more staple needing removal. States went back to work last month. Had two fainting episodes, thinks may have overworked herself. Is taking next two weeks.

## 2021-05-15 ENCOUNTER — EKG ENCOUNTER (OUTPATIENT)
Dept: LAB | Age: 56
End: 2021-05-15
Attending: NURSE PRACTITIONER
Payer: MEDICAID

## 2021-05-15 ENCOUNTER — LAB ENCOUNTER (OUTPATIENT)
Dept: LAB | Age: 56
End: 2021-05-15
Attending: NURSE PRACTITIONER
Payer: MEDICAID

## 2021-05-15 ENCOUNTER — HOSPITAL ENCOUNTER (OUTPATIENT)
Dept: GENERAL RADIOLOGY | Age: 56
Discharge: HOME | End: 2021-05-15
Attending: NURSE PRACTITIONER
Payer: MEDICAID

## 2021-05-15 DIAGNOSIS — R53.83 FATIGUE, UNSPECIFIED TYPE: ICD-10-CM

## 2021-05-15 DIAGNOSIS — E05.90 HYPERTHYROIDISM: ICD-10-CM

## 2021-05-15 DIAGNOSIS — E05.00 THYROID EYE DISEASE: ICD-10-CM

## 2021-05-15 DIAGNOSIS — H05.243 CONSTANT EXOPHTHALMOS OF BOTH EYES: ICD-10-CM

## 2021-05-15 DIAGNOSIS — H53.2 DIPLOPIA: ICD-10-CM

## 2021-05-15 PROCEDURE — 80053 COMPREHEN METABOLIC PANEL: CPT

## 2021-05-15 PROCEDURE — 82728 ASSAY OF FERRITIN: CPT

## 2021-05-15 PROCEDURE — 84481 FREE ASSAY (FT-3): CPT

## 2021-05-15 PROCEDURE — 83550 IRON BINDING TEST: CPT

## 2021-05-15 PROCEDURE — 36415 COLL VENOUS BLD VENIPUNCTURE: CPT

## 2021-05-15 PROCEDURE — 84480 ASSAY TRIIODOTHYRONINE (T3): CPT

## 2021-05-15 PROCEDURE — 93005 ELECTROCARDIOGRAM TRACING: CPT

## 2021-05-15 PROCEDURE — 71046 X-RAY EXAM CHEST 2 VIEWS: CPT | Performed by: NURSE PRACTITIONER

## 2021-05-15 PROCEDURE — 85025 COMPLETE CBC W/AUTO DIFF WBC: CPT

## 2021-05-15 PROCEDURE — 82306 VITAMIN D 25 HYDROXY: CPT

## 2021-05-15 PROCEDURE — 93010 ELECTROCARDIOGRAM REPORT: CPT | Performed by: INTERNAL MEDICINE

## 2021-05-15 PROCEDURE — 82746 ASSAY OF FOLIC ACID SERUM: CPT

## 2021-05-15 PROCEDURE — 84439 ASSAY OF FREE THYROXINE: CPT

## 2021-05-15 PROCEDURE — 82607 VITAMIN B-12: CPT

## 2021-05-15 PROCEDURE — 83540 ASSAY OF IRON: CPT

## 2021-05-15 PROCEDURE — 84443 ASSAY THYROID STIM HORMONE: CPT

## 2021-05-17 ENCOUNTER — VIRTUAL PHONE E/M (OUTPATIENT)
Dept: FAMILY MEDICINE CLINIC | Facility: CLINIC | Age: 56
End: 2021-05-17
Payer: MEDICAID

## 2021-05-17 ENCOUNTER — TELEPHONE (OUTPATIENT)
Dept: FAMILY MEDICINE CLINIC | Facility: CLINIC | Age: 56
End: 2021-05-17

## 2021-05-17 DIAGNOSIS — R94.31 ABNORMAL EKG: ICD-10-CM

## 2021-05-17 DIAGNOSIS — J44.1 COPD EXACERBATION (HCC): Primary | ICD-10-CM

## 2021-05-17 DIAGNOSIS — E87.6 HYPOKALEMIA: Primary | ICD-10-CM

## 2021-05-17 PROCEDURE — 99212 OFFICE O/P EST SF 10 MIN: CPT | Performed by: NURSE PRACTITIONER

## 2021-05-17 RX ORDER — GUAIFENESIN 600 MG
1200 TABLET, EXTENDED RELEASE 12 HR ORAL 2 TIMES DAILY
Qty: 120 TABLET | Refills: 0 | Status: SHIPPED | OUTPATIENT
Start: 2021-05-17 | End: 2021-06-16

## 2021-05-17 RX ORDER — PREDNISONE 20 MG/1
TABLET ORAL
Qty: 9 TABLET | Refills: 0 | Status: SHIPPED | OUTPATIENT
Start: 2021-05-17 | End: 2021-07-06 | Stop reason: ALTCHOICE

## 2021-05-17 NOTE — PROGRESS NOTES
Telephone Information:  Home Phone      [phone]  Mobile          [phone]  Patient informed of Dr. Prabhu Boyd result note. Patient verbalized understanding and agrees with plan.

## 2021-05-17 NOTE — TELEPHONE ENCOUNTER
----- Message from ARNIE Wood sent at 5/17/2021  7:27 AM CDT -----  Bronchitis  - continue albuterol inhaler , mucinex if  not getting better will sent  medications

## 2021-05-17 NOTE — TELEPHONE ENCOUNTER
----- Message from ARNIE Dunham sent at 5/17/2021  7:31 AM CDT -----  Vitamin D is low -sent new prescription for Vitamin D -take it once per week   Potassium is slightly low -take banana , orange juice -will repeat it in one week   Iron deficiency

## 2021-05-17 NOTE — TELEPHONE ENCOUNTER
Medication(s) to Refill:   Requested Prescriptions     Pending Prescriptions Disp Refills   • DIVALPROEX SODIUM 250 MG Oral Tab EC DR tab [Pharmacy Med Name: DIVALPROEX DELAYED RELEASE 250MG TB] 60 tablet 2     Sig: TAKE 1 TABLET(250 MG) BY MOUTH TWICE EMERITA

## 2021-05-17 NOTE — TELEPHONE ENCOUNTER
Patient inquiring about lab, CXR, and EKG results. Notified the patient of lab and CXR results from 5/17/2021. Patient verbalized understanding. Patient states that she already picked up the prescription of vitamin D. Will go buy bananas.  Has to use albute

## 2021-05-17 NOTE — PROGRESS NOTES
Virtual Telephone Check-In    Sapphire Schafer verbally consents to a Virtual/Telephone Check-In visit on 05/17/21. Patient has been referred to the [facility] website at [email or web address] to review the yearly Consent to Treat document.     Patient gary • ZINC GLUCONATE OR Take by mouth daily. • Selenium 100 MCG Oral Tab Take 100 mcg by mouth 2 (two) times daily.      • Albuterol Sulfate  (90 Base) MCG/ACT Inhalation Aero Soln Inhale 2 puffs into the lungs every 6 (six) hours as needed for [facility] vision  RESPIRATORY: shortness of breath  CARDIOVASCULAR: denies palpitations or orthopnea, no edema. GI: denies abdominal pain and denies heartburn  NEURO: denies headaches, abnormal sensation.   PSYCH: no panic attacks    EXAM:   LMP 07/05/2011   GEN: Pt

## 2021-05-18 RX ORDER — DIVALPROEX SODIUM 250 MG/1
TABLET, DELAYED RELEASE ORAL
Qty: 60 TABLET | Refills: 2 | Status: SHIPPED | OUTPATIENT
Start: 2021-05-18 | End: 2021-10-25

## 2021-05-20 DIAGNOSIS — J44.1 COPD WITH ACUTE EXACERBATION (HCC): ICD-10-CM

## 2021-05-20 RX ORDER — MONTELUKAST SODIUM 10 MG/1
10 TABLET ORAL EVERY EVENING
Qty: 90 TABLET | Refills: 0 | Status: SHIPPED | OUTPATIENT
Start: 2021-05-20 | End: 2021-08-07

## 2021-05-20 NOTE — TELEPHONE ENCOUNTER
Medication(s) to Refill:   Requested Prescriptions     Pending Prescriptions Disp Refills   • Montelukast Sodium 10 MG Oral Tab 90 tablet 0     Sig: Take 1 tablet (10 mg total) by mouth every evening.          Reason for Medication Refill being sent to [facility]

## 2021-05-22 ENCOUNTER — LAB ENCOUNTER (OUTPATIENT)
Dept: LAB | Age: 56
End: 2021-05-22
Attending: NURSE PRACTITIONER
Payer: MEDICAID

## 2021-05-22 DIAGNOSIS — J44.1 COPD EXACERBATION (HCC): ICD-10-CM

## 2021-05-25 ENCOUNTER — TELEPHONE (OUTPATIENT)
Dept: FAMILY MEDICINE CLINIC | Facility: CLINIC | Age: 56
End: 2021-05-25

## 2021-05-25 DIAGNOSIS — Z98.890 S/P CEREBRAL ANEURYSM REPAIR: ICD-10-CM

## 2021-05-25 DIAGNOSIS — R94.31 ABNORMAL EKG: Primary | ICD-10-CM

## 2021-05-25 DIAGNOSIS — I67.1 CEREBRAL ANEURYSM WITHOUT RUPTURE: ICD-10-CM

## 2021-05-25 DIAGNOSIS — Z86.79 S/P CEREBRAL ANEURYSM REPAIR: ICD-10-CM

## 2021-05-25 NOTE — TELEPHONE ENCOUNTER
Junior from [facility] called and stated patient had a echo stress test schedule for today. Junior stated patient was unable to do the test, patient was complaining of having SOB and stated her legs were tired.  Junior stated she decreased the speed but patient w

## 2021-05-26 NOTE — TELEPHONE ENCOUNTER
Notified the patient of the nuclear stress test. Patient verbalized understanding. Patient states that she will schedule the test once it is approved. Answered all questions at this time.

## 2021-05-30 ENCOUNTER — PATIENT MESSAGE (OUTPATIENT)
Dept: FAMILY MEDICINE CLINIC | Facility: CLINIC | Age: 56
End: 2021-05-30

## 2021-06-01 NOTE — TELEPHONE ENCOUNTER
From: Francesco López  To: Ekta Reyes MD  Sent: 5/30/2021 11:16 PM CDT  Subject: Non-Urgent Medical Question    Good evening Dr. Axel Skiff I was just wondering if anybody has put in the order for the nuclear stress test I called Central Scheduling and the

## 2021-06-08 DIAGNOSIS — E55.9 VITAMIN D DEFICIENCY: ICD-10-CM

## 2021-06-08 DIAGNOSIS — J44.1 COPD WITH ACUTE EXACERBATION (HCC): ICD-10-CM

## 2021-06-08 RX ORDER — CETIRIZINE HYDROCHLORIDE 10 MG/1
TABLET ORAL
Qty: 30 TABLET | Refills: 2 | Status: SHIPPED | OUTPATIENT
Start: 2021-06-08 | End: 2021-12-06

## 2021-06-08 RX ORDER — ERGOCALCIFEROL 1.25 MG/1
50000 CAPSULE ORAL WEEKLY
Qty: 4 CAPSULE | Refills: 3 | OUTPATIENT
Start: 2021-06-08

## 2021-06-09 ENCOUNTER — HOSPITAL ENCOUNTER (OUTPATIENT)
Dept: CV DIAGNOSTICS | Facility: HOSPITAL | Age: 56
Discharge: HOME | End: 2021-06-09
Attending: FAMILY MEDICINE
Payer: MEDICAID

## 2021-06-09 DIAGNOSIS — Z86.79 S/P CEREBRAL ANEURYSM REPAIR: ICD-10-CM

## 2021-06-09 DIAGNOSIS — Z98.890 S/P CEREBRAL ANEURYSM REPAIR: ICD-10-CM

## 2021-06-09 DIAGNOSIS — R94.31 ABNORMAL EKG: ICD-10-CM

## 2021-06-09 PROCEDURE — 78452 HT MUSCLE IMAGE SPECT MULT: CPT | Performed by: FAMILY MEDICINE

## 2021-06-09 PROCEDURE — 93018 CV STRESS TEST I&R ONLY: CPT | Performed by: FAMILY MEDICINE

## 2021-06-09 PROCEDURE — 93017 CV STRESS TEST TRACING ONLY: CPT | Performed by: FAMILY MEDICINE

## 2021-06-14 ENCOUNTER — TELEPHONE (OUTPATIENT)
Dept: FAMILY MEDICINE CLINIC | Facility: CLINIC | Age: 56
End: 2021-06-14

## 2021-06-14 DIAGNOSIS — Z12.11 ENCOUNTER FOR SCREENING COLONOSCOPY: Primary | ICD-10-CM

## 2021-06-14 NOTE — TELEPHONE ENCOUNTER
Notified the patient of the below stress test results. Patient verbalized understanding. Answered all questions at this time. Patient requesting a referral for colonoscopy. Last colonoscopy in 2018. Referral placed.  Provided contact information for

## 2021-06-14 NOTE — TELEPHONE ENCOUNTER
Pt states she completed her stress test on 6/9/21, no ones has called her with results. Pt looking for results.

## 2021-07-04 ENCOUNTER — MOBILE ENCOUNTER (OUTPATIENT)
Dept: FAMILY MEDICINE CLINIC | Facility: CLINIC | Age: 56
End: 2021-07-04

## 2021-07-04 RX ORDER — ALBUTEROL SULFATE 2.5 MG/3ML
2.5 SOLUTION RESPIRATORY (INHALATION) EVERY 4 HOURS PRN
Qty: 30 EACH | Refills: 3 | Status: SHIPPED | OUTPATIENT
Start: 2021-07-04

## 2021-07-05 ENCOUNTER — HOSPITAL ENCOUNTER (EMERGENCY)
Age: 56
Discharge: HOME | End: 2021-07-05
Attending: EMERGENCY MEDICINE
Payer: MEDICAID

## 2021-07-05 ENCOUNTER — APPOINTMENT (OUTPATIENT)
Dept: CT IMAGING | Age: 56
End: 2021-07-05
Attending: EMERGENCY MEDICINE
Payer: MEDICAID

## 2021-07-05 ENCOUNTER — APPOINTMENT (OUTPATIENT)
Dept: GENERAL RADIOLOGY | Age: 56
End: 2021-07-05
Attending: EMERGENCY MEDICINE
Payer: MEDICAID

## 2021-07-05 VITALS
OXYGEN SATURATION: 98 % | HEART RATE: 80 BPM | DIASTOLIC BLOOD PRESSURE: 82 MMHG | SYSTOLIC BLOOD PRESSURE: 126 MMHG | HEIGHT: 61 IN | WEIGHT: 120 LBS | BODY MASS INDEX: 22.66 KG/M2 | RESPIRATION RATE: 16 BRPM | TEMPERATURE: 98 F

## 2021-07-05 DIAGNOSIS — J44.1 COPD EXACERBATION (HCC): Primary | ICD-10-CM

## 2021-07-05 DIAGNOSIS — R51.9 NONINTRACTABLE EPISODIC HEADACHE, UNSPECIFIED HEADACHE TYPE: ICD-10-CM

## 2021-07-05 LAB
ANION GAP SERPL CALC-SCNC: 6 MMOL/L (ref 0–18)
BASOPHILS # BLD AUTO: 0.03 X10(3) UL (ref 0–0.2)
BASOPHILS NFR BLD AUTO: 0.3 %
BUN BLD-MCNC: 13 MG/DL (ref 7–18)
BUN/CREAT SERPL: 23.2 (ref 10–20)
CALCIUM BLD-MCNC: 8.9 MG/DL (ref 8.5–10.1)
CHLORIDE SERPL-SCNC: 108 MMOL/L (ref 98–112)
CO2 SERPL-SCNC: 26 MMOL/L (ref 21–32)
CREAT BLD-MCNC: 0.56 MG/DL
DEPRECATED RDW RBC AUTO: 52.3 FL (ref 35.1–46.3)
EOSINOPHIL # BLD AUTO: 0.27 X10(3) UL (ref 0–0.7)
EOSINOPHIL NFR BLD AUTO: 2.7 %
ERYTHROCYTE [DISTWIDTH] IN BLOOD BY AUTOMATED COUNT: 16 % (ref 11–15)
GLUCOSE BLD-MCNC: 78 MG/DL (ref 70–99)
HCT VFR BLD AUTO: 39.6 %
HGB BLD-MCNC: 13.3 G/DL
IMM GRANULOCYTES # BLD AUTO: 0.02 X10(3) UL (ref 0–1)
IMM GRANULOCYTES NFR BLD: 0.2 %
LYMPHOCYTES # BLD AUTO: 3.01 X10(3) UL (ref 1–4)
LYMPHOCYTES NFR BLD AUTO: 29.8 %
MCH RBC QN AUTO: 30 PG (ref 26–34)
MCHC RBC AUTO-ENTMCNC: 33.6 G/DL (ref 31–37)
MCV RBC AUTO: 89.4 FL
MONOCYTES # BLD AUTO: 0.74 X10(3) UL (ref 0.1–1)
MONOCYTES NFR BLD AUTO: 7.3 %
NEUTROPHILS # BLD AUTO: 6.02 X10 (3) UL (ref 1.5–7.7)
NEUTROPHILS # BLD AUTO: 6.02 X10(3) UL (ref 1.5–7.7)
NEUTROPHILS NFR BLD AUTO: 59.7 %
OSMOLALITY SERPL CALC.SUM OF ELEC: 289 MOSM/KG (ref 275–295)
PLATELET # BLD AUTO: 456 10(3)UL (ref 150–450)
POTASSIUM SERPL-SCNC: 3.8 MMOL/L (ref 3.5–5.1)
RBC # BLD AUTO: 4.43 X10(6)UL
SARS-COV-2 RNA RESP QL NAA+PROBE: NOT DETECTED
SODIUM SERPL-SCNC: 140 MMOL/L (ref 136–145)
WBC # BLD AUTO: 10.1 X10(3) UL (ref 4–11)

## 2021-07-05 PROCEDURE — 94640 AIRWAY INHALATION TREATMENT: CPT

## 2021-07-05 PROCEDURE — 70496 CT ANGIOGRAPHY HEAD: CPT | Performed by: EMERGENCY MEDICINE

## 2021-07-05 PROCEDURE — 96374 THER/PROPH/DIAG INJ IV PUSH: CPT

## 2021-07-05 PROCEDURE — 71045 X-RAY EXAM CHEST 1 VIEW: CPT | Performed by: EMERGENCY MEDICINE

## 2021-07-05 PROCEDURE — 99284 EMERGENCY DEPT VISIT MOD MDM: CPT

## 2021-07-05 PROCEDURE — 99285 EMERGENCY DEPT VISIT HI MDM: CPT

## 2021-07-05 PROCEDURE — 85025 COMPLETE CBC W/AUTO DIFF WBC: CPT | Performed by: EMERGENCY MEDICINE

## 2021-07-05 PROCEDURE — 80048 BASIC METABOLIC PNL TOTAL CA: CPT | Performed by: EMERGENCY MEDICINE

## 2021-07-05 RX ORDER — PREDNISONE 20 MG/1
40 TABLET ORAL DAILY
Qty: 10 TABLET | Refills: 0 | Status: SHIPPED | OUTPATIENT
Start: 2021-07-05 | End: 2021-07-10

## 2021-07-05 RX ORDER — IPRATROPIUM BROMIDE AND ALBUTEROL SULFATE 2.5; .5 MG/3ML; MG/3ML
3 SOLUTION RESPIRATORY (INHALATION) ONCE
Status: COMPLETED | OUTPATIENT
Start: 2021-07-05 | End: 2021-07-05

## 2021-07-05 RX ORDER — METHYLPREDNISOLONE SODIUM SUCCINATE 125 MG/2ML
125 INJECTION, POWDER, LYOPHILIZED, FOR SOLUTION INTRAMUSCULAR; INTRAVENOUS ONCE
Status: COMPLETED | OUTPATIENT
Start: 2021-07-05 | End: 2021-07-05

## 2021-07-05 RX ORDER — ALBUTEROL SULFATE 90 UG/1
2 AEROSOL, METERED RESPIRATORY (INHALATION) EVERY 4 HOURS PRN
Qty: 1 EACH | Refills: 0 | Status: SHIPPED | OUTPATIENT
Start: 2021-07-05 | End: 2021-08-04

## 2021-07-06 ENCOUNTER — TELEPHONE (OUTPATIENT)
Dept: FAMILY MEDICINE CLINIC | Facility: CLINIC | Age: 56
End: 2021-07-06

## 2021-07-06 ENCOUNTER — TELEMEDICINE (OUTPATIENT)
Dept: FAMILY MEDICINE CLINIC | Facility: CLINIC | Age: 56
End: 2021-07-06

## 2021-07-06 DIAGNOSIS — F41.9 ANXIETY: ICD-10-CM

## 2021-07-06 DIAGNOSIS — R53.83 FATIGUE, UNSPECIFIED TYPE: Primary | ICD-10-CM

## 2021-07-06 DIAGNOSIS — J44.9 CHRONIC OBSTRUCTIVE PULMONARY DISEASE, UNSPECIFIED COPD TYPE (HCC): ICD-10-CM

## 2021-07-06 PROCEDURE — 99213 OFFICE O/P EST LOW 20 MIN: CPT | Performed by: FAMILY MEDICINE

## 2021-07-06 RX ORDER — ESCITALOPRAM OXALATE 10 MG/1
10 TABLET ORAL DAILY
Qty: 30 TABLET | Refills: 0 | Status: SHIPPED | OUTPATIENT
Start: 2021-07-06 | End: 2021-08-03

## 2021-07-06 RX ORDER — BUDESONIDE, GLYCOPYRROLATE, AND FORMOTEROL FUMARATE 160; 9; 4.8 UG/1; UG/1; UG/1
2 AEROSOL, METERED RESPIRATORY (INHALATION) 2 TIMES DAILY
Qty: 1 EACH | Refills: 0 | Status: SHIPPED | COMMUNITY
Start: 2021-07-06

## 2021-07-06 RX ORDER — CLONAZEPAM 0.5 MG/1
0.25 TABLET ORAL DAILY PRN
Qty: 15 TABLET | Refills: 0 | Status: SHIPPED | OUTPATIENT
Start: 2021-07-06 | End: 2021-08-03

## 2021-07-06 NOTE — ED PROVIDER NOTES
Patient Seen in: [facility] Emergency Department In [place]      History   Patient presents with:  Difficulty Breathing    Stated Complaint: headache, ANTELMO     HPI/Subjective:   HPI    45-year-old female with history of COPD presents emergency with chief c Years: 40.00        Pack years: 0      Smokeless tobacco: Never Used      Tobacco comment: 2-3 cigarettes/week    Vaping Use      Vaping Use: Never used    Alcohol use: Not Currently      Comment: rare - 1 drink 3 times a year    Drug use: Not Currently Notable for the following components:    .0 (*)     RDW 16.0 (*)     RDW-SD 52.3 (*)     All other components within normal limits   RAPID SARS-COV-2 BY PCR - Normal   CBC WITH DIFFERENTIAL WITH PLATELET    Narrative:      The following orders were c puffs into the lungs every 4 (four) hours as needed for Wheezing. Qty: 1 each Refills: 0    !! - Potential duplicate medications found. Please discuss with provider.

## 2021-07-06 NOTE — PROGRESS NOTES
Telehealth outside of [address] Verbal Consent   I conducted a telehealth visit with Bárbara jacques, 07/06/21, which was completed using two-way, real-time interactive audio and video communication.  This has been done in good tate to wes adam bid   Went to ER yesterday for SOB   Started on prednisone burst     Has anxiety   Worse because of unstable job situation     Past Medical History:   Diagnosis Date   • Anxiety    • Arthritis    • Celiac disease    • COPD (chronic obstructive pulmona daily as needed for Anxiety. 15 tablet 0   • budeson-glycopyrrol-formoterol (BREZTRI AEROSPHERE) 160-9-4.8 MCG/ACT Inhalation Aerosol Inhale 2 puffs into the lungs 2 (two) times daily.  1 each 0   • FOLIC ACID 1 MG Oral Tab TAKE 1 TABLET(1 MG) BY MOUTH VALDO Psychiatric/Behavioral: The patient is nervous/anxious. EXAM:   LMP 07/05/2011  Estimated body mass index is 22.67 kg/m² as calculated from the following:    Height as of 7/5/21: 5' 1\" (1.549 m). Weight as of 7/5/21: 120 lb (54.4 kg).    Vital

## 2021-07-06 NOTE — ED INITIAL ASSESSMENT (HPI)
Pt has left sided headache that started yesterday.  Pt also c/o SOB on exertion, pt has hx of COPD and took 2 treatments today without improvement

## 2021-07-06 NOTE — PROGRESS NOTES
Pt requesting refills for albuterol nebulizer, reports her dyspnea has been worsening recently. No acute respiratory distress, no wheezing. Able to talk full sentences comfortably over the phone. No chest pain, new cough, no fevers.  We reviewed ER precauti

## 2021-07-06 NOTE — TELEPHONE ENCOUNTER
Received order from PCP to send patient MyChart message with contact information to pulmonologist from today's VV. MyChart message sent to patient.

## 2021-07-08 RX ORDER — FOLIC ACID 1 MG/1
TABLET ORAL
Qty: 30 TABLET | Refills: 2 | Status: SHIPPED | OUTPATIENT
Start: 2021-07-08 | End: 2021-10-07

## 2021-07-09 ENCOUNTER — TELEPHONE (OUTPATIENT)
Dept: SURGERY | Facility: CLINIC | Age: 56
End: 2021-07-09

## 2021-07-09 NOTE — TELEPHONE ENCOUNTER
LVM for patient to call office referred back to last office note by Dr. Tomasa Duran indicating that patient should have office visit with [facility] with possible Angiogram done in August.  When patient calls back please help her schedule an appointment with IR>

## 2021-07-09 NOTE — TELEPHONE ENCOUNTER
Per OV visit Dr Liam Briones wanted patient to see [facility] provider for eval/angiogram in August.  Pt needs apt. Please call.

## 2021-07-12 NOTE — TELEPHONE ENCOUNTER
No Identifier on phone  Asked patient to call office. When patient calls August appointment should be with IR for aneurysm per Dr. Grazyna Martínez last office note.

## 2021-07-19 ENCOUNTER — PATIENT MESSAGE (OUTPATIENT)
Dept: SURGERY | Facility: CLINIC | Age: 56
End: 2021-07-19

## 2021-07-19 ENCOUNTER — TELEPHONE (OUTPATIENT)
Dept: SURGERY | Facility: CLINIC | Age: 56
End: 2021-07-19

## 2021-07-19 NOTE — TELEPHONE ENCOUNTER
Dr. Jagdish Jeffery feedback noted. RESPACE message with information sent to patient. Routed to Dr. Omaira Roche to order imaging if needed.

## 2021-07-19 NOTE — TELEPHONE ENCOUNTER
Pt has appt with Dr. Roby Pardo on 8.12 and she wants to know if she needs to get angiogram prior to that. She also has 8.4 appt with Dr. Mahad Alexander and she wants to know if she needs to r/s that out further (after IR appt). Please call pt to advise.

## 2021-07-19 NOTE — TELEPHONE ENCOUNTER
She can keep her appointment and we can get the angiogram at any time around that period, I can review and call her

## 2021-07-19 NOTE — TELEPHONE ENCOUNTER
S:  PSR message noted with patient inquiry regarding timing of upcoming follow up appointments. B:  Per LOV with Dr. Simran Aguirre on 4/28/21:    \"ASSESSMENT:  49 yo F s/p craniotomy for clipping of R ICA terminus and MCA aneurysms.  Overall doing well, fati

## 2021-07-22 NOTE — TELEPHONE ENCOUNTER
Bettie message from patient noted and reply sent informing patient that no imaging has been ordered by [facility] providers.   ARNIE Pollard from [facility] has reviewed messages and informed Nursing that she will await any feedback from Dr. Bogdan Myers regarding patient

## 2021-07-22 NOTE — TELEPHONE ENCOUNTER
From: Orquidea Reyes  Sent: 7/22/2021 9:33 AM CDT  To: Shirley Gaytan 2 Nurse  Subject: RE: Feedback from provider    Jj Blank I was wondering if the doctor ordered that test so I can call and schedule it can you please advise.  Thank you    ----- Message --

## 2021-07-23 NOTE — TELEPHONE ENCOUNTER
Discussed with Dr. Priti Quarles as he does not feel she needs to see us in clinic (previously scheduled for 8/12). Please cancel office appointment. Dr. Jesús Jimenez will you please order diagnostic angio?

## 2021-07-29 NOTE — TELEPHONE ENCOUNTER
Noted PSR's message. Nothing further needed prior to patient's 8/4 appointment with Dr. Amanda Messina.

## 2021-08-03 RX ORDER — ESCITALOPRAM OXALATE 10 MG/1
TABLET ORAL
Qty: 30 TABLET | Refills: 0 | Status: SHIPPED | OUTPATIENT
Start: 2021-08-03 | End: 2021-09-07

## 2021-08-03 RX ORDER — CLONAZEPAM 0.5 MG/1
TABLET ORAL
Qty: 15 TABLET | Refills: 0 | Status: SHIPPED | OUTPATIENT
Start: 2021-08-03 | End: 2021-10-25

## 2021-08-04 ENCOUNTER — OFFICE VISIT (OUTPATIENT)
Dept: SURGERY | Facility: CLINIC | Age: 56
End: 2021-08-04
Payer: MEDICAID

## 2021-08-04 VITALS
BODY MASS INDEX: 22.66 KG/M2 | DIASTOLIC BLOOD PRESSURE: 78 MMHG | HEIGHT: 61 IN | SYSTOLIC BLOOD PRESSURE: 116 MMHG | WEIGHT: 120 LBS

## 2021-08-04 DIAGNOSIS — Z98.890 S/P CRANIOTOMY: ICD-10-CM

## 2021-08-04 DIAGNOSIS — I67.1 CEREBRAL ANEURYSM WITHOUT RUPTURE: Primary | ICD-10-CM

## 2021-08-04 PROCEDURE — 3074F SYST BP LT 130 MM HG: CPT | Performed by: NEUROLOGICAL SURGERY

## 2021-08-04 PROCEDURE — 3078F DIAST BP <80 MM HG: CPT | Performed by: NEUROLOGICAL SURGERY

## 2021-08-04 PROCEDURE — 99024 POSTOP FOLLOW-UP VISIT: CPT | Performed by: NEUROLOGICAL SURGERY

## 2021-08-04 PROCEDURE — 3008F BODY MASS INDEX DOCD: CPT | Performed by: NEUROLOGICAL SURGERY

## 2021-08-04 NOTE — PROGRESS NOTES
[facility]  Neurosurgery Clinic Visit      HISTORY OF PRESENT ILLNESS:  Chika Humphreys is a(n) 55year old female here for 6 month follow-up.  She is doing well, still has some fatigue but thinks this may be more due to long haul cov PHYSICAL EXAMINATION:  VITAL SIGNS: /78 (BP Location: Left arm, Patient Position: Sitting, Cuff Size: adult)   Ht 61\"   Wt 120 lb (54.4 kg)   LMP 07/05/2011   BMI 22.67 kg/m²   GENERAL:  Patient is a 55year old female in no acute distress.   HALEY

## 2021-08-07 DIAGNOSIS — J44.1 COPD WITH ACUTE EXACERBATION (HCC): ICD-10-CM

## 2021-08-07 RX ORDER — MONTELUKAST SODIUM 10 MG/1
10 TABLET ORAL EVERY EVENING
Qty: 90 TABLET | Refills: 1 | Status: SHIPPED | OUTPATIENT
Start: 2021-08-07 | End: 2022-02-25

## 2021-08-10 ENCOUNTER — PATIENT MESSAGE (OUTPATIENT)
Dept: FAMILY MEDICINE CLINIC | Facility: CLINIC | Age: 56
End: 2021-08-10

## 2021-08-10 ENCOUNTER — TELEPHONE (OUTPATIENT)
Dept: SURGERY | Facility: CLINIC | Age: 56
End: 2021-08-10

## 2021-08-10 NOTE — TELEPHONE ENCOUNTER
From: Chika Humphreys  To: Anay Pizarro MD  Sent: 8/10/2021 1:44 PM CDT  Subject: Other    Hi Dr. Mariaelena Clark I was just wondering if you think it's a good idea for me to get the Covid shot and if there's a particular shot I should be getting can you please a

## 2021-08-25 ENCOUNTER — TELEMEDICINE (OUTPATIENT)
Dept: FAMILY MEDICINE CLINIC | Facility: CLINIC | Age: 56
End: 2021-08-25

## 2021-08-25 DIAGNOSIS — R23.3 PETECHIAL RASH: Primary | ICD-10-CM

## 2021-08-25 DIAGNOSIS — E55.9 VITAMIN D DEFICIENCY: ICD-10-CM

## 2021-08-25 PROCEDURE — 99214 OFFICE O/P EST MOD 30 MIN: CPT | Performed by: FAMILY MEDICINE

## 2021-08-25 RX ORDER — ERGOCALCIFEROL 1.25 MG/1
CAPSULE ORAL
Qty: 4 CAPSULE | Refills: 3 | OUTPATIENT
Start: 2021-08-25

## 2021-08-25 NOTE — PROGRESS NOTES
Telehealth outside of [address] Verbal Consent   I conducted a telehealth visit with Nancy Panchal today, 08/25/21, which was completed using two-way, real-time interactive audio and video communication.  This has been done in good tate to wes COPD (chronic obstructive pulmonary disease) (HCC)    • Depression    • Disorder of thyroid    • Fibroids    • History of stomach ulcers    • Human papilloma virus infection    • Prediabetes     Diet control - no meds or blood surgar checking at home   • S budeson-glycopyrrol-formoterol (BREZTRI AEROSPHERE) 160-9-4.8 MCG/ACT Inhalation Aerosol Inhale 2 puffs into the lungs 2 (two) times daily.  1 each 0   • albuterol sulfate (2.5 MG/3ML) 0.083% Inhalation Nebu Soln Take 3 mL (2.5 mg total) by nebulization herlinda is not jaundiced or pale. Findings: Petechiae (scattered on legs) present. Neurological:      Mental Status: She is alert and oriented to person, place, and time.    Psychiatric:         Mood and Affect: Mood normal.         Behavior: Behavior normal

## 2021-09-07 NOTE — TELEPHONE ENCOUNTER
Medication(s) to Refill:   Requested Prescriptions     Pending Prescriptions Disp Refills   • escitalopram 10 MG Oral Tab 30 tablet 0     Sig: Take 1 tablet (10 mg total) by mouth daily.          Reason for Medication Refill being sent to Provider / Zabrina Lacks

## 2021-09-08 RX ORDER — ESCITALOPRAM OXALATE 10 MG/1
10 TABLET ORAL DAILY
Qty: 90 TABLET | Refills: 0 | Status: SHIPPED | OUTPATIENT
Start: 2021-09-08 | End: 2021-12-09

## 2021-09-13 ENCOUNTER — PATIENT MESSAGE (OUTPATIENT)
Dept: FAMILY MEDICINE CLINIC | Facility: CLINIC | Age: 56
End: 2021-09-13

## 2021-09-20 ENCOUNTER — HOSPITAL ENCOUNTER (INPATIENT)
Age: 56
LOS: 3 days | Discharge: PSYCHIATRIC OR SUBSTANCE USE FACILITY | DRG: 754 | End: 2021-09-24
Attending: EMERGENCY MEDICINE | Admitting: PSYCHIATRY & NEUROLOGY

## 2021-09-20 DIAGNOSIS — F19.90 SUBSTANCE USE DISORDER: ICD-10-CM

## 2021-09-20 DIAGNOSIS — R45.851 SUICIDAL IDEATION: ICD-10-CM

## 2021-09-20 DIAGNOSIS — F32.A DEPRESSION, UNSPECIFIED DEPRESSION TYPE: Primary | ICD-10-CM

## 2021-09-20 PROCEDURE — C9803 HOPD COVID-19 SPEC COLLECT: HCPCS

## 2021-09-20 PROCEDURE — 93005 ELECTROCARDIOGRAM TRACING: CPT | Performed by: EMERGENCY MEDICINE

## 2021-09-20 PROCEDURE — 36415 COLL VENOUS BLD VENIPUNCTURE: CPT

## 2021-09-20 PROCEDURE — 99284 EMERGENCY DEPT VISIT MOD MDM: CPT

## 2021-09-20 RX ORDER — SELENIUM 100 MCG
100 TABLET ORAL
COMMUNITY

## 2021-09-20 RX ORDER — BUDESONIDE, GLYCOPYRROLATE, AND FORMOTEROL FUMARATE 160; 9; 4.8 UG/1; UG/1; UG/1
2 AEROSOL, METERED RESPIRATORY (INHALATION) 3 TIMES DAILY
COMMUNITY
Start: 2021-07-06

## 2021-09-20 RX ORDER — ESCITALOPRAM OXALATE 10 MG/1
10 TABLET ORAL DAILY
Status: ON HOLD | COMMUNITY
Start: 2021-09-08 | End: 2021-09-23 | Stop reason: HOSPADM

## 2021-09-20 RX ORDER — HYDROXYZINE HYDROCHLORIDE 10 MG/1
10 TABLET, FILM COATED ORAL
Status: ON HOLD | COMMUNITY
End: 2021-09-23 | Stop reason: HOSPADM

## 2021-09-20 RX ORDER — CLONAZEPAM 0.5 MG/1
0.5 TABLET ORAL NIGHTLY PRN
Status: ON HOLD | COMMUNITY
Start: 2021-08-03 | End: 2021-09-23 | Stop reason: HOSPADM

## 2021-09-20 RX ORDER — MONTELUKAST SODIUM 10 MG/1
10 TABLET ORAL NIGHTLY
COMMUNITY
Start: 2021-08-07

## 2021-09-20 RX ORDER — DICLOFENAC POTASSIUM 50 MG/1
50 TABLET, FILM COATED ORAL 2 TIMES DAILY
Status: ON HOLD | COMMUNITY
Start: 2021-04-13 | End: 2021-09-24 | Stop reason: SDUPTHER

## 2021-09-20 RX ORDER — MOMETASONE FUROATE AND FORMOTEROL FUMARATE DIHYDRATE 200; 5 UG/1; UG/1
1 AEROSOL RESPIRATORY (INHALATION) 2 TIMES DAILY
COMMUNITY
Start: 2021-07-28

## 2021-09-20 RX ORDER — ALBUTEROL SULFATE 2.5 MG/3ML
2.5 SOLUTION RESPIRATORY (INHALATION)
COMMUNITY
Start: 2021-07-04

## 2021-09-20 RX ORDER — FOLIC ACID 1 MG/1
TABLET ORAL
COMMUNITY
Start: 2021-07-08

## 2021-09-20 RX ORDER — METHIMAZOLE 5 MG/1
5 TABLET ORAL DAILY
Status: ON HOLD | COMMUNITY
Start: 2021-09-01 | End: 2021-09-24 | Stop reason: SDUPTHER

## 2021-09-20 RX ORDER — ERGOCALCIFEROL 1.25 MG/1
50000 CAPSULE ORAL
COMMUNITY
Start: 2021-05-17

## 2021-09-20 RX ORDER — DIVALPROEX SODIUM 250 MG/1
TABLET, DELAYED RELEASE ORAL
Status: ON HOLD | COMMUNITY
Start: 2021-05-18 | End: 2021-09-23 | Stop reason: HOSPADM

## 2021-09-21 LAB
ALBUMIN SERPL-MCNC: 3 G/DL (ref 3.6–5.1)
ALBUMIN/GLOB SERPL: 0.7 {RATIO} (ref 1–2.4)
ALP SERPL-CCNC: 87 UNITS/L (ref 45–117)
ALT SERPL-CCNC: 22 UNITS/L
AMPHETAMINES UR QL SCN>500 NG/ML: NEGATIVE
ANION GAP SERPL CALC-SCNC: 8 MMOL/L (ref 10–20)
AST SERPL-CCNC: 13 UNITS/L
ATRIAL RATE (BPM): 60
BARBITURATES UR QL SCN>200 NG/ML: NEGATIVE
BASOPHILS # BLD: 0 K/MCL (ref 0–0.3)
BASOPHILS NFR BLD: 1 %
BENZODIAZ UR QL SCN>200 NG/ML: NEGATIVE
BILIRUB SERPL-MCNC: 0.2 MG/DL (ref 0.2–1)
BUN SERPL-MCNC: 22 MG/DL (ref 6–20)
BUN/CREAT SERPL: 25 (ref 7–25)
BZE UR QL SCN>150 NG/ML: POSITIVE
CALCIUM SERPL-MCNC: 8.7 MG/DL (ref 8.4–10.2)
CANNABINOIDS UR QL SCN>50 NG/ML: NEGATIVE
CHLORIDE SERPL-SCNC: 111 MMOL/L (ref 98–107)
CO2 SERPL-SCNC: 26 MMOL/L (ref 21–32)
CREAT SERPL-MCNC: 0.87 MG/DL (ref 0.51–0.95)
DEPRECATED RDW RBC: 51.5 FL (ref 39–50)
EOSINOPHIL # BLD: 0.3 K/MCL (ref 0–0.5)
EOSINOPHIL NFR BLD: 3 %
ERYTHROCYTE [DISTWIDTH] IN BLOOD: 15 % (ref 11–15)
ETHANOL SERPL-MCNC: NORMAL MG/DL
FASTING DURATION TIME PATIENT: ABNORMAL H
GFR SERPLBLD BASED ON 1.73 SQ M-ARVRAT: 75 ML/MIN
GLOBULIN SER-MCNC: 4.1 G/DL (ref 2–4)
GLUCOSE SERPL-MCNC: 94 MG/DL (ref 65–99)
HBA1C MFR BLD: 5.6 % (ref 4.5–5.6)
HCT VFR BLD CALC: 35.1 % (ref 36–46.5)
HGB BLD-MCNC: 11.6 G/DL (ref 12–15.5)
IMM GRANULOCYTES # BLD AUTO: 0 K/MCL (ref 0–0.2)
IMM GRANULOCYTES # BLD: 0 %
LYMPHOCYTES # BLD: 3.8 K/MCL (ref 1–4)
LYMPHOCYTES NFR BLD: 46 %
MCH RBC QN AUTO: 30.7 PG (ref 26–34)
MCHC RBC AUTO-ENTMCNC: 33 G/DL (ref 32–36.5)
MCV RBC AUTO: 92.9 FL (ref 78–100)
MONOCYTES # BLD: 0.5 K/MCL (ref 0.3–0.9)
MONOCYTES NFR BLD: 6 %
NEUTROPHILS # BLD: 3.6 K/MCL (ref 1.8–7.7)
NEUTROPHILS NFR BLD: 44 %
NRBC BLD MANUAL-RTO: 0 /100 WBC
OPIATES UR QL SCN>300 NG/ML: NEGATIVE
P AXIS (DEGREES): 46
PCP UR QL SCN>25 NG/ML: NEGATIVE
PLATELET # BLD AUTO: 392 K/MCL (ref 140–450)
POTASSIUM SERPL-SCNC: 3.4 MMOL/L (ref 3.4–5.1)
PR-INTERVAL (MSEC): 128
PROT SERPL-MCNC: 7.1 G/DL (ref 6.4–8.2)
QRS-INTERVAL (MSEC): 80
QT-INTERVAL (MSEC): 434
QTC: 434
R AXIS (DEGREES): -20
RAINBOW EXTRA TUBES HOLD SPECIMEN: NORMAL
RBC # BLD: 3.78 MIL/MCL (ref 4–5.2)
REPORT TEXT: NORMAL
SARS-COV-2 RNA RESP QL NAA+PROBE: NOT DETECTED
SERVICE CMNT-IMP: NORMAL
SERVICE CMNT-IMP: NORMAL
SODIUM SERPL-SCNC: 142 MMOL/L (ref 135–145)
T AXIS (DEGREES): 45
TSH SERPL-ACNC: 1.17 MCUNITS/ML (ref 0.35–5)
VENTRICULAR RATE EKG/MIN (BPM): 60
WBC # BLD: 8.3 K/MCL (ref 4.2–11)

## 2021-09-21 PROCEDURE — HZ2ZZZZ DETOXIFICATION SERVICES FOR SUBSTANCE ABUSE TREATMENT: ICD-10-PCS | Performed by: INTERNAL MEDICINE

## 2021-09-21 PROCEDURE — 83036 HEMOGLOBIN GLYCOSYLATED A1C: CPT | Performed by: PSYCHIATRY & NEUROLOGY

## 2021-09-21 PROCEDURE — 84443 ASSAY THYROID STIM HORMONE: CPT | Performed by: PSYCHIATRY & NEUROLOGY

## 2021-09-21 PROCEDURE — 99406 BEHAV CHNG SMOKING 3-10 MIN: CPT | Performed by: INTERNAL MEDICINE

## 2021-09-21 PROCEDURE — 87635 SARS-COV-2 COVID-19 AMP PRB: CPT | Performed by: EMERGENCY MEDICINE

## 2021-09-21 PROCEDURE — 10002803 HB RX 637: Performed by: PSYCHIATRY & NEUROLOGY

## 2021-09-21 PROCEDURE — 10004577 HB ROOM CHARGE PSYCH

## 2021-09-21 PROCEDURE — 90839 PSYTX CRISIS INITIAL 60 MIN: CPT

## 2021-09-21 PROCEDURE — 10002803 HB RX 637: Performed by: INTERNAL MEDICINE

## 2021-09-21 PROCEDURE — 10002801 HB RX 250 W/O HCPCS: Performed by: INTERNAL MEDICINE

## 2021-09-21 PROCEDURE — 85025 COMPLETE CBC W/AUTO DIFF WBC: CPT | Performed by: EMERGENCY MEDICINE

## 2021-09-21 PROCEDURE — 80053 COMPREHEN METABOLIC PANEL: CPT | Performed by: EMERGENCY MEDICINE

## 2021-09-21 PROCEDURE — 99223 1ST HOSP IP/OBS HIGH 75: CPT | Performed by: PSYCHIATRY & NEUROLOGY

## 2021-09-21 PROCEDURE — 82077 ASSAY SPEC XCP UR&BREATH IA: CPT | Performed by: EMERGENCY MEDICINE

## 2021-09-21 PROCEDURE — 80307 DRUG TEST PRSMV CHEM ANLYZR: CPT | Performed by: EMERGENCY MEDICINE

## 2021-09-21 PROCEDURE — 99254 IP/OBS CNSLTJ NEW/EST MOD 60: CPT | Performed by: INTERNAL MEDICINE

## 2021-09-21 RX ORDER — HYDROXYZINE HYDROCHLORIDE 25 MG/1
25 TABLET, FILM COATED ORAL 3 TIMES DAILY
Status: DISCONTINUED | OUTPATIENT
Start: 2021-09-21 | End: 2021-09-24 | Stop reason: HOSPADM

## 2021-09-21 RX ORDER — LORAZEPAM 1 MG/1
1 TABLET ORAL EVERY 4 HOURS PRN
Status: DISCONTINUED | OUTPATIENT
Start: 2021-09-21 | End: 2021-09-24 | Stop reason: HOSPADM

## 2021-09-21 RX ORDER — ACETAMINOPHEN 325 MG/1
650 TABLET ORAL EVERY 4 HOURS PRN
Status: DISCONTINUED | OUTPATIENT
Start: 2021-09-21 | End: 2021-09-24 | Stop reason: HOSPADM

## 2021-09-21 RX ORDER — TRAZODONE HYDROCHLORIDE 50 MG/1
50 TABLET ORAL NIGHTLY PRN
Status: DISCONTINUED | OUTPATIENT
Start: 2021-09-21 | End: 2021-09-23

## 2021-09-21 RX ORDER — METHIMAZOLE 5 MG/1
5 TABLET ORAL DAILY
Status: DISCONTINUED | OUTPATIENT
Start: 2021-09-21 | End: 2021-09-24 | Stop reason: HOSPADM

## 2021-09-21 RX ORDER — BENZTROPINE MESYLATE 1 MG/1
1 TABLET ORAL EVERY 8 HOURS PRN
Status: DISCONTINUED | OUTPATIENT
Start: 2021-09-21 | End: 2021-09-24 | Stop reason: HOSPADM

## 2021-09-21 RX ORDER — BUDESONIDE AND FORMOTEROL FUMARATE DIHYDRATE 80; 4.5 UG/1; UG/1
2 AEROSOL RESPIRATORY (INHALATION)
Status: DISCONTINUED | OUTPATIENT
Start: 2021-09-21 | End: 2021-09-24 | Stop reason: HOSPADM

## 2021-09-21 RX ORDER — NICOTINE 21 MG/24HR
1 PATCH, TRANSDERMAL 24 HOURS TRANSDERMAL DAILY
Status: DISCONTINUED | OUTPATIENT
Start: 2021-09-21 | End: 2021-09-24 | Stop reason: HOSPADM

## 2021-09-21 RX ORDER — HALOPERIDOL 5 MG/1
5 TABLET ORAL EVERY 4 HOURS PRN
Status: DISCONTINUED | OUTPATIENT
Start: 2021-09-21 | End: 2021-09-24 | Stop reason: HOSPADM

## 2021-09-21 RX ORDER — MAGNESIUM HYDROXIDE/ALUMINUM HYDROXICE/SIMETHICONE 120; 1200; 1200 MG/30ML; MG/30ML; MG/30ML
30 SUSPENSION ORAL EVERY 4 HOURS PRN
Status: DISCONTINUED | OUTPATIENT
Start: 2021-09-21 | End: 2021-09-24 | Stop reason: HOSPADM

## 2021-09-21 RX ORDER — LORAZEPAM 2 MG/ML
1 INJECTION INTRAMUSCULAR EVERY 4 HOURS PRN
Status: DISCONTINUED | OUTPATIENT
Start: 2021-09-21 | End: 2021-09-24 | Stop reason: HOSPADM

## 2021-09-21 RX ORDER — CETIRIZINE HYDROCHLORIDE 10 MG/1
10 TABLET ORAL NIGHTLY
Status: DISCONTINUED | OUTPATIENT
Start: 2021-09-21 | End: 2021-09-24 | Stop reason: HOSPADM

## 2021-09-21 RX ORDER — DOXEPIN HYDROCHLORIDE 10 MG/1
10 CAPSULE ORAL NIGHTLY
Status: DISCONTINUED | OUTPATIENT
Start: 2021-09-21 | End: 2021-09-22

## 2021-09-21 RX ORDER — ESCITALOPRAM OXALATE 10 MG/1
10 TABLET ORAL DAILY
Status: DISCONTINUED | OUTPATIENT
Start: 2021-09-21 | End: 2021-09-23

## 2021-09-21 RX ORDER — HYDROXYZINE HYDROCHLORIDE 25 MG/1
50 TABLET, FILM COATED ORAL EVERY 4 HOURS PRN
Status: DISCONTINUED | OUTPATIENT
Start: 2021-09-21 | End: 2021-09-21

## 2021-09-21 RX ORDER — MONTELUKAST SODIUM 10 MG/1
10 TABLET ORAL NIGHTLY
Status: DISCONTINUED | OUTPATIENT
Start: 2021-09-21 | End: 2021-09-24 | Stop reason: HOSPADM

## 2021-09-21 RX ORDER — BENZTROPINE MESYLATE 1 MG/ML
1 INJECTION INTRAMUSCULAR; INTRAVENOUS EVERY 8 HOURS PRN
Status: DISCONTINUED | OUTPATIENT
Start: 2021-09-21 | End: 2021-09-24 | Stop reason: HOSPADM

## 2021-09-21 RX ORDER — HALOPERIDOL 5 MG/ML
5 INJECTION INTRAMUSCULAR EVERY 4 HOURS PRN
Status: DISCONTINUED | OUTPATIENT
Start: 2021-09-21 | End: 2021-09-24 | Stop reason: HOSPADM

## 2021-09-21 RX ORDER — FOLIC ACID 1 MG/1
1 TABLET ORAL DAILY
Status: DISCONTINUED | OUTPATIENT
Start: 2021-09-21 | End: 2021-09-24 | Stop reason: HOSPADM

## 2021-09-21 RX ADMIN — LORAZEPAM 1 MG: 1 TABLET ORAL at 19:02

## 2021-09-21 RX ADMIN — TRAZODONE HYDROCHLORIDE 50 MG: 50 TABLET ORAL at 21:07

## 2021-09-21 RX ADMIN — LORAZEPAM 1 MG: 1 TABLET ORAL at 09:08

## 2021-09-21 RX ADMIN — MONTELUKAST SODIUM 10 MG: 10 TABLET, FILM COATED ORAL at 21:00

## 2021-09-21 RX ADMIN — LORAZEPAM 1 MG: 1 TABLET ORAL at 21:07

## 2021-09-21 RX ADMIN — ESCITALOPRAM OXALATE 10 MG: 10 TABLET ORAL at 21:01

## 2021-09-21 RX ADMIN — HYDROXYZINE HYDROCHLORIDE 25 MG: 25 TABLET ORAL at 21:00

## 2021-09-21 RX ADMIN — BUDESONIDE AND FORMOTEROL FUMARATE DIHYDRATE 2 PUFF: 80; 4.5 AEROSOL RESPIRATORY (INHALATION) at 21:00

## 2021-09-21 RX ADMIN — BUDESONIDE AND FORMOTEROL FUMARATE DIHYDRATE 2 PUFF: 80; 4.5 AEROSOL RESPIRATORY (INHALATION) at 09:36

## 2021-09-21 RX ADMIN — FOLIC ACID 1 MG: 1 TABLET ORAL at 09:07

## 2021-09-21 RX ADMIN — METHIMAZOLE 5 MG: 5 TABLET ORAL at 09:36

## 2021-09-21 RX ADMIN — DOXEPIN HYDROCHLORIDE 10 MG: 10 CAPSULE ORAL at 21:00

## 2021-09-21 RX ADMIN — Medication 1 PATCH: at 09:07

## 2021-09-21 RX ADMIN — CETIRIZINE HYDROCHLORIDE 10 MG: 10 TABLET ORAL at 21:00

## 2021-09-21 ASSESSMENT — ENCOUNTER SYMPTOMS
CHILLS: 0
NEUROLOGICAL NEGATIVE: 1
FEVER: 0
GASTROINTESTINAL NEGATIVE: 1
CHEST TIGHTNESS: 0
COUGH: 0

## 2021-09-21 ASSESSMENT — LIFESTYLE VARIABLES
HAVE YOU BEEN EATING POORLY BECAUSE OF A DECREASED APPETITE: 0
RECENTLY LOST WEIGHT WITHOUT TRYING: 0
RECENT DECLINE IN ADLS: NO
ALCOHOL_USE_STATUS: NO OR LOW RISK WITH VALIDATED TOOL
HAVE YOU EVER BEEN VERBALLY, EMOTIONALLY, PHYSICALLY OR SEXUALLY ABUSED, OR ABUSED VIA SOCIAL MEDIA?: NO
TOBACCO_USE_STATUS_IN_THE_LAST_30_DAYS: USED TOBACCO IN THE LAST 30 DAYS
ROUTE OF COCAINE: SMOKE
ARE YOU DEAF OR DO YOU HAVE SERIOUS DIFFICULTY  HEARING: NO
HOW OFTEN DO YOU HAVE A DRINK CONTAINING ALCOHOL: NEVER
ADL NEEDS ASSIST: NO
HOW OFTEN DO YOU HAVE A DRINK CONTAINING ALCOHOL: NEVER
ARE YOU BLIND OR DO YOU HAVE SERIOUS DIFFICULTY SEEING, EVEN WHEN WEARING GLASSES: NO
HOW OFTEN DO YOU HAVE 6 OR MORE DRINKS ON ONE OCCASION: NEVER
AUDIT-C TOTAL SCORE: 0
HAVE YOU EVER BEEN EXPOSED TO OTHER VERY DISTURBING, TRAUMATIC OR ANXIETY PROVOKING EVENTS IN YOUR LIFETIME?: NO
IS PATIENT ABLE TO COMPLETE ASSESSMENT AT THIS TIME: YES
HOW MANY STANDARD DRINKS CONTAINING ALCOHOL DO YOU HAVE ON A TYPICAL DAY: 0,1 OR 2
ADL BEFORE ADMISSION: INDEPENDENT
COCAINE USE: YES
CHRONIC/CANCER PAIN PRESENT: NO
SHORT OF BREATH OR FATIGUE WITH ADLS: NO

## 2021-09-21 ASSESSMENT — COGNITIVE AND FUNCTIONAL STATUS - GENERAL
SPEECH: WEAK VOICE
PERCEPTUAL_MISINTERPRETATIONS_HALLUCINATIONS: CLEAR REALITY BASED PERCEPTIONS
ATTENTION_CALCULATED: MAINTAINS ATTENTION
MOTOR_BEHAVIOR-RETARDATION_CALCULATED: CALM AND PURPOSEFUL
MOTOR_BEHAVIOR-AGITATION_CALCULATED: CALM AND PURPOSEFUL
ORIENTATION: ORIENTED (PERSON/PLACE/TIME)
MOOD: ANXIOUS;DEPRESSED;FLAT
AFFECT: APPROPRIATE TO SITUATION;ANXIOUS;DEPRESSED;FLAT
MEMORY: INTACT
BEHAVIOR: APPROPRIATE TO SITUATION
LEVEL_OF_CONSCIOUSNESS_CALCULATED: ALERT

## 2021-09-21 ASSESSMENT — PAIN SCALES - GENERAL
PAINLEVEL_OUTOF10: 0

## 2021-09-21 ASSESSMENT — ACTIVITIES OF DAILY LIVING (ADL): ADL_SCORE: 12

## 2021-09-22 PROCEDURE — 10002801 HB RX 250 W/O HCPCS: Performed by: INTERNAL MEDICINE

## 2021-09-22 PROCEDURE — 10004577 HB ROOM CHARGE PSYCH

## 2021-09-22 PROCEDURE — 10002803 HB RX 637: Performed by: PSYCHIATRY & NEUROLOGY

## 2021-09-22 PROCEDURE — 10004281 HB COUNTER-STAFF TIME PER 15 MIN

## 2021-09-22 PROCEDURE — 10002803 HB RX 637: Performed by: INTERNAL MEDICINE

## 2021-09-22 PROCEDURE — 99233 SBSQ HOSP IP/OBS HIGH 50: CPT | Performed by: PSYCHIATRY & NEUROLOGY

## 2021-09-22 RX ORDER — DOXEPIN HYDROCHLORIDE 25 MG/1
25 CAPSULE ORAL NIGHTLY
Status: DISCONTINUED | OUTPATIENT
Start: 2021-09-22 | End: 2021-09-24 | Stop reason: HOSPADM

## 2021-09-22 RX ORDER — ALBUTEROL SULFATE 90 UG/1
2 AEROSOL, METERED RESPIRATORY (INHALATION)
Status: DISCONTINUED | OUTPATIENT
Start: 2021-09-22 | End: 2021-09-24 | Stop reason: HOSPADM

## 2021-09-22 RX ADMIN — CETIRIZINE HYDROCHLORIDE 10 MG: 10 TABLET ORAL at 21:31

## 2021-09-22 RX ADMIN — FOLIC ACID 1 MG: 1 TABLET ORAL at 09:11

## 2021-09-22 RX ADMIN — BUDESONIDE AND FORMOTEROL FUMARATE DIHYDRATE 2 PUFF: 80; 4.5 AEROSOL RESPIRATORY (INHALATION) at 21:32

## 2021-09-22 RX ADMIN — DOXEPIN HYDROCHLORIDE 25 MG: 25 CAPSULE ORAL at 21:32

## 2021-09-22 RX ADMIN — BUDESONIDE AND FORMOTEROL FUMARATE DIHYDRATE 2 PUFF: 80; 4.5 AEROSOL RESPIRATORY (INHALATION) at 09:14

## 2021-09-22 RX ADMIN — ALBUTEROL SULFATE 2 PUFF: 90 AEROSOL, METERED RESPIRATORY (INHALATION) at 16:01

## 2021-09-22 RX ADMIN — Medication 1 PATCH: at 09:00

## 2021-09-22 RX ADMIN — HYDROXYZINE HYDROCHLORIDE 25 MG: 25 TABLET ORAL at 21:32

## 2021-09-22 RX ADMIN — TRAZODONE HYDROCHLORIDE 50 MG: 50 TABLET ORAL at 21:32

## 2021-09-22 RX ADMIN — HYDROXYZINE HYDROCHLORIDE 25 MG: 25 TABLET ORAL at 09:12

## 2021-09-22 RX ADMIN — METHIMAZOLE 5 MG: 5 TABLET ORAL at 09:11

## 2021-09-22 RX ADMIN — HYDROXYZINE HYDROCHLORIDE 25 MG: 25 TABLET ORAL at 14:56

## 2021-09-22 RX ADMIN — ALBUTEROL SULFATE 2 PUFF: 90 AEROSOL, METERED RESPIRATORY (INHALATION) at 21:34

## 2021-09-22 RX ADMIN — MONTELUKAST SODIUM 10 MG: 10 TABLET, FILM COATED ORAL at 21:32

## 2021-09-22 RX ADMIN — ESCITALOPRAM OXALATE 10 MG: 10 TABLET ORAL at 09:12

## 2021-09-22 ASSESSMENT — PAIN SCALES - GENERAL
PAINLEVEL_OUTOF10: 0

## 2021-09-22 ASSESSMENT — PULMONARY FUNCTION TESTS
FEV1: 0
FEV1/FVC: UNABLE TO OBTAIN, OR GREATER THAN 70%
FEV1: 0
FEV1_PREDICTED: 0
FEV1: 0

## 2021-09-23 PROCEDURE — 10002803 HB RX 637: Performed by: PSYCHIATRY & NEUROLOGY

## 2021-09-23 PROCEDURE — 10002803 HB RX 637: Performed by: INTERNAL MEDICINE

## 2021-09-23 PROCEDURE — 99233 SBSQ HOSP IP/OBS HIGH 50: CPT | Performed by: PSYCHIATRY & NEUROLOGY

## 2021-09-23 PROCEDURE — 10004577 HB ROOM CHARGE PSYCH

## 2021-09-23 PROCEDURE — 10002801 HB RX 250 W/O HCPCS: Performed by: INTERNAL MEDICINE

## 2021-09-23 RX ORDER — HYDROXYZINE HYDROCHLORIDE 25 MG/1
25 TABLET, FILM COATED ORAL 3 TIMES DAILY
Qty: 90 TABLET | Refills: 1 | Status: SHIPPED | OUTPATIENT
Start: 2021-09-23

## 2021-09-23 RX ORDER — NICOTINE 21 MG/24HR
1 PATCH, TRANSDERMAL 24 HOURS TRANSDERMAL DAILY
Qty: 30 PATCH | Refills: 1 | Status: SHIPPED | OUTPATIENT
Start: 2021-09-24

## 2021-09-23 RX ORDER — TRAZODONE HYDROCHLORIDE 100 MG/1
100 TABLET ORAL NIGHTLY
Status: DISCONTINUED | OUTPATIENT
Start: 2021-09-23 | End: 2021-09-24 | Stop reason: HOSPADM

## 2021-09-23 RX ORDER — ESCITALOPRAM OXALATE 10 MG/1
10 TABLET ORAL ONCE
Status: COMPLETED | OUTPATIENT
Start: 2021-09-23 | End: 2021-09-23

## 2021-09-23 RX ORDER — DOXEPIN HYDROCHLORIDE 25 MG/1
25 CAPSULE ORAL NIGHTLY
Qty: 30 CAPSULE | Refills: 1 | Status: SHIPPED | OUTPATIENT
Start: 2021-09-23

## 2021-09-23 RX ORDER — ESCITALOPRAM OXALATE 20 MG/1
20 TABLET ORAL DAILY
Qty: 30 TABLET | Refills: 1 | Status: SHIPPED | OUTPATIENT
Start: 2021-09-23

## 2021-09-23 RX ORDER — ESCITALOPRAM OXALATE 10 MG/1
20 TABLET ORAL DAILY
Status: DISCONTINUED | OUTPATIENT
Start: 2021-09-24 | End: 2021-09-24 | Stop reason: HOSPADM

## 2021-09-23 RX ORDER — TRAZODONE HYDROCHLORIDE 100 MG/1
100 TABLET ORAL NIGHTLY
Qty: 30 TABLET | Refills: 1 | Status: SHIPPED | OUTPATIENT
Start: 2021-09-23

## 2021-09-23 RX ADMIN — DOXEPIN HYDROCHLORIDE 25 MG: 25 CAPSULE ORAL at 21:33

## 2021-09-23 RX ADMIN — CETIRIZINE HYDROCHLORIDE 10 MG: 10 TABLET ORAL at 21:33

## 2021-09-23 RX ADMIN — BUDESONIDE AND FORMOTEROL FUMARATE DIHYDRATE 2 PUFF: 80; 4.5 AEROSOL RESPIRATORY (INHALATION) at 08:44

## 2021-09-23 RX ADMIN — Medication 1 PATCH: at 08:40

## 2021-09-23 RX ADMIN — ALBUTEROL SULFATE 2 PUFF: 90 AEROSOL, METERED RESPIRATORY (INHALATION) at 21:33

## 2021-09-23 RX ADMIN — TRAZODONE HYDROCHLORIDE 100 MG: 100 TABLET ORAL at 21:33

## 2021-09-23 RX ADMIN — LORAZEPAM 1 MG: 1 TABLET ORAL at 14:54

## 2021-09-23 RX ADMIN — ESCITALOPRAM OXALATE 10 MG: 10 TABLET ORAL at 15:03

## 2021-09-23 RX ADMIN — HYDROXYZINE HYDROCHLORIDE 25 MG: 25 TABLET ORAL at 15:03

## 2021-09-23 RX ADMIN — HYDROXYZINE HYDROCHLORIDE 25 MG: 25 TABLET ORAL at 21:33

## 2021-09-23 RX ADMIN — FOLIC ACID 1 MG: 1 TABLET ORAL at 08:40

## 2021-09-23 RX ADMIN — ESCITALOPRAM OXALATE 10 MG: 10 TABLET ORAL at 08:40

## 2021-09-23 RX ADMIN — BUDESONIDE AND FORMOTEROL FUMARATE DIHYDRATE 2 PUFF: 80; 4.5 AEROSOL RESPIRATORY (INHALATION) at 21:33

## 2021-09-23 RX ADMIN — HYDROXYZINE HYDROCHLORIDE 25 MG: 25 TABLET ORAL at 08:40

## 2021-09-23 RX ADMIN — METHIMAZOLE 5 MG: 5 TABLET ORAL at 08:40

## 2021-09-23 RX ADMIN — MONTELUKAST SODIUM 10 MG: 10 TABLET, FILM COATED ORAL at 21:33

## 2021-09-23 ASSESSMENT — PAIN SCALES - GENERAL
PAINLEVEL_OUTOF10: 0

## 2021-09-24 VITALS
RESPIRATION RATE: 14 BRPM | WEIGHT: 113.98 LBS | SYSTOLIC BLOOD PRESSURE: 101 MMHG | OXYGEN SATURATION: 100 % | HEART RATE: 87 BPM | DIASTOLIC BLOOD PRESSURE: 87 MMHG | BODY MASS INDEX: 20.97 KG/M2 | HEIGHT: 62 IN | TEMPERATURE: 97.5 F

## 2021-09-24 PROCEDURE — 99238 HOSP IP/OBS DSCHRG MGMT 30/<: CPT | Performed by: PSYCHIATRY & NEUROLOGY

## 2021-09-24 PROCEDURE — 10002803 HB RX 637: Performed by: INTERNAL MEDICINE

## 2021-09-24 PROCEDURE — 10002803 HB RX 637: Performed by: PSYCHIATRY & NEUROLOGY

## 2021-09-24 RX ORDER — METHIMAZOLE 5 MG/1
5 TABLET ORAL DAILY
Qty: 30 TABLET | Refills: 0 | Status: SHIPPED | OUTPATIENT
Start: 2021-09-24 | End: 2021-10-24

## 2021-09-24 RX ORDER — DICLOFENAC POTASSIUM 50 MG/1
50 TABLET, FILM COATED ORAL 2 TIMES DAILY
Qty: 30 TABLET | Refills: 0 | Status: SHIPPED | OUTPATIENT
Start: 2021-09-24

## 2021-09-24 RX ADMIN — HYDROXYZINE HYDROCHLORIDE 25 MG: 25 TABLET ORAL at 08:47

## 2021-09-24 RX ADMIN — Medication 1 PATCH: at 08:47

## 2021-09-24 RX ADMIN — FOLIC ACID 1 MG: 1 TABLET ORAL at 08:47

## 2021-09-24 RX ADMIN — ESCITALOPRAM OXALATE 20 MG: 10 TABLET ORAL at 08:47

## 2021-09-24 RX ADMIN — BUDESONIDE AND FORMOTEROL FUMARATE DIHYDRATE 2 PUFF: 80; 4.5 AEROSOL RESPIRATORY (INHALATION) at 09:15

## 2021-09-24 RX ADMIN — METHIMAZOLE 5 MG: 5 TABLET ORAL at 08:47

## 2021-09-24 ASSESSMENT — PAIN SCALES - GENERAL: PAINLEVEL_OUTOF10: 0

## 2021-09-29 ENCOUNTER — APPOINTMENT (OUTPATIENT)
Dept: CT IMAGING | Age: 56
End: 2021-09-29
Attending: EMERGENCY MEDICINE

## 2021-09-29 ENCOUNTER — HOSPITAL ENCOUNTER (EMERGENCY)
Age: 56
Discharge: HOME | End: 2021-09-29
Attending: EMERGENCY MEDICINE

## 2021-09-29 VITALS
DIASTOLIC BLOOD PRESSURE: 80 MMHG | SYSTOLIC BLOOD PRESSURE: 110 MMHG | RESPIRATION RATE: 16 BRPM | OXYGEN SATURATION: 99 % | HEART RATE: 68 BPM | TEMPERATURE: 98.6 F

## 2021-09-29 DIAGNOSIS — R51.9 NONINTRACTABLE HEADACHE, UNSPECIFIED CHRONICITY PATTERN, UNSPECIFIED HEADACHE TYPE: Primary | ICD-10-CM

## 2021-09-29 LAB
ALBUMIN SERPL-MCNC: 3.5 G/DL (ref 3.6–5.1)
ALBUMIN/GLOB SERPL: 0.9 {RATIO} (ref 1–2.4)
ALP SERPL-CCNC: 107 UNITS/L (ref 45–117)
ALT SERPL-CCNC: 37 UNITS/L
ANION GAP SERPL CALC-SCNC: 9 MMOL/L (ref 10–20)
AST SERPL-CCNC: 22 UNITS/L
BASOPHILS # BLD: 0 K/MCL (ref 0–0.3)
BASOPHILS NFR BLD: 0 %
BILIRUB SERPL-MCNC: 0.2 MG/DL (ref 0.2–1)
BUN SERPL-MCNC: 19 MG/DL (ref 6–20)
BUN/CREAT SERPL: 27 (ref 7–25)
CALCIUM SERPL-MCNC: 8.8 MG/DL (ref 8.4–10.2)
CHLORIDE SERPL-SCNC: 106 MMOL/L (ref 98–107)
CO2 SERPL-SCNC: 27 MMOL/L (ref 21–32)
CREAT SERPL-MCNC: 0.71 MG/DL (ref 0.51–0.95)
DEPRECATED RDW RBC: 51.2 FL (ref 39–50)
EOSINOPHIL # BLD: 0.2 K/MCL (ref 0–0.5)
EOSINOPHIL NFR BLD: 2 %
ERYTHROCYTE [DISTWIDTH] IN BLOOD: 14.9 % (ref 11–15)
FASTING DURATION TIME PATIENT: ABNORMAL H
GFR SERPLBLD BASED ON 1.73 SQ M-ARVRAT: >90 ML/MIN
GLOBULIN SER-MCNC: 3.9 G/DL (ref 2–4)
GLUCOSE SERPL-MCNC: 85 MG/DL (ref 70–99)
HCT VFR BLD CALC: 36.9 % (ref 36–46.5)
HGB BLD-MCNC: 12 G/DL (ref 12–15.5)
IMM GRANULOCYTES # BLD AUTO: 0 K/MCL (ref 0–0.2)
IMM GRANULOCYTES # BLD: 0 %
LYMPHOCYTES # BLD: 3.9 K/MCL (ref 1–4)
LYMPHOCYTES NFR BLD: 40 %
MCH RBC QN AUTO: 30.2 PG (ref 26–34)
MCHC RBC AUTO-ENTMCNC: 32.5 G/DL (ref 32–36.5)
MCV RBC AUTO: 92.9 FL (ref 78–100)
MONOCYTES # BLD: 0.7 K/MCL (ref 0.3–0.9)
MONOCYTES NFR BLD: 7 %
NEUTROPHILS # BLD: 4.9 K/MCL (ref 1.8–7.7)
NEUTROPHILS NFR BLD: 51 %
NRBC BLD MANUAL-RTO: 0 /100 WBC
PLATELET # BLD AUTO: 388 K/MCL (ref 140–450)
POTASSIUM SERPL-SCNC: 4.3 MMOL/L (ref 3.4–5.1)
PROT SERPL-MCNC: 7.4 G/DL (ref 6.4–8.2)
RBC # BLD: 3.97 MIL/MCL (ref 4–5.2)
SODIUM SERPL-SCNC: 138 MMOL/L (ref 135–145)
WBC # BLD: 9.8 K/MCL (ref 4.2–11)

## 2021-09-29 PROCEDURE — 85025 COMPLETE CBC W/AUTO DIFF WBC: CPT | Performed by: EMERGENCY MEDICINE

## 2021-09-29 PROCEDURE — 70450 CT HEAD/BRAIN W/O DYE: CPT

## 2021-09-29 PROCEDURE — 36415 COLL VENOUS BLD VENIPUNCTURE: CPT

## 2021-09-29 PROCEDURE — 99284 EMERGENCY DEPT VISIT MOD MDM: CPT

## 2021-09-29 PROCEDURE — 80053 COMPREHEN METABOLIC PANEL: CPT | Performed by: EMERGENCY MEDICINE

## 2021-09-29 PROCEDURE — G1004 CDSM NDSC: HCPCS

## 2021-09-30 LAB
RAINBOW EXTRA TUBES HOLD SPECIMEN: NORMAL
RAINBOW EXTRA TUBES HOLD SPECIMEN: NORMAL

## 2021-10-07 ENCOUNTER — TELEPHONE (OUTPATIENT)
Dept: SURGERY | Facility: CLINIC | Age: 56
End: 2021-10-07

## 2021-10-07 RX ORDER — FOLIC ACID 1 MG/1
TABLET ORAL
Qty: 30 TABLET | Refills: 2 | Status: SHIPPED | OUTPATIENT
Start: 2021-10-07

## 2021-10-07 NOTE — TELEPHONE ENCOUNTER
Pt is currently inpatient at [facility] in [place]. She states they need to know what kind of clips she has from her surgery because they want to do an MRI. Pls call Soledad Bonilla at [phone].   They are faxing a request for info form to us as wel

## 2021-10-07 NOTE — TELEPHONE ENCOUNTER
Aneurysm clip [id number]  : [facility].   Model/catalog number: [id number]   Size: 12MM Standard     Aneurysm clip -02 [id number]  : [facility].  Model/catalog number: [id number]  Size: 6.6MM Mini    Screw BN

## 2021-10-08 NOTE — TELEPHONE ENCOUNTER
Received call from patient who is states she is in hospital and needing a MRI completed but facility can not complete until they have received patients clipping and instrumentation information to confirm MRI compatibility.  Informed patient we have informat

## 2021-10-20 ENCOUNTER — TELEPHONE (OUTPATIENT)
Dept: FAMILY MEDICINE CLINIC | Facility: CLINIC | Age: 56
End: 2021-10-20

## 2021-10-20 RX ORDER — ATORVASTATIN CALCIUM 40 MG/1
40 TABLET, FILM COATED ORAL NIGHTLY
Qty: 30 TABLET | Refills: 0 | Status: SHIPPED | OUTPATIENT
Start: 2021-10-20 | End: 2021-10-25

## 2021-10-20 NOTE — TELEPHONE ENCOUNTER
Called pt and was informed of below response from Dr. Nena Farrar. Pt states she is waiting to her back from neurologist regarding appt. Confirmed appt with Dr. Nena Farrar. Pt verbalized understanding.

## 2021-10-20 NOTE — TELEPHONE ENCOUNTER
May need to inc dose  Will send 30 days to pharmacy     Please also have her schedule follow up with neurology

## 2021-10-20 NOTE — TELEPHONE ENCOUNTER
Patient states that she was in in-patient rehab for 30 days for drug addiction. While at rehab, patient went to ER twice. First time for double vision. Second time for double vision and difficulty walking.   Was told by ER provider that she had a stroke b

## 2021-10-20 NOTE — TELEPHONE ENCOUNTER
Pt called, when she was in [facility] she was prescribed Lipitor 40mg once daily, Pt only has 1 pill left and was hoping Dr. Ayan Saenz would send a new prescription to her pharmacy. Patient is scheduled for HFU on Monday.

## 2021-10-25 ENCOUNTER — OFFICE VISIT (OUTPATIENT)
Dept: FAMILY MEDICINE CLINIC | Facility: CLINIC | Age: 56
End: 2021-10-25
Payer: MEDICAID

## 2021-10-25 VITALS
WEIGHT: 117 LBS | HEART RATE: 67 BPM | BODY MASS INDEX: 22.09 KG/M2 | SYSTOLIC BLOOD PRESSURE: 104 MMHG | DIASTOLIC BLOOD PRESSURE: 70 MMHG | HEIGHT: 61 IN | OXYGEN SATURATION: 99 % | RESPIRATION RATE: 21 BRPM | TEMPERATURE: 98 F

## 2021-10-25 DIAGNOSIS — Z12.31 ENCOUNTER FOR SCREENING MAMMOGRAM FOR MALIGNANT NEOPLASM OF BREAST: ICD-10-CM

## 2021-10-25 DIAGNOSIS — Z98.890 S/P CEREBRAL ANEURYSM REPAIR: ICD-10-CM

## 2021-10-25 DIAGNOSIS — E55.9 VITAMIN D DEFICIENCY: ICD-10-CM

## 2021-10-25 DIAGNOSIS — Z86.79 S/P CEREBRAL ANEURYSM REPAIR: ICD-10-CM

## 2021-10-25 DIAGNOSIS — J44.9 CHRONIC OBSTRUCTIVE PULMONARY DISEASE, UNSPECIFIED COPD TYPE (HCC): ICD-10-CM

## 2021-10-25 DIAGNOSIS — H53.2 DIPLOPIA: ICD-10-CM

## 2021-10-25 DIAGNOSIS — I67.1 INTRACEREBRAL ANEURYSM: ICD-10-CM

## 2021-10-25 DIAGNOSIS — R53.83 FATIGUE, UNSPECIFIED TYPE: Primary | ICD-10-CM

## 2021-10-25 DIAGNOSIS — Z12.11 SCREENING FOR COLON CANCER: ICD-10-CM

## 2021-10-25 DIAGNOSIS — E05.90 HYPERTHYROIDISM: ICD-10-CM

## 2021-10-25 PROCEDURE — 3074F SYST BP LT 130 MM HG: CPT | Performed by: FAMILY MEDICINE

## 2021-10-25 PROCEDURE — 3078F DIAST BP <80 MM HG: CPT | Performed by: FAMILY MEDICINE

## 2021-10-25 PROCEDURE — 99214 OFFICE O/P EST MOD 30 MIN: CPT | Performed by: FAMILY MEDICINE

## 2021-10-25 PROCEDURE — 3008F BODY MASS INDEX DOCD: CPT | Performed by: FAMILY MEDICINE

## 2021-10-25 RX ORDER — TRAZODONE HYDROCHLORIDE 150 MG/1
150 TABLET ORAL NIGHTLY
COMMUNITY
End: 2021-12-29

## 2021-10-25 RX ORDER — BUDESONIDE, GLYCOPYRROLATE, AND FORMOTEROL FUMARATE 160; 9; 4.8 UG/1; UG/1; UG/1
2 AEROSOL, METERED RESPIRATORY (INHALATION) 2 TIMES DAILY
Qty: 1 EACH | Refills: 0 | Status: SHIPPED | COMMUNITY
Start: 2021-10-25

## 2021-10-25 RX ORDER — ATORVASTATIN CALCIUM 80 MG/1
80 TABLET, FILM COATED ORAL NIGHTLY
Qty: 90 TABLET | Refills: 0 | Status: SHIPPED | OUTPATIENT
Start: 2021-10-25 | End: 2021-10-27 | Stop reason: ALTCHOICE

## 2021-10-25 RX ORDER — HYDROXYZINE HYDROCHLORIDE 25 MG/1
25 TABLET, FILM COATED ORAL 3 TIMES DAILY PRN
COMMUNITY

## 2021-10-25 NOTE — PROGRESS NOTES
HPI:    Jose Smalls is a 55year old female here today for hospital follow up.    She was discharged from inpatient addiction treatment program.     HPI:    Inpatient program - [address]   Seen at [facility] medications on file prior to visit.         HISTORY: reconciled and reviewed with patient  She  has a past medical history of Anxiety, Arthritis, Celiac disease, COPD (chronic obstructive pulmonary disease) ([facility] [address] Depression, Disorder of thyroid, Fibroids, kg/m²    GENERAL: well developed, well nourished, in no apparent distress  SKIN: no rashes, no suspicious lesions  HEENT: atraumatic, normocephalic, ears and throat are clear  EYES: PERRLA, EOMI, conjunctiva are clear  NECK: supple, no adenopathy, no bruit

## 2021-10-26 ENCOUNTER — TELEPHONE (OUTPATIENT)
Dept: FAMILY MEDICINE CLINIC | Facility: CLINIC | Age: 56
End: 2021-10-26

## 2021-10-26 NOTE — TELEPHONE ENCOUNTER
Sample provided to patient - call pt in 2 weeks and see if respiratory sx are unchanged, better or worse. If better - will try Trelegy or ask pt to apply for patient assistance.

## 2021-10-26 NOTE — TELEPHONE ENCOUNTER
KANA SENT TO     Yolande Cabrera  PH: [phone]  FAX: [phone]    @ 9:00 AM, RECEIVED CONFIRMATION

## 2021-10-27 ENCOUNTER — TELEPHONE (OUTPATIENT)
Dept: FAMILY MEDICINE CLINIC | Facility: CLINIC | Age: 56
End: 2021-10-27

## 2021-10-27 RX ORDER — EZETIMIBE 10 MG/1
10 TABLET ORAL NIGHTLY
Qty: 30 TABLET | Refills: 2 | Status: SHIPPED | OUTPATIENT
Start: 2021-10-27 | End: 2022-01-24

## 2021-10-27 NOTE — TELEPHONE ENCOUNTER
Patient had follow up with NS on 10/26/21 -he is concerned about possible myopathy from Lipitor. Will try ezetimibe and see if sx bridgette.    Please ask pt to stop atorvastatin for now    She has an EMG ordered for peripheral neuropathy - by NS at [place]

## 2021-10-27 NOTE — TELEPHONE ENCOUNTER
Notified the patient of the below response by the provider. Patient verbalized understanding. States that she will stop the Lipitor and start Zetia. Answered all questions at this time. Medication list updated.

## 2021-11-01 ENCOUNTER — PATIENT MESSAGE (OUTPATIENT)
Dept: FAMILY MEDICINE CLINIC | Facility: CLINIC | Age: 56
End: 2021-11-01

## 2021-11-01 NOTE — TELEPHONE ENCOUNTER
From: Roldan Gregg  To: Ann Hernandez MD  Sent: 11/1/2021 4:45 PM CDT  Subject: Vaccine     Hi Dr. Carleen Rutherford I forgot to tell you that I got my second Moderna shot and Oct.5, 2021 while I was in treatment.     Thank you,  Lilly Carson

## 2021-11-02 ENCOUNTER — TELEPHONE (OUTPATIENT)
Dept: FAMILY MEDICINE CLINIC | Facility: CLINIC | Age: 56
End: 2021-11-02

## 2021-11-02 DIAGNOSIS — Z13.6 ENCOUNTER FOR SCREENING FOR CARDIOVASCULAR DISORDERS: Primary | ICD-10-CM

## 2021-11-02 NOTE — TELEPHONE ENCOUNTER
Patient called asking if she also needs to have her lipids and urine checked. If yes, please send to [facility] and let Pt know.  Thank you

## 2021-11-02 NOTE — TELEPHONE ENCOUNTER
----- Message from Jayy Mcgee MD sent at 11/2/2021  8:06 AM CDT -----  Results reviewed. Can [facility] add on free T4?  There was a big decline in TSH, but it is still normal.

## 2021-11-02 NOTE — TELEPHONE ENCOUNTER
Called [email or web address] and they did add the FreeT4.   Pending outcome    Authorization form for the lab test faxed to [email or web address] today 11/5/21 at [phone]

## 2021-11-03 ENCOUNTER — MED REC SCAN ONLY (OUTPATIENT)
Dept: FAMILY MEDICINE CLINIC | Facility: CLINIC | Age: 56
End: 2021-11-03

## 2021-11-05 NOTE — TELEPHONE ENCOUNTER
VM left for pt. PSR please let pt know that  did order lipid panel for her. Does not need urine test unless she's having symptoms. Please let us know if she is, thanks.

## 2021-11-08 ENCOUNTER — PATIENT MESSAGE (OUTPATIENT)
Dept: FAMILY MEDICINE CLINIC | Facility: CLINIC | Age: 56
End: 2021-11-08

## 2021-11-08 NOTE — TELEPHONE ENCOUNTER
From: Onelia Murray  Sent: 11/8/2021 2:22 PM CST  To: [facility] Clinical Staff  Subject: [address] Anna said that I can go to Rebecca Mosqueda or Dr. Denis Chao office for labs    Also shiela said that if she needed any more labs they keep the blood fo

## 2021-11-17 NOTE — ADDENDUM NOTE
Addended by: oMy Crisostomo on: 1/20/2021 12:59 PM     Modules accepted: Orders Yris (Friend) Call [phone] when he needs a ride home.

## 2021-11-23 ENCOUNTER — HOSPITAL ENCOUNTER (EMERGENCY)
Age: 56
Discharge: HOME | End: 2021-11-23
Attending: EMERGENCY MEDICINE
Payer: MEDICAID

## 2021-11-23 ENCOUNTER — OFFICE VISIT (OUTPATIENT)
Dept: FAMILY MEDICINE CLINIC | Facility: CLINIC | Age: 56
End: 2021-11-23
Payer: MEDICAID

## 2021-11-23 ENCOUNTER — APPOINTMENT (OUTPATIENT)
Dept: GENERAL RADIOLOGY | Age: 56
End: 2021-11-23
Attending: EMERGENCY MEDICINE
Payer: MEDICAID

## 2021-11-23 VITALS
HEIGHT: 61 IN | SYSTOLIC BLOOD PRESSURE: 97 MMHG | WEIGHT: 117 LBS | HEART RATE: 73 BPM | BODY MASS INDEX: 22.09 KG/M2 | TEMPERATURE: 98 F | RESPIRATION RATE: 20 BRPM | OXYGEN SATURATION: 98 % | DIASTOLIC BLOOD PRESSURE: 65 MMHG

## 2021-11-23 VITALS
BODY MASS INDEX: 22.09 KG/M2 | TEMPERATURE: 97 F | OXYGEN SATURATION: 98 % | SYSTOLIC BLOOD PRESSURE: 90 MMHG | WEIGHT: 117 LBS | RESPIRATION RATE: 20 BRPM | DIASTOLIC BLOOD PRESSURE: 60 MMHG | HEIGHT: 61 IN | HEART RATE: 62 BPM

## 2021-11-23 DIAGNOSIS — R05.9 COUGH: Primary | ICD-10-CM

## 2021-11-23 DIAGNOSIS — J98.01 ACUTE BRONCHOSPASM: Primary | ICD-10-CM

## 2021-11-23 DIAGNOSIS — J44.1 COPD EXACERBATION (HCC): ICD-10-CM

## 2021-11-23 PROCEDURE — 94640 AIRWAY INHALATION TREATMENT: CPT

## 2021-11-23 PROCEDURE — 99284 EMERGENCY DEPT VISIT MOD MDM: CPT

## 2021-11-23 PROCEDURE — 87502 INFLUENZA DNA AMP PROBE: CPT | Performed by: EMERGENCY MEDICINE

## 2021-11-23 PROCEDURE — 71045 X-RAY EXAM CHEST 1 VIEW: CPT | Performed by: EMERGENCY MEDICINE

## 2021-11-23 RX ORDER — ALBUTEROL SULFATE 90 UG/1
8 AEROSOL, METERED RESPIRATORY (INHALATION) ONCE
Status: COMPLETED | OUTPATIENT
Start: 2021-11-23 | End: 2021-11-23

## 2021-11-23 RX ORDER — PREDNISONE 20 MG/1
20 TABLET ORAL 2 TIMES DAILY
Qty: 10 TABLET | Refills: 0 | Status: SHIPPED | OUTPATIENT
Start: 2021-11-23 | End: 2021-11-28

## 2021-11-23 RX ORDER — DOXYCYCLINE HYCLATE 100 MG/1
100 CAPSULE ORAL 2 TIMES DAILY
Qty: 14 CAPSULE | Refills: 0 | Status: SHIPPED | OUTPATIENT
Start: 2021-11-23 | End: 2021-11-30

## 2021-11-23 NOTE — PROGRESS NOTES
CHIEF COMPLAINT:     Patient presents with: Other: COVID testing      HPI:   Skyler Galindo is a 55year old female who presents with complaints of chills, sweats, and fatigue for the past 3 days. The patient denies fever.   She denies antipyretics to OR Take by mouth daily. • Selenium 100 MCG Oral Tab Take 100 mcg by mouth 2 (two) times daily. • nicotine 21 MG/24HR Transdermal Patch 24 Hr Place 1 patch onto the skin daily.  28 patch 1   • Mometasone Furo-Formoterol Fum (DULERA) 200-5 MCG/ACT Inh developed, well nourished,in no apparent distress, cooperative   SKIN: no rashes, nosuspicious lesions, no abnormal pigmentation  HEAD: atraumatic, normocephalic  EYES: EOM intact, PERRL. Conjunctiva normal.  Cornea clear.   Lid margins normal.  No active

## 2021-11-23 NOTE — ED PROVIDER NOTES
Patient Seen in: THE [facility] Emergency Department In [place]      History   Patient presents with:  Cough    Stated Complaint: cough x1 week- chills and fatigue- covid test pending     Subjective:   HPI    16-year-old female presents to the emergency depar Comment: rare - 1 drink 3 times a year    Drug use: Not Currently      Types: Cannabis, Cocaine      Comment: former cocaine history-Has used CBD oil             Review of Systems   All other systems reviewed and are negative.       Positive for stated comp Cranial Nerves: No cranial nerve deficit. Coordination: Coordination normal.              ED Course     Labs Reviewed   RAPID SARS-COV-2 BY PCR - Normal   POCT FLU TEST - Normal    Narrative:      This assay is a rapid molecular in vitro test utilizing exacerbation [facility])     Disposition:  Discharge  11/23/2021  4:33 pm    Follow-up:  Mario Hughes MD  [address] [place] [address] [phone]    Schedule an appointment as soon as possible for a visit            Medications Prescribed:  Discharge M

## 2021-12-05 DIAGNOSIS — J44.1 COPD WITH ACUTE EXACERBATION (HCC): ICD-10-CM

## 2021-12-06 RX ORDER — CETIRIZINE HYDROCHLORIDE 10 MG/1
TABLET ORAL
Qty: 30 TABLET | Refills: 2 | Status: SHIPPED | OUTPATIENT
Start: 2021-12-06

## 2021-12-09 RX ORDER — ESCITALOPRAM OXALATE 10 MG/1
TABLET ORAL
Qty: 90 TABLET | Refills: 0 | Status: SHIPPED | OUTPATIENT
Start: 2021-12-09 | End: 2021-12-29

## 2021-12-29 ENCOUNTER — TELEPHONE (OUTPATIENT)
Dept: FAMILY MEDICINE CLINIC | Facility: CLINIC | Age: 56
End: 2021-12-29

## 2021-12-29 DIAGNOSIS — F32.A DEPRESSION, UNSPECIFIED DEPRESSION TYPE: ICD-10-CM

## 2021-12-29 RX ORDER — TRAZODONE HYDROCHLORIDE 150 MG/1
150 TABLET ORAL NIGHTLY
Qty: 30 TABLET | Refills: 5 | Status: SHIPPED | OUTPATIENT
Start: 2021-12-29

## 2021-12-29 RX ORDER — ASPIRIN 81 MG/1
1 TABLET, CHEWABLE ORAL DAILY
COMMUNITY
Start: 2021-10-09

## 2021-12-29 RX ORDER — ESCITALOPRAM OXALATE 20 MG/1
20 TABLET ORAL DAILY
Qty: 30 TABLET | Refills: 5 | Status: SHIPPED | OUTPATIENT
Start: 2021-12-29

## 2021-12-29 RX ORDER — ESCITALOPRAM OXALATE 20 MG/1
20 TABLET ORAL DAILY
COMMUNITY
Start: 2021-11-23 | End: 2021-12-29

## 2021-12-29 RX ORDER — ESCITALOPRAM OXALATE 20 MG/1
20 TABLET ORAL DAILY
Qty: 30 TABLET | Refills: 1 | OUTPATIENT
Start: 2021-12-29

## 2021-12-29 RX ORDER — TRAZODONE HYDROCHLORIDE 100 MG/1
100 TABLET ORAL NIGHTLY
COMMUNITY
Start: 2021-11-23 | End: 2021-12-29

## 2021-12-29 RX ORDER — TRAZODONE HYDROCHLORIDE 100 MG/1
TABLET ORAL
Qty: 30 TABLET | Refills: 1 | OUTPATIENT
Start: 2021-12-29

## 2021-12-29 NOTE — TELEPHONE ENCOUNTER
Patient calling requesting new refill   Tranzolone 10 mg 1 at bedtime    Also was prescribed Escitalopram 12/8 for 10 mg patient is requesting 20 mg that has worked better for her in the past

## 2021-12-29 NOTE — TELEPHONE ENCOUNTER
Patient requesting a refill on trazodone and increase on lexapro. Please advise if you would like patient to schedule OV first. LOV 10/25/2021.

## 2022-01-11 ENCOUNTER — APPOINTMENT (OUTPATIENT)
Dept: GENERAL RADIOLOGY | Age: 57
End: 2022-01-11
Attending: PHYSICIAN ASSISTANT
Payer: MEDICAID

## 2022-01-11 ENCOUNTER — HOSPITAL ENCOUNTER (EMERGENCY)
Age: 57
Discharge: HOME | End: 2022-01-11
Attending: EMERGENCY MEDICINE
Payer: MEDICAID

## 2022-01-11 VITALS
RESPIRATION RATE: 32 BRPM | BODY MASS INDEX: 21.71 KG/M2 | HEART RATE: 63 BPM | TEMPERATURE: 98 F | OXYGEN SATURATION: 98 % | HEIGHT: 61 IN | SYSTOLIC BLOOD PRESSURE: 132 MMHG | WEIGHT: 115 LBS | DIASTOLIC BLOOD PRESSURE: 90 MMHG

## 2022-01-11 DIAGNOSIS — T78.40XA ALLERGIC REACTION, INITIAL ENCOUNTER: ICD-10-CM

## 2022-01-11 DIAGNOSIS — J44.1 COPD EXACERBATION (HCC): Primary | ICD-10-CM

## 2022-01-11 LAB — SARS-COV-2 RNA RESP QL NAA+PROBE: NOT DETECTED

## 2022-01-11 PROCEDURE — 96374 THER/PROPH/DIAG INJ IV PUSH: CPT

## 2022-01-11 PROCEDURE — 71045 X-RAY EXAM CHEST 1 VIEW: CPT | Performed by: PHYSICIAN ASSISTANT

## 2022-01-11 PROCEDURE — 96375 TX/PRO/DX INJ NEW DRUG ADDON: CPT

## 2022-01-11 PROCEDURE — 99284 EMERGENCY DEPT VISIT MOD MDM: CPT

## 2022-01-11 PROCEDURE — S0028 INJECTION, FAMOTIDINE, 20 MG: HCPCS | Performed by: PHYSICIAN ASSISTANT

## 2022-01-11 RX ORDER — FAMOTIDINE 10 MG/ML
20 INJECTION, SOLUTION INTRAVENOUS ONCE
Status: COMPLETED | OUTPATIENT
Start: 2022-01-11 | End: 2022-01-11

## 2022-01-11 RX ORDER — PREDNISONE 20 MG/1
40 TABLET ORAL DAILY
Qty: 10 TABLET | Refills: 0 | Status: SHIPPED | OUTPATIENT
Start: 2022-01-11 | End: 2022-01-16

## 2022-01-11 RX ORDER — ALBUTEROL SULFATE 2.5 MG/3ML
2.5 SOLUTION RESPIRATORY (INHALATION) EVERY 4 HOURS PRN
Qty: 30 EACH | Refills: 0 | Status: SHIPPED | OUTPATIENT
Start: 2022-01-11 | End: 2022-02-10

## 2022-01-11 RX ORDER — DIPHENHYDRAMINE HYDROCHLORIDE 50 MG/ML
25 INJECTION INTRAMUSCULAR; INTRAVENOUS ONCE
Status: COMPLETED | OUTPATIENT
Start: 2022-01-11 | End: 2022-01-11

## 2022-01-11 RX ORDER — METHYLPREDNISOLONE SODIUM SUCCINATE 125 MG/2ML
125 INJECTION, POWDER, LYOPHILIZED, FOR SOLUTION INTRAMUSCULAR; INTRAVENOUS ONCE
Status: COMPLETED | OUTPATIENT
Start: 2022-01-11 | End: 2022-01-11

## 2022-01-11 NOTE — ED PROVIDER NOTES
Patient Seen in: [facility] In [place]      History   Patient presents with:  Difficulty Breathing  Cough/URI    Stated Complaint: sue since yesterday    Subjective:   HPI    27-year-old female with a history of COPD here with complai cigarettes/week    Vaping Use      Vaping Use: Never used    Alcohol use: Not Currently      Comment: rare - 1 drink 3 times a year    Drug use: Not Currently      Types: Cannabis, Cocaine      Comment: former cocaine history-Has used CBD oil             R and Affect: Mood is anxious. Behavior: Behavior normal.         Thought Content:  Thought content normal.         Judgment: Judgment normal.             ED Course     Labs Reviewed   RAPID SARS-COV-2 BY PCR - Normal   RAINBOW DRAW LAVENDER   RAINBOW with your Singulair etc.  Work on smoking sensation. If anything changes i.e. increasing shortness of breath etc. go directly to the emergency room.   Otherwise follow-up with your primary care physician for further evaluation and treatment  This case was

## 2022-01-12 ENCOUNTER — TELEPHONE (OUTPATIENT)
Dept: SURGERY | Facility: CLINIC | Age: 57
End: 2022-01-12

## 2022-01-15 DIAGNOSIS — F32.A DEPRESSION, UNSPECIFIED DEPRESSION TYPE: ICD-10-CM

## 2022-01-16 RX ORDER — HYDROXYZINE HYDROCHLORIDE 25 MG/1
TABLET, FILM COATED ORAL
Qty: 90 TABLET | Refills: 1 | OUTPATIENT
Start: 2022-01-16

## 2022-01-23 ENCOUNTER — TELEPHONE (OUTPATIENT)
Dept: FAMILY MEDICINE CLINIC | Facility: CLINIC | Age: 57
End: 2022-01-23

## 2022-01-23 DIAGNOSIS — E78.5 HYPERLIPIDEMIA, UNSPECIFIED HYPERLIPIDEMIA TYPE: Primary | ICD-10-CM

## 2022-01-24 RX ORDER — EZETIMIBE 10 MG/1
10 TABLET ORAL NIGHTLY
Qty: 90 TABLET | Refills: 1 | Status: SHIPPED | OUTPATIENT
Start: 2022-01-24

## 2022-01-24 RX ORDER — ATORVASTATIN CALCIUM 80 MG/1
TABLET, FILM COATED ORAL
Qty: 90 TABLET | Refills: 0 | OUTPATIENT
Start: 2022-01-24

## 2022-01-24 NOTE — TELEPHONE ENCOUNTER
If LFT or CMP not completed in last 3 months please ask pt to complete.      lipitor stopped due to side effects (recommended to discontinue by specialist)

## 2022-01-24 NOTE — TELEPHONE ENCOUNTER
Medication(s) to Refill:   Requested Prescriptions     Pending Prescriptions Disp Refills   • EZETIMIBE 10 MG Oral Tab [Pharmacy Med Name: EZETIMIBE 10MG TABLETS] 30 tablet 2     Sig: TAKE 1 TABLET(10 MG) BY MOUTH EVERY NIGHT     Refused Prescriptions Disp

## 2022-02-01 DIAGNOSIS — E05.90 HYPERTHYROIDISM: ICD-10-CM

## 2022-02-01 RX ORDER — METHIMAZOLE 5 MG/1
TABLET ORAL
Qty: 60 TABLET | Refills: 0 | OUTPATIENT
Start: 2022-02-01

## 2022-02-07 ENCOUNTER — LAB ENCOUNTER (OUTPATIENT)
Dept: LAB | Age: 57
End: 2022-02-07
Attending: FAMILY MEDICINE
Payer: MEDICAID

## 2022-02-07 DIAGNOSIS — E78.5 HYPERLIPIDEMIA, UNSPECIFIED HYPERLIPIDEMIA TYPE: ICD-10-CM

## 2022-02-07 DIAGNOSIS — H53.2 DIPLOPIA: ICD-10-CM

## 2022-02-07 DIAGNOSIS — E87.6 HYPOKALEMIA: ICD-10-CM

## 2022-02-07 DIAGNOSIS — H57.89 THYROID EYE DISEASE: ICD-10-CM

## 2022-02-07 DIAGNOSIS — Z13.6 ENCOUNTER FOR SCREENING FOR CARDIOVASCULAR DISORDERS: Primary | ICD-10-CM

## 2022-02-07 DIAGNOSIS — E07.9 THYROID EYE DISEASE: ICD-10-CM

## 2022-02-07 DIAGNOSIS — H05.243 CONSTANT EXOPHTHALMOS OF BOTH EYES: ICD-10-CM

## 2022-02-07 LAB
ALBUMIN SERPL-MCNC: 3.6 G/DL (ref 3.4–5)
ALBUMIN/GLOB SERPL: 1 {RATIO} (ref 1–2)
ALP LIVER SERPL-CCNC: 89 U/L
ALT SERPL-CCNC: 22 U/L
ANION GAP SERPL CALC-SCNC: 4 MMOL/L (ref 0–18)
AST SERPL-CCNC: 17 U/L (ref 15–37)
BILIRUB DIRECT SERPL-MCNC: <0.1 MG/DL (ref 0–0.2)
BILIRUB SERPL-MCNC: 0.3 MG/DL (ref 0.1–2)
BUN BLD-MCNC: 13 MG/DL (ref 7–18)
CALCIUM BLD-MCNC: 9 MG/DL (ref 8.5–10.1)
CHLORIDE SERPL-SCNC: 109 MMOL/L (ref 98–112)
CHOLEST SERPL-MCNC: 183 MG/DL (ref ?–200)
CO2 SERPL-SCNC: 27 MMOL/L (ref 21–32)
CREAT BLD-MCNC: 0.6 MG/DL
FASTING PATIENT LIPID ANSWER: YES
FASTING STATUS PATIENT QL REPORTED: YES
GLOBULIN PLAS-MCNC: 3.5 G/DL (ref 2.8–4.4)
GLUCOSE BLD-MCNC: 98 MG/DL (ref 70–99)
HDLC SERPL-MCNC: 40 MG/DL (ref 40–59)
LDLC SERPL CALC-MCNC: 106 MG/DL (ref ?–100)
NONHDLC SERPL-MCNC: 143 MG/DL (ref ?–130)
OSMOLALITY SERPL CALC.SUM OF ELEC: 290 MOSM/KG (ref 275–295)
POTASSIUM SERPL-SCNC: 4.5 MMOL/L (ref 3.5–5.1)
PROT SERPL-MCNC: 7.1 G/DL (ref 6.4–8.2)
SODIUM SERPL-SCNC: 140 MMOL/L (ref 136–145)
T3FREE SERPL-MCNC: 2.94 PG/ML (ref 2.4–4.2)
T4 FREE SERPL-MCNC: 0.9 NG/DL (ref 0.8–1.7)
TRIGL SERPL-MCNC: 214 MG/DL (ref 30–149)
TSI SER-ACNC: 0.76 MIU/ML (ref 0.36–3.74)
VLDLC SERPL CALC-MCNC: 36 MG/DL (ref 0–30)

## 2022-02-07 PROCEDURE — 36415 COLL VENOUS BLD VENIPUNCTURE: CPT

## 2022-02-07 PROCEDURE — 82248 BILIRUBIN DIRECT: CPT

## 2022-02-07 PROCEDURE — 80061 LIPID PANEL: CPT

## 2022-02-07 PROCEDURE — 84443 ASSAY THYROID STIM HORMONE: CPT

## 2022-02-07 PROCEDURE — 84481 FREE ASSAY (FT-3): CPT

## 2022-02-07 PROCEDURE — 80053 COMPREHEN METABOLIC PANEL: CPT

## 2022-02-07 PROCEDURE — 84439 ASSAY OF FREE THYROXINE: CPT

## 2022-02-08 ENCOUNTER — TELEPHONE (OUTPATIENT)
Dept: FAMILY MEDICINE CLINIC | Facility: CLINIC | Age: 57
End: 2022-02-08

## 2022-02-25 RX ORDER — MONTELUKAST SODIUM 10 MG/1
TABLET ORAL
Qty: 90 TABLET | Refills: 1 | Status: SHIPPED | OUTPATIENT
Start: 2022-02-25

## 2022-03-07 ENCOUNTER — MED REC SCAN ONLY (OUTPATIENT)
Dept: FAMILY MEDICINE CLINIC | Facility: CLINIC | Age: 57
End: 2022-03-07

## 2022-03-12 RX ORDER — ALBUTEROL SULFATE 90 UG/1
2 AEROSOL, METERED RESPIRATORY (INHALATION)
Qty: 8.5 G | Refills: 1 | Status: SHIPPED | OUTPATIENT
Start: 2022-03-12 | End: 2022-04-11

## 2022-03-28 RX ORDER — FOLIC ACID 1 MG/1
TABLET ORAL
Qty: 30 TABLET | Refills: 2 | Status: SHIPPED | OUTPATIENT
Start: 2022-03-28

## 2022-04-04 ENCOUNTER — OFFICE VISIT (OUTPATIENT)
Dept: FAMILY MEDICINE CLINIC | Facility: CLINIC | Age: 57
End: 2022-04-04
Payer: MEDICAID

## 2022-04-04 ENCOUNTER — APPOINTMENT (OUTPATIENT)
Dept: GENERAL RADIOLOGY | Age: 57
End: 2022-04-04
Attending: EMERGENCY MEDICINE
Payer: MEDICAID

## 2022-04-04 ENCOUNTER — HOSPITAL ENCOUNTER (EMERGENCY)
Age: 57
Discharge: HOME | End: 2022-04-04
Attending: EMERGENCY MEDICINE
Payer: MEDICAID

## 2022-04-04 VITALS
HEIGHT: 62 IN | TEMPERATURE: 98 F | HEART RATE: 74 BPM | WEIGHT: 115 LBS | RESPIRATION RATE: 19 BRPM | DIASTOLIC BLOOD PRESSURE: 55 MMHG | OXYGEN SATURATION: 98 % | BODY MASS INDEX: 21.16 KG/M2 | SYSTOLIC BLOOD PRESSURE: 103 MMHG

## 2022-04-04 VITALS
OXYGEN SATURATION: 97 % | TEMPERATURE: 99 F | HEART RATE: 94 BPM | SYSTOLIC BLOOD PRESSURE: 108 MMHG | DIASTOLIC BLOOD PRESSURE: 74 MMHG | RESPIRATION RATE: 18 BRPM

## 2022-04-04 DIAGNOSIS — R06.02 SHORTNESS OF BREATH: ICD-10-CM

## 2022-04-04 DIAGNOSIS — J44.1 COPD EXACERBATION (HCC): Primary | ICD-10-CM

## 2022-04-04 DIAGNOSIS — J18.9 COMMUNITY ACQUIRED PNEUMONIA OF RIGHT LOWER LOBE OF LUNG: ICD-10-CM

## 2022-04-04 DIAGNOSIS — J44.9 CHRONIC OBSTRUCTIVE PULMONARY DISEASE, UNSPECIFIED COPD TYPE (HCC): ICD-10-CM

## 2022-04-04 DIAGNOSIS — R06.2 WHEEZING: Primary | ICD-10-CM

## 2022-04-04 LAB
POCT INFLUENZA A: NEGATIVE
POCT INFLUENZA B: NEGATIVE
SARS-COV-2 RNA RESP QL NAA+PROBE: NOT DETECTED

## 2022-04-04 PROCEDURE — 99284 EMERGENCY DEPT VISIT MOD MDM: CPT | Performed by: EMERGENCY MEDICINE

## 2022-04-04 PROCEDURE — 94640 AIRWAY INHALATION TREATMENT: CPT

## 2022-04-04 PROCEDURE — 71045 X-RAY EXAM CHEST 1 VIEW: CPT | Performed by: EMERGENCY MEDICINE

## 2022-04-04 PROCEDURE — 87502 INFLUENZA DNA AMP PROBE: CPT | Performed by: EMERGENCY MEDICINE

## 2022-04-04 RX ORDER — PREDNISONE 20 MG/1
60 TABLET ORAL ONCE
Status: COMPLETED | OUTPATIENT
Start: 2022-04-04 | End: 2022-04-04

## 2022-04-04 RX ORDER — ALBUTEROL SULFATE 90 UG/1
8 AEROSOL, METERED RESPIRATORY (INHALATION) ONCE
Status: COMPLETED | OUTPATIENT
Start: 2022-04-04 | End: 2022-04-04

## 2022-04-04 RX ORDER — PREDNISONE 20 MG/1
40 TABLET ORAL DAILY
Qty: 10 TABLET | Refills: 0 | Status: SHIPPED | OUTPATIENT
Start: 2022-04-04 | End: 2022-04-09

## 2022-04-04 RX ORDER — AZITHROMYCIN 250 MG/1
TABLET, FILM COATED ORAL
Qty: 6 TABLET | Refills: 0 | Status: SHIPPED | OUTPATIENT
Start: 2022-04-04 | End: 2022-04-09

## 2022-04-04 RX ORDER — AMOXICILLIN AND CLAVULANATE POTASSIUM 875; 125 MG/1; MG/1
1 TABLET, FILM COATED ORAL 2 TIMES DAILY
Qty: 20 TABLET | Refills: 0 | Status: SHIPPED | OUTPATIENT
Start: 2022-04-04 | End: 2022-04-14

## 2022-04-04 NOTE — PROGRESS NOTES
Lorena Pepe is a 55year old female w/ hx of COPD who presents to [facility] with c/o wheezing, cough and shortness of breath. Notes fevers up to 102. Notes symptoms started 1 week ago with mild nasal congestin/runny nose. Now notes worsening cough and wheezing. Using inhalers and nebs at home with no relief. On exam sats are good at 97%. However this is inspiratory and expiratory wheezing in bilat lung fields. Pt notes she is now short of breath and albuterol is not working at home. Discussed HPI and physical exam with pt. We discussed the limited diagnostic capacity of this clinic and there are dangerous conditions associated with her wheezing fevers, and shortness of breath that I can not fully rule out. I advised she be seen in the ED. Pt verbalized understanding and notes she will go to the [facility] ED. She declined medical transport. We discussed the risks of not going by medical transport including worsening condition, permanent injury and/or death. She verbalized understanding  Accompanied by: self  After triage, higher acuity of care was recommended to Lorena Pepe today. Rationale: Need for further evaluation and management outside the scope of practice for the walk in clinic  Site recommendation: [facility]  Patient declined transport via EMS or having someone else drive her. Patient/parent verbalized understanding of rationale for further evaluation and was stable upon discharge.

## 2022-04-18 ENCOUNTER — HOSPITAL ENCOUNTER (EMERGENCY)
Facility: HOSPITAL | Age: 57
Discharge: HOME | End: 2022-04-18
Payer: MEDICAID

## 2022-04-18 ENCOUNTER — HOSPITAL ENCOUNTER (EMERGENCY)
Age: 57
Discharge: HOME | End: 2022-04-18
Payer: MEDICAID

## 2022-04-18 VITALS
OXYGEN SATURATION: 99 % | WEIGHT: 115 LBS | RESPIRATION RATE: 16 BRPM | SYSTOLIC BLOOD PRESSURE: 119 MMHG | HEART RATE: 80 BPM | BODY MASS INDEX: 21.71 KG/M2 | TEMPERATURE: 98 F | HEIGHT: 61 IN | DIASTOLIC BLOOD PRESSURE: 74 MMHG

## 2022-04-18 VITALS
RESPIRATION RATE: 19 BRPM | OXYGEN SATURATION: 96 % | DIASTOLIC BLOOD PRESSURE: 79 MMHG | SYSTOLIC BLOOD PRESSURE: 113 MMHG | TEMPERATURE: 98 F | HEART RATE: 83 BPM

## 2022-04-18 DIAGNOSIS — S61.215A LACERATION OF LEFT RING FINGER WITHOUT FOREIGN BODY WITHOUT DAMAGE TO NAIL, INITIAL ENCOUNTER: Primary | ICD-10-CM

## 2022-04-18 PROCEDURE — 99283 EMERGENCY DEPT VISIT LOW MDM: CPT

## 2022-04-18 PROCEDURE — 12001 RPR S/N/AX/GEN/TRNK 2.5CM/<: CPT

## 2022-04-18 PROCEDURE — 90471 IMMUNIZATION ADMIN: CPT

## 2022-04-18 NOTE — ED INITIAL ASSESSMENT (HPI)
A&Ox3 patient p/w c/o L ring finger laceration    Patient was at work cutting food and sliced tip of finger, dressing intact, bleeding controlled at this time. Takes daily baby ASA, unknown last tetanus.      Denies any cp/sob/n/v/d/c/f/LH/vision changes/urinary sx at this time  RR even/NL, speaking in full clear sentences, ambulatory w/ steady gait

## 2022-04-18 NOTE — TELEPHONE ENCOUNTER
Most recent labs 09/10/2020 - Present, along with OV notes from most recent physical 09/16/2020, faxed to psychiatrist with [facility] at [phone] as requested. Fax confirmation received. Patient notified via [employer]. Rounding (Video Assessment):  Yes    Comments: Video rounds completed. Pt lying in bed w b eye closed hr 64 o2 sats 98% bp 164/90 resp 28 per monitor

## 2022-04-18 NOTE — ED INITIAL ASSESSMENT (HPI)
Left 4th finger avulsion. Tip of finger currently adhering back to finger.  Patient not up to date on Tetanus

## 2022-04-26 RX ORDER — CETIRIZINE HYDROCHLORIDE 10 MG/1
TABLET ORAL
Qty: 30 TABLET | Refills: 2 | Status: SHIPPED | OUTPATIENT
Start: 2022-04-26

## 2022-05-10 ENCOUNTER — OFFICE VISIT (OUTPATIENT)
Dept: FAMILY MEDICINE CLINIC | Facility: CLINIC | Age: 57
End: 2022-05-10
Payer: MEDICAID

## 2022-05-10 ENCOUNTER — TELEPHONE (OUTPATIENT)
Dept: FAMILY MEDICINE CLINIC | Facility: CLINIC | Age: 57
End: 2022-05-10

## 2022-05-10 ENCOUNTER — LAB ENCOUNTER (OUTPATIENT)
Dept: LAB | Age: 57
End: 2022-05-10
Attending: FAMILY MEDICINE
Payer: MEDICAID

## 2022-05-10 VITALS
HEIGHT: 61 IN | BODY MASS INDEX: 20.01 KG/M2 | OXYGEN SATURATION: 97 % | RESPIRATION RATE: 18 BRPM | SYSTOLIC BLOOD PRESSURE: 92 MMHG | WEIGHT: 106 LBS | DIASTOLIC BLOOD PRESSURE: 58 MMHG | HEART RATE: 70 BPM

## 2022-05-10 DIAGNOSIS — F33.2 SEVERE EPISODE OF RECURRENT MAJOR DEPRESSIVE DISORDER, WITHOUT PSYCHOTIC FEATURES (HCC): ICD-10-CM

## 2022-05-10 DIAGNOSIS — G89.29 CHRONIC LEFT HIP PAIN: ICD-10-CM

## 2022-05-10 DIAGNOSIS — F19.10 SUBSTANCE ABUSE (HCC): ICD-10-CM

## 2022-05-10 DIAGNOSIS — Z80.0 FAMILY HISTORY OF COLON CANCER REQUIRING SCREENING COLONOSCOPY: ICD-10-CM

## 2022-05-10 DIAGNOSIS — Z01.419 WELL WOMAN EXAM WITH ROUTINE GYNECOLOGICAL EXAM: Primary | ICD-10-CM

## 2022-05-10 DIAGNOSIS — Z12.31 ENCOUNTER FOR SCREENING MAMMOGRAM FOR MALIGNANT NEOPLASM OF BREAST: ICD-10-CM

## 2022-05-10 DIAGNOSIS — R63.4 UNINTENTIONAL WEIGHT LOSS: ICD-10-CM

## 2022-05-10 DIAGNOSIS — E05.90 HYPERTHYROIDISM: ICD-10-CM

## 2022-05-10 DIAGNOSIS — Z12.11 SCREENING FOR COLON CANCER: ICD-10-CM

## 2022-05-10 DIAGNOSIS — M25.552 CHRONIC LEFT HIP PAIN: ICD-10-CM

## 2022-05-10 DIAGNOSIS — Z00.00 LABORATORY EXAMINATION ORDERED AS PART OF A ROUTINE GENERAL MEDICAL EXAMINATION: ICD-10-CM

## 2022-05-10 DIAGNOSIS — J44.9 CHRONIC OBSTRUCTIVE PULMONARY DISEASE, UNSPECIFIED COPD TYPE (HCC): ICD-10-CM

## 2022-05-10 LAB
ALBUMIN SERPL-MCNC: 3.3 G/DL (ref 3.4–5)
ALBUMIN/GLOB SERPL: 0.8 {RATIO} (ref 1–2)
ALP LIVER SERPL-CCNC: 79 U/L
ALT SERPL-CCNC: 17 U/L
ANION GAP SERPL CALC-SCNC: 6 MMOL/L (ref 0–18)
AST SERPL-CCNC: 17 U/L (ref 15–37)
BASOPHILS # BLD AUTO: 0.05 X10(3) UL (ref 0–0.2)
BASOPHILS NFR BLD AUTO: 0.6 %
BILIRUB SERPL-MCNC: 0.3 MG/DL (ref 0.1–2)
BUN BLD-MCNC: 19 MG/DL (ref 7–18)
CALCIUM BLD-MCNC: 8.9 MG/DL (ref 8.5–10.1)
CHLORIDE SERPL-SCNC: 108 MMOL/L (ref 98–112)
CO2 SERPL-SCNC: 25 MMOL/L (ref 21–32)
CREAT BLD-MCNC: 0.62 MG/DL
EOSINOPHIL # BLD AUTO: 0.21 X10(3) UL (ref 0–0.7)
EOSINOPHIL NFR BLD AUTO: 2.3 %
ERYTHROCYTE [DISTWIDTH] IN BLOOD BY AUTOMATED COUNT: 15.2 %
FASTING STATUS PATIENT QL REPORTED: NO
GLOBULIN PLAS-MCNC: 4.2 G/DL (ref 2.8–4.4)
GLUCOSE BLD-MCNC: 93 MG/DL (ref 70–99)
HCT VFR BLD AUTO: 40.3 %
HGB BLD-MCNC: 13 G/DL
IMM GRANULOCYTES # BLD AUTO: 0.03 X10(3) UL (ref 0–1)
IMM GRANULOCYTES NFR BLD: 0.3 %
LDH SERPL L TO P-CCNC: 311 U/L
LYMPHOCYTES # BLD AUTO: 2.91 X10(3) UL (ref 1–4)
LYMPHOCYTES NFR BLD AUTO: 32.3 %
MCH RBC QN AUTO: 30.4 PG (ref 26–34)
MCHC RBC AUTO-ENTMCNC: 32.3 G/DL (ref 31–37)
MCV RBC AUTO: 94.2 FL
MONOCYTES # BLD AUTO: 0.71 X10(3) UL (ref 0.1–1)
MONOCYTES NFR BLD AUTO: 7.9 %
NEUTROPHILS # BLD AUTO: 5.11 X10 (3) UL (ref 1.5–7.7)
NEUTROPHILS # BLD AUTO: 5.11 X10(3) UL (ref 1.5–7.7)
NEUTROPHILS NFR BLD AUTO: 56.6 %
OSMOLALITY SERPL CALC.SUM OF ELEC: 290 MOSM/KG (ref 275–295)
PLATELET # BLD AUTO: 421 10(3)UL (ref 150–450)
POTASSIUM SERPL-SCNC: 4.2 MMOL/L (ref 3.5–5.1)
PREALB SERPL-MCNC: 20.4 MG/DL (ref 20–40)
PROT SERPL-MCNC: 7.5 G/DL (ref 6.4–8.2)
RBC # BLD AUTO: 4.28 X10(6)UL
SODIUM SERPL-SCNC: 139 MMOL/L (ref 136–145)
T3FREE SERPL-MCNC: 3.25 PG/ML (ref 2.4–4.2)
T4 FREE SERPL-MCNC: 0.9 NG/DL (ref 0.8–1.7)
TSI SER-ACNC: 0.84 MIU/ML (ref 0.36–3.74)
WBC # BLD AUTO: 9 X10(3) UL (ref 4–11)

## 2022-05-10 PROCEDURE — 3074F SYST BP LT 130 MM HG: CPT | Performed by: FAMILY MEDICINE

## 2022-05-10 PROCEDURE — 87624 HPV HI-RISK TYP POOLED RSLT: CPT | Performed by: FAMILY MEDICINE

## 2022-05-10 PROCEDURE — 3008F BODY MASS INDEX DOCD: CPT | Performed by: FAMILY MEDICINE

## 2022-05-10 PROCEDURE — 84443 ASSAY THYROID STIM HORMONE: CPT

## 2022-05-10 PROCEDURE — 36415 COLL VENOUS BLD VENIPUNCTURE: CPT

## 2022-05-10 PROCEDURE — 83615 LACTATE (LD) (LDH) ENZYME: CPT

## 2022-05-10 PROCEDURE — 84481 FREE ASSAY (FT-3): CPT

## 2022-05-10 PROCEDURE — 3078F DIAST BP <80 MM HG: CPT | Performed by: FAMILY MEDICINE

## 2022-05-10 PROCEDURE — 99396 PREV VISIT EST AGE 40-64: CPT | Performed by: FAMILY MEDICINE

## 2022-05-10 PROCEDURE — 88175 CYTOPATH C/V AUTO FLUID REDO: CPT | Performed by: FAMILY MEDICINE

## 2022-05-10 PROCEDURE — 84134 ASSAY OF PREALBUMIN: CPT

## 2022-05-10 PROCEDURE — 80053 COMPREHEN METABOLIC PANEL: CPT

## 2022-05-10 PROCEDURE — 84439 ASSAY OF FREE THYROXINE: CPT

## 2022-05-10 PROCEDURE — 85025 COMPLETE CBC W/AUTO DIFF WBC: CPT

## 2022-05-10 RX ORDER — BUDESONIDE, GLYCOPYRROLATE, AND FORMOTEROL FUMARATE 160; 9; 4.8 UG/1; UG/1; UG/1
2 AEROSOL, METERED RESPIRATORY (INHALATION) 2 TIMES DAILY
Qty: 1 EACH | Refills: 0 | COMMUNITY
Start: 2022-05-10

## 2022-05-10 RX ORDER — NICOTINE 21 MG/24HR
1 PATCH, TRANSDERMAL 24 HOURS TRANSDERMAL EVERY 24 HOURS
Qty: 28 PATCH | Refills: 1 | Status: SHIPPED | OUTPATIENT
Start: 2022-05-10

## 2022-05-10 RX ORDER — PREDNISONE 20 MG/1
40 TABLET ORAL DAILY
Qty: 10 TABLET | Refills: 0 | Status: SHIPPED | OUTPATIENT
Start: 2022-05-10 | End: 2022-05-15

## 2022-05-10 RX ORDER — BUDESONIDE, GLYCOPYRROLATE, AND FORMOTEROL FUMARATE 160; 9; 4.8 UG/1; UG/1; UG/1
2 AEROSOL, METERED RESPIRATORY (INHALATION) 2 TIMES DAILY
Qty: 1 EACH | Refills: 0 | Status: SHIPPED | OUTPATIENT
Start: 2022-05-10 | End: 2022-05-10

## 2022-05-11 LAB — HPV I/H RISK 1 DNA SPEC QL NAA+PROBE: NEGATIVE

## 2022-05-12 ENCOUNTER — TELEPHONE (OUTPATIENT)
Dept: FAMILY MEDICINE CLINIC | Facility: CLINIC | Age: 57
End: 2022-05-12

## 2022-05-23 ENCOUNTER — HOSPITAL ENCOUNTER (OUTPATIENT)
Dept: GENERAL RADIOLOGY | Age: 57
Discharge: HOME | End: 2022-05-23
Attending: FAMILY MEDICINE
Payer: MEDICAID

## 2022-05-23 ENCOUNTER — HOSPITAL ENCOUNTER (OUTPATIENT)
Dept: ULTRASOUND IMAGING | Age: 57
Discharge: HOME | End: 2022-05-23
Attending: FAMILY MEDICINE
Payer: MEDICAID

## 2022-05-23 DIAGNOSIS — R63.4 UNINTENTIONAL WEIGHT LOSS: ICD-10-CM

## 2022-05-23 DIAGNOSIS — M25.552 CHRONIC LEFT HIP PAIN: ICD-10-CM

## 2022-05-23 DIAGNOSIS — G89.29 CHRONIC LEFT HIP PAIN: ICD-10-CM

## 2022-05-23 PROCEDURE — 76700 US EXAM ABDOM COMPLETE: CPT | Performed by: FAMILY MEDICINE

## 2022-05-23 PROCEDURE — 73502 X-RAY EXAM HIP UNI 2-3 VIEWS: CPT | Performed by: FAMILY MEDICINE

## 2022-05-24 ENCOUNTER — PATIENT MESSAGE (OUTPATIENT)
Dept: FAMILY MEDICINE CLINIC | Facility: CLINIC | Age: 57
End: 2022-05-24

## 2022-05-25 NOTE — TELEPHONE ENCOUNTER
----- Message from Dilma Locke MD sent at 5/24/2022 11:07 PM CDT -----  Results reviewed. Released to [address] abd/pelvis with contrast recommended. Dilated CBD. Evaluate for biliary for pancreatic pathology.

## 2022-05-25 NOTE — TELEPHONE ENCOUNTER
Called the patient back. Notified the patient of the below US results and recommendation. Patient verbalized understanding. Patient states that she already scheduled CT scan on 6/5/2022. Patient then stated that she was told that PCP needs to order oral contrast for her to . Explained to patient that she picks up oral contrast from hospital. PCP does not send order to pharmacy for oral contrast. Patient verbalized understanding. Patient then stated that she would like to have contact information for surgeon that would be completing colonoscopy sent to [facility] [address] [facility] [address]. Answered all questions at this time.

## 2022-06-03 ENCOUNTER — TELEPHONE (OUTPATIENT)
Dept: FAMILY MEDICINE CLINIC | Facility: CLINIC | Age: 57
End: 2022-06-03

## 2022-06-03 NOTE — PROGRESS NOTES
Thyroid level stable. Change methimazole from 10mg to 7.5mg daily. Repeat labs one week prior to next appt - rx and labs already ordered. 98.5

## 2022-06-05 ENCOUNTER — PATIENT MESSAGE (OUTPATIENT)
Dept: FAMILY MEDICINE CLINIC | Facility: CLINIC | Age: 57
End: 2022-06-05

## 2022-06-05 ENCOUNTER — HOSPITAL ENCOUNTER (OUTPATIENT)
Dept: CT IMAGING | Age: 57
End: 2022-06-05
Attending: FAMILY MEDICINE
Payer: MEDICAID

## 2022-06-05 ENCOUNTER — HOSPITAL ENCOUNTER (OUTPATIENT)
Dept: CT IMAGING | Age: 57
Discharge: HOME | End: 2022-06-05
Attending: FAMILY MEDICINE
Payer: MEDICAID

## 2022-06-05 DIAGNOSIS — R63.4 UNINTENTIONAL WEIGHT LOSS: ICD-10-CM

## 2022-06-05 DIAGNOSIS — K83.8 DILATION OF COMMON BILE DUCT: ICD-10-CM

## 2022-06-05 DIAGNOSIS — K83.8 DILATED BILE DUCT: Primary | ICD-10-CM

## 2022-06-05 DIAGNOSIS — J84.10 PULMONARY FIBROSIS (HCC): ICD-10-CM

## 2022-06-05 PROCEDURE — 74177 CT ABD & PELVIS W/CONTRAST: CPT | Performed by: FAMILY MEDICINE

## 2022-06-06 ENCOUNTER — TELEPHONE (OUTPATIENT)
Dept: FAMILY MEDICINE CLINIC | Facility: CLINIC | Age: 57
End: 2022-06-06

## 2022-06-06 NOTE — TELEPHONE ENCOUNTER
From: Billie Callahan  To: Deedee Babin MD  Sent: 6/5/2022 6:07 PM CDT  Subject: Lungs    I would really like to talk about my test results as soon as possible with you if that's possible. I'm very worried and scared from what I read but I really don't know how to interpret them.     Thanks,   [facility]

## 2022-06-06 NOTE — TELEPHONE ENCOUNTER
I spoke with Eduardo Griffith & notified her that pt is on [place].   See 6/6 TE regarding medical record request.

## 2022-06-07 RX ORDER — VARENICLINE TARTRATE 25 MG
KIT ORAL
Qty: 53 TABLET | Refills: 0 | Status: SHIPPED | OUTPATIENT
Start: 2022-06-07

## 2022-06-07 RX ORDER — BUDESONIDE, GLYCOPYRROLATE, AND FORMOTEROL FUMARATE 160; 9; 4.8 UG/1; UG/1; UG/1
2 AEROSOL, METERED RESPIRATORY (INHALATION) 2 TIMES DAILY
Qty: 10.7 G | Refills: 3 | Status: SHIPPED | OUTPATIENT
Start: 2022-06-07

## 2022-06-07 NOTE — TELEPHONE ENCOUNTER
Patient called as she is very nervous about the results, Pt asked if Dr. Lencho Martinez would call her. other/severe eczema

## 2022-06-07 NOTE — TELEPHONE ENCOUNTER
Patient needs to see pulmonology for pulmonary fibrosis   She was thought to have primarily emphysema due to smoking history, but pulmonary fibrosis is different - usually idiopathic   Still treated with inhaled steroids, but needs to see pulm regularly to determine long term treatment. No lymphadenopathy   Liver lesions are likely benign   Gallbladder looks ok, but bile duct still enlarged. Needs to have MRCP to evaluate - and refer to GI vs surgery depending on results.

## 2022-06-07 NOTE — TELEPHONE ENCOUNTER
Talked with patient  Cece bevespi for now   Referred to pulmonology - patient prefers [address]  Referral ordered     MRCP ordered   Needs to establish with GI   Will refer to [place] as well to try to keep specialists together   Referral ordered -     [address]  Phone: [phone]    Please send referral info to patient     chantix ordered - if still on recall ok to substitute with generic apo-varenacline

## 2022-06-08 ENCOUNTER — TELEPHONE (OUTPATIENT)
Dept: FAMILY MEDICINE CLINIC | Facility: CLINIC | Age: 57
End: 2022-06-08

## 2022-06-08 NOTE — TELEPHONE ENCOUNTER
Patient signed 2 KANA for info to go to   [address] [place] [address],Third Floor gastroenterology   medicine pulmonology    Sent to scan stat, one request was marked to be expidited

## 2022-06-09 ENCOUNTER — TELEPHONE (OUTPATIENT)
Dept: FAMILY MEDICINE CLINIC | Facility: CLINIC | Age: 57
End: 2022-06-09

## 2022-06-09 NOTE — TELEPHONE ENCOUNTER
Called [facility], talked with pharmacist who states the  for chantix will be able to sent chantix tomorrow or by Monday the latest to the pharmacy. Arara message sent to pt.

## 2022-06-09 NOTE — TELEPHONE ENCOUNTER
Pt states that marla is having a hard time getting Varenicline Tartrate (CHANTIX STARTING MONTH ANGE) 0.5 MG X 11 & 1 MG X 42 Oral Misc  Pt is requesting another med in it's place. Pt uses [facility].

## 2022-06-11 ENCOUNTER — HOSPITAL ENCOUNTER (OUTPATIENT)
Dept: MRI IMAGING | Age: 57
Discharge: HOME | End: 2022-06-11
Attending: FAMILY MEDICINE
Payer: MEDICAID

## 2022-06-11 DIAGNOSIS — R63.4 UNINTENTIONAL WEIGHT LOSS: ICD-10-CM

## 2022-06-11 PROCEDURE — 76376 3D RENDER W/INTRP POSTPROCES: CPT | Performed by: FAMILY MEDICINE

## 2022-06-11 PROCEDURE — 74181 MRI ABDOMEN W/O CONTRAST: CPT | Performed by: FAMILY MEDICINE

## 2022-06-16 ENCOUNTER — APPOINTMENT (OUTPATIENT)
Dept: GENERAL RADIOLOGY | Age: 57
End: 2022-06-16
Payer: MEDICAID

## 2022-06-16 ENCOUNTER — HOSPITAL ENCOUNTER (EMERGENCY)
Age: 57
Discharge: HOME | End: 2022-06-16
Payer: MEDICAID

## 2022-06-16 VITALS
TEMPERATURE: 98 F | BODY MASS INDEX: 20.24 KG/M2 | RESPIRATION RATE: 18 BRPM | SYSTOLIC BLOOD PRESSURE: 117 MMHG | HEIGHT: 62 IN | OXYGEN SATURATION: 97 % | HEART RATE: 64 BPM | WEIGHT: 110 LBS | DIASTOLIC BLOOD PRESSURE: 40 MMHG

## 2022-06-16 DIAGNOSIS — S93.402A MILD SPRAIN OF LEFT ANKLE, INITIAL ENCOUNTER: Primary | ICD-10-CM

## 2022-06-16 PROCEDURE — 99283 EMERGENCY DEPT VISIT LOW MDM: CPT

## 2022-06-16 PROCEDURE — 73610 X-RAY EXAM OF ANKLE: CPT

## 2022-06-28 ENCOUNTER — HOSPITAL ENCOUNTER (OUTPATIENT)
Dept: MAMMOGRAPHY | Age: 57
Discharge: HOME | End: 2022-06-28
Attending: FAMILY MEDICINE
Payer: MEDICAID

## 2022-06-28 DIAGNOSIS — Z12.31 ENCOUNTER FOR SCREENING MAMMOGRAM FOR MALIGNANT NEOPLASM OF BREAST: ICD-10-CM

## 2022-06-28 PROCEDURE — 77063 BREAST TOMOSYNTHESIS BI: CPT | Performed by: FAMILY MEDICINE

## 2022-06-28 PROCEDURE — 77067 SCR MAMMO BI INCL CAD: CPT | Performed by: FAMILY MEDICINE

## 2022-07-02 DIAGNOSIS — R63.4 UNINTENTIONAL WEIGHT LOSS: ICD-10-CM

## 2022-07-22 RX ORDER — FOLIC ACID 1 MG/1
TABLET ORAL
Qty: 30 TABLET | Refills: 2 | Status: SHIPPED | OUTPATIENT
Start: 2022-07-22

## 2022-07-29 RX ORDER — TRAZODONE HYDROCHLORIDE 150 MG/1
TABLET ORAL
Qty: 30 TABLET | Refills: 5 | Status: SHIPPED | OUTPATIENT
Start: 2022-07-29

## 2022-08-05 RX ORDER — EZETIMIBE 10 MG/1
TABLET ORAL
Qty: 90 TABLET | Refills: 1 | Status: SHIPPED | OUTPATIENT
Start: 2022-08-05

## 2022-08-22 DIAGNOSIS — J44.1 COPD WITH ACUTE EXACERBATION (HCC): ICD-10-CM

## 2022-08-22 RX ORDER — MONTELUKAST SODIUM 10 MG/1
TABLET ORAL
Qty: 90 TABLET | Refills: 1 | Status: SHIPPED | OUTPATIENT
Start: 2022-08-22

## 2022-08-26 ENCOUNTER — PATIENT MESSAGE (OUTPATIENT)
Dept: FAMILY MEDICINE CLINIC | Facility: CLINIC | Age: 57
End: 2022-08-26

## 2022-08-26 DIAGNOSIS — R94.8 ABNORMAL POSITRON EMISSION TOMOGRAPHY (PET) SCAN: Primary | ICD-10-CM

## 2022-08-27 NOTE — TELEPHONE ENCOUNTER
From: Yahir Kiser  To: Laya Valdes MD  Sent: 8/26/2022 6:20 PM CDT  Subject: PET scan    Dear Dr Maryann Escobar I was wondering if I could get an appointment with you to discuss the results of my pet scan and also I'm trying to get this ability due to my lung fibrosis and have paperwork that the government needs filled out.  Thank you in advance and hope you have a great weekend

## 2022-08-28 NOTE — TELEPHONE ENCOUNTER
Doc,   pt had a PET scan at [facility] from Dr. Misa Santacruz and she is asking if she can schedule an appt to discuss the results with you and also she is looking to get disability for her lung fibrosis and has paperwork that she is asking you to fill out. Would that come from Dr Urrutia Spare?     Please advise      Only see a PFT done 6/27/22 and CT chest done 7/14/22

## 2022-08-29 ENCOUNTER — PATIENT MESSAGE (OUTPATIENT)
Dept: FAMILY MEDICINE CLINIC | Facility: CLINIC | Age: 57
End: 2022-08-29

## 2022-08-29 NOTE — TELEPHONE ENCOUNTER
Yes she can schedule appt to discuss disability paperwork.      Needs to follow up with endo regarding abnormal thyroid gland seen on PET scan

## 2022-08-30 DIAGNOSIS — F32.A DEPRESSION, UNSPECIFIED DEPRESSION TYPE: ICD-10-CM

## 2022-08-30 RX ORDER — HYDROXYZINE HYDROCHLORIDE 25 MG/1
TABLET, FILM COATED ORAL
Qty: 90 TABLET | Refills: 1 | OUTPATIENT
Start: 2022-08-30

## 2022-08-30 NOTE — TELEPHONE ENCOUNTER
From: Orly Paget  Sent: 8/30/2022 8:20 AM CDT  To: [facility] Clinical Staff  Subject: Appointment    Do you know if that doctor is associated with Shirley Melendrez like to keep all my specialist at [facility]. Please advise!

## 2022-09-06 ENCOUNTER — OFFICE VISIT (OUTPATIENT)
Dept: FAMILY MEDICINE CLINIC | Facility: CLINIC | Age: 57
End: 2022-09-06
Payer: MEDICAID

## 2022-09-06 VITALS
WEIGHT: 113 LBS | HEART RATE: 71 BPM | DIASTOLIC BLOOD PRESSURE: 60 MMHG | OXYGEN SATURATION: 99 % | HEIGHT: 62 IN | SYSTOLIC BLOOD PRESSURE: 100 MMHG | BODY MASS INDEX: 20.8 KG/M2 | TEMPERATURE: 98 F | RESPIRATION RATE: 17 BRPM

## 2022-09-06 DIAGNOSIS — E05.90 HYPERTHYROIDISM: ICD-10-CM

## 2022-09-06 DIAGNOSIS — J43.1 PANLOBULAR EMPHYSEMA (HCC): ICD-10-CM

## 2022-09-06 DIAGNOSIS — F19.11 HISTORY OF SUBSTANCE ABUSE (HCC): ICD-10-CM

## 2022-09-06 DIAGNOSIS — J84.10 PULMONARY FIBROSIS (HCC): Primary | ICD-10-CM

## 2022-09-06 DIAGNOSIS — F33.2 SEVERE EPISODE OF RECURRENT MAJOR DEPRESSIVE DISORDER, WITHOUT PSYCHOTIC FEATURES (HCC): ICD-10-CM

## 2022-09-06 DIAGNOSIS — L08.9 SUPERFICIAL SKIN INFECTION: ICD-10-CM

## 2022-09-06 PROCEDURE — 3078F DIAST BP <80 MM HG: CPT | Performed by: FAMILY MEDICINE

## 2022-09-06 PROCEDURE — 3008F BODY MASS INDEX DOCD: CPT | Performed by: FAMILY MEDICINE

## 2022-09-06 PROCEDURE — 3074F SYST BP LT 130 MM HG: CPT | Performed by: FAMILY MEDICINE

## 2022-09-06 PROCEDURE — 99214 OFFICE O/P EST MOD 30 MIN: CPT | Performed by: FAMILY MEDICINE

## 2022-09-06 RX ORDER — HYDROXYZINE HYDROCHLORIDE 25 MG/1
25 TABLET, FILM COATED ORAL 3 TIMES DAILY PRN
Refills: 0 | Status: CANCELLED | OUTPATIENT
Start: 2022-09-06

## 2022-09-06 RX ORDER — HYDROXYZINE HYDROCHLORIDE 25 MG/1
25 TABLET, FILM COATED ORAL EVERY 8 HOURS PRN
Qty: 180 TABLET | Refills: 1 | Status: SHIPPED | OUTPATIENT
Start: 2022-09-06

## 2022-09-06 RX ORDER — CLINDAMYCIN HYDROCHLORIDE 300 MG/1
300 CAPSULE ORAL 3 TIMES DAILY
Qty: 30 CAPSULE | Refills: 0 | Status: SHIPPED | OUTPATIENT
Start: 2022-09-06 | End: 2022-09-16

## 2022-09-06 RX ORDER — VARENICLINE TARTRATE 25 MG
KIT ORAL
Qty: 53 TABLET | Refills: 0 | Status: SHIPPED | OUTPATIENT
Start: 2022-09-06

## 2022-09-06 RX ORDER — VARENICLINE TARTRATE 1 MG/1
1 TABLET, FILM COATED ORAL 2 TIMES DAILY
Qty: 60 TABLET | Refills: 3 | Status: SHIPPED | OUTPATIENT
Start: 2022-10-06 | End: 2023-02-03

## 2022-09-07 ENCOUNTER — TELEPHONE (OUTPATIENT)
Dept: FAMILY MEDICINE CLINIC | Facility: CLINIC | Age: 57
End: 2022-09-07

## 2022-09-07 PROBLEM — F19.11 HISTORY OF SUBSTANCE ABUSE (HCC): Status: ACTIVE | Noted: 2022-09-07

## 2022-09-07 NOTE — TELEPHONE ENCOUNTER
Called and spoke to pt stated forms are ready for  and placed at the  along with additional paperwork. Copies of forms sent to scanned, additional copy placed in back office.

## 2022-09-21 DIAGNOSIS — J44.1 COPD WITH ACUTE EXACERBATION (HCC): ICD-10-CM

## 2022-09-21 RX ORDER — CETIRIZINE HYDROCHLORIDE 10 MG/1
TABLET ORAL
Qty: 30 TABLET | Refills: 2 | Status: SHIPPED | OUTPATIENT
Start: 2022-09-21

## 2022-10-21 RX ORDER — FOLIC ACID 1 MG/1
TABLET ORAL
Qty: 30 TABLET | Refills: 2 | Status: SHIPPED | OUTPATIENT
Start: 2022-10-21

## 2022-10-31 ENCOUNTER — TELEPHONE (OUTPATIENT)
Dept: FAMILY MEDICINE CLINIC | Facility: CLINIC | Age: 57
End: 2022-10-31

## 2022-10-31 NOTE — TELEPHONE ENCOUNTER
Called pt asking if surgeon requires her to be seen by pcp. Paperwork states she already had ekg and labs. Pt will call surgeon and call us back.

## 2022-11-04 RX ORDER — EZETIMIBE 10 MG/1
TABLET ORAL
Qty: 90 TABLET | Refills: 1 | OUTPATIENT
Start: 2022-11-04

## 2022-11-08 ENCOUNTER — TELEPHONE (OUTPATIENT)
Dept: FAMILY MEDICINE CLINIC | Facility: CLINIC | Age: 57
End: 2022-11-08

## 2022-11-08 PROBLEM — K90.0 CELIAC DISEASE (HCC): Status: ACTIVE | Noted: 2022-11-08

## 2022-11-08 PROBLEM — K90.0 CELIAC DISEASE: Status: ACTIVE | Noted: 2022-11-08

## 2022-11-08 NOTE — TELEPHONE ENCOUNTER
Problems   The patient or proxy has not reviewed this information, and there are updates pending:   Requested Problem Additions Date Noted Reported By  Comments   Celiac disease 9/1/2018 Jourdan Maldonado    Hyperthyroidism 9/24/2018 Veronica Kim      Medications   The patient or proxy has not reviewed this information, and there are updates pending:   Requested Medication Removals Start Date Reported By  Comments   B Complex Vitamins (B COMPLEX-B12 OR)  Jourdan Maldonado Not taking. nicotine 21 MG/24HR Pt24 5/10/2022 Clemencia Kim No longer using. Allergies   The patient or proxy has not reviewed this information. Medication list updated as requested. Problem list updated as requested.

## 2022-11-11 ENCOUNTER — PATIENT MESSAGE (OUTPATIENT)
Dept: FAMILY MEDICINE CLINIC | Facility: CLINIC | Age: 57
End: 2022-11-11

## 2022-11-11 ENCOUNTER — TELEPHONE (OUTPATIENT)
Dept: FAMILY MEDICINE CLINIC | Facility: CLINIC | Age: 57
End: 2022-11-11

## 2022-11-11 NOTE — TELEPHONE ENCOUNTER
Pt recently discharged from hospital and needs to continue breathing treatment. Pt only has  meds and needs a refill.      albuterol sulfate (2.5 MG/3ML) 0.083% Inhalation Nebu Soln    Pt has OV scheduled for 11/15  LOV:22

## 2022-11-11 NOTE — TELEPHONE ENCOUNTER
From: Orly Encinast  To: Gertrude Aguilar MD  Sent: 11/11/2022 7:12 AM CST  Subject: Test results    Can you please look over all my test results from the hospital stay I recently had there's a lot of things that they never went over with me then I'm a bit disturbed about. And I believe they did a vitamin D check and it's low.  I will see you in a few days hope you have a great weekend thank you Dr Aspen Flynn

## 2022-11-11 NOTE — TELEPHONE ENCOUNTER
From: Payam Lopez  Sent: 11/11/2022 7:25 AM CST  To: [facility] Clinical Staff  Subject: Appointment    Will do. There are quite a few so I was just letting her know!! Maybe you can answer a question for me? Is she getting copies of all my medical procedures and results from [email or web address]? I told them to make sure she did I just want to make sure they are?  Hope you have a great day thank you

## 2022-11-15 ENCOUNTER — OFFICE VISIT (OUTPATIENT)
Dept: FAMILY MEDICINE CLINIC | Facility: CLINIC | Age: 57
End: 2022-11-15
Payer: MEDICAID

## 2022-11-15 VITALS
DIASTOLIC BLOOD PRESSURE: 70 MMHG | HEART RATE: 70 BPM | OXYGEN SATURATION: 94 % | TEMPERATURE: 98 F | SYSTOLIC BLOOD PRESSURE: 110 MMHG | RESPIRATION RATE: 21 BRPM

## 2022-11-15 DIAGNOSIS — E05.90 HYPERTHYROIDISM: ICD-10-CM

## 2022-11-15 DIAGNOSIS — J43.1 PANLOBULAR EMPHYSEMA (HCC): ICD-10-CM

## 2022-11-15 DIAGNOSIS — F17.200 TOBACCO USE DISORDER: ICD-10-CM

## 2022-11-15 DIAGNOSIS — J84.10 PULMONARY FIBROSIS (HCC): Primary | ICD-10-CM

## 2022-11-15 RX ORDER — ERGOCALCIFEROL (VITAMIN D2) 1250 MCG
50000 CAPSULE ORAL WEEKLY
Qty: 13 CAPSULE | Refills: 1 | Status: SHIPPED | OUTPATIENT
Start: 2022-11-15 | End: 2023-05-14

## 2022-11-15 RX ORDER — GABAPENTIN 400 MG/1
400 CAPSULE ORAL 3 TIMES DAILY
COMMUNITY

## 2022-11-15 RX ORDER — GUAIFENESIN 600 MG/1
600 TABLET, EXTENDED RELEASE ORAL EVERY 12 HOURS
COMMUNITY
Start: 2022-11-08

## 2022-11-15 RX ORDER — HYDROCODONE BITARTRATE AND ACETAMINOPHEN 10; 325 MG/1; MG/1
1 TABLET ORAL EVERY 4 HOURS PRN
COMMUNITY
Start: 2022-11-10

## 2022-11-15 RX ORDER — POLYETHYLENE GLYCOL 3350 17 G/17G
17 POWDER, FOR SOLUTION ORAL DAILY
COMMUNITY
Start: 2022-11-11

## 2022-11-15 NOTE — PATIENT INSTRUCTIONS
Continue senna   Stop miralax; then use miralax as needed   Try holding hydroxyzine to see if dry mouth improves   Gabapentin is likely big cause of dry mouth, but you should wean off Norco first, then wean gabapentin as tolerated   Vitamin D rx sent, check with Dr. Patsy Spring

## 2022-12-10 RX ORDER — VORTIOXETINE 20 MG/1
TABLET, FILM COATED ORAL
Qty: 30 TABLET | Refills: 2 | Status: SHIPPED | OUTPATIENT
Start: 2022-12-10

## 2022-12-20 DIAGNOSIS — J44.1 COPD WITH ACUTE EXACERBATION (HCC): ICD-10-CM

## 2022-12-20 RX ORDER — CETIRIZINE HYDROCHLORIDE 10 MG/1
TABLET ORAL
Qty: 30 TABLET | Refills: 2 | Status: SHIPPED | OUTPATIENT
Start: 2022-12-20

## 2022-12-26 ENCOUNTER — HOSPITAL ENCOUNTER (EMERGENCY)
Age: 57
Discharge: HOME | End: 2022-12-27
Attending: EMERGENCY MEDICINE
Payer: MEDICAID

## 2022-12-26 DIAGNOSIS — J44.1 COPD EXACERBATION (HCC): Primary | ICD-10-CM

## 2022-12-26 LAB
BASOPHILS # BLD AUTO: 0.03 X10(3) UL (ref 0–0.2)
BASOPHILS NFR BLD AUTO: 0.4 %
D DIMER PPP FEU-MCNC: 0.6 UG/ML FEU (ref ?–0.56)
EOSINOPHIL # BLD AUTO: 0.16 X10(3) UL (ref 0–0.7)
EOSINOPHIL NFR BLD AUTO: 2.1 %
ERYTHROCYTE [DISTWIDTH] IN BLOOD BY AUTOMATED COUNT: 13.7 %
HCT VFR BLD AUTO: 36.1 %
HGB BLD-MCNC: 11.9 G/DL
IMM GRANULOCYTES # BLD AUTO: 0.02 X10(3) UL (ref 0–1)
IMM GRANULOCYTES NFR BLD: 0.3 %
LYMPHOCYTES # BLD AUTO: 3.71 X10(3) UL (ref 1–4)
LYMPHOCYTES NFR BLD AUTO: 47.9 %
MCH RBC QN AUTO: 29.4 PG (ref 26–34)
MCHC RBC AUTO-ENTMCNC: 33 G/DL (ref 31–37)
MCV RBC AUTO: 89.1 FL
MONOCYTES # BLD AUTO: 0.62 X10(3) UL (ref 0.1–1)
MONOCYTES NFR BLD AUTO: 8 %
NEUTROPHILS # BLD AUTO: 3.2 X10 (3) UL (ref 1.5–7.7)
NEUTROPHILS # BLD AUTO: 3.2 X10(3) UL (ref 1.5–7.7)
NEUTROPHILS NFR BLD AUTO: 41.3 %
PLATELET # BLD AUTO: 391 10(3)UL (ref 150–450)
RBC # BLD AUTO: 4.05 X10(6)UL
SARS-COV-2 RNA RESP QL NAA+PROBE: NOT DETECTED
WBC # BLD AUTO: 7.7 X10(3) UL (ref 4–11)

## 2022-12-26 PROCEDURE — 84484 ASSAY OF TROPONIN QUANT: CPT | Performed by: EMERGENCY MEDICINE

## 2022-12-26 PROCEDURE — 85025 COMPLETE CBC W/AUTO DIFF WBC: CPT | Performed by: EMERGENCY MEDICINE

## 2022-12-26 PROCEDURE — 87502 INFLUENZA DNA AMP PROBE: CPT | Performed by: EMERGENCY MEDICINE

## 2022-12-26 PROCEDURE — 80053 COMPREHEN METABOLIC PANEL: CPT | Performed by: EMERGENCY MEDICINE

## 2022-12-26 PROCEDURE — 96374 THER/PROPH/DIAG INJ IV PUSH: CPT

## 2022-12-26 PROCEDURE — 99284 EMERGENCY DEPT VISIT MOD MDM: CPT

## 2022-12-26 PROCEDURE — 85379 FIBRIN DEGRADATION QUANT: CPT | Performed by: EMERGENCY MEDICINE

## 2022-12-27 ENCOUNTER — APPOINTMENT (OUTPATIENT)
Dept: CT IMAGING | Age: 57
End: 2022-12-27
Attending: EMERGENCY MEDICINE
Payer: MEDICAID

## 2022-12-27 VITALS
BODY MASS INDEX: 23.6 KG/M2 | DIASTOLIC BLOOD PRESSURE: 79 MMHG | OXYGEN SATURATION: 99 % | WEIGHT: 125 LBS | RESPIRATION RATE: 16 BRPM | SYSTOLIC BLOOD PRESSURE: 113 MMHG | TEMPERATURE: 98 F | HEIGHT: 61 IN | HEART RATE: 57 BPM

## 2022-12-27 LAB
ALBUMIN SERPL-MCNC: 3.5 G/DL (ref 3.4–5)
ALBUMIN/GLOB SERPL: 0.8 {RATIO} (ref 1–2)
ALP LIVER SERPL-CCNC: 97 U/L
ALT SERPL-CCNC: 18 U/L
ANION GAP SERPL CALC-SCNC: 8 MMOL/L (ref 0–18)
AST SERPL-CCNC: 16 U/L (ref 15–37)
BILIRUB SERPL-MCNC: 0.2 MG/DL (ref 0.1–2)
BUN BLD-MCNC: 20 MG/DL (ref 7–18)
CALCIUM BLD-MCNC: 9.1 MG/DL (ref 8.5–10.1)
CHLORIDE SERPL-SCNC: 105 MMOL/L (ref 98–112)
CO2 SERPL-SCNC: 25 MMOL/L (ref 21–32)
CREAT BLD-MCNC: 0.67 MG/DL
GFR SERPLBLD BASED ON 1.73 SQ M-ARVRAT: 103 ML/MIN/1.73M2 (ref 60–?)
GLOBULIN PLAS-MCNC: 4.2 G/DL (ref 2.8–4.4)
GLUCOSE BLD-MCNC: 104 MG/DL (ref 70–99)
OSMOLALITY SERPL CALC.SUM OF ELEC: 289 MOSM/KG (ref 275–295)
POCT INFLUENZA A: NEGATIVE
POCT INFLUENZA B: NEGATIVE
POTASSIUM SERPL-SCNC: 3.7 MMOL/L (ref 3.5–5.1)
PROT SERPL-MCNC: 7.7 G/DL (ref 6.4–8.2)
SODIUM SERPL-SCNC: 138 MMOL/L (ref 136–145)
TROPONIN I HIGH SENSITIVITY: 3 NG/L

## 2022-12-27 PROCEDURE — 71260 CT THORAX DX C+: CPT | Performed by: EMERGENCY MEDICINE

## 2022-12-27 RX ORDER — PREDNISONE 20 MG/1
40 TABLET ORAL DAILY
Qty: 10 TABLET | Refills: 0 | Status: SHIPPED | OUTPATIENT
Start: 2022-12-27 | End: 2023-01-01

## 2022-12-27 RX ORDER — METHYLPREDNISOLONE SODIUM SUCCINATE 125 MG/2ML
125 INJECTION, POWDER, LYOPHILIZED, FOR SOLUTION INTRAMUSCULAR; INTRAVENOUS ONCE
Status: COMPLETED | OUTPATIENT
Start: 2022-12-27 | End: 2022-12-27

## 2022-12-27 NOTE — ED INITIAL ASSESSMENT (HPI)
Pt to ed with c/o sob x 1 day, pt has been doing neb treatments at home with no relief.  Denies fevers, + dry cough

## 2023-01-24 RX ORDER — HYDROXYZINE HYDROCHLORIDE 25 MG/1
TABLET, FILM COATED ORAL
Qty: 180 TABLET | Refills: 1 | Status: SHIPPED | OUTPATIENT
Start: 2023-01-24

## 2023-01-30 ENCOUNTER — TELEPHONE (OUTPATIENT)
Dept: FAMILY MEDICINE CLINIC | Facility: CLINIC | Age: 58
End: 2023-01-30

## 2023-01-30 RX ORDER — CLINDAMYCIN HYDROCHLORIDE 300 MG/1
CAPSULE ORAL
Qty: 30 CAPSULE | Refills: 0 | OUTPATIENT
Start: 2023-01-30

## 2023-01-30 NOTE — TELEPHONE ENCOUNTER
Called the patient for clarification. Patient went and saw Dr. Chelita Erwin out of [address]. Patient will be needing dental cleaning and tooth extraction under local anesthesia. Needs a letter for PCP stating that it is ok based on patient's medical history. Please advise.

## 2023-01-30 NOTE — TELEPHONE ENCOUNTER
Needs medical clearance to do oral work deep cleaning, office said they didn't have any sort of form, but just need to make sure if they use anesthesia there will be no issues     Fax: [phone]

## 2023-02-07 RX ORDER — VARENICLINE TARTRATE 25 MG
1 KIT ORAL AS DIRECTED
Qty: 53 EACH | Refills: 0 | OUTPATIENT
Start: 2023-02-07

## 2023-02-09 ENCOUNTER — TELEPHONE (OUTPATIENT)
Dept: FAMILY MEDICINE CLINIC | Facility: CLINIC | Age: 58
End: 2023-02-09

## 2023-02-09 RX ORDER — LOSARTAN POTASSIUM 25 MG/1
25 TABLET ORAL DAILY
Qty: 30 TABLET | Refills: 0 | Status: SHIPPED | OUTPATIENT
Start: 2023-02-09 | End: 2023-02-13

## 2023-02-09 NOTE — TELEPHONE ENCOUNTER
Pt having high blood pressure for last few days sporadically. Pt recently started new meds for lung disease, but is concerned about the spikes in bp. Pt requesting call to discuss symptoms w/bp and medications. Pt was taking OFEV 150mg oral tab.     LOV: 11/15/22

## 2023-02-09 NOTE — TELEPHONE ENCOUNTER
BP is not better since stopping the medication. I would like her to start losartan 25mg daily   Keep checking BP about 2x per day and follow up next week.

## 2023-02-09 NOTE — TELEPHONE ENCOUNTER
Since starting  OFEV 1st week of Jan, has had: HA, Diarrhea, and HBP sporadically. Stopped taking OFEV 2wks ago. Shari Giles prescribed and had pt stop with HBP.    Pt provided:   2/4: 160/107, 181/112,  151/104, 134/90  2/5: 151/119, 118/75, 130/92 160/65  2/6: Highest 116/75, 136/98  2/8: 141/99, 151,106, 135/112, 160/101,   2/9: 136/94, 145/97    LOV 11/15/22 Please advise

## 2023-02-13 ENCOUNTER — TELEPHONE (OUTPATIENT)
Dept: FAMILY MEDICINE CLINIC | Facility: CLINIC | Age: 58
End: 2023-02-13

## 2023-02-13 DIAGNOSIS — J84.10 PULMONARY FIBROSIS (HCC): Primary | ICD-10-CM

## 2023-02-13 RX ORDER — LOSARTAN POTASSIUM 50 MG/1
50 TABLET ORAL DAILY
Qty: 30 TABLET | Refills: 0 | Status: SHIPPED | OUTPATIENT
Start: 2023-02-13

## 2023-02-14 RX ORDER — ALBUTEROL SULFATE 2.5 MG/3ML
SOLUTION RESPIRATORY (INHALATION)
Qty: 90 ML | Refills: 1 | Status: SHIPPED | OUTPATIENT
Start: 2023-02-14

## 2023-02-18 DIAGNOSIS — J44.1 COPD WITH ACUTE EXACERBATION (HCC): ICD-10-CM

## 2023-02-18 RX ORDER — MONTELUKAST SODIUM 10 MG/1
TABLET ORAL
Qty: 90 TABLET | Refills: 1 | Status: SHIPPED | OUTPATIENT
Start: 2023-02-18

## 2023-02-22 ENCOUNTER — TELEPHONE (OUTPATIENT)
Dept: FAMILY MEDICINE CLINIC | Facility: CLINIC | Age: 58
End: 2023-02-22

## 2023-02-27 ENCOUNTER — TELEPHONE (OUTPATIENT)
Dept: FAMILY MEDICINE CLINIC | Facility: CLINIC | Age: 58
End: 2023-02-27

## 2023-02-27 NOTE — TELEPHONE ENCOUNTER
Pt states her bp has been spiking at night. Last night was 172/101 and this morning 130/105. Pt wants to know is she should take meds in morning and at night. Pt has headache and is weak.

## 2023-02-27 NOTE — TELEPHONE ENCOUNTER
Called the patient for more information. (See TE dated 2/13/2023.)  Patient states that anxiety is \"through the roof\" due to elevated BP. Has a \"slight\" headache. Denies worst headache of life. Also, just ate some food because she was unsure if it was a hunger headache. Has been taking hydroxyzine nightly- does not want to take during the day because it causes drowsiness. Described weak as \"low energy\"- only wants to sit around the house. Denies S/S of stroke. Currently taking losartan 50mg daily. BP today was 130/105. Please advise on any orders.

## 2023-02-28 NOTE — TELEPHONE ENCOUNTER
Called the patient to notify her to go to ER or UC. Patient verbalized understanding, but stated that she would go later today because she has a support group from 6-8pm.   States that she no longer has headache. Still experiencing weakness. BP readings today- 149/102, 135/115, 124/98, 102/75.

## 2023-03-24 ENCOUNTER — HOSPITAL ENCOUNTER (EMERGENCY)
Age: 58
Discharge: HOME | End: 2023-03-24
Attending: EMERGENCY MEDICINE
Payer: MEDICAID

## 2023-03-24 VITALS
HEART RATE: 64 BPM | SYSTOLIC BLOOD PRESSURE: 132 MMHG | BODY MASS INDEX: 22.66 KG/M2 | DIASTOLIC BLOOD PRESSURE: 67 MMHG | RESPIRATION RATE: 20 BRPM | WEIGHT: 120 LBS | OXYGEN SATURATION: 99 % | TEMPERATURE: 97 F | HEIGHT: 61 IN

## 2023-03-24 DIAGNOSIS — L50.9 HIVES: Primary | ICD-10-CM

## 2023-03-24 PROCEDURE — 96375 TX/PRO/DX INJ NEW DRUG ADDON: CPT

## 2023-03-24 PROCEDURE — S0028 INJECTION, FAMOTIDINE, 20 MG: HCPCS | Performed by: EMERGENCY MEDICINE

## 2023-03-24 PROCEDURE — 96374 THER/PROPH/DIAG INJ IV PUSH: CPT

## 2023-03-24 PROCEDURE — 99284 EMERGENCY DEPT VISIT MOD MDM: CPT

## 2023-03-24 RX ORDER — METHYLPREDNISOLONE SODIUM SUCCINATE 125 MG/2ML
125 INJECTION, POWDER, LYOPHILIZED, FOR SOLUTION INTRAMUSCULAR; INTRAVENOUS ONCE
Status: COMPLETED | OUTPATIENT
Start: 2023-03-24 | End: 2023-03-24

## 2023-03-24 RX ORDER — FAMOTIDINE 10 MG/ML
20 INJECTION, SOLUTION INTRAVENOUS ONCE
Status: COMPLETED | OUTPATIENT
Start: 2023-03-24 | End: 2023-03-24

## 2023-03-24 RX ORDER — PREDNISONE 20 MG/1
60 TABLET ORAL DAILY
Qty: 12 TABLET | Refills: 0 | Status: SHIPPED | OUTPATIENT
Start: 2023-03-24 | End: 2023-03-28

## 2023-03-24 RX ORDER — DIPHENHYDRAMINE HYDROCHLORIDE 50 MG/ML
25 INJECTION INTRAMUSCULAR; INTRAVENOUS ONCE
Status: COMPLETED | OUTPATIENT
Start: 2023-03-24 | End: 2023-03-24

## 2023-03-24 NOTE — ED INITIAL ASSESSMENT (HPI)
PT to the ED for evaluation of itching and redness to bilateral medial legs. PT reports this has been happening intermittently for the last month or two.

## 2023-04-14 DIAGNOSIS — J44.1 COPD WITH ACUTE EXACERBATION (HCC): ICD-10-CM

## 2023-04-17 RX ORDER — VARENICLINE TARTRATE 1 MG/1
1 TABLET, FILM COATED ORAL 2 TIMES DAILY
Qty: 60 TABLET | Refills: 6 | Status: SHIPPED | OUTPATIENT
Start: 2023-04-17 | End: 2023-04-20

## 2023-04-17 RX ORDER — LOSARTAN POTASSIUM 50 MG/1
50 TABLET ORAL DAILY
Qty: 30 TABLET | Refills: 2 | Status: SHIPPED | OUTPATIENT
Start: 2023-04-17

## 2023-04-17 RX ORDER — CETIRIZINE HYDROCHLORIDE 10 MG/1
10 TABLET ORAL DAILY
Qty: 30 TABLET | Refills: 2 | Status: SHIPPED | OUTPATIENT
Start: 2023-04-17

## 2023-04-17 RX ORDER — VORTIOXETINE 20 MG/1
TABLET, FILM COATED ORAL
Qty: 30 TABLET | Refills: 2 | OUTPATIENT
Start: 2023-04-17

## 2023-04-17 RX ORDER — CETIRIZINE HYDROCHLORIDE 10 MG/1
TABLET ORAL
Qty: 30 TABLET | Refills: 2 | OUTPATIENT
Start: 2023-04-17

## 2023-04-19 ENCOUNTER — PATIENT MESSAGE (OUTPATIENT)
Dept: FAMILY MEDICINE CLINIC | Facility: CLINIC | Age: 58
End: 2023-04-19

## 2023-04-19 ENCOUNTER — TELEMEDICINE (OUTPATIENT)
Dept: FAMILY MEDICINE CLINIC | Facility: CLINIC | Age: 58
End: 2023-04-19
Payer: MEDICAID

## 2023-04-19 ENCOUNTER — NURSE ONLY (OUTPATIENT)
Dept: FAMILY MEDICINE CLINIC | Facility: CLINIC | Age: 58
End: 2023-04-19
Payer: MEDICAID

## 2023-04-19 VITALS — DIASTOLIC BLOOD PRESSURE: 70 MMHG | SYSTOLIC BLOOD PRESSURE: 120 MMHG

## 2023-04-19 DIAGNOSIS — E05.90 HYPERTHYROIDISM: ICD-10-CM

## 2023-04-19 DIAGNOSIS — J84.10 PULMONARY FIBROSIS (HCC): Primary | ICD-10-CM

## 2023-04-19 DIAGNOSIS — F19.11 HISTORY OF SUBSTANCE ABUSE (HCC): ICD-10-CM

## 2023-04-19 NOTE — TELEPHONE ENCOUNTER
From: Cristina Saha  To: Flor De La Vega MD  Sent: 4/19/2023 1:32 PM CDT  Subject: BP readings    BP readings

## 2023-04-19 NOTE — TELEPHONE ENCOUNTER
Reviewed readings. Most are at goal. Random elevated readings - may be due to other health concerns.

## 2023-04-19 NOTE — PROGRESS NOTES
Patient did not connect for telehealth visit after 2 outreaches sent. Will have patient follow up in office.

## 2023-04-20 RX ORDER — OMEPRAZOLE 20 MG/1
20 CAPSULE, DELAYED RELEASE ORAL DAILY
COMMUNITY
Start: 2022-12-12

## 2023-04-20 RX ORDER — DILTIAZEM HYDROCHLORIDE 60 MG/1
2 TABLET, FILM COATED ORAL 2 TIMES DAILY
COMMUNITY
Start: 2023-03-01

## 2023-04-20 RX ORDER — NINTEDANIB 150 MG/1
CAPSULE ORAL
COMMUNITY
Start: 2023-02-13

## 2023-05-01 ENCOUNTER — HOSPITAL ENCOUNTER (OUTPATIENT)
Dept: CV DIAGNOSTICS | Age: 58
Discharge: HOME | End: 2023-05-01
Attending: FAMILY MEDICINE
Payer: MEDICAID

## 2023-05-01 DIAGNOSIS — J84.10 PULMONARY FIBROSIS (HCC): ICD-10-CM

## 2023-05-01 PROCEDURE — 93306 TTE W/DOPPLER COMPLETE: CPT | Performed by: FAMILY MEDICINE

## 2023-05-01 RX ORDER — ERGOCALCIFEROL 1.25 MG/1
CAPSULE ORAL
Qty: 13 CAPSULE | Refills: 1 | OUTPATIENT
Start: 2023-05-01

## 2023-05-02 RX ORDER — TRAZODONE HYDROCHLORIDE 150 MG/1
TABLET ORAL
Qty: 30 TABLET | Refills: 5 | Status: SHIPPED | OUTPATIENT
Start: 2023-05-02

## 2023-05-13 ENCOUNTER — HOSPITAL ENCOUNTER (EMERGENCY)
Age: 58
Discharge: HOME | End: 2023-05-13
Attending: EMERGENCY MEDICINE
Payer: MEDICAID

## 2023-05-13 VITALS
WEIGHT: 125 LBS | TEMPERATURE: 99 F | RESPIRATION RATE: 20 BRPM | BODY MASS INDEX: 23 KG/M2 | HEART RATE: 77 BPM | DIASTOLIC BLOOD PRESSURE: 96 MMHG | HEIGHT: 62 IN | SYSTOLIC BLOOD PRESSURE: 148 MMHG

## 2023-05-13 DIAGNOSIS — R31.9 URINARY TRACT INFECTION WITH HEMATURIA, SITE UNSPECIFIED: Primary | ICD-10-CM

## 2023-05-13 DIAGNOSIS — M54.50 BILATERAL LOW BACK PAIN WITHOUT SCIATICA, UNSPECIFIED CHRONICITY: ICD-10-CM

## 2023-05-13 DIAGNOSIS — N39.0 URINARY TRACT INFECTION WITH HEMATURIA, SITE UNSPECIFIED: Primary | ICD-10-CM

## 2023-05-13 LAB
ANION GAP SERPL CALC-SCNC: 6 MMOL/L (ref 0–18)
BASOPHILS # BLD AUTO: 0.05 X10(3) UL (ref 0–0.2)
BASOPHILS NFR BLD AUTO: 0.3 %
BILIRUB UR QL STRIP.AUTO: NEGATIVE
BUN BLD-MCNC: 18 MG/DL (ref 7–18)
CALCIUM BLD-MCNC: 8.8 MG/DL (ref 8.5–10.1)
CHLORIDE SERPL-SCNC: 106 MMOL/L (ref 98–112)
CO2 SERPL-SCNC: 24 MMOL/L (ref 21–32)
COLOR UR AUTO: YELLOW
CREAT BLD-MCNC: 0.63 MG/DL
EOSINOPHIL # BLD AUTO: 0.14 X10(3) UL (ref 0–0.7)
EOSINOPHIL NFR BLD AUTO: 0.9 %
ERYTHROCYTE [DISTWIDTH] IN BLOOD BY AUTOMATED COUNT: 13.9 %
GFR SERPLBLD BASED ON 1.73 SQ M-ARVRAT: 104 ML/MIN/1.73M2 (ref 60–?)
GLUCOSE BLD-MCNC: 108 MG/DL (ref 70–99)
GLUCOSE UR STRIP.AUTO-MCNC: NEGATIVE MG/DL
HCT VFR BLD AUTO: 36.3 %
HGB BLD-MCNC: 12.2 G/DL
IMM GRANULOCYTES # BLD AUTO: 0.07 X10(3) UL (ref 0–1)
IMM GRANULOCYTES NFR BLD: 0.5 %
KETONES UR STRIP.AUTO-MCNC: NEGATIVE MG/DL
LYMPHOCYTES # BLD AUTO: 2.42 X10(3) UL (ref 1–4)
LYMPHOCYTES NFR BLD AUTO: 15.7 %
MCH RBC QN AUTO: 30.6 PG (ref 26–34)
MCHC RBC AUTO-ENTMCNC: 33.6 G/DL (ref 31–37)
MCV RBC AUTO: 91 FL
MONOCYTES # BLD AUTO: 1.01 X10(3) UL (ref 0.1–1)
MONOCYTES NFR BLD AUTO: 6.5 %
NEUTROPHILS # BLD AUTO: 11.77 X10 (3) UL (ref 1.5–7.7)
NEUTROPHILS # BLD AUTO: 11.77 X10(3) UL (ref 1.5–7.7)
NEUTROPHILS NFR BLD AUTO: 76.1 %
NITRITE UR QL STRIP.AUTO: POSITIVE
OSMOLALITY SERPL CALC.SUM OF ELEC: 284 MOSM/KG (ref 275–295)
PH UR STRIP.AUTO: 6 [PH] (ref 5–8)
PLATELET # BLD AUTO: 522 10(3)UL (ref 150–450)
POTASSIUM SERPL-SCNC: 3.5 MMOL/L (ref 3.5–5.1)
RBC # BLD AUTO: 3.99 X10(6)UL
RBC #/AREA URNS AUTO: >10 /HPF
SODIUM SERPL-SCNC: 136 MMOL/L (ref 136–145)
SP GR UR STRIP.AUTO: 1.01 (ref 1–1.03)
UROBILINOGEN UR STRIP.AUTO-MCNC: 0.2 MG/DL
WBC # BLD AUTO: 15.5 X10(3) UL (ref 4–11)
WBC #/AREA URNS AUTO: >50 /HPF
WBC CLUMPS UR QL AUTO: PRESENT /HPF

## 2023-05-13 PROCEDURE — 87086 URINE CULTURE/COLONY COUNT: CPT | Performed by: EMERGENCY MEDICINE

## 2023-05-13 PROCEDURE — 87186 SC STD MICRODIL/AGAR DIL: CPT | Performed by: EMERGENCY MEDICINE

## 2023-05-13 PROCEDURE — 81015 MICROSCOPIC EXAM OF URINE: CPT

## 2023-05-13 PROCEDURE — 85025 COMPLETE CBC W/AUTO DIFF WBC: CPT | Performed by: EMERGENCY MEDICINE

## 2023-05-13 PROCEDURE — 99284 EMERGENCY DEPT VISIT MOD MDM: CPT

## 2023-05-13 PROCEDURE — 87077 CULTURE AEROBIC IDENTIFY: CPT | Performed by: EMERGENCY MEDICINE

## 2023-05-13 PROCEDURE — 81001 URINALYSIS AUTO W/SCOPE: CPT

## 2023-05-13 PROCEDURE — 80048 BASIC METABOLIC PNL TOTAL CA: CPT | Performed by: EMERGENCY MEDICINE

## 2023-05-13 PROCEDURE — 96365 THER/PROPH/DIAG IV INF INIT: CPT

## 2023-05-13 PROCEDURE — 87086 URINE CULTURE/COLONY COUNT: CPT

## 2023-05-13 PROCEDURE — 81015 MICROSCOPIC EXAM OF URINE: CPT | Performed by: EMERGENCY MEDICINE

## 2023-05-13 RX ORDER — SULFAMETHOXAZOLE AND TRIMETHOPRIM 800; 160 MG/1; MG/1
1 TABLET ORAL ONCE
Status: COMPLETED | OUTPATIENT
Start: 2023-05-13 | End: 2023-05-13

## 2023-05-13 RX ORDER — ACETAMINOPHEN 500 MG
1000 TABLET ORAL ONCE
Status: COMPLETED | OUTPATIENT
Start: 2023-05-13 | End: 2023-05-13

## 2023-05-13 RX ORDER — SULFAMETHOXAZOLE AND TRIMETHOPRIM 800; 160 MG/1; MG/1
1 TABLET ORAL 2 TIMES DAILY
Qty: 14 TABLET | Refills: 0 | Status: SHIPPED | OUTPATIENT
Start: 2023-05-13 | End: 2023-05-20

## 2023-05-13 RX ORDER — PHENAZOPYRIDINE HYDROCHLORIDE 200 MG/1
200 TABLET, FILM COATED ORAL ONCE
Status: COMPLETED | OUTPATIENT
Start: 2023-05-13 | End: 2023-05-13

## 2023-05-13 RX ORDER — ONDANSETRON 2 MG/ML
INJECTION INTRAMUSCULAR; INTRAVENOUS
Status: COMPLETED
Start: 2023-05-13 | End: 2023-05-13

## 2023-05-13 NOTE — DISCHARGE INSTRUCTIONS
Please follow up with your doctor in 1-2 days. Return to the ER if symptoms worsen or progress. Please finish the entire course of antibiotics. Use pyridium over the counter for pain with urination. Take tylenol 650mg every 6 hours for fever or pain.

## 2023-05-13 NOTE — ED INITIAL ASSESSMENT (HPI)
Pt to ER for diffuse abd pain, lower back pain, and decreased urine output for the past week. She says it feels like her bladder is full.

## 2023-05-16 ENCOUNTER — TELEPHONE (OUTPATIENT)
Dept: FAMILY MEDICINE CLINIC | Facility: CLINIC | Age: 58
End: 2023-05-16

## 2023-05-16 DIAGNOSIS — Z00.00 LABORATORY EXAMINATION ORDERED AS PART OF A ROUTINE GENERAL MEDICAL EXAMINATION: ICD-10-CM

## 2023-05-16 DIAGNOSIS — Z13.21 ENCOUNTER FOR VITAMIN DEFICIENCY SCREENING: Primary | ICD-10-CM

## 2023-05-16 NOTE — TELEPHONE ENCOUNTER
Called pt about lab orders. She also mentioned she was seen in the ER on 5/13/23 and states lidocaine-menthol 4-1 % External Patch  Place 1 patch onto the skin daily. , Print, Disp-30 patch, R-0  Was not covered by insurance and states if we can prescribe something else. Please advise.

## 2023-05-22 ENCOUNTER — APPOINTMENT (OUTPATIENT)
Dept: CT IMAGING | Facility: HOSPITAL | Age: 58
End: 2023-05-22
Attending: EMERGENCY MEDICINE
Payer: MEDICAID

## 2023-05-22 ENCOUNTER — LAB ENCOUNTER (OUTPATIENT)
Dept: LAB | Age: 58
End: 2023-05-22
Attending: FAMILY MEDICINE
Payer: MEDICAID

## 2023-05-22 ENCOUNTER — HOSPITAL ENCOUNTER (EMERGENCY)
Facility: HOSPITAL | Age: 58
Discharge: HOME | End: 2023-05-22
Attending: EMERGENCY MEDICINE
Payer: MEDICAID

## 2023-05-22 VITALS
OXYGEN SATURATION: 100 % | DIASTOLIC BLOOD PRESSURE: 93 MMHG | RESPIRATION RATE: 25 BRPM | HEIGHT: 62 IN | HEART RATE: 51 BPM | BODY MASS INDEX: 21.71 KG/M2 | TEMPERATURE: 98 F | SYSTOLIC BLOOD PRESSURE: 135 MMHG | WEIGHT: 118 LBS

## 2023-05-22 DIAGNOSIS — Z13.21 ENCOUNTER FOR VITAMIN DEFICIENCY SCREENING: ICD-10-CM

## 2023-05-22 DIAGNOSIS — K57.92 ACUTE DIVERTICULITIS: ICD-10-CM

## 2023-05-22 DIAGNOSIS — Z00.00 LABORATORY EXAMINATION ORDERED AS PART OF A ROUTINE GENERAL MEDICAL EXAMINATION: ICD-10-CM

## 2023-05-22 DIAGNOSIS — K52.9 GASTROENTERITIS: Primary | ICD-10-CM

## 2023-05-22 LAB
ALBUMIN SERPL-MCNC: 3 G/DL (ref 3.4–5)
ALBUMIN SERPL-MCNC: 3.1 G/DL (ref 3.4–5)
ALBUMIN/GLOB SERPL: 0.7 {RATIO} (ref 1–2)
ALBUMIN/GLOB SERPL: 0.7 {RATIO} (ref 1–2)
ALP LIVER SERPL-CCNC: 79 U/L
ALP LIVER SERPL-CCNC: 80 U/L
ALT SERPL-CCNC: 14 U/L
ALT SERPL-CCNC: 17 U/L
ANION GAP SERPL CALC-SCNC: 2 MMOL/L (ref 0–18)
ANION GAP SERPL CALC-SCNC: 5 MMOL/L (ref 0–18)
AST SERPL-CCNC: 11 U/L (ref 15–37)
AST SERPL-CCNC: 14 U/L (ref 15–37)
BASOPHILS # BLD AUTO: 0.03 X10(3) UL (ref 0–0.2)
BASOPHILS # BLD AUTO: 0.06 X10(3) UL (ref 0–0.2)
BASOPHILS NFR BLD AUTO: 0.4 %
BASOPHILS NFR BLD AUTO: 0.7 %
BILIRUB SERPL-MCNC: 0.2 MG/DL (ref 0.1–2)
BILIRUB SERPL-MCNC: 0.2 MG/DL (ref 0.1–2)
BILIRUB UR QL STRIP.AUTO: NEGATIVE
BUN BLD-MCNC: 17 MG/DL (ref 7–18)
BUN BLD-MCNC: 18 MG/DL (ref 7–18)
CALCIUM BLD-MCNC: 8.6 MG/DL (ref 8.5–10.1)
CALCIUM BLD-MCNC: 8.8 MG/DL (ref 8.5–10.1)
CHLORIDE SERPL-SCNC: 109 MMOL/L (ref 98–112)
CHLORIDE SERPL-SCNC: 110 MMOL/L (ref 98–112)
CHOLEST SERPL-MCNC: 153 MG/DL (ref ?–200)
CLARITY UR REFRACT.AUTO: CLEAR
CO2 SERPL-SCNC: 24 MMOL/L (ref 21–32)
CO2 SERPL-SCNC: 26 MMOL/L (ref 21–32)
COLOR UR AUTO: YELLOW
CREAT BLD-MCNC: 0.61 MG/DL
CREAT BLD-MCNC: 0.63 MG/DL
EOSINOPHIL # BLD AUTO: 0.15 X10(3) UL (ref 0–0.7)
EOSINOPHIL # BLD AUTO: 0.15 X10(3) UL (ref 0–0.7)
EOSINOPHIL NFR BLD AUTO: 1.8 %
EOSINOPHIL NFR BLD AUTO: 2 %
ERYTHROCYTE [DISTWIDTH] IN BLOOD BY AUTOMATED COUNT: 13.7 %
ERYTHROCYTE [DISTWIDTH] IN BLOOD BY AUTOMATED COUNT: 14 %
FASTING PATIENT LIPID ANSWER: YES
FASTING STATUS PATIENT QL REPORTED: YES
GFR SERPLBLD BASED ON 1.73 SQ M-ARVRAT: 104 ML/MIN/1.73M2 (ref 60–?)
GFR SERPLBLD BASED ON 1.73 SQ M-ARVRAT: 105 ML/MIN/1.73M2 (ref 60–?)
GLOBULIN PLAS-MCNC: 4.2 G/DL (ref 2.8–4.4)
GLOBULIN PLAS-MCNC: 4.3 G/DL (ref 2.8–4.4)
GLUCOSE BLD-MCNC: 100 MG/DL (ref 70–99)
GLUCOSE BLD-MCNC: 101 MG/DL (ref 70–99)
GLUCOSE UR STRIP.AUTO-MCNC: NEGATIVE MG/DL
HCT VFR BLD AUTO: 35.3 %
HCT VFR BLD AUTO: 36.7 %
HDLC SERPL-MCNC: 41 MG/DL (ref 40–59)
HGB BLD-MCNC: 11.7 G/DL
HGB BLD-MCNC: 11.8 G/DL
IMM GRANULOCYTES # BLD AUTO: 0.04 X10(3) UL (ref 0–1)
IMM GRANULOCYTES # BLD AUTO: 0.05 X10(3) UL (ref 0–1)
IMM GRANULOCYTES NFR BLD: 0.5 %
IMM GRANULOCYTES NFR BLD: 0.6 %
KETONES UR STRIP.AUTO-MCNC: NEGATIVE MG/DL
LDLC SERPL CALC-MCNC: 92 MG/DL (ref ?–100)
LEUKOCYTE ESTERASE UR QL STRIP.AUTO: NEGATIVE
LYMPHOCYTES # BLD AUTO: 2.52 X10(3) UL (ref 1–4)
LYMPHOCYTES # BLD AUTO: 2.53 X10(3) UL (ref 1–4)
LYMPHOCYTES NFR BLD AUTO: 29.9 %
LYMPHOCYTES NFR BLD AUTO: 33.2 %
MCH RBC QN AUTO: 30.2 PG (ref 26–34)
MCH RBC QN AUTO: 30.6 PG (ref 26–34)
MCHC RBC AUTO-ENTMCNC: 32.2 G/DL (ref 31–37)
MCHC RBC AUTO-ENTMCNC: 33.1 G/DL (ref 31–37)
MCV RBC AUTO: 91 FL
MCV RBC AUTO: 95.1 FL
MONOCYTES # BLD AUTO: 0.45 X10(3) UL (ref 0.1–1)
MONOCYTES # BLD AUTO: 0.56 X10(3) UL (ref 0.1–1)
MONOCYTES NFR BLD AUTO: 5.9 %
MONOCYTES NFR BLD AUTO: 6.6 %
NEUTROPHILS # BLD AUTO: 4.43 X10 (3) UL (ref 1.5–7.7)
NEUTROPHILS # BLD AUTO: 4.43 X10(3) UL (ref 1.5–7.7)
NEUTROPHILS # BLD AUTO: 5.09 X10 (3) UL (ref 1.5–7.7)
NEUTROPHILS # BLD AUTO: 5.09 X10(3) UL (ref 1.5–7.7)
NEUTROPHILS NFR BLD AUTO: 58 %
NEUTROPHILS NFR BLD AUTO: 60.4 %
NITRITE UR QL STRIP.AUTO: NEGATIVE
NONHDLC SERPL-MCNC: 112 MG/DL (ref ?–130)
OSMOLALITY SERPL CALC.SUM OF ELEC: 286 MOSM/KG (ref 275–295)
OSMOLALITY SERPL CALC.SUM OF ELEC: 290 MOSM/KG (ref 275–295)
PH UR STRIP.AUTO: 5 [PH] (ref 5–8)
PLATELET # BLD AUTO: 509 10(3)UL (ref 150–450)
PLATELET # BLD AUTO: 560 10(3)UL (ref 150–450)
POTASSIUM SERPL-SCNC: 4 MMOL/L (ref 3.5–5.1)
POTASSIUM SERPL-SCNC: 4 MMOL/L (ref 3.5–5.1)
PROT SERPL-MCNC: 7.2 G/DL (ref 6.4–8.2)
PROT SERPL-MCNC: 7.4 G/DL (ref 6.4–8.2)
PROT UR STRIP.AUTO-MCNC: NEGATIVE MG/DL
RBC # BLD AUTO: 3.86 X10(6)UL
RBC # BLD AUTO: 3.88 X10(6)UL
RBC UR QL AUTO: NEGATIVE
SODIUM SERPL-SCNC: 137 MMOL/L (ref 136–145)
SODIUM SERPL-SCNC: 139 MMOL/L (ref 136–145)
SP GR UR STRIP.AUTO: 1.02 (ref 1–1.03)
TRIGL SERPL-MCNC: 106 MG/DL (ref 30–149)
TSI SER-ACNC: 1.66 MIU/ML (ref 0.36–3.74)
UROBILINOGEN UR STRIP.AUTO-MCNC: <2 MG/DL
VLDLC SERPL CALC-MCNC: 17 MG/DL (ref 0–30)
WBC # BLD AUTO: 7.6 X10(3) UL (ref 4–11)
WBC # BLD AUTO: 8.4 X10(3) UL (ref 4–11)

## 2023-05-22 PROCEDURE — 80053 COMPREHEN METABOLIC PANEL: CPT

## 2023-05-22 PROCEDURE — 96372 THER/PROPH/DIAG INJ SC/IM: CPT

## 2023-05-22 PROCEDURE — 82306 VITAMIN D 25 HYDROXY: CPT

## 2023-05-22 PROCEDURE — 96374 THER/PROPH/DIAG INJ IV PUSH: CPT

## 2023-05-22 PROCEDURE — 81003 URINALYSIS AUTO W/O SCOPE: CPT | Performed by: EMERGENCY MEDICINE

## 2023-05-22 PROCEDURE — 84443 ASSAY THYROID STIM HORMONE: CPT

## 2023-05-22 PROCEDURE — 85025 COMPLETE CBC W/AUTO DIFF WBC: CPT | Performed by: EMERGENCY MEDICINE

## 2023-05-22 PROCEDURE — 36415 COLL VENOUS BLD VENIPUNCTURE: CPT

## 2023-05-22 PROCEDURE — 80053 COMPREHEN METABOLIC PANEL: CPT | Performed by: EMERGENCY MEDICINE

## 2023-05-22 PROCEDURE — 99284 EMERGENCY DEPT VISIT MOD MDM: CPT

## 2023-05-22 PROCEDURE — 74177 CT ABD & PELVIS W/CONTRAST: CPT | Performed by: EMERGENCY MEDICINE

## 2023-05-22 PROCEDURE — 85025 COMPLETE CBC W/AUTO DIFF WBC: CPT

## 2023-05-22 PROCEDURE — 80061 LIPID PANEL: CPT

## 2023-05-22 PROCEDURE — 99285 EMERGENCY DEPT VISIT HI MDM: CPT

## 2023-05-22 RX ORDER — DICYCLOMINE HYDROCHLORIDE 10 MG/ML
20 INJECTION INTRAMUSCULAR ONCE
Status: COMPLETED | OUTPATIENT
Start: 2023-05-22 | End: 2023-05-22

## 2023-05-22 RX ORDER — ONDANSETRON 4 MG/1
4 TABLET, ORALLY DISINTEGRATING ORAL EVERY 4 HOURS PRN
Qty: 20 TABLET | Refills: 0 | Status: SHIPPED | OUTPATIENT
Start: 2023-05-22 | End: 2023-05-27

## 2023-05-22 RX ORDER — ONDANSETRON 2 MG/ML
INJECTION INTRAMUSCULAR; INTRAVENOUS
Status: COMPLETED
Start: 2023-05-22 | End: 2023-05-22

## 2023-05-22 RX ORDER — METRONIDAZOLE 500 MG/1
500 TABLET ORAL 2 TIMES DAILY
Qty: 14 TABLET | Refills: 0 | Status: SHIPPED | OUTPATIENT
Start: 2023-05-22 | End: 2023-05-29

## 2023-05-22 RX ORDER — DICYCLOMINE HCL 20 MG
20 TABLET ORAL 3 TIMES DAILY
Qty: 20 TABLET | Refills: 0 | Status: SHIPPED | OUTPATIENT
Start: 2023-05-22

## 2023-05-22 RX ORDER — CEFPODOXIME PROXETIL 200 MG/1
400 TABLET, FILM COATED ORAL 2 TIMES DAILY
Qty: 28 TABLET | Refills: 0 | Status: SHIPPED | OUTPATIENT
Start: 2023-05-22 | End: 2023-05-29

## 2023-05-22 RX ORDER — ONDANSETRON 2 MG/ML
4 INJECTION INTRAMUSCULAR; INTRAVENOUS ONCE
Status: COMPLETED | OUTPATIENT
Start: 2023-05-22 | End: 2023-05-22

## 2023-05-22 NOTE — ED QUICK NOTES
Pt reports being treated for uti, on last few days of antibiotics started having abdominal pain and vomiting and diarrhea, not able to keep liquids down and pain after eating

## 2023-05-22 NOTE — ED INITIAL ASSESSMENT (HPI)
Abdominal pain  Pain , vomiting and diarrhea since 7 days . History of ecoli  In urine  And completed antibiotic already . Fells very weak and tired.

## 2023-05-22 NOTE — DISCHARGE INSTRUCTIONS
Bring stool sample to lab    Ondansetron for nausea/vomiting    Dicyclomine for pain      Push fluids

## 2023-05-23 ENCOUNTER — OFFICE VISIT (OUTPATIENT)
Dept: FAMILY MEDICINE CLINIC | Facility: CLINIC | Age: 58
End: 2023-05-23
Payer: MEDICAID

## 2023-05-23 VITALS
OXYGEN SATURATION: 98 % | BODY MASS INDEX: 22.08 KG/M2 | HEART RATE: 55 BPM | RESPIRATION RATE: 16 BRPM | WEIGHT: 120 LBS | TEMPERATURE: 98 F | HEIGHT: 62 IN | DIASTOLIC BLOOD PRESSURE: 80 MMHG | SYSTOLIC BLOOD PRESSURE: 116 MMHG

## 2023-05-23 DIAGNOSIS — Z86.79 S/P CEREBRAL ANEURYSM REPAIR: ICD-10-CM

## 2023-05-23 DIAGNOSIS — E05.90 HYPERTHYROIDISM: ICD-10-CM

## 2023-05-23 DIAGNOSIS — R92.2 DENSE BREAST TISSUE ON MAMMOGRAM: ICD-10-CM

## 2023-05-23 DIAGNOSIS — F19.11 HISTORY OF SUBSTANCE ABUSE (HCC): ICD-10-CM

## 2023-05-23 DIAGNOSIS — F17.200 TOBACCO USE DISORDER: ICD-10-CM

## 2023-05-23 DIAGNOSIS — Z00.00 ROUTINE PHYSICAL EXAMINATION: Primary | ICD-10-CM

## 2023-05-23 DIAGNOSIS — J84.10 PULMONARY FIBROSIS (HCC): ICD-10-CM

## 2023-05-23 DIAGNOSIS — K57.92 DIVERTICULITIS: ICD-10-CM

## 2023-05-23 DIAGNOSIS — Z98.890 S/P CEREBRAL ANEURYSM REPAIR: ICD-10-CM

## 2023-05-23 DIAGNOSIS — F33.2 SEVERE EPISODE OF RECURRENT MAJOR DEPRESSIVE DISORDER, WITHOUT PSYCHOTIC FEATURES (HCC): ICD-10-CM

## 2023-05-23 LAB — VIT D+METAB SERPL-MCNC: 59.4 NG/ML (ref 30–100)

## 2023-05-23 PROCEDURE — 3074F SYST BP LT 130 MM HG: CPT | Performed by: FAMILY MEDICINE

## 2023-05-23 PROCEDURE — 99396 PREV VISIT EST AGE 40-64: CPT | Performed by: FAMILY MEDICINE

## 2023-05-23 PROCEDURE — 3008F BODY MASS INDEX DOCD: CPT | Performed by: FAMILY MEDICINE

## 2023-05-23 PROCEDURE — 3079F DIAST BP 80-89 MM HG: CPT | Performed by: FAMILY MEDICINE

## 2023-05-23 RX ORDER — VARENICLINE TARTRATE 1 MG/1
1 TABLET, FILM COATED ORAL 2 TIMES DAILY
Qty: 60 TABLET | Refills: 5 | Status: SHIPPED | OUTPATIENT
Start: 2023-06-22 | End: 2023-12-19

## 2023-05-23 RX ORDER — ARIPIPRAZOLE 2 MG/1
2 TABLET ORAL DAILY
Qty: 30 TABLET | Refills: 1 | Status: SHIPPED | OUTPATIENT
Start: 2023-06-05

## 2023-05-23 RX ORDER — TRAZODONE HYDROCHLORIDE 50 MG/1
25 TABLET ORAL NIGHTLY
Qty: 15 TABLET | Refills: 0 | Status: SHIPPED | OUTPATIENT
Start: 2023-06-05

## 2023-05-23 RX ORDER — OMEPRAZOLE 20 MG/1
20 CAPSULE, DELAYED RELEASE ORAL DAILY
Qty: 90 CAPSULE | Refills: 2 | Status: SHIPPED | OUTPATIENT
Start: 2023-05-23 | End: 2023-06-08

## 2023-05-23 RX ORDER — PRAZOSIN HYDROCHLORIDE 1 MG/1
1 CAPSULE ORAL NIGHTLY
Qty: 30 CAPSULE | Refills: 0 | Status: SHIPPED | OUTPATIENT
Start: 2023-05-23 | End: 2023-06-27

## 2023-05-25 NOTE — CM/SW NOTE
Per Dr. Ben zamudio to call in Cipro 500mg BIDx10 days since patient's insurance does not cover cefpodoxime. Provided pharmacist at [facility].

## 2023-06-01 ENCOUNTER — TELEPHONE (OUTPATIENT)
Dept: FAMILY MEDICINE CLINIC | Facility: CLINIC | Age: 58
End: 2023-06-01

## 2023-06-01 NOTE — TELEPHONE ENCOUNTER
Pt called stating she accidentally started taking a medication too early, ARIPiprazole (ABILIFY) 2 MG Oral Tab. ON after visit summary from [facility] 5/23/23 it states for pt to begin use of medication 6/5. Pt has been taking medication since that OV.  Wants to make sure everything is okay and nothing will happen, pt said she is not feeling any SE. Device: Coolsculpting Time (Minutes - Will Only Render If Nonzero): 35 Applicator Size: CoolAdvantage Petite Detail Level: Zone Suction Settings: The suction settings were per protocol. Applicator Size: CoolAdvantage Curve Applicator Size: CoolAdvantage Core Patient Weight (Optional): 108.8 Intro: Prior to treatment, the area was cleaned with alcohol and marked out with a marking pen. The pretreatment skin wipe was applied for 60 seconds. The gel pad was then applied uniformly. The applicator was applied to the skin with good contact and suction. Consent: Written consent obtained, risks reviewed including, but not limited to, blistering from suction, darker or lighter pigmentary change, bruising, and/or need for multiple treatments. Location 1: lower abdomen Post Treatment: After treatment, the suction was turned off, and the applicator was removed from the skin. A two minute massage was performed to treatment area. Patient tolerated well.

## 2023-06-01 NOTE — TELEPHONE ENCOUNTER
Spoke w/ pt. Per OV instructions she wa supposed to start weaning off Trazodone (had been taking 150mg at bedtime) and then begin Abilify in 2 weeks. She said she accidentally starting taking the medication right away so has been taking since her LOV 5/23. She has decreased her Trazodone to 50mg every other night for the last 3 or 4 doses. How would you like her to proceed w/ trazodone? She reports no new symptoms. Also says the prazosin has been great for helping her sleep.

## 2023-06-08 ENCOUNTER — TELEPHONE (OUTPATIENT)
Dept: FAMILY MEDICINE CLINIC | Facility: CLINIC | Age: 58
End: 2023-06-08

## 2023-06-08 ENCOUNTER — PATIENT MESSAGE (OUTPATIENT)
Dept: FAMILY MEDICINE CLINIC | Facility: CLINIC | Age: 58
End: 2023-06-08

## 2023-06-08 RX ORDER — OMEPRAZOLE 20 MG/1
20 CAPSULE, DELAYED RELEASE ORAL DAILY
Qty: 90 CAPSULE | Refills: 2 | Status: CANCELLED | OUTPATIENT
Start: 2023-06-08

## 2023-06-08 NOTE — TELEPHONE ENCOUNTER
LF 5/23/2023  LOV 5/23/2023  RTC not on file     Pt sarahi lynn, stating \"  chantix refill has to be called in and pre-approved through the insurance company by the [address] [place] [address] office according to the pharmacy. Also the Omeprazole didn't get refilled.  Pharmacy said they faxed your office twice\"

## 2023-06-09 RX ORDER — OMEPRAZOLE 20 MG/1
20 CAPSULE, DELAYED RELEASE ORAL DAILY
Qty: 90 CAPSULE | Refills: 2 | Status: SHIPPED | OUTPATIENT
Start: 2023-06-09

## 2023-06-09 NOTE — TELEPHONE ENCOUNTER
From: Zina Mckeon  To: Aleja Beltrán MD  Sent: 6/8/2023 5:18 PM CDT  Subject: Prescriptions     Just wanted to let you know that the chantix refill has to be called in and pre-approved through the insurance company by the [address] [place] [address] office according to the pharmacy. Also the Omeprazole didn't get refilled. Pharmacy said they faxed your office twice.

## 2023-06-12 ENCOUNTER — TELEPHONE (OUTPATIENT)
Dept: FAMILY MEDICINE CLINIC | Facility: CLINIC | Age: 58
End: 2023-06-12

## 2023-06-12 NOTE — TELEPHONE ENCOUNTER
Received a fax from [facility]. Omeprazole is not covered by the patient's insurance. Can initiate a prior authorization by calling [phone]. ID# [id number].

## 2023-06-14 NOTE — TELEPHONE ENCOUNTER
Called [facility] [employer] to check status. Spoke with Piedad. Unable to locate PA for [employer], so will be sending PA form via fax to be completed.

## 2023-06-14 NOTE — TELEPHONE ENCOUNTER
Received fax from [employer]. Completed PA for Chantix. Faxed to [phone]. Received confirmation fax.

## 2023-06-16 NOTE — TELEPHONE ENCOUNTER
PA for Chantix denied. Patient able to get drug up to a quantity limit, patient reached quantity limit.

## 2023-06-27 RX ORDER — PRAZOSIN HYDROCHLORIDE 1 MG/1
1 CAPSULE ORAL NIGHTLY
Qty: 90 CAPSULE | Refills: 1 | Status: SHIPPED | OUTPATIENT
Start: 2023-06-27

## 2023-06-28 RX ORDER — ARIPIPRAZOLE 2 MG/1
2 TABLET ORAL DAILY
Qty: 30 TABLET | Refills: 1 | OUTPATIENT
Start: 2023-06-28

## 2023-06-30 ENCOUNTER — HOSPITAL ENCOUNTER (OUTPATIENT)
Dept: MAMMOGRAPHY | Age: 58
Discharge: HOME | End: 2023-06-30
Attending: FAMILY MEDICINE
Payer: MEDICAID

## 2023-06-30 ENCOUNTER — HOSPITAL ENCOUNTER (OUTPATIENT)
Dept: ULTRASOUND IMAGING | Age: 58
Discharge: HOME | End: 2023-06-30
Attending: FAMILY MEDICINE
Payer: MEDICAID

## 2023-06-30 DIAGNOSIS — R92.2 DENSE BREAST TISSUE ON MAMMOGRAM: ICD-10-CM

## 2023-06-30 PROCEDURE — 77066 DX MAMMO INCL CAD BI: CPT | Performed by: FAMILY MEDICINE

## 2023-06-30 PROCEDURE — 76641 ULTRASOUND BREAST COMPLETE: CPT | Performed by: FAMILY MEDICINE

## 2023-06-30 PROCEDURE — 77062 BREAST TOMOSYNTHESIS BI: CPT | Performed by: FAMILY MEDICINE

## 2023-07-03 ENCOUNTER — TELEPHONE (OUTPATIENT)
Dept: FAMILY MEDICINE CLINIC | Facility: CLINIC | Age: 58
End: 2023-07-03

## 2023-07-09 DIAGNOSIS — J44.1 COPD WITH ACUTE EXACERBATION (HCC): ICD-10-CM

## 2023-07-10 NOTE — TELEPHONE ENCOUNTER
Medication(s) to Refill:   Requested Prescriptions     Pending Prescriptions Disp Refills    CETIRIZINE 10 MG Oral Tab [Pharmacy Med Name: CETIRIZINE 10MG TABLETS] 30 tablet 2     Sig: TAKE 1 TABLET BY MOUTH DAILY    HYDROXYZINE 25 MG Oral Tab [Pharmacy Med Name: HYDROXYZINE HCL 25MG TABS (WHITE)] 180 tablet 1     Sig: TAKE 1 TABLET(25 MG) BY MOUTH EVERY 8 HOURS AS NEEDED FOR ITCHING OR ANXIETY         Reason for Medication Refill being sent to Provider / Reason Protocol Failed:  [] 90 day refill has already been granted  [] Blood Pressure out of range  [] Labs Abnormal/over due  [] Medication not previously prescribed by Provider  [] Non-Protocol Medication  [] Controlled Substance   [] Due for appointment- no future appointment scheduled  [] No Follow up specified      Last Time Medication was Filled:  4/17/23      Last Office Visit with PCP: 5/23/23    When Patient was Due Back to the Office:  3 months  (from when PCP last addressed condition)    Future Appointments:  Future Appointments   Date Time Provider Lori Crump   8/16/2023 12:00 PM Raquel Estevez [facility]         Last Blood Pressures:  BP Readings from Last 2 Encounters:  05/23/23 : 116/80  05/22/23 : Roderick Postal 135/93        Action taken:  [] Refill approved per protocol  [] Routing to provider for approval

## 2023-07-11 RX ORDER — HYDROXYZINE HYDROCHLORIDE 25 MG/1
25 TABLET, FILM COATED ORAL EVERY 8 HOURS PRN
Qty: 180 TABLET | Refills: 1 | Status: SHIPPED | OUTPATIENT
Start: 2023-07-11

## 2023-07-11 RX ORDER — CETIRIZINE HYDROCHLORIDE 10 MG/1
10 TABLET ORAL DAILY
Qty: 30 TABLET | Refills: 2 | Status: SHIPPED | OUTPATIENT
Start: 2023-07-11

## 2023-08-03 RX ORDER — ARIPIPRAZOLE 2 MG/1
2 TABLET ORAL DAILY
Qty: 30 TABLET | Refills: 1 | Status: SHIPPED | OUTPATIENT
Start: 2023-08-03

## 2023-08-04 ENCOUNTER — APPOINTMENT (OUTPATIENT)
Dept: GENERAL RADIOLOGY | Facility: HOSPITAL | Age: 58
End: 2023-08-04
Payer: MEDICAID

## 2023-08-04 ENCOUNTER — HOSPITAL ENCOUNTER (EMERGENCY)
Facility: HOSPITAL | Age: 58
Discharge: HOME | End: 2023-08-04
Attending: EMERGENCY MEDICINE
Payer: MEDICAID

## 2023-08-04 VITALS
HEART RATE: 65 BPM | SYSTOLIC BLOOD PRESSURE: 148 MMHG | RESPIRATION RATE: 22 BRPM | BODY MASS INDEX: 22 KG/M2 | TEMPERATURE: 96 F | WEIGHT: 120 LBS | OXYGEN SATURATION: 95 % | DIASTOLIC BLOOD PRESSURE: 85 MMHG

## 2023-08-04 DIAGNOSIS — J20.9 ACUTE BRONCHITIS, UNSPECIFIED ORGANISM: Primary | ICD-10-CM

## 2023-08-04 LAB
FLUAV + FLUBV RNA SPEC NAA+PROBE: NEGATIVE
FLUAV + FLUBV RNA SPEC NAA+PROBE: NEGATIVE
RSV RNA SPEC NAA+PROBE: NEGATIVE
SARS-COV-2 RNA RESP QL NAA+PROBE: NOT DETECTED

## 2023-08-04 PROCEDURE — 99284 EMERGENCY DEPT VISIT MOD MDM: CPT

## 2023-08-04 PROCEDURE — 71045 X-RAY EXAM CHEST 1 VIEW: CPT

## 2023-08-04 PROCEDURE — 0241U SARS-COV-2/FLU A AND B/RSV BY PCR (GENEXPERT): CPT | Performed by: EMERGENCY MEDICINE

## 2023-08-04 RX ORDER — BENZONATATE 100 MG/1
100 CAPSULE ORAL 3 TIMES DAILY PRN
Qty: 30 CAPSULE | Refills: 0 | Status: SHIPPED | OUTPATIENT
Start: 2023-08-04 | End: 2023-09-03

## 2023-08-04 RX ORDER — PREDNISONE 10 MG/1
TABLET ORAL
COMMUNITY
Start: 2023-08-02

## 2023-08-04 RX ORDER — DOXYCYCLINE 100 MG/1
100 TABLET ORAL 2 TIMES DAILY
COMMUNITY
Start: 2023-08-02 | End: 2023-08-12

## 2023-08-04 RX ORDER — FAMOTIDINE 40 MG/1
40 TABLET, FILM COATED ORAL DAILY
COMMUNITY
Start: 2023-08-02

## 2023-08-04 NOTE — ED INITIAL ASSESSMENT (HPI)
58YF c/c of cough Pt state that she has a hx of pulmonary  fibrosis and COPD Pt state that she here today with increased coughing and bring up green/brown phelgm

## 2023-08-04 NOTE — DISCHARGE INSTRUCTIONS
Recommend benzonatate for cough    Take Mucinex over-the-counter    Continue steroid and antibiotic which was already prescribed

## 2023-08-25 ENCOUNTER — TELEPHONE (OUTPATIENT)
Dept: FAMILY MEDICINE CLINIC | Facility: CLINIC | Age: 58
End: 2023-08-25

## 2023-08-25 DIAGNOSIS — E16.2 HYPOGLYCEMIA: Primary | ICD-10-CM

## 2023-08-25 NOTE — TELEPHONE ENCOUNTER
Patient scheduled to see pulmonologist, Dr. Evan Hawkins, on Thursday, 8/31/2023. Educated the patient on S/S of hypoglycemia. Patient verbalized understanding. During the conversation, phone call disconnected. Attempted to call patient back x2, phone call went straight to voicemail. Patient requesting F/U appointment with PCP. Please advise if ok to double book patient next week.

## 2023-08-25 NOTE — TELEPHONE ENCOUNTER
Patient went to see pulmonologist and was informed her blood sugar is low and to follow up with her primary doctor. Patient states she's experiencing fatigue, one of her legs feel numb and cold at times and she passed out at work. Patient thinks she might have diabetes and she also mentioned the medication that is prescribed by her pulmonologist is not helping her, please advise.      LOV: 05/23/23

## 2023-08-28 NOTE — TELEPHONE ENCOUNTER
Notified the patient of the below orders. Patient verbalized understanding. Stated that she will complete the fasting labs tomorrow. Patient also apologized for phone call on Friday. Patient states that she turned on airplane mode without realizing.

## 2023-08-28 NOTE — TELEPHONE ENCOUNTER
Fasting labs ordered - need fasting insulin level and A1c     May need to see endocrinology if her sugars drop and she does not have diabetes or insulin secretion issue

## 2023-08-31 ENCOUNTER — PATIENT MESSAGE (OUTPATIENT)
Dept: FAMILY MEDICINE CLINIC | Facility: CLINIC | Age: 58
End: 2023-08-31

## 2023-08-31 ENCOUNTER — LAB ENCOUNTER (OUTPATIENT)
Dept: LAB | Age: 58
End: 2023-08-31
Attending: FAMILY MEDICINE
Payer: MEDICAID

## 2023-08-31 DIAGNOSIS — Z86.79 S/P CEREBRAL ANEURYSM REPAIR: ICD-10-CM

## 2023-08-31 DIAGNOSIS — E16.2 HYPOGLYCEMIA: ICD-10-CM

## 2023-08-31 DIAGNOSIS — R55 SYNCOPE AND COLLAPSE: Primary | ICD-10-CM

## 2023-08-31 DIAGNOSIS — J84.10 PULMONARY FIBROSIS (HCC): ICD-10-CM

## 2023-08-31 DIAGNOSIS — Z98.890 S/P CEREBRAL ANEURYSM REPAIR: ICD-10-CM

## 2023-08-31 LAB
ANION GAP SERPL CALC-SCNC: 8 MMOL/L (ref 0–18)
BUN BLD-MCNC: 16 MG/DL (ref 7–18)
CALCIUM BLD-MCNC: 8.7 MG/DL (ref 8.5–10.1)
CHLORIDE SERPL-SCNC: 106 MMOL/L (ref 98–112)
CO2 SERPL-SCNC: 23 MMOL/L (ref 21–32)
CREAT BLD-MCNC: 0.72 MG/DL
EGFRCR SERPLBLD CKD-EPI 2021: 97 ML/MIN/1.73M2 (ref 60–?)
EST. AVERAGE GLUCOSE BLD GHB EST-MCNC: 120 MG/DL (ref 68–126)
FASTING STATUS PATIENT QL REPORTED: YES
GLUCOSE BLD-MCNC: 124 MG/DL (ref 70–99)
HBA1C MFR BLD: 5.8 % (ref ?–5.7)
INSULIN SERPL-ACNC: 24.3 MU/L (ref 3–25)
OSMOLALITY SERPL CALC.SUM OF ELEC: 287 MOSM/KG (ref 275–295)
POTASSIUM SERPL-SCNC: 3.2 MMOL/L (ref 3.5–5.1)
SODIUM SERPL-SCNC: 137 MMOL/L (ref 136–145)

## 2023-08-31 PROCEDURE — 36415 COLL VENOUS BLD VENIPUNCTURE: CPT

## 2023-08-31 PROCEDURE — 83036 HEMOGLOBIN GLYCOSYLATED A1C: CPT

## 2023-08-31 PROCEDURE — 86341 ISLET CELL ANTIBODY: CPT

## 2023-08-31 PROCEDURE — 80048 BASIC METABOLIC PNL TOTAL CA: CPT

## 2023-08-31 PROCEDURE — 83525 ASSAY OF INSULIN: CPT

## 2023-09-01 NOTE — TELEPHONE ENCOUNTER
She has mild prediabetes (but glucose and insulin were not markedly abn) and I don't think that is the cause. The syncope may be heart related - complication of pulmonary disease. Would like her to complete echo (previously completed in May but given change in sx this is necessary to repeat) and refer to cardiology asap    Will also order stat CTA brain (hx of aneurysm s/p clipping)     Patient needs to go to ER if it happens again.

## 2023-09-01 NOTE — TELEPHONE ENCOUNTER
From: Gabriel Quevedo  To: Meryl Cunningham MD  Sent: 8/31/2023 8:05 PM CDT  Subject: Health    Hi Dr Romeo Gu I went and got them test done today after fasting. I wanted to let you know that I passed out again at work it's weird because all of a sudden I get light-headed and then my legs just buckle.  I'm not exactly sure what's going on but it's pretty scary so I just figured I should let you know this is the second time I've passed out at work in a week

## 2023-09-04 LAB — GAD-65: <5 U/ML

## 2023-09-06 ENCOUNTER — TELEPHONE (OUTPATIENT)
Dept: ADMINISTRATIVE | Age: 58
End: 2023-09-06

## 2023-09-06 NOTE — TELEPHONE ENCOUNTER
Hello,    Prior authorization for URGENT CTA BRAIN (CPT=70496) has been denied by payer/[employer]. Denial rationale: \"The request cannot be approved because: It must be requested by a neurosurgeon (a doctor who performs surgery on the brain   and spine) or the team managing your condition. The requested imaging was not ordered by (or in consultation with) a specialist.    Your records do not show documentation of physical exam results that included a detailed nervous system (a network of nerve cells and fibers that send nerve messages between parts of your body) performed after your symptoms started or changed that support imaging. \"    Case remains [id number] eligible until end of day 09/13/2023. Case remains first level appeal eligible for the next 60 calendar days, appeal information may be found within denial letter. [id number] offer letter and denial letter PDF's may be found within the Media tab. Please un-check Clinical Info Only to view. Patient is scheduled for URGENT CTA BRAIN on 09/12/2023. Please note: the centralized [facility] team will NOT advise patient to cancel denied imaging due to Urgent/Stat order protocol. Please advise,  Thank you!

## 2023-09-07 ENCOUNTER — HOSPITAL ENCOUNTER (OUTPATIENT)
Dept: CV DIAGNOSTICS | Facility: HOSPITAL | Age: 58
Discharge: HOME | End: 2023-09-07
Attending: FAMILY MEDICINE
Payer: MEDICAID

## 2023-09-07 DIAGNOSIS — R55 SYNCOPE AND COLLAPSE: ICD-10-CM

## 2023-09-07 DIAGNOSIS — J84.10 PULMONARY FIBROSIS (HCC): ICD-10-CM

## 2023-09-07 PROCEDURE — 93306 TTE W/DOPPLER COMPLETE: CPT | Performed by: FAMILY MEDICINE

## 2023-09-12 ENCOUNTER — TELEPHONE (OUTPATIENT)
Dept: FAMILY MEDICINE CLINIC | Facility: CLINIC | Age: 58
End: 2023-09-12

## 2023-09-12 ENCOUNTER — HOSPITAL ENCOUNTER (OUTPATIENT)
Dept: CT IMAGING | Age: 58
Discharge: HOME | End: 2023-09-12
Attending: FAMILY MEDICINE
Payer: MEDICAID

## 2023-09-12 DIAGNOSIS — Z86.79 S/P CEREBRAL ANEURYSM REPAIR: ICD-10-CM

## 2023-09-12 DIAGNOSIS — R55 SYNCOPE AND COLLAPSE: ICD-10-CM

## 2023-09-12 DIAGNOSIS — Z98.890 S/P CEREBRAL ANEURYSM REPAIR: ICD-10-CM

## 2023-09-12 PROCEDURE — 70496 CT ANGIOGRAPHY HEAD: CPT | Performed by: FAMILY MEDICINE

## 2023-09-13 ENCOUNTER — TELEPHONE (OUTPATIENT)
Dept: FAMILY MEDICINE CLINIC | Facility: CLINIC | Age: 58
End: 2023-09-13

## 2023-10-13 ENCOUNTER — PATIENT MESSAGE (OUTPATIENT)
Dept: FAMILY MEDICINE CLINIC | Facility: CLINIC | Age: 58
End: 2023-10-13

## 2023-10-13 DIAGNOSIS — R73.03 PREDIABETES: ICD-10-CM

## 2023-10-13 DIAGNOSIS — J84.10 PULMONARY FIBROSIS (HCC): ICD-10-CM

## 2023-10-13 DIAGNOSIS — E05.90 HYPERTHYROIDISM: Primary | ICD-10-CM

## 2023-10-13 DIAGNOSIS — E78.5 HYPERLIPIDEMIA, UNSPECIFIED HYPERLIPIDEMIA TYPE: ICD-10-CM

## 2023-10-13 NOTE — TELEPHONE ENCOUNTER
From: Lorena Pepe  To: Keaton Regalado  Sent: 10/13/2023 6:58 AM CDT  Subject: Labs    Good morning I tried to go get my labs done last week because I'm on the old five again and they said there is no order in the system for me to have laboratory work done. Can you please advise! Thank you and have a great day!

## 2023-10-25 RX ORDER — ARIPIPRAZOLE 2 MG/1
2 TABLET ORAL DAILY
Qty: 30 TABLET | Refills: 1 | Status: SHIPPED | OUTPATIENT
Start: 2023-10-25

## 2023-10-25 RX ORDER — LOSARTAN POTASSIUM 50 MG/1
50 TABLET ORAL DAILY
Qty: 30 TABLET | Refills: 2 | Status: SHIPPED | OUTPATIENT
Start: 2023-10-25

## 2023-10-31 ENCOUNTER — OFFICE VISIT (OUTPATIENT)
Dept: FAMILY MEDICINE CLINIC | Facility: CLINIC | Age: 58
End: 2023-10-31

## 2023-10-31 VITALS
WEIGHT: 124 LBS | HEART RATE: 76 BPM | SYSTOLIC BLOOD PRESSURE: 105 MMHG | HEIGHT: 61.5 IN | BODY MASS INDEX: 23.11 KG/M2 | DIASTOLIC BLOOD PRESSURE: 70 MMHG | TEMPERATURE: 98 F | RESPIRATION RATE: 18 BRPM

## 2023-10-31 DIAGNOSIS — J01.90 ACUTE SINUSITIS, RECURRENCE NOT SPECIFIED, UNSPECIFIED LOCATION: ICD-10-CM

## 2023-10-31 DIAGNOSIS — B34.9 VIRAL SYNDROME: Primary | ICD-10-CM

## 2023-10-31 LAB
COVID19 BINAX NOW ANTIGEN: NOT DETECTED
OPERATOR ID: NORMAL

## 2023-10-31 PROCEDURE — 3074F SYST BP LT 130 MM HG: CPT | Performed by: FAMILY MEDICINE

## 2023-10-31 PROCEDURE — 3078F DIAST BP <80 MM HG: CPT | Performed by: FAMILY MEDICINE

## 2023-10-31 PROCEDURE — 99213 OFFICE O/P EST LOW 20 MIN: CPT | Performed by: FAMILY MEDICINE

## 2023-10-31 PROCEDURE — 3008F BODY MASS INDEX DOCD: CPT | Performed by: FAMILY MEDICINE

## 2023-10-31 RX ORDER — AMOXICILLIN 875 MG/1
875 TABLET, COATED ORAL 2 TIMES DAILY
Qty: 20 TABLET | Refills: 0 | Status: SHIPPED | OUTPATIENT
Start: 2023-10-31

## 2023-10-31 RX ORDER — OMEPRAZOLE 40 MG/1
40 CAPSULE, DELAYED RELEASE ORAL 2 TIMES DAILY
COMMUNITY

## 2023-10-31 NOTE — PROGRESS NOTES
Subjective:   Patient ID: Suyapa Chao is a 63year old female. Pt presents with cold symptoms for 3 days. Pt has had cough, sore throat and now diarrhea today. No fevers. Pt has tried otc remedies without relief. Pt states boy friend has had some allergy symptoms. No known COVID/ strep exposure. Past hx of lung disease. COPD           History/Other:   Review of Systems   Constitutional:  Negative for fever. HENT:  Positive for rhinorrhea and sinus pressure. Negative for congestion, ear pain and postnasal drip. Sore throat: mild. Respiratory:  Negative for cough, shortness of breath and wheezing. Cardiovascular:  Negative for chest pain. Gastrointestinal:  Positive for diarrhea. Negative for abdominal pain, blood in stool, nausea and vomiting. Current Outpatient Medications   Medication Sig Dispense Refill    Omeprazole 40 MG Oral Capsule Delayed Release Take 1 capsule (40 mg total) by mouth 2 (two) times daily. amoxicillin 875 MG Oral Tab Take 1 tablet (875 mg total) by mouth 2 (two) times daily. 20 tablet 0    losartan 50 MG Oral Tab Take 1 tablet (50 mg total) by mouth daily. 30 tablet 2    ARIPiprazole 2 MG Oral Tab Take 1 tablet (2 mg total) by mouth daily. 30 tablet 1    TRINTELLIX 20 MG Oral Tab TAKE 1 TABLET(20 MG) BY MOUTH DAILY 30 tablet 2    famotidine 40 MG Oral Tab Take 1 tablet (40 mg total) by mouth daily. cetirizine 10 MG Oral Tab Take 1 tablet (10 mg total) by mouth daily. 30 tablet 2    hydrOXYzine 25 MG Oral Tab Take 1 tablet (25 mg total) by mouth every 8 (eight) hours as needed for Anxiety. 180 tablet 1    prazosin 1 MG Oral Cap Take 1 capsule (1 mg total) by mouth nightly. 90 capsule 1    Varenicline Tartrate (CHANTIX STARTING MONTH PAK) 0.5 MG X 11 & 1 MG X 42 Oral Misc Take as directed. Day 1-3: 1 tablet once a day. Day 4-7: 1 tablet twice a day.   Day 8+: 2 tablets twice a day 53 tablet 0    varenicline (CHANTIX) 1 MG Oral Tab Take 1 tablet (1 mg total) by mouth 2 (two) times daily. 60 tablet 5    dicyclomine 20 MG Oral Tab Take 1 tablet (20 mg total) by mouth 3 (three) times daily. 20 tablet 0    SYMBICORT 80-4.5 MCG/ACT Inhalation Aerosol Inhale 2 puffs into the lungs 2 (two) times daily. OFEV 150 MG Oral Cap       MONTELUKAST 10 MG Oral Tab TAKE 1 TABLET(10 MG) BY MOUTH EVERY EVENING 90 tablet 1    ALBUTEROL (2.5 MG/3ML) 0.083% Inhalation Nebu Soln NEBULIZE 1 VIAL EVERY 4 HOURS AS NEEDED FOR WHEEZING OR SHORTNESS OF BREATH 90 mL 1    folic acid 1 MG Oral Tab Take 1 tablet (1 mg total) by mouth daily. 90 tablet 3    Tiotropium Bromide Monohydrate 2.5 MCG/ACT Inhalation Aero Soln Inhale into the lungs. EZETIMIBE 10 MG Oral Tab TAKE 1 TABLET(10 MG) BY MOUTH EVERY NIGHT 90 tablet 1    methIMAzole 5 MG Oral Tab Take 1 tablet (5 mg total) by mouth daily. 90 tablet 1    aspirin 81 MG Oral Chew Tab Chew 1 tablet (81 mg total) by mouth daily. Diclofenac Potassium 50 MG Oral Tab Take 1 tablet (50 mg total) by mouth 2 (two) times daily. ZINC GLUCONATE OR Take by mouth daily. Selenium 100 MCG Oral Tab Take 1 tablet (100 mcg total) by mouth 2 (two) times daily. predniSONE 10 MG Oral Tab Take 40 mg (4 tabs) once daily x 5 days, then 30 mg (3 tabs) daily x 3 d, then 20 mg (2 tabs) daily x 3 d, then 10 mg (1 tab) daily x 3 d. (Patient not taking: Reported on 10/31/2023)       Allergies:No Known Allergies    Objective:   Physical Exam  Vitals reviewed. Constitutional:       Appearance: Normal appearance. She is well-developed. HENT:      Right Ear: Tympanic membrane and ear canal normal.      Left Ear: Tympanic membrane and ear canal normal.      Mouth/Throat:      Mouth: Mucous membranes are moist.      Pharynx: No oropharyngeal exudate or posterior oropharyngeal erythema. Cardiovascular:      Rate and Rhythm: Normal rate and regular rhythm. Heart sounds: Normal heart sounds.    Pulmonary:      Effort: Pulmonary effort is normal. No respiratory distress. Breath sounds: Normal breath sounds. No wheezing or rales. Abdominal:      General: Abdomen is flat. There is no distension. Palpations: There is no mass. Tenderness: There is no abdominal tenderness. There is no guarding or rebound. Musculoskeletal:      Cervical back: Normal range of motion and neck supple. Lymphadenopathy:      Cervical: No cervical adenopathy. Neurological:      Mental Status: She is alert. Assessment & Plan:   Viral syndrome /Acute sinusitis, recurrence not specified, unspecified location: COVID testing negative in office:  - After discussion with patient, amoxicillin as directed; Over the counter remedies discussed; To call if worse or not better; Follow up in one week if not resolved or as needed if worse. Cochran diet discussed; adequate fluids; To call if worse or not better; Follow up as needed. ER if any sig symptoms. Orders Placed This Encounter      COVID19 BinaxNOW Antigen      Meds This Visit:  Requested Prescriptions     Signed Prescriptions Disp Refills    amoxicillin 875 MG Oral Tab 20 tablet 0     Sig: Take 1 tablet (875 mg total) by mouth 2 (two) times daily.        Imaging & Referrals:  None

## 2023-11-01 ENCOUNTER — LAB ENCOUNTER (OUTPATIENT)
Dept: LAB | Age: 58
End: 2023-11-01
Attending: FAMILY MEDICINE
Payer: MEDICAID

## 2023-11-01 ENCOUNTER — OFFICE VISIT (OUTPATIENT)
Dept: FAMILY MEDICINE CLINIC | Facility: CLINIC | Age: 58
End: 2023-11-01
Payer: MEDICAID

## 2023-11-01 VITALS
WEIGHT: 127 LBS | DIASTOLIC BLOOD PRESSURE: 80 MMHG | SYSTOLIC BLOOD PRESSURE: 112 MMHG | OXYGEN SATURATION: 95 % | RESPIRATION RATE: 18 BRPM | HEART RATE: 72 BPM | BODY MASS INDEX: 23.98 KG/M2 | HEIGHT: 61 IN

## 2023-11-01 DIAGNOSIS — J84.10 PULMONARY FIBROSIS (HCC): ICD-10-CM

## 2023-11-01 DIAGNOSIS — E78.5 HYPERLIPIDEMIA, UNSPECIFIED HYPERLIPIDEMIA TYPE: ICD-10-CM

## 2023-11-01 DIAGNOSIS — B34.9 VIRAL SYNDROME: Primary | ICD-10-CM

## 2023-11-01 DIAGNOSIS — B36.0 TINEA VERSICOLOR: ICD-10-CM

## 2023-11-01 DIAGNOSIS — R15.9 FULL INCONTINENCE OF FECES: ICD-10-CM

## 2023-11-01 DIAGNOSIS — R73.03 PREDIABETES: ICD-10-CM

## 2023-11-01 DIAGNOSIS — J84.9 ILD (INTERSTITIAL LUNG DISEASE) (HCC): ICD-10-CM

## 2023-11-01 DIAGNOSIS — E05.90 HYPERTHYROIDISM: ICD-10-CM

## 2023-11-01 LAB
ALBUMIN SERPL-MCNC: 3.7 G/DL (ref 3.4–5)
ALBUMIN/GLOB SERPL: 0.9 {RATIO} (ref 1–2)
ALP LIVER SERPL-CCNC: 107 U/L
ALT SERPL-CCNC: 34 U/L
ANION GAP SERPL CALC-SCNC: 8 MMOL/L (ref 0–18)
AST SERPL-CCNC: 24 U/L (ref 15–37)
BASOPHILS # BLD AUTO: 0.04 X10(3) UL (ref 0–0.2)
BASOPHILS NFR BLD AUTO: 0.5 %
BILIRUB SERPL-MCNC: 0.4 MG/DL (ref 0.1–2)
BUN BLD-MCNC: 14 MG/DL (ref 9–23)
CALCIUM BLD-MCNC: 9.2 MG/DL (ref 8.5–10.1)
CHLORIDE SERPL-SCNC: 104 MMOL/L (ref 98–112)
CHOLEST SERPL-MCNC: 214 MG/DL (ref ?–200)
CO2 SERPL-SCNC: 24 MMOL/L (ref 21–32)
CREAT BLD-MCNC: 0.86 MG/DL
EGFRCR SERPLBLD CKD-EPI 2021: 79 ML/MIN/1.73M2 (ref 60–?)
EOSINOPHIL # BLD AUTO: 0.18 X10(3) UL (ref 0–0.7)
EOSINOPHIL NFR BLD AUTO: 2.1 %
ERYTHROCYTE [DISTWIDTH] IN BLOOD BY AUTOMATED COUNT: 14 %
EST. AVERAGE GLUCOSE BLD GHB EST-MCNC: 148 MG/DL (ref 68–126)
FASTING PATIENT LIPID ANSWER: NO
FASTING STATUS PATIENT QL REPORTED: NO
GLOBULIN PLAS-MCNC: 4.2 G/DL (ref 2.8–4.4)
GLUCOSE BLD-MCNC: 124 MG/DL (ref 70–99)
HBA1C MFR BLD: 6.8 % (ref ?–5.7)
HCT VFR BLD AUTO: 37.9 %
HDLC SERPL-MCNC: 71 MG/DL (ref 40–59)
HGB BLD-MCNC: 13 G/DL
IMM GRANULOCYTES # BLD AUTO: 0.03 X10(3) UL (ref 0–1)
IMM GRANULOCYTES NFR BLD: 0.4 %
LDLC SERPL CALC-MCNC: 126 MG/DL (ref ?–100)
LYMPHOCYTES # BLD AUTO: 2.73 X10(3) UL (ref 1–4)
LYMPHOCYTES NFR BLD AUTO: 32 %
MCH RBC QN AUTO: 30.8 PG (ref 26–34)
MCHC RBC AUTO-ENTMCNC: 34.3 G/DL (ref 31–37)
MCV RBC AUTO: 89.8 FL
MONOCYTES # BLD AUTO: 0.56 X10(3) UL (ref 0.1–1)
MONOCYTES NFR BLD AUTO: 6.6 %
NEUTROPHILS # BLD AUTO: 4.98 X10 (3) UL (ref 1.5–7.7)
NEUTROPHILS # BLD AUTO: 4.98 X10(3) UL (ref 1.5–7.7)
NEUTROPHILS NFR BLD AUTO: 58.4 %
NONHDLC SERPL-MCNC: 143 MG/DL (ref ?–130)
OSMOLALITY SERPL CALC.SUM OF ELEC: 284 MOSM/KG (ref 275–295)
PLATELET # BLD AUTO: 309 10(3)UL (ref 150–450)
POTASSIUM SERPL-SCNC: 4 MMOL/L (ref 3.5–5.1)
PROT SERPL-MCNC: 7.9 G/DL (ref 6.4–8.2)
RBC # BLD AUTO: 4.22 X10(6)UL
SODIUM SERPL-SCNC: 136 MMOL/L (ref 136–145)
T4 FREE SERPL-MCNC: 0.9 NG/DL (ref 0.8–1.7)
TRIGL SERPL-MCNC: 94 MG/DL (ref 30–149)
TSI SER-ACNC: 1.87 MIU/ML (ref 0.36–3.74)
VLDLC SERPL CALC-MCNC: 17 MG/DL (ref 0–30)
WBC # BLD AUTO: 8.5 X10(3) UL (ref 4–11)

## 2023-11-01 PROCEDURE — 3079F DIAST BP 80-89 MM HG: CPT | Performed by: FAMILY MEDICINE

## 2023-11-01 PROCEDURE — 36415 COLL VENOUS BLD VENIPUNCTURE: CPT

## 2023-11-01 PROCEDURE — 3074F SYST BP LT 130 MM HG: CPT | Performed by: FAMILY MEDICINE

## 2023-11-01 PROCEDURE — 3008F BODY MASS INDEX DOCD: CPT | Performed by: FAMILY MEDICINE

## 2023-11-01 PROCEDURE — 80053 COMPREHEN METABOLIC PANEL: CPT

## 2023-11-01 PROCEDURE — 99214 OFFICE O/P EST MOD 30 MIN: CPT | Performed by: FAMILY MEDICINE

## 2023-11-01 PROCEDURE — 80061 LIPID PANEL: CPT

## 2023-11-01 PROCEDURE — 84439 ASSAY OF FREE THYROXINE: CPT

## 2023-11-01 PROCEDURE — 87637 SARSCOV2&INF A&B&RSV AMP PRB: CPT | Performed by: FAMILY MEDICINE

## 2023-11-01 PROCEDURE — 84443 ASSAY THYROID STIM HORMONE: CPT

## 2023-11-01 PROCEDURE — 85025 COMPLETE CBC W/AUTO DIFF WBC: CPT

## 2023-11-01 PROCEDURE — 83036 HEMOGLOBIN GLYCOSYLATED A1C: CPT

## 2023-11-01 NOTE — PATIENT INSTRUCTIONS
Hold amoxicillin   Mucinex (mucous relief)   (guaifenesin) is a medication that helps thin mucous to make it easier to expel  Take 1200mg twice a day while sick (then if you take 600mg twice a day when not sick that is ok)       Good job with quitting smoking     If you have any bowel accidents again after 1-2 weeks from now, then call and we will order MRI back

## 2023-11-02 LAB
FLUAV + FLUBV RNA SPEC NAA+PROBE: NOT DETECTED
FLUAV + FLUBV RNA SPEC NAA+PROBE: NOT DETECTED
RSV RNA SPEC NAA+PROBE: NOT DETECTED
SARS-COV-2 RNA RESP QL NAA+PROBE: NOT DETECTED

## 2023-11-14 ENCOUNTER — TELEPHONE (OUTPATIENT)
Dept: FAMILY MEDICINE CLINIC | Facility: CLINIC | Age: 58
End: 2023-11-14

## 2023-11-14 DIAGNOSIS — E78.5 HYPERLIPIDEMIA, UNSPECIFIED HYPERLIPIDEMIA TYPE: Primary | ICD-10-CM

## 2023-11-14 DIAGNOSIS — R73.09 ELEVATED HEMOGLOBIN A1C: ICD-10-CM

## 2023-11-14 NOTE — TELEPHONE ENCOUNTER
----- Message from Philippe Condon MD sent at 11/9/2023  2:08 PM CST -----  Results reviewed. Please inform patient A1c elevated - possible steroid induced hyperglycemia   Would like her to repeat A1c, bmp in 3 months. Will monitor lipid - repeat in 6 months.    Other labs ok

## 2023-11-22 ENCOUNTER — TELEPHONE (OUTPATIENT)
Dept: FAMILY MEDICINE CLINIC | Facility: CLINIC | Age: 58
End: 2023-11-22

## 2023-11-22 NOTE — TELEPHONE ENCOUNTER
Pt requesting refill [place] Amabile requesting Medication Refill for:  Omeprazole 40 MG Oral Capsule Delayed Release      LOV: 11/1/2023   Last Refill date:   Pharmacy: [id number], [place] - [address] AT [id number] [place] [address], [phone], [phone]   Next Scheduled appointment:

## 2023-11-27 RX ORDER — EZETIMIBE 10 MG/1
TABLET ORAL
Qty: 90 TABLET | Refills: 1 | Status: SHIPPED | OUTPATIENT
Start: 2023-11-27

## 2023-12-26 DIAGNOSIS — J44.1 COPD WITH ACUTE EXACERBATION (HCC): ICD-10-CM

## 2023-12-26 RX ORDER — CETIRIZINE HYDROCHLORIDE 10 MG/1
10 TABLET ORAL DAILY
Qty: 90 TABLET | Refills: 2 | Status: SHIPPED | OUTPATIENT
Start: 2023-12-26

## 2023-12-26 RX ORDER — MONTELUKAST SODIUM 10 MG/1
10 TABLET ORAL EVERY EVENING
Qty: 90 TABLET | Refills: 2 | Status: SHIPPED | OUTPATIENT
Start: 2023-12-26

## 2023-12-26 NOTE — TELEPHONE ENCOUNTER
Medication(s) to Refill:   Requested Prescriptions     Pending Prescriptions Disp Refills    TRINTELLIX 20 MG Oral Tab [Pharmacy Med Name: TRINTELLIX 20MG TB (WAS BRINTELLIX)] 30 tablet 2     Sig: TAKE 1 TABLET(20 MG) BY MOUTH DAILY    CETIRIZINE 10 MG Oral Tab [Pharmacy Med Name: CETIRIZINE 10MG TABLETS] 90 tablet 0     Sig: TAKE 1 TABLET(10 MG) BY MOUTH DAILY    MONTELUKAST 10 MG Oral Tab [Pharmacy Med Name: MONTELUKAST 10MG TABLETS] 90 tablet 1     Sig: TAKE 1 TABLET(10 MG) BY MOUTH EVERY EVENING         Reason for Medication Refill being sent to Provider / Reason Protocol Failed:  [] 90 day refill has already been granted  [] Blood Pressure out of range  [] Labs Abnormal/over due  [] Medication not previously prescribed by Provider  [] Non-Protocol Medication  [] Controlled Substance   [] Due for appointment- no future appointment scheduled  [] No Follow up specified      Last Time Medication was Filled:  9/1/23      Last Office Visit with PCP: 11/1/23    When Patient was Due Back to the Office:    (from when PCP last addressed condition)    Future Appointments:  No future appointments.       Last Blood Pressures:  BP Readings from Last 2 Encounters:   11/01/23 112/80   10/31/23 105/70         Action taken:  [] Refill approved per protocol  [] Routing to provider for approval Received permission to share information with daughter Genia Rubio [phone]. She is requesting an update from the MD. MD notified through [employer].

## 2023-12-29 RX ORDER — ARIPIPRAZOLE 2 MG/1
2 TABLET ORAL DAILY
Qty: 30 TABLET | Refills: 1 | Status: SHIPPED | OUTPATIENT
Start: 2023-12-29

## 2023-12-29 NOTE — TELEPHONE ENCOUNTER
Medication(s) to Refill:   Requested Prescriptions     Pending Prescriptions Disp Refills    ARIPIPRAZOLE 2 MG Oral Tab [Pharmacy Med Name: ARIPIPRAZOLE 2MG TABLETS] 30 tablet 1     Sig: TAKE 1 TABLET(2 MG) BY MOUTH DAILY         Reason for Medication Refill being sent to Provider / Reason Protocol Failed:  [] 90 day refill has already been granted  [] Blood Pressure out of range  [] Labs Abnormal/over due  [] Medication not previously prescribed by Provider  [] Non-Protocol Medication  [] Controlled Substance   [] Due for appointment- no future appointment scheduled  [] No Follow up specified      Last Time Medication was Filled:  10/25/23      Last Office Visit with PCP: 11/1/23    When Patient was Due Back to the Office:  4 weeks  (from when PCP last addressed condition)    Future Appointments:  No future appointments.       Last Blood Pressures:  BP Readings from Last 2 Encounters:   11/01/23 112/80   10/31/23 105/70         Action taken:  [] Refill approved per protocol  [] Routing to provider for approval

## 2024-01-01 RX ORDER — LOSARTAN POTASSIUM 50 MG/1
50 TABLET ORAL DAILY
Qty: 90 TABLET | Refills: 0 | Status: SHIPPED | OUTPATIENT
Start: 2024-01-01

## 2024-01-17 NOTE — TELEPHONE ENCOUNTER
Medication(s) to Refill:   Requested Prescriptions     Pending Prescriptions Disp Refills   • Budesonide-Formoterol Fumarate (SYMBICORT) 160-4.5 MCG/ACT Inhalation Aerosol 3 Inhaler 0     Sig: Inhale 2 puffs into the lungs 2 (two) times daily.            Paula Terry
yes

## 2024-01-24 ENCOUNTER — PATIENT MESSAGE (OUTPATIENT)
Dept: FAMILY MEDICINE CLINIC | Facility: CLINIC | Age: 59
End: 2024-01-24

## 2024-01-24 DIAGNOSIS — N32.81 OAB (OVERACTIVE BLADDER): ICD-10-CM

## 2024-01-24 DIAGNOSIS — R23.2 HOT FLASHES: Primary | ICD-10-CM

## 2024-01-24 DIAGNOSIS — R73.03 PREDIABETES: ICD-10-CM

## 2024-01-24 DIAGNOSIS — E05.90 HYPERTHYROIDISM: ICD-10-CM

## 2024-01-25 NOTE — TELEPHONE ENCOUNTER
From: Clemencia Kim  To: SEAN MCMANUS  Sent: 1/24/2024 7:54 PM CST  Subject: Z    I have stopped the Ofev around a month ago. My accidents are not as frequent but still a problem. Also, major hot flashes the past few weeks. Not sure if there is anything I can do to stop peeing on myself.    Thx  Clemencia Kim

## 2024-02-01 RX ORDER — OXYBUTYNIN CHLORIDE 5 MG/1
5 TABLET, EXTENDED RELEASE ORAL DAILY
Qty: 90 TABLET | Refills: 0 | Status: SHIPPED | OUTPATIENT
Start: 2024-02-01

## 2024-02-05 RX ORDER — PRAZOSIN HYDROCHLORIDE 1 MG/1
1 CAPSULE ORAL NIGHTLY
Qty: 90 CAPSULE | Refills: 1 | OUTPATIENT
Start: 2024-02-05

## 2024-02-05 RX ORDER — PRAZOSIN HYDROCHLORIDE 1 MG/1
1 CAPSULE ORAL NIGHTLY
Qty: 90 CAPSULE | Refills: 1 | Status: SHIPPED | OUTPATIENT
Start: 2024-02-05

## 2024-02-20 RX ORDER — ARIPIPRAZOLE 2 MG/1
2 TABLET ORAL DAILY
Qty: 30 TABLET | Refills: 1 | Status: SHIPPED | OUTPATIENT
Start: 2024-02-20

## 2024-02-20 NOTE — TELEPHONE ENCOUNTER
Medication(s) to Refill:   Requested Prescriptions     Pending Prescriptions Disp Refills    ARIPIPRAZOLE 2 MG Oral Tab [Pharmacy Med Name: ARIPIPRAZOLE 2MG TABLETS] 30 tablet 1     Sig: TAKE 1 TABLET(2 MG) BY MOUTH DAILY         Reason for Medication Refill being sent to Provider / Reason Protocol Failed:  [] 90 day refill has already been granted  [] Blood Pressure out of range  [] Labs Abnormal/over due  [] Medication not previously prescribed by Provider  [] Non-Protocol Medication  [] Controlled Substance   [] Due for appointment- no future appointment scheduled  [] No Follow up specified      Last Time Medication was Filled:  12/29/23      Last Office Visit with PCP: 11/1/23    When Patient was Due Back to the Office:  1 week  (from when PCP last addressed condition)    Future Appointments:  No future appointments.      Last Blood Pressures:  BP Readings from Last 2 Encounters:   11/01/23 112/80   10/31/23 105/70         Action taken:  [] Refill approved per protocol  [] Routing to provider for approval

## 2024-02-21 RX ORDER — ARIPIPRAZOLE 2 MG/1
2 TABLET ORAL DAILY
Qty: 30 TABLET | Refills: 1 | OUTPATIENT
Start: 2024-02-21

## 2024-03-07 RX ORDER — HYDROXYZINE HYDROCHLORIDE 25 MG/1
25 TABLET, FILM COATED ORAL EVERY 8 HOURS PRN
Qty: 180 TABLET | Refills: 0 | Status: SHIPPED | OUTPATIENT
Start: 2024-03-07

## 2024-03-13 ENCOUNTER — LAB ENCOUNTER (OUTPATIENT)
Dept: LAB | Age: 59
End: 2024-03-13
Attending: FAMILY MEDICINE
Payer: MEDICAID

## 2024-03-13 DIAGNOSIS — R73.09 ELEVATED HEMOGLOBIN A1C: ICD-10-CM

## 2024-03-13 DIAGNOSIS — E05.90 HYPERTHYROIDISM: ICD-10-CM

## 2024-03-13 DIAGNOSIS — R73.03 PREDIABETES: ICD-10-CM

## 2024-03-13 DIAGNOSIS — R23.2 HOT FLASHES: ICD-10-CM

## 2024-03-13 LAB
BASOPHILS # BLD AUTO: 0.04 X10(3) UL (ref 0–0.2)
BASOPHILS NFR BLD AUTO: 0.4 %
EOSINOPHIL # BLD AUTO: 0.04 X10(3) UL (ref 0–0.7)
EOSINOPHIL NFR BLD AUTO: 0.4 %
ERYTHROCYTE [DISTWIDTH] IN BLOOD BY AUTOMATED COUNT: 13.5 %
HCT VFR BLD AUTO: 39 %
HGB BLD-MCNC: 12.9 G/DL
IMM GRANULOCYTES # BLD AUTO: 0.03 X10(3) UL (ref 0–1)
IMM GRANULOCYTES NFR BLD: 0.3 %
LYMPHOCYTES # BLD AUTO: 2.98 X10(3) UL (ref 1–4)
LYMPHOCYTES NFR BLD AUTO: 27.5 %
MCH RBC QN AUTO: 30.4 PG (ref 26–34)
MCHC RBC AUTO-ENTMCNC: 33.1 G/DL (ref 31–37)
MCV RBC AUTO: 92 FL
MONOCYTES # BLD AUTO: 0.95 X10(3) UL (ref 0.1–1)
MONOCYTES NFR BLD AUTO: 8.8 %
NEUTROPHILS # BLD AUTO: 6.79 X10 (3) UL (ref 1.5–7.7)
NEUTROPHILS # BLD AUTO: 6.79 X10(3) UL (ref 1.5–7.7)
NEUTROPHILS NFR BLD AUTO: 62.6 %
PLATELET # BLD AUTO: 381 10(3)UL (ref 150–450)
RBC # BLD AUTO: 4.24 X10(6)UL
WBC # BLD AUTO: 10.8 X10(3) UL (ref 4–11)

## 2024-03-13 PROCEDURE — 36415 COLL VENOUS BLD VENIPUNCTURE: CPT

## 2024-03-13 PROCEDURE — 84443 ASSAY THYROID STIM HORMONE: CPT

## 2024-03-13 PROCEDURE — 85025 COMPLETE CBC W/AUTO DIFF WBC: CPT

## 2024-03-13 PROCEDURE — 84439 ASSAY OF FREE THYROXINE: CPT

## 2024-03-13 PROCEDURE — 83036 HEMOGLOBIN GLYCOSYLATED A1C: CPT

## 2024-03-13 PROCEDURE — 80053 COMPREHEN METABOLIC PANEL: CPT

## 2024-03-14 LAB
ALBUMIN SERPL-MCNC: 4 G/DL (ref 3.4–5)
ALBUMIN/GLOB SERPL: 0.9 {RATIO} (ref 1–2)
ALP LIVER SERPL-CCNC: 93 U/L
ALT SERPL-CCNC: 28 U/L
ANION GAP SERPL CALC-SCNC: 2 MMOL/L (ref 0–18)
AST SERPL-CCNC: 22 U/L (ref 15–37)
BILIRUB SERPL-MCNC: 0.5 MG/DL (ref 0.1–2)
BUN BLD-MCNC: 18 MG/DL (ref 9–23)
CALCIUM BLD-MCNC: 9.4 MG/DL (ref 8.5–10.1)
CHLORIDE SERPL-SCNC: 109 MMOL/L (ref 98–112)
CO2 SERPL-SCNC: 27 MMOL/L (ref 21–32)
CREAT BLD-MCNC: 0.74 MG/DL
EGFRCR SERPLBLD CKD-EPI 2021: 94 ML/MIN/1.73M2 (ref 60–?)
EST. AVERAGE GLUCOSE BLD GHB EST-MCNC: 128 MG/DL (ref 68–126)
FASTING STATUS PATIENT QL REPORTED: YES
GLOBULIN PLAS-MCNC: 4.4 G/DL (ref 2.8–4.4)
GLUCOSE BLD-MCNC: 101 MG/DL (ref 70–99)
HBA1C MFR BLD: 6.1 % (ref ?–5.7)
OSMOLALITY SERPL CALC.SUM OF ELEC: 288 MOSM/KG (ref 275–295)
POTASSIUM SERPL-SCNC: 3.6 MMOL/L (ref 3.5–5.1)
PROT SERPL-MCNC: 8.4 G/DL (ref 6.4–8.2)
SODIUM SERPL-SCNC: 138 MMOL/L (ref 136–145)
T4 FREE SERPL-MCNC: 1.1 NG/DL (ref 0.8–1.7)
TSI SER-ACNC: 0.14 MIU/ML (ref 0.36–3.74)

## 2024-03-20 RX ORDER — ARIPIPRAZOLE 2 MG/1
2 TABLET ORAL DAILY
Qty: 30 TABLET | Refills: 1 | OUTPATIENT
Start: 2024-03-20

## 2024-03-20 NOTE — TELEPHONE ENCOUNTER
Requesting refill on aripiprazole.   LOV 11/1/2023  Appt scheduled for 4/2/2024.  LF 2/20/2024 for 30 days with one refill.   Rx request denied. Refill available until appt.

## 2024-04-05 RX ORDER — LOSARTAN POTASSIUM 50 MG/1
50 TABLET ORAL DAILY
Qty: 90 TABLET | Refills: 0 | Status: SHIPPED | OUTPATIENT
Start: 2024-04-05

## 2024-04-09 ENCOUNTER — MED REC SCAN ONLY (OUTPATIENT)
Dept: FAMILY MEDICINE CLINIC | Facility: CLINIC | Age: 59
End: 2024-04-09

## 2024-04-16 RX ORDER — OXYBUTYNIN CHLORIDE 5 MG/1
5 TABLET, EXTENDED RELEASE ORAL DAILY
Qty: 90 TABLET | Refills: 0 | Status: SHIPPED | OUTPATIENT
Start: 2024-04-16

## 2024-04-16 NOTE — TELEPHONE ENCOUNTER
Medication(s) to Refill:   Requested Prescriptions     Pending Prescriptions Disp Refills    oxybutynin ER 5 MG Oral Tablet 24 Hr 90 tablet 0     Sig: Take 1 tablet (5 mg total) by mouth daily.         Reason for Medication Refill being sent to Provider / Reason Protocol Failed:  [] 90 day refill has already been granted  [] Blood Pressure out of range  [] Labs Abnormal/over due  [] Medication not previously prescribed by Provider  [] Non-Protocol Medication  [] Controlled Substance   [] Due for appointment- no future appointment scheduled  [] No Follow up specified      Last Time Medication was Filled:  2/1/24      Last Office Visit with PCP: 11/1/23    When Patient was Due Back to the Office:  4 weeks  (from when PCP last addressed condition)    Future Appointments:  No future appointments.      Last Blood Pressures:  BP Readings from Last 2 Encounters:   11/01/23 112/80   10/31/23 105/70       Action taken:  [] Refill approved per protocol  [] Routing to provider for approval

## 2024-04-17 ENCOUNTER — TELEPHONE (OUTPATIENT)
Dept: FAMILY MEDICINE CLINIC | Facility: CLINIC | Age: 59
End: 2024-04-17

## 2024-04-17 DIAGNOSIS — J84.10 PULMONARY FIBROSIS (HCC): ICD-10-CM

## 2024-04-17 DIAGNOSIS — R63.5 WEIGHT GAIN: Primary | ICD-10-CM

## 2024-04-17 DIAGNOSIS — E05.90 HYPERTHYROIDISM: ICD-10-CM

## 2024-04-17 NOTE — TELEPHONE ENCOUNTER
Patient requesting PCP refill methimazole 5mg.   Please approve or deny pending rx.     Patient states that she does not have a cardiologist.   Does not recall ever seeing a cardiologist.   Please advise on cardiologist and diagnosis.     While on the phone, patient became tearful.   States that she has been forgetting things.   Used example that she will forget what she was going to do in different room.   Patient unsure if forgetfulness is related to cerebral aneurysm.   Patient scheduled to see PCP on 5/8 regarding forgetfulness.

## 2024-04-17 NOTE — TELEPHONE ENCOUNTER
Patient said she does not think she has been taking her thyroid mediation. She can not remember the name, that's why she is not sure. She said she has been gaining a lot of weight so that's also why she is thinking that.

## 2024-04-17 NOTE — TELEPHONE ENCOUNTER
Please clarify if patient is to be taking a prescription for hyperthyroidism.   Methimazole last prescribed on 1/24/2022 by endo.

## 2024-04-17 NOTE — TELEPHONE ENCOUNTER
Last thyroid lab shows mild hyperactive.     Should be on methimazole 5mg daily   Weight gain not necessarily thyroid related. Not taking methimazole will not cause her to gain weight. Does she see a cardiologist with [facility]?

## 2024-04-18 RX ORDER — METHIMAZOLE 5 MG/1
5 TABLET ORAL DAILY
Qty: 90 TABLET | Refills: 1 | Status: SHIPPED | OUTPATIENT
Start: 2024-04-18

## 2024-04-18 NOTE — TELEPHONE ENCOUNTER
Her lung disease could cause pulmonary hypertension - not same as traditional hypertension. This can cause strain on heart and sometimes that is cause of weight gain as well.  Has she had a recent echo at [facility]? Last echo at  shows borderline pulmonary htn. If last echo was September 2023 - get repeat then she should see cardiology for if there is a change.     Chronic steroid use is another cause of unexplained weight gain.       Will refill methimazole   Refer to [facility] endo    Can discuss at next visit too

## 2024-04-22 NOTE — TELEPHONE ENCOUNTER
Medication(s) to Refill:   Requested Prescriptions     Pending Prescriptions Disp Refills    folic acid 1 MG Oral Tab 90 tablet 3     Sig: Take 1 tablet (1 mg total) by mouth daily.         Reason for Medication Refill being sent to Provider / Reason Protocol Failed:  [] 90 day refill has already been granted  [] Blood Pressure out of range  [] Labs Abnormal/over due  [] Medication not previously prescribed by Provider  [] Non-Protocol Medication  [] Controlled Substance   [] Due for appointment- no future appointment scheduled  [] No Follow up specified      Last Time Medication was Filled:  1/19/23      Last Office Visit with PCP: 11/1/23    When Patient was Due Back to the Office:    (from when PCP last addressed condition)    Future Appointments:  Future Appointments   Date Time Provider Department Center   5/8/2024 11:00 AM Aurora Gonzalez MD [facility]   5/8/2024  2:00 PM [facility] [place]         Last Blood Pressures:  BP Readings from Last 2 Encounters:   11/01/23 112/80   10/31/23 105/70         Action taken:  [] Refill approved per protocol  [] Routing to provider for approval

## 2024-04-23 RX ORDER — FOLIC ACID 1 MG/1
1 TABLET ORAL DAILY
Qty: 90 TABLET | Refills: 3 | Status: SHIPPED | OUTPATIENT
Start: 2024-04-23

## 2024-04-24 RX ORDER — VORTIOXETINE 20 MG/1
20 TABLET, FILM COATED ORAL DAILY
Qty: 90 TABLET | Refills: 0 | Status: SHIPPED | OUTPATIENT
Start: 2024-04-24

## 2024-04-24 NOTE — TELEPHONE ENCOUNTER
Pt has Physical appointment on 5/8. Will that be ok? Pt asking for a phone visit is she needs another appointment.

## 2024-05-08 RX ORDER — EZETIMIBE 10 MG/1
10 TABLET ORAL NIGHTLY
Qty: 90 TABLET | Refills: 1 | Status: SHIPPED | OUTPATIENT
Start: 2024-05-08

## 2024-05-09 ENCOUNTER — OFFICE VISIT (OUTPATIENT)
Dept: FAMILY MEDICINE CLINIC | Facility: CLINIC | Age: 59
End: 2024-05-09
Payer: MEDICAID

## 2024-05-09 VITALS
SYSTOLIC BLOOD PRESSURE: 110 MMHG | DIASTOLIC BLOOD PRESSURE: 70 MMHG | RESPIRATION RATE: 16 BRPM | HEART RATE: 92 BPM | OXYGEN SATURATION: 96 % | BODY MASS INDEX: 24.17 KG/M2 | HEIGHT: 61 IN | WEIGHT: 128 LBS

## 2024-05-09 DIAGNOSIS — R14.0 BLOATING: ICD-10-CM

## 2024-05-09 DIAGNOSIS — E05.90 HYPERTHYROIDISM: ICD-10-CM

## 2024-05-09 DIAGNOSIS — Z00.00 ROUTINE GENERAL MEDICAL EXAMINATION AT A HEALTH CARE FACILITY: Primary | ICD-10-CM

## 2024-05-09 PROCEDURE — 99396 PREV VISIT EST AGE 40-64: CPT | Performed by: NURSE PRACTITIONER

## 2024-05-09 NOTE — PROGRESS NOTES
Clemencia Kim is a 58 year old female who presents for a complete physical exam, no gyn.  HPI:     Chief Complaint   Patient presents with    Wellness Visit       Patient feels well, dental visit up to date, no hearing problem.  Vaccinations up to date.  Patient concern of bloating of her abdomen   Exercise: minimal, occasional.  Diet: watches minimally and watches somewhat    Wt Readings from Last 3 Encounters:   05/09/24 128 lb (58.1 kg)   11/01/23 127 lb (57.6 kg)   10/31/23 124 lb (56.2 kg)      BP Readings from Last 3 Encounters:   05/09/24 110/70   11/01/23 112/80   10/31/23 105/70     Patient's last menstrual period was 07/05/2011.         Current Outpatient Medications   Medication Sig Dispense Refill    ezetimibe 10 MG Oral Tab Take 1 tablet (10 mg total) by mouth nightly. 90 tablet 1    TRINTELLIX 20 MG Oral Tab TAKE 1 TABLET(20 MG) BY MOUTH DAILY 90 tablet 0    folic acid 1 MG Oral Tab Take 1 tablet (1 mg total) by mouth daily. 90 tablet 3    methIMAzole 5 MG Oral Tab Take 1 tablet (5 mg total) by mouth daily. 90 tablet 1    oxybutynin ER 5 MG Oral Tablet 24 Hr Take 1 tablet (5 mg total) by mouth daily. 90 tablet 0    LOSARTAN 50 MG Oral Tab TAKE 1 TABLET(50 MG) BY MOUTH DAILY 90 tablet 0    hydrOXYzine 25 MG Oral Tab Take 1 tablet (25 mg total) by mouth every 8 (eight) hours as needed for Anxiety. 180 tablet 0    ARIPiprazole 2 MG Oral Tab Take 1 tablet (2 mg total) by mouth daily. 30 tablet 1    PRAZOSIN 1 MG Oral Cap TAKE 1 CAPSULE(1 MG) BY MOUTH EVERY NIGHT 90 capsule 1    cetirizine 10 MG Oral Tab Take 1 tablet (10 mg total) by mouth daily. 90 tablet 2    montelukast 10 MG Oral Tab Take 1 tablet (10 mg total) by mouth every evening. 90 tablet 2    Omeprazole 40 MG Oral Capsule Delayed Release Take 1 capsule (40 mg total) by mouth 2 (two) times daily.      SYMBICORT 80-4.5 MCG/ACT Inhalation Aerosol Inhale 2 puffs into the lungs 2 (two) times daily.      OFEV 150 MG Oral Cap       ALBUTEROL  (2.5 MG/3ML) 0.083% Inhalation Nebu Soln NEBULIZE 1 VIAL EVERY 4 HOURS AS NEEDED FOR WHEEZING OR SHORTNESS OF BREATH 90 mL 1    aspirin 81 MG Oral Chew Tab Chew 1 tablet (81 mg total) by mouth daily.      Diclofenac Potassium 50 MG Oral Tab Take 1 tablet (50 mg total) by mouth 2 (two) times daily.      ZINC GLUCONATE OR Take by mouth daily.      Selenium 100 MCG Oral Tab Take 1 tablet (100 mcg total) by mouth 2 (two) times daily.        Past Medical History:    Acute, but ill-defined, cerebrovascular disease    Anxiety    Arthritis    COPD (chronic obstructive pulmonary disease) (HCC)    Depression    Disorder of thyroid    Fibroids    History of stomach ulcers    Human papilloma virus infection    Hyperlipidemia    Hyperthyroidism    Idiopathic pulmonary fibrosis (HCC)    Prediabetes    Diet control - no meds or blood surgar checking at home    Sexually transmitted disease    Thyroid disease    Visual impairment    glasses      Past Surgical History:   Procedure Laterality Date    Brain surgery      Colonoscopy  2018    adenoma- repeat 6-12 mo    Colonoscopy            Other      Thyroid polyp removal    Other surgical history  2021    Other surgical history      removal of parts of upper and lower lobes in right lung    Tubal ligation      Upper gi endoscopy,exam        Family History   Problem Relation Age of Onset    Cancer Father         colon cancer    Hypertension Father     Stroke Father     Cancer Mother         lung cancer, brain cancer    Depression Mother     Hypertension Mother     Breast Cancer Maternal Grandmother     Asthma Maternal Grandmother     Depression Maternal Grandmother     Diabetes Maternal Grandmother     Infectious Disease Maternal Grandmother         Scleraderma    Cancer Paternal Grandmother         colon cancer    Breast Cancer Paternal Grandmother     Diabetes Paternal Grandmother     Hypertension Paternal Grandmother     Ovarian Cancer Maternal Cousin 50      Social  History     Socioeconomic History    Marital status:    Tobacco Use    Smoking status: Every Day     Current packs/day: 0.00     Types: Cigarettes     Start date: 1982     Last attempt to quit: 2022     Years since quittin.6     Passive exposure: Current    Smokeless tobacco: Never    Tobacco comments:     1 cig per day   Vaping Use    Vaping status: Every Day    Substances: CBD   Substance and Sexual Activity    Alcohol use: Not Currently     Comment: rare - 1 drink 3 times a year    Drug use: Not Currently     Types: Cocaine     Comment: former cocaine history-Has used CBD oil    Sexual activity: Never   Other Topics Concern    Caffeine Concern No    Exercise Yes    Seat Belt Yes    Special Diet Yes     Comment: Celiac disease    Stress Concern Yes    Weight Concern No     Social Determinants of Health      Received from [facility]    Food Insecurity    Received from [facility]    Transportation Needs   Housing Stability: Low Risk  (2022)    Received from [facility]    Housing Stability     Are you concerned about having a safe and reliable place to live?: No        REVIEW OF SYSTEMS:   GENERAL HEALTH: feels well, no fatigue.  SKIN: denies any unusual skin lesions or rashes  EYES: no visual complaints or deficits  HEENT: denies nasal congestion, sinus pain or sore throat; hearing loss negative   RESPIRATORY: denies shortness of breath, wheezing or cough   CARDIOVASCULAR: denies chest pain, SOB, edema,orthopnea, no palpitations   GI: denies nausea, vomiting, constipation, diarrhea; no rectal bleeding; no heartburn  GENITAL/: no dysuria, urgency or frequency  MUSCULOSKELETAL: no joint complaints upper or lower extremities  NEURO: no sensory or motor complaint  HEMATOLOGY: denies hx anemia; denies bruising or excessive bleeding  ENDOCRINE: denies excessive thirst or urination; denies unexpected wt gain or wt  loss  ALLERGY/IMM.: denies food or seasonal allergies  PSYCH: no symptoms of depression or anxiety      EXAM:   /70   Pulse 92   Resp 16   Ht 5' 1\" (1.549 m)   Wt 128 lb (58.1 kg)   LMP 07/05/2011   SpO2 96%   BMI 24.19 kg/m²      General: WD/WN in no acute distress.   HEENT: PERRL and EOMI.  OP moist no lesions.  Neck is supple, with no cervical LAD or thyroid abnormalities. No carotid bruits.    Lungs: are clear to auscultation bilaterally, with no wheeze, rhonchi, or rales.   Heart: is RRR.  S1, S2, with no murmurs,clicks, gallops  Abdomen: is soft,NBS, NT/ND with no HSM.  No rebound or guarding. No CVA tenderness, no hernias.  Neuro: Cranial nerves II-XII normal,no focal abnormalities, and reflexes coordination and gait normal and symmetric.Sensation intact.  Extremities: are symmetric with no cyanosis, clubbing, or edema.  MS: Normal muscles tones, no joints abnormalities.  SKIN: Normal color, turgor, no lesions, rashes or wounds.  PSYCH: Normal affect and mood.          ASSESSMENT AND PLAN:     Clemencia Kim is a 58 year old female who presents for a complete physical exam.   Diagnoses and all orders for this visit:    Routine general medical examination at a health care facility    Bloating  -     Gastro Referral - In Network    Hyperthyroidism  -     TSH W Reflex To Free T4; Future       Pt's was recommended low fat diet and aerobic exercise 30 minutes three times weekly.   Health maintenance.   The patient indicates understanding of these issues and agrees to the plan.  The patient is asked to return for CPX in 1 year.

## 2024-05-12 ENCOUNTER — APPOINTMENT (OUTPATIENT)
Dept: GENERAL RADIOLOGY | Facility: HOSPITAL | Age: 59
End: 2024-05-12
Attending: STUDENT IN AN ORGANIZED HEALTH CARE EDUCATION/TRAINING PROGRAM

## 2024-05-12 ENCOUNTER — HOSPITAL ENCOUNTER (EMERGENCY)
Facility: HOSPITAL | Age: 59
Discharge: HOME | End: 2024-05-12
Attending: STUDENT IN AN ORGANIZED HEALTH CARE EDUCATION/TRAINING PROGRAM

## 2024-05-12 VITALS
DIASTOLIC BLOOD PRESSURE: 68 MMHG | OXYGEN SATURATION: 96 % | WEIGHT: 128 LBS | TEMPERATURE: 98 F | HEART RATE: 80 BPM | SYSTOLIC BLOOD PRESSURE: 100 MMHG | RESPIRATION RATE: 24 BRPM | BODY MASS INDEX: 23.55 KG/M2 | HEIGHT: 62 IN

## 2024-05-12 DIAGNOSIS — J18.9 PNEUMONIA OF RIGHT LOWER LOBE DUE TO INFECTIOUS ORGANISM: ICD-10-CM

## 2024-05-12 DIAGNOSIS — J44.1 COPD EXACERBATION (HCC): Primary | ICD-10-CM

## 2024-05-12 PROCEDURE — 99284 EMERGENCY DEPT VISIT MOD MDM: CPT

## 2024-05-12 PROCEDURE — 94640 AIRWAY INHALATION TREATMENT: CPT

## 2024-05-12 PROCEDURE — 0241U SARS-COV-2/FLU A AND B/RSV BY PCR (GENEXPERT): CPT | Performed by: STUDENT IN AN ORGANIZED HEALTH CARE EDUCATION/TRAINING PROGRAM

## 2024-05-12 PROCEDURE — 71046 X-RAY EXAM CHEST 2 VIEWS: CPT | Performed by: STUDENT IN AN ORGANIZED HEALTH CARE EDUCATION/TRAINING PROGRAM

## 2024-05-12 RX ORDER — AMOXICILLIN AND CLAVULANATE POTASSIUM 875; 125 MG/1; MG/1
1 TABLET, FILM COATED ORAL 2 TIMES DAILY
Qty: 14 TABLET | Refills: 0 | Status: SHIPPED | OUTPATIENT
Start: 2024-05-12 | End: 2024-05-19

## 2024-05-12 RX ORDER — PREDNISONE 20 MG/1
40 TABLET ORAL DAILY
Qty: 8 TABLET | Refills: 0 | Status: SHIPPED | OUTPATIENT
Start: 2024-05-12 | End: 2024-05-16

## 2024-05-12 RX ORDER — IPRATROPIUM BROMIDE AND ALBUTEROL SULFATE 2.5; .5 MG/3ML; MG/3ML
3 SOLUTION RESPIRATORY (INHALATION) ONCE
Status: COMPLETED | OUTPATIENT
Start: 2024-05-12 | End: 2024-05-12

## 2024-05-12 RX ORDER — AZITHROMYCIN 250 MG/1
TABLET, FILM COATED ORAL
Qty: 6 TABLET | Refills: 0 | Status: SHIPPED | OUTPATIENT
Start: 2024-05-12 | End: 2024-05-17

## 2024-05-12 RX ORDER — PREDNISONE 20 MG/1
60 TABLET ORAL ONCE
Status: COMPLETED | OUTPATIENT
Start: 2024-05-12 | End: 2024-05-12

## 2024-05-12 NOTE — ED INITIAL ASSESSMENT (HPI)
Pt presents to ed with c/o ANTELMO. Pt states she thinks she is having a COPD flare up. Pt reports it started this morning and has worsened since. Pt speaking in short sentences. Denies CP in triage.      Nebulizer at 3pm with no relief, rescue inhaler with no relief.

## 2024-05-12 NOTE — ED PROVIDER NOTES
History     Chief Complaint   Patient presents with    Difficulty Breathing       HPI    58 year old female history of interstitial lung disease, COPD, prior smoker presents with 3 to 4 days of dry intermittently productive cough, dyspnea, wheezing.  Feels like a COPD exacerbation to the patient.  She has been using her rescue inhaler which she feels is not helping much.  At baseline ET.  No chest pain.  At times coughing fits induced emesis.  No fevers or sore throat or congestion.  Compliant with her maintenance inhaler daily.        Past Medical History:    Acute, but ill-defined, cerebrovascular disease    Anxiety    Arthritis    COPD (chronic obstructive pulmonary disease) (HCC)    Depression    Disorder of thyroid    Emphysema of lung (HCC)    Fibroids    History of stomach ulcers    Human papilloma virus infection    Hyperlipidemia    Hyperthyroidism    Idiopathic pulmonary fibrosis (HCC)    Prediabetes    Diet control - no meds or blood surgar checking at home    Sexually transmitted disease    Thyroid disease    Visual impairment    glasses       Past Surgical History:   Procedure Laterality Date    Brain surgery      Colonoscopy  2018    adenoma- repeat 6-12 mo    Colonoscopy            Other      Thyroid polyp removal    Other surgical history  2021    Other surgical history      removal of parts of upper and lower lobes in right lung    Tubal ligation      Upper gi endoscopy,exam         Social History     Socioeconomic History    Marital status:    Tobacco Use    Smoking status: Former     Types: Cigarettes     Start date: 1982     Quit date: 2022     Years since quittin.6     Passive exposure: Current    Smokeless tobacco: Never    Tobacco comments:     1 cig per day   Vaping Use    Vaping status: Former    Substances: CBD   Substance and Sexual Activity    Alcohol use: Not Currently     Comment: rare - 1 drink 3 times a year    Drug use: Not Currently     Types:  Cocaine     Comment: former cocaine history-Has used CBD oil    Sexual activity: Never   Other Topics Concern    Caffeine Concern No    Exercise Yes    Seat Belt Yes    Special Diet Yes     Comment: Celiac disease    Stress Concern Yes    Weight Concern No     Social Determinants of Health      Received from [facility]    Food Insecurity    Received from [facility]    Transportation Needs   Housing Stability: Low Risk  (11/2/2022)    Received from [facility]    Housing Stability     Are you concerned about having a safe and reliable place to live?: No                   Physical Exam     ED Triage Vitals [05/12/24 1712]   /68   Pulse 80   Resp 24   Temp 97.6 °F (36.4 °C)   Temp src Temporal   SpO2 97 %   O2 Device None (Room air)       Physical Exam  Constitutional:       General: She is not in acute distress.  Eyes:      Extraocular Movements: Extraocular movements intact.   Cardiovascular:      Rate and Rhythm: Normal rate and regular rhythm.      Pulses: Normal pulses.   Pulmonary:      Effort: Pulmonary effort is normal. No respiratory distress.      Breath sounds: Wheezing present.   Musculoskeletal:         General: No swelling or deformity.      Cervical back: Normal range of motion.   Skin:     General: Skin is warm.   Neurological:      General: No focal deficit present.      Mental Status: She is alert.              ED Course     Labs Reviewed   SARS-COV-2/FLU A AND B/RSV BY PCR (GENEXPERT) - Normal    Narrative:     This test is intended for the qualitative detection and differentiation of SARS-CoV-2, influenza A, influenza B, and respiratory syncytial virus (RSV) viral RNA in nasopharyngeal or nares swabs from individuals suspected of respiratory viral infection consistent with COVID-19 by their healthcare provider. Signs and symptoms of respiratory viral infection due to SARS-CoV-2, influenza, and RSV can be similar.    Test  performed using the Xpert Xpress SARS-CoV-2/FLU/RSV (real time RT-PCR)  assay on the GeneXpert instrument, "[employer] [place] [address].   This test is being used under the Food and Drug Administration's Emergency Use Authorization.    The authorized Fact Sheet for Healthcare Providers for this assay is available upon request from the laboratory.     XR CHEST PA + LAT CHEST (CPT=71046)    Result Date: 5/12/2024  CONCLUSION:  1. No acute cardiopulmonary process. 2. Stable findings of a chronic interstitial lung disease.    Dictated by (CST): Romeo Pena MD on 5/12/2024 at 5:58 PM     Finalized by (CST): Romeo Pena MD on 5/12/2024 at 5:59 PM               MDM     Vitals:    05/12/24 1712 05/12/24 1721   BP: 100/68    Pulse: 80    Resp: 24    Temp: 97.6 °F (36.4 °C)    TempSrc: Temporal    SpO2: 97% 96%   Weight: 58.1 kg    Height: 157.5 cm (5' 2\")        SOB, Wheezing, cough.  Possible COPD exacerbation/interstitial lung disease flare.  Scattered wheezes but moving air well.  Nontoxic-appearing, will get chest x-ray to evaluate for pneumonia.  Viral swabs.  Given DuoNeb and will start on steroids.    ED Course as of 05/12/24 2037  ------------------------------------------------------------  Time: 05/12 1759  Comment: My interpretation of x-ray with interstitial changes, possible right lower lobe infiltrate  ------------------------------------------------------------  Time: 05/12 1801  Comment: Given underlying risk factors with productive cough, will plan to start antibiotics.  ------------------------------------------------------------  Time: 05/12 2966  Comment: Discussed all findings.  Patient is feeling well to be discharged, good response to treatment.  Prescription for antibiotics and steroid burst.  She will follow-up viral results on MyChart, she is not within the window of treatment for oseltamivir  Vitals remain stable.  Ambulating without difficulty.  Given written and verbal instructions  regarding this condition and strict return precautions.   Will follow up in clinic.   Patient verbalizes understanding of instructions and follow up plan, as well as indications to return to the emergency department.           Disposition and Plan     Clinical Impression:  1. COPD exacerbation (HCC)    2. Pneumonia of right lower lobe due to infectious organism        Disposition:  Discharge    Follow-up:  Aurora Gonzalez MD  [id number]  [facility] [id number]  [phone]    Follow up        Medications Prescribed:  Discharge Medication List as of 5/12/2024  6:09 PM        START taking these medications    Details   amoxicillin clavulanate 875-125 MG Oral Tab Take 1 tablet by mouth 2 (two) times daily for 7 days., Normal, Disp-14 tablet, R-0      azithromycin (ZITHROMAX Z-ANGE) 250 MG Oral Tab 500 mg once followed by 250 mg daily x 4 days, Normal, Disp-6 tablet, R-0      predniSONE 20 MG Oral Tab Take 2 tablets (40 mg total) by mouth daily for 4 days., Normal, Disp-8 tablet, R-0

## 2024-05-12 NOTE — ED QUICK NOTES
Patient noted to be 96% on room air with ambulation. Continues to endorse breathing difficulty. Lungs clear upon auscultation. Took albuterol nebulizer 2 hours prior to arrival. Denies chest pain.

## 2024-05-14 ENCOUNTER — HOSPITAL ENCOUNTER (OUTPATIENT)
Dept: CV DIAGNOSTICS | Age: 59
Discharge: HOME | End: 2024-05-14
Attending: FAMILY MEDICINE

## 2024-05-14 ENCOUNTER — LAB ENCOUNTER (OUTPATIENT)
Dept: LAB | Age: 59
End: 2024-05-14
Attending: FAMILY MEDICINE

## 2024-05-14 DIAGNOSIS — E78.5 HYPERLIPIDEMIA, UNSPECIFIED HYPERLIPIDEMIA TYPE: ICD-10-CM

## 2024-05-14 DIAGNOSIS — E05.90 HYPERTHYROIDISM: ICD-10-CM

## 2024-05-14 DIAGNOSIS — R63.5 WEIGHT GAIN: ICD-10-CM

## 2024-05-14 DIAGNOSIS — J84.10 PULMONARY FIBROSIS (HCC): ICD-10-CM

## 2024-05-14 LAB
CHOLEST SERPL-MCNC: 165 MG/DL (ref ?–200)
FASTING PATIENT LIPID ANSWER: YES
HDLC SERPL-MCNC: 50 MG/DL (ref 40–59)
LDLC SERPL CALC-MCNC: 102 MG/DL (ref ?–100)
NONHDLC SERPL-MCNC: 115 MG/DL (ref ?–130)
T3FREE SERPL-MCNC: 2.49 PG/ML (ref 2.4–4.2)
T4 FREE SERPL-MCNC: 1.4 NG/DL (ref 0.8–1.7)
TRIGL SERPL-MCNC: 65 MG/DL (ref 30–149)
TSI SER-ACNC: 0.04 MIU/ML (ref 0.55–4.78)
VLDLC SERPL CALC-MCNC: 11 MG/DL (ref 0–30)

## 2024-05-14 PROCEDURE — 36415 COLL VENOUS BLD VENIPUNCTURE: CPT

## 2024-05-14 PROCEDURE — 80061 LIPID PANEL: CPT

## 2024-05-14 PROCEDURE — 93306 TTE W/DOPPLER COMPLETE: CPT | Performed by: FAMILY MEDICINE

## 2024-05-14 PROCEDURE — 84443 ASSAY THYROID STIM HORMONE: CPT

## 2024-05-14 PROCEDURE — 84481 FREE ASSAY (FT-3): CPT

## 2024-05-14 PROCEDURE — 84439 ASSAY OF FREE THYROXINE: CPT

## 2024-05-15 ENCOUNTER — OFFICE VISIT (OUTPATIENT)
Dept: FAMILY MEDICINE CLINIC | Facility: CLINIC | Age: 59
End: 2024-05-15

## 2024-05-15 VITALS
HEART RATE: 68 BPM | WEIGHT: 133 LBS | DIASTOLIC BLOOD PRESSURE: 80 MMHG | SYSTOLIC BLOOD PRESSURE: 130 MMHG | HEIGHT: 62 IN | OXYGEN SATURATION: 98 % | BODY MASS INDEX: 24.48 KG/M2 | RESPIRATION RATE: 16 BRPM

## 2024-05-15 DIAGNOSIS — J84.10 PULMONARY FIBROSIS (HCC): Primary | ICD-10-CM

## 2024-05-15 DIAGNOSIS — J44.1 COPD WITH ACUTE EXACERBATION (HCC): ICD-10-CM

## 2024-05-15 DIAGNOSIS — E05.90 HYPERTHYROIDISM: ICD-10-CM

## 2024-05-15 PROCEDURE — 99214 OFFICE O/P EST MOD 30 MIN: CPT | Performed by: NURSE PRACTITIONER

## 2024-05-15 RX ORDER — ARIPIPRAZOLE 2 MG/1
2 TABLET ORAL DAILY
Qty: 30 TABLET | Refills: 1 | Status: SHIPPED | OUTPATIENT
Start: 2024-05-15

## 2024-05-15 RX ORDER — METHIMAZOLE 5 MG/1
5 TABLET ORAL 2 TIMES DAILY
Qty: 180 TABLET | Refills: 0 | Status: SHIPPED | OUTPATIENT
Start: 2024-05-15 | End: 2024-08-13

## 2024-05-15 NOTE — TELEPHONE ENCOUNTER
Medication(s) to Refill:   Requested Prescriptions     Pending Prescriptions Disp Refills    ARIPIPRAZOLE 2 MG Oral Tab [Pharmacy Med Name: ARIPIPRAZOLE 2MG TABLETS] 30 tablet 1     Sig: TAKE 1 TABLET(2 MG) BY MOUTH DAILY         Reason for Medication Refill being sent to Provider / Reason Protocol Failed:  [] 90 day refill has already been granted  [] Blood Pressure out of range  [] Labs Abnormal/over due  [] Medication not previously prescribed by Provider  [] Non-Protocol Medication  [] Controlled Substance   [] Due for appointment- no future appointment scheduled  [] No Follow up specified      Last Time Medication was Filled:  2/20/24      Last Office Visit with PCP: 5/9/24    When Patient was Due Back to the Office:  3 months  (from when PCP last addressed condition)    Future Appointments:  Future Appointments   Date Time Provider Department Center   5/15/2024 11:15 AM Madeline Armstrong APRN [facility] 17 [facility]   7/9/2024 11:40 AM [facility]         Last Blood Pressures:  BP Readings from Last 2 Encounters:   05/12/24 100/68   05/09/24 110/70         Action taken:  [] Refill approved per protocol  [] Routing to provider for approval

## 2024-05-25 RX ORDER — PRAZOSIN HYDROCHLORIDE 1 MG/1
1 CAPSULE ORAL NIGHTLY
Qty: 90 CAPSULE | Refills: 1 | OUTPATIENT
Start: 2024-05-25

## 2024-06-03 RX ORDER — PRAZOSIN HYDROCHLORIDE 1 MG/1
1 CAPSULE ORAL NIGHTLY
Qty: 90 CAPSULE | Refills: 1 | Status: SHIPPED | OUTPATIENT
Start: 2024-06-03

## 2024-06-03 RX ORDER — OXYBUTYNIN CHLORIDE 5 MG/1
5 TABLET, EXTENDED RELEASE ORAL DAILY
Qty: 90 TABLET | Refills: 0 | Status: SHIPPED | OUTPATIENT
Start: 2024-06-03

## 2024-06-07 DIAGNOSIS — E05.90 HYPERTHYROIDISM: ICD-10-CM

## 2024-06-07 RX ORDER — METHIMAZOLE 5 MG/1
5 TABLET ORAL 2 TIMES DAILY
Qty: 180 TABLET | Refills: 0 | OUTPATIENT
Start: 2024-06-07

## 2024-06-19 ENCOUNTER — PATIENT MESSAGE (OUTPATIENT)
Dept: FAMILY MEDICINE CLINIC | Facility: CLINIC | Age: 59
End: 2024-06-19

## 2024-06-20 NOTE — TELEPHONE ENCOUNTER
From: Clemencia Kim  To: SEAN MCMANUS  Sent: 6/19/2024 7:48 PM CDT  Subject: Handicap application    Do you know how I can get a handicap placard for my car

## 2024-06-29 ENCOUNTER — PATIENT MESSAGE (OUTPATIENT)
Dept: FAMILY MEDICINE CLINIC | Facility: CLINIC | Age: 59
End: 2024-06-29

## 2024-06-29 DIAGNOSIS — N95.1 MENOPAUSAL HOT FLUSHES: Primary | ICD-10-CM

## 2024-06-29 DIAGNOSIS — R73.03 PREDIABETES: ICD-10-CM

## 2024-07-01 NOTE — TELEPHONE ENCOUNTER
From: Clemencia Kim  To: SEAN MCMANUS  Sent: 6/29/2024 2:58 PM CDT  Subject: Hot flashes     Wouldn't it be possible to check my hormone levels I've been having really bad hot flashes and menopause like symptoms

## 2024-07-01 NOTE — TELEPHONE ENCOUNTER
Patient c/o really bad hot flashes and menopause like symptoms asking if she can have her hormone levels checked.  LOV 5/15/24  Please advise

## 2024-07-08 ENCOUNTER — PATIENT MESSAGE (OUTPATIENT)
Dept: FAMILY MEDICINE CLINIC | Facility: CLINIC | Age: 59
End: 2024-07-08

## 2024-07-08 ENCOUNTER — LAB ENCOUNTER (OUTPATIENT)
Dept: LAB | Age: 59
End: 2024-07-08
Attending: FAMILY MEDICINE
Payer: MEDICAID

## 2024-07-08 DIAGNOSIS — N95.1 MENOPAUSAL HOT FLUSHES: ICD-10-CM

## 2024-07-08 DIAGNOSIS — R73.03 PREDIABETES: ICD-10-CM

## 2024-07-08 LAB
EST. AVERAGE GLUCOSE BLD GHB EST-MCNC: 131 MG/DL (ref 68–126)
ESTRADIOL SERPL-MCNC: 25.7 PG/ML
FSH SERPL-ACNC: 100.4 MIU/ML
HBA1C MFR BLD: 6.2 % (ref ?–5.7)

## 2024-07-08 PROCEDURE — 36415 COLL VENOUS BLD VENIPUNCTURE: CPT

## 2024-07-08 PROCEDURE — 83036 HEMOGLOBIN GLYCOSYLATED A1C: CPT

## 2024-07-08 PROCEDURE — 82670 ASSAY OF TOTAL ESTRADIOL: CPT

## 2024-07-08 PROCEDURE — 83001 ASSAY OF GONADOTROPIN (FSH): CPT

## 2024-07-08 NOTE — TELEPHONE ENCOUNTER
From: Clemencia Kim  To: SEAN MCMANUS  Sent: 7/8/2024 3:56 PM CDT  Subject: Menopause    Is there anything I can take to make the symptoms less

## 2024-07-09 ENCOUNTER — HOSPITAL ENCOUNTER (OUTPATIENT)
Dept: MAMMOGRAPHY | Facility: HOSPITAL | Age: 59
Discharge: HOME | End: 2024-07-09
Attending: FAMILY MEDICINE
Payer: MEDICAID

## 2024-07-09 DIAGNOSIS — Z12.31 ENCOUNTER FOR SCREENING MAMMOGRAM FOR HIGH-RISK PATIENT: ICD-10-CM

## 2024-07-09 PROCEDURE — 77067 SCR MAMMO BI INCL CAD: CPT | Performed by: FAMILY MEDICINE

## 2024-07-09 PROCEDURE — 77063 BREAST TOMOSYNTHESIS BI: CPT | Performed by: FAMILY MEDICINE

## 2024-07-09 RX ORDER — LOSARTAN POTASSIUM 50 MG/1
50 TABLET ORAL DAILY
Qty: 90 TABLET | Refills: 0 | Status: SHIPPED | OUTPATIENT
Start: 2024-07-09

## 2024-07-15 RX ORDER — ARIPIPRAZOLE 2 MG/1
2 TABLET ORAL DAILY
Qty: 30 TABLET | Refills: 1 | Status: SHIPPED | OUTPATIENT
Start: 2024-07-15

## 2024-07-15 NOTE — TELEPHONE ENCOUNTER
Medication(s) to Refill:   Requested Prescriptions     Pending Prescriptions Disp Refills    ARIPIPRAZOLE 2 MG Oral Tab [Pharmacy Med Name: ARIPIPRAZOLE 2MG TABLETS] 30 tablet 1     Sig: TAKE 1 TABLET(2 MG) BY MOUTH DAILY         Reason for Medication Refill being sent to Provider / Reason Protocol Failed:  [] 90 day refill has already been granted  [] Blood Pressure out of range  [] Labs Abnormal/over due  [] Medication not previously prescribed by Provider  [] Non-Protocol Medication  [] Controlled Substance   [] Due for appointment- no future appointment scheduled  [] No Follow up specified      Last Time Medication was Filled:  5/15/24      Last Office Visit with PCP: 5/15/24    When Patient was Due Back to the Office:    (from when PCP last addressed condition)    Future Appointments:  No future appointments.      Last Blood Pressures:  BP Readings from Last 2 Encounters:   05/15/24 130/80   05/12/24 100/68         Action taken:  [] Refill approved per protocol  [] Routing to provider for approval

## 2024-07-20 ENCOUNTER — APPOINTMENT (OUTPATIENT)
Dept: CT IMAGING | Facility: HOSPITAL | Age: 59
End: 2024-07-20
Attending: EMERGENCY MEDICINE
Payer: MEDICAID

## 2024-07-20 ENCOUNTER — APPOINTMENT (OUTPATIENT)
Dept: GENERAL RADIOLOGY | Facility: HOSPITAL | Age: 59
End: 2024-07-20
Attending: EMERGENCY MEDICINE
Payer: MEDICAID

## 2024-07-20 ENCOUNTER — HOSPITAL ENCOUNTER (EMERGENCY)
Facility: HOSPITAL | Age: 59
Discharge: HOME | End: 2024-07-21
Attending: EMERGENCY MEDICINE
Payer: MEDICAID

## 2024-07-20 DIAGNOSIS — R51.9 NONINTRACTABLE EPISODIC HEADACHE, UNSPECIFIED HEADACHE TYPE: Primary | ICD-10-CM

## 2024-07-20 LAB
ANION GAP SERPL CALC-SCNC: 10 MMOL/L (ref 0–18)
BASOPHILS # BLD AUTO: 0.06 X10(3) UL (ref 0–0.2)
BASOPHILS NFR BLD AUTO: 0.3 %
BUN BLD-MCNC: 17 MG/DL (ref 9–23)
BUN/CREAT SERPL: 23 (ref 10–20)
CALCIUM BLD-MCNC: 10.2 MG/DL (ref 8.7–10.4)
CHLORIDE SERPL-SCNC: 108 MMOL/L (ref 98–112)
CO2 SERPL-SCNC: 20 MMOL/L (ref 21–32)
CREAT BLD-MCNC: 0.74 MG/DL
DEPRECATED RDW RBC AUTO: 46.3 FL (ref 35.1–46.3)
EGFRCR SERPLBLD CKD-EPI 2021: 94 ML/MIN/1.73M2 (ref 60–?)
EOSINOPHIL # BLD AUTO: 0.09 X10(3) UL (ref 0–0.7)
EOSINOPHIL NFR BLD AUTO: 0.5 %
ERYTHROCYTE [DISTWIDTH] IN BLOOD BY AUTOMATED COUNT: 14.3 % (ref 11–15)
GLUCOSE BLD-MCNC: 124 MG/DL (ref 70–99)
GLUCOSE BLDC GLUCOMTR-MCNC: 123 MG/DL (ref 70–99)
HCT VFR BLD AUTO: 40.8 %
HGB BLD-MCNC: 14.5 G/DL
IMM GRANULOCYTES # BLD AUTO: 0.07 X10(3) UL (ref 0–1)
IMM GRANULOCYTES NFR BLD: 0.4 %
LYMPHOCYTES # BLD AUTO: 3.88 X10(3) UL (ref 1–4)
LYMPHOCYTES NFR BLD AUTO: 19.6 %
MCH RBC QN AUTO: 31.3 PG (ref 26–34)
MCHC RBC AUTO-ENTMCNC: 35.5 G/DL (ref 31–37)
MCV RBC AUTO: 88.1 FL
MONOCYTES # BLD AUTO: 0.86 X10(3) UL (ref 0.1–1)
MONOCYTES NFR BLD AUTO: 4.3 %
NEUTROPHILS # BLD AUTO: 14.83 X10 (3) UL (ref 1.5–7.7)
NEUTROPHILS # BLD AUTO: 14.83 X10(3) UL (ref 1.5–7.7)
NEUTROPHILS NFR BLD AUTO: 74.9 %
OSMOLALITY SERPL CALC.SUM OF ELEC: 289 MOSM/KG (ref 275–295)
PLATELET # BLD AUTO: 465 10(3)UL (ref 150–450)
POTASSIUM SERPL-SCNC: 3.3 MMOL/L (ref 3.5–5.1)
RBC # BLD AUTO: 4.63 X10(6)UL
SODIUM SERPL-SCNC: 138 MMOL/L (ref 136–145)
WBC # BLD AUTO: 19.8 X10(3) UL (ref 4–11)

## 2024-07-20 PROCEDURE — 85025 COMPLETE CBC W/AUTO DIFF WBC: CPT | Performed by: EMERGENCY MEDICINE

## 2024-07-20 PROCEDURE — 99284 EMERGENCY DEPT VISIT MOD MDM: CPT

## 2024-07-20 PROCEDURE — 80048 BASIC METABOLIC PNL TOTAL CA: CPT | Performed by: EMERGENCY MEDICINE

## 2024-07-20 PROCEDURE — 70450 CT HEAD/BRAIN W/O DYE: CPT | Performed by: EMERGENCY MEDICINE

## 2024-07-20 PROCEDURE — 82962 GLUCOSE BLOOD TEST: CPT

## 2024-07-20 PROCEDURE — 96374 THER/PROPH/DIAG INJ IV PUSH: CPT

## 2024-07-20 PROCEDURE — 71045 X-RAY EXAM CHEST 1 VIEW: CPT | Performed by: EMERGENCY MEDICINE

## 2024-07-20 PROCEDURE — 96375 TX/PRO/DX INJ NEW DRUG ADDON: CPT

## 2024-07-20 PROCEDURE — 94640 AIRWAY INHALATION TREATMENT: CPT

## 2024-07-20 RX ORDER — METOCLOPRAMIDE HYDROCHLORIDE 5 MG/ML
10 INJECTION INTRAMUSCULAR; INTRAVENOUS ONCE
Status: COMPLETED | OUTPATIENT
Start: 2024-07-20 | End: 2024-07-20

## 2024-07-20 RX ORDER — METHYLPREDNISOLONE SODIUM SUCCINATE 125 MG/2ML
60 INJECTION, POWDER, LYOPHILIZED, FOR SOLUTION INTRAMUSCULAR; INTRAVENOUS ONCE
Status: COMPLETED | OUTPATIENT
Start: 2024-07-20 | End: 2024-07-20

## 2024-07-20 RX ORDER — KETOROLAC TROMETHAMINE 15 MG/ML
15 INJECTION, SOLUTION INTRAMUSCULAR; INTRAVENOUS ONCE
Status: COMPLETED | OUTPATIENT
Start: 2024-07-20 | End: 2024-07-20

## 2024-07-20 RX ORDER — PREDNISONE 20 MG/1
60 TABLET ORAL ONCE
Status: DISCONTINUED | OUTPATIENT
Start: 2024-07-20 | End: 2024-07-20

## 2024-07-20 RX ORDER — DIPHENHYDRAMINE HYDROCHLORIDE 50 MG/ML
25 INJECTION INTRAMUSCULAR; INTRAVENOUS ONCE
Status: COMPLETED | OUTPATIENT
Start: 2024-07-20 | End: 2024-07-20

## 2024-07-20 RX ORDER — IPRATROPIUM BROMIDE AND ALBUTEROL SULFATE 2.5; .5 MG/3ML; MG/3ML
3 SOLUTION RESPIRATORY (INHALATION) ONCE
Status: COMPLETED | OUTPATIENT
Start: 2024-07-20 | End: 2024-07-20

## 2024-07-21 VITALS
BODY MASS INDEX: 23.92 KG/M2 | HEART RATE: 80 BPM | RESPIRATION RATE: 17 BRPM | OXYGEN SATURATION: 98 % | HEIGHT: 62 IN | SYSTOLIC BLOOD PRESSURE: 119 MMHG | DIASTOLIC BLOOD PRESSURE: 85 MMHG | TEMPERATURE: 98 F | WEIGHT: 130 LBS

## 2024-07-21 NOTE — DISCHARGE INSTRUCTIONS
Continue medications as previously prescribed.  Follow-up with your primary physician for reevaluation.  Return to the emergency department if severe headache recurs or if difficulty breathing or other new symptoms develop.

## 2024-07-21 NOTE — ED PROVIDER NOTES
Patient Seen in: [facility] Emergency Department      History     Chief Complaint   Patient presents with    Headache     Stated Complaint: headache, history of brain aneurysms    Subjective:   HPI    59-year-old female with history of hyperlipidemia, prediabetes, thyroid disorder, COPD, idiopathic pulmonary fibrosis, chronic headaches, anxiety, depression, and prior MCA aneurysm status post clipping in 2021 presents with complaints of headache.  The patient reports onset of headache last night typical of past headaches.  She reports the headache usually resolves by the morning but she woke this morning with continuation of her headache.  She states the pain has persisted throughout the day.  She reports some occasional diplopia including some earlier today.  She denies any other vision or speech changes.  She denies focal weakness or numbness.  No known fevers.  She denies any neck pain or stiffness.    Objective:   Past Medical History:    Acute, but ill-defined, cerebrovascular disease    Anxiety    Arthritis    COPD (chronic obstructive pulmonary disease) (HCC)    Depression    Disorder of thyroid    Emphysema of lung (HCC)    Fibroids    History of stomach ulcers    Human papilloma virus infection    Hyperlipidemia    Hyperthyroidism    Idiopathic pulmonary fibrosis (HCC)    Prediabetes    Diet control - no meds or blood surgar checking at home    Sexually transmitted disease    Thyroid disease    Visual impairment    glasses              No pertinent past surgical history.              No pertinent social history.            Review of Systems    Positive for stated Chief Complaint: Headache    Other systems are as noted in HPI.  Constitutional and vital signs reviewed.      All other systems reviewed and negative except as noted above.    Physical Exam     ED Triage Vitals [07/20/24 2139]   BP (!) 140/93   Pulse 90   Resp (!) 28   Temp 97.7 °F (36.5 °C)   Temp src Oral   SpO2 (!) 85 %   O2 Device None  (Room air)       Current Vitals:   Vital Signs  BP: 103/73  Pulse: 87  Resp: 24  Temp: 97.7 °F (36.5 °C)  Temp src: Oral  MAP (mmHg): 82    Oxygen Therapy  SpO2: 97 %  O2 Device: None (Room air)  O2 Flow Rate (L/min): 3 L/min            Physical Exam      General Appearance: alert, no distress  Eyes: pupils equal and round no pallor or injection  ENT, Mouth: mucous membranes moist  Respiratory: there are no retractions, rhonchi and wheezes bilaterally  Cardiovascular: regular rate and rhythm  Gastrointestinal:  abdomen is soft and non tender, no masses, bowel sounds normal  Neurological: Speech normal.  Cranial nerves II through XII intact.  No focal motor or sensory deficits to extremities x 4.  Cerebellum intact to finger-to-nose.  Skin: warm and dry, no rashes.  Musculoskeletal: neck is supple non tender        Extremities are symmetrical, full range of motion  Psychiatric: patient is oriented X 3, there is no agitation    DIFFERENTIAL DIAGNOSIS: After history and physical exam differential diagnosis was considered for headache including subarachnoid hemorrhage migraine headache, meningitis, infectious causes such as pharyngitis, tension headache and sinusitis        ED Course     Labs Reviewed   BASIC METABOLIC PANEL (8) - Abnormal; Notable for the following components:       Result Value    Glucose 124 (*)     Potassium 3.3 (*)     CO2 20.0 (*)     BUN/CREA Ratio 23.0 (*)     All other components within normal limits   POCT GLUCOSE - Abnormal; Notable for the following components:    POC Glucose  123 (*)     All other components within normal limits   CBC W/ DIFFERENTIAL - Abnormal; Notable for the following components:    WBC 19.8 (*)     .0 (*)     Neutrophil Absolute Prelim 14.83 (*)     Neutrophil Absolute 14.83 (*)     All other components within normal limits   CBC WITH DIFFERENTIAL WITH PLATELET    Narrative:     The following orders were created for panel order CBC With Differential With  Platelet.  Procedure                               Abnormality         Status                     ---------                               -----------         ------                     CBC W/ DIFFERENTIAL[[id number]]          Abnormal            Final result                 Please view results for these tests on the individual orders.   RAINBOW DRAW LAVENDER   RAINBOW DRAW LIGHT GREEN   RAINBOW DRAW BLUE   RAINBOW DRAW GOLD                    MDM      After initial evaluation the patient was brought to CT.  CT results noted with no evidence of hemorrhage.  The patient was then given medications for headache.  She was also hypoxic upon arrival and had some wheezing.  She was given a nebulizer treatment.  Her hypoxia resolved.  The patient's headache resolved after receiving medications.  She fell asleep stating that she did not sleep well last night because of her headache.  She was awoken after approximately an hour and reports that her headache has not returned.  She is comfortable at present.  O2 saturation 95 percent on room air.  Will discharge the patient home with recommendations to follow-up with her primary physician.  She is advised to return if severe headache recurs or if other new symptoms develop.                                   Medical Decision Making      Disposition and Plan     Clinical Impression:  1. Nonintractable episodic headache, unspecified headache type         Disposition:  Discharge  7/21/2024  1:00 am    Follow-up:  Aurora Gonzalez MD  [id number]  [facility] [address]  [phone]    Follow up            Medications Prescribed:  Current Discharge Medication List

## 2024-07-21 NOTE — ED INITIAL ASSESSMENT (HPI)
PT to ED reporting headache 10/10 \"like someone is smashing my brain\", nausea and vomiting x 1 day. Denies blood in vomit. Hx of brain aneurysm. 85% on RA, BE FAST NEGATIVE. TL notifed,

## 2024-07-22 ENCOUNTER — HOSPITAL ENCOUNTER (INPATIENT)
Facility: HOSPITAL | Age: 59
LOS: 2 days | Discharge: HOME | End: 2024-07-24
Attending: EMERGENCY MEDICINE | Admitting: HOSPITALIST
Payer: MEDICAID

## 2024-07-22 ENCOUNTER — APPOINTMENT (OUTPATIENT)
Dept: GENERAL RADIOLOGY | Facility: HOSPITAL | Age: 59
End: 2024-07-22
Attending: EMERGENCY MEDICINE
Payer: MEDICAID

## 2024-07-22 DIAGNOSIS — J96.01 ACUTE HYPOXEMIC RESPIRATORY FAILURE (HCC): Primary | ICD-10-CM

## 2024-07-22 DIAGNOSIS — J84.10 INTERSTITIAL PULMONARY FIBROSIS (HCC): ICD-10-CM

## 2024-07-22 PROBLEM — J18.9 PNEUMONIA: Status: ACTIVE | Noted: 2024-07-22

## 2024-07-22 LAB
ALBUMIN SERPL-MCNC: 4.8 G/DL (ref 3.2–4.8)
ALBUMIN/GLOB SERPL: 1.5 {RATIO} (ref 1–2)
ALP LIVER SERPL-CCNC: 88 U/L
ALT SERPL-CCNC: 13 U/L
ANION GAP SERPL CALC-SCNC: 10 MMOL/L (ref 0–18)
AST SERPL-CCNC: 17 U/L (ref ?–34)
BASE EXCESS BLD CALC-SCNC: -0.9 MMOL/L (ref ?–2)
BASOPHILS # BLD AUTO: 0.02 X10(3) UL (ref 0–0.2)
BASOPHILS NFR BLD AUTO: 0.1 %
BILIRUB SERPL-MCNC: 0.8 MG/DL (ref 0.3–1.2)
BNP SERPL-MCNC: 12 PG/ML
BUN BLD-MCNC: 19 MG/DL (ref 9–23)
BUN/CREAT SERPL: 30.6 (ref 10–20)
CALCIUM BLD-MCNC: 9.1 MG/DL (ref 8.7–10.4)
CHLORIDE SERPL-SCNC: 106 MMOL/L (ref 98–112)
CO2 SERPL-SCNC: 23 MMOL/L (ref 21–32)
CREAT BLD-MCNC: 0.62 MG/DL
DEPRECATED RDW RBC AUTO: 45.4 FL (ref 35.1–46.3)
EGFRCR SERPLBLD CKD-EPI 2021: 103 ML/MIN/1.73M2 (ref 60–?)
EOSINOPHIL # BLD AUTO: 0.16 X10(3) UL (ref 0–0.7)
EOSINOPHIL NFR BLD AUTO: 1.1 %
ERYTHROCYTE [DISTWIDTH] IN BLOOD BY AUTOMATED COUNT: 14.1 % (ref 11–15)
FLUAV + FLUBV RNA SPEC NAA+PROBE: NEGATIVE
FLUAV + FLUBV RNA SPEC NAA+PROBE: NEGATIVE
GLOBULIN PLAS-MCNC: 3.2 G/DL (ref 2–3.5)
GLUCOSE BLD-MCNC: 121 MG/DL (ref 70–99)
HCO3 BLDA-SCNC: 23.9 MEQ/L (ref 21–27)
HCT VFR BLD AUTO: 36.4 %
HGB BLD-MCNC: 12.8 G/DL
IMM GRANULOCYTES # BLD AUTO: 0.07 X10(3) UL (ref 0–1)
IMM GRANULOCYTES NFR BLD: 0.5 %
LACTATE SERPL-SCNC: 0.8 MMOL/L (ref 0.5–2)
LIPASE SERPL-CCNC: 32 U/L (ref 12–53)
LYMPHOCYTES # BLD AUTO: 2.7 X10(3) UL (ref 1–4)
LYMPHOCYTES NFR BLD AUTO: 19.1 %
MCH RBC QN AUTO: 31.1 PG (ref 26–34)
MCHC RBC AUTO-ENTMCNC: 35.2 G/DL (ref 31–37)
MCV RBC AUTO: 88.6 FL
MODIFIED ALLEN TEST: POSITIVE
MONOCYTES # BLD AUTO: 1.04 X10(3) UL (ref 0.1–1)
MONOCYTES NFR BLD AUTO: 7.4 %
NEUTROPHILS # BLD AUTO: 10.14 X10 (3) UL (ref 1.5–7.7)
NEUTROPHILS # BLD AUTO: 10.14 X10(3) UL (ref 1.5–7.7)
NEUTROPHILS NFR BLD AUTO: 71.8 %
O2 CT BLD-SCNC: 13.6 VOL% (ref 15–23)
OSMOLALITY SERPL CALC.SUM OF ELEC: 292 MOSM/KG (ref 275–295)
OXYGEN LITERS/MINUTE: 2 L/MIN
PCO2 BLDA: 37 MM HG (ref 35–45)
PH BLDA: 7.41 [PH] (ref 7.35–7.45)
PLATELET # BLD AUTO: 430 10(3)UL (ref 150–450)
PO2 BLDA: 48 MM HG (ref 80–100)
POTASSIUM SERPL-SCNC: 3.1 MMOL/L (ref 3.5–5.1)
PROCALCITONIN SERPL-MCNC: 1.29 NG/ML (ref ?–0.05)
PROT SERPL-MCNC: 8 G/DL (ref 5.7–8.2)
PUNCTURE CHARGE: YES
RBC # BLD AUTO: 4.11 X10(6)UL
RSV RNA SPEC NAA+PROBE: NEGATIVE
SAO2 % BLDA: 85.3 % (ref 94–100)
SARS-COV-2 RNA RESP QL NAA+PROBE: NOT DETECTED
SODIUM SERPL-SCNC: 139 MMOL/L (ref 136–145)
WBC # BLD AUTO: 14.1 X10(3) UL (ref 4–11)

## 2024-07-22 PROCEDURE — 99223 1ST HOSP IP/OBS HIGH 75: CPT | Performed by: INTERNAL MEDICINE

## 2024-07-22 PROCEDURE — 99223 1ST HOSP IP/OBS HIGH 75: CPT | Performed by: HOSPITALIST

## 2024-07-22 PROCEDURE — 71045 X-RAY EXAM CHEST 1 VIEW: CPT | Performed by: EMERGENCY MEDICINE

## 2024-07-22 RX ORDER — POTASSIUM CHLORIDE 20 MEQ/1
40 TABLET, EXTENDED RELEASE ORAL ONCE
Status: COMPLETED | OUTPATIENT
Start: 2024-07-22 | End: 2024-07-22

## 2024-07-22 RX ORDER — PROCHLORPERAZINE EDISYLATE 5 MG/ML
10 INJECTION INTRAMUSCULAR; INTRAVENOUS ONCE
Status: COMPLETED | OUTPATIENT
Start: 2024-07-22 | End: 2024-07-22

## 2024-07-22 RX ORDER — SODIUM CHLORIDE 9 MG/ML
INJECTION, SOLUTION INTRAVENOUS CONTINUOUS
Status: DISCONTINUED | OUTPATIENT
Start: 2024-07-22 | End: 2024-07-24

## 2024-07-22 RX ORDER — ENOXAPARIN SODIUM 100 MG/ML
40 INJECTION SUBCUTANEOUS DAILY
Status: DISCONTINUED | OUTPATIENT
Start: 2024-07-23 | End: 2024-07-24

## 2024-07-22 RX ORDER — TEMAZEPAM 15 MG/1
15 CAPSULE ORAL NIGHTLY PRN
Status: DISCONTINUED | OUTPATIENT
Start: 2024-07-22 | End: 2024-07-24

## 2024-07-22 RX ORDER — ONDANSETRON 2 MG/ML
4 INJECTION INTRAMUSCULAR; INTRAVENOUS EVERY 6 HOURS PRN
Status: DISCONTINUED | OUTPATIENT
Start: 2024-07-22 | End: 2024-07-24

## 2024-07-22 RX ORDER — METHYLPREDNISOLONE SODIUM SUCCINATE 40 MG/ML
40 INJECTION, POWDER, LYOPHILIZED, FOR SOLUTION INTRAMUSCULAR; INTRAVENOUS EVERY 8 HOURS
Status: DISCONTINUED | OUTPATIENT
Start: 2024-07-22 | End: 2024-07-22

## 2024-07-22 RX ORDER — IPRATROPIUM BROMIDE AND ALBUTEROL SULFATE 2.5; .5 MG/3ML; MG/3ML
3 SOLUTION RESPIRATORY (INHALATION) EVERY 6 HOURS PRN
Status: DISCONTINUED | OUTPATIENT
Start: 2024-07-22 | End: 2024-07-22

## 2024-07-22 RX ORDER — PROCHLORPERAZINE EDISYLATE 5 MG/ML
5 INJECTION INTRAMUSCULAR; INTRAVENOUS EVERY 8 HOURS PRN
Status: DISCONTINUED | OUTPATIENT
Start: 2024-07-22 | End: 2024-07-24

## 2024-07-22 RX ORDER — IPRATROPIUM BROMIDE AND ALBUTEROL SULFATE 2.5; .5 MG/3ML; MG/3ML
3 SOLUTION RESPIRATORY (INHALATION) EVERY 6 HOURS
Status: DISCONTINUED | OUTPATIENT
Start: 2024-07-22 | End: 2024-07-24

## 2024-07-22 RX ORDER — METHYLPREDNISOLONE SODIUM SUCCINATE 40 MG/ML
40 INJECTION, POWDER, LYOPHILIZED, FOR SOLUTION INTRAMUSCULAR; INTRAVENOUS EVERY 8 HOURS
Status: DISCONTINUED | OUTPATIENT
Start: 2024-07-22 | End: 2024-07-24

## 2024-07-22 RX ORDER — ACETAMINOPHEN 500 MG
500 TABLET ORAL EVERY 4 HOURS PRN
Status: DISCONTINUED | OUTPATIENT
Start: 2024-07-22 | End: 2024-07-24

## 2024-07-22 NOTE — ED INITIAL ASSESSMENT (HPI)
Received pt via EMS. Pt presents to ED r/t generalized abd pain. Upon arrival pt's O2 sat 85% pt placed on 3L O2 improved to 94%. Pt c/o sob. Denies cp.

## 2024-07-22 NOTE — ED QUICK NOTES
Orders for admission, patient is aware of plan and ready to go upstairs. Any questions, please call ED FRANCOIS Moore at extension [phone].     Patient Covid vaccination status: Fully vaccinated     COVID Test Ordered in ED: SARS-CoV-2/Flu A and B/RSV by PCR (GeneXpert)    COVID Suspicion at Admission: N/A    Running Infusions:  zithromax    Mental Status/LOC at time of transport: A/Ox4    Other pertinent information: Independent and ambulatory from home. On 2L O2, not normally on O2  CIWA score: N/A   NIH score:  N/A

## 2024-07-22 NOTE — ED PROVIDER NOTES
Patient Seen in: [facility] Emergency Department    History     Chief Complaint   Patient presents with    Nausea/Vomiting/Diarrhea     Stated Complaint: n/v     HPI    59-year-old female with past medical history of dyslipidemia, idiopathic pulmonary fibrosis followed by [facility] and with recent initiation of pirfenidone presenting for evaluation with vomiting/diarrhea over the past several days associated with worsening shortness of breath and hypoxia at 85%.  No chest pain.  Chronic cough noted to be worse over similar duration.  No chest pain. No abdominal pain.  No sick contacts/recent travel, no new food ingestions or recent antibiotics.  No urinary complaints.    Past Medical History:    Acute, but ill-defined, cerebrovascular disease    Anxiety    Arthritis    COPD (chronic obstructive pulmonary disease) (HCC)    Depression    Disorder of thyroid    Emphysema of lung (HCC)    Fibroids    History of stomach ulcers    Human papilloma virus infection    Hyperlipidemia    Hyperthyroidism    Idiopathic pulmonary fibrosis (HCC)    Prediabetes    Diet control - no meds or blood surgar checking at home    Sexually transmitted disease    Thyroid disease    Visual impairment    glasses       Past Surgical History:   Procedure Laterality Date    Brain surgery      Colonoscopy  2018    adenoma- repeat 6-12 mo    Colonoscopy            Other      Thyroid polyp removal    Other surgical history  2021    Other surgical history      removal of parts of upper and lower lobes in right lung    Tubal ligation      Upper gi endoscopy,exam              Family History   Problem Relation Age of Onset    Cancer Father         colon cancer    Hypertension Father     Stroke Father     Cancer Mother         lung cancer, brain cancer    Depression Mother     Hypertension Mother     Breast Cancer Maternal Grandmother     Asthma Maternal Grandmother     Depression Maternal Grandmother     Diabetes Maternal  Grandmother     Infectious Disease Maternal Grandmother         Scleraderma    Cancer Paternal Grandmother         colon cancer    Breast Cancer Paternal Grandmother     Diabetes Paternal Grandmother     Hypertension Paternal Grandmother     Ovarian Cancer Maternal Cousin 50       Social History     Socioeconomic History    Marital status:    Tobacco Use    Smoking status: Former     Types: Cigarettes     Start date: 1982     Quit date: 2022     Years since quittin.8     Passive exposure: Current    Smokeless tobacco: Never    Tobacco comments:     1 cig per day   Vaping Use    Vaping status: Former    Substances: CBD   Substance and Sexual Activity    Alcohol use: Not Currently     Comment: rare - 1 drink 3 times a year    Drug use: Not Currently     Types: Cocaine     Comment: former cocaine history-Has used CBD oil    Sexual activity: Never   Other Topics Concern    Caffeine Concern No    Exercise Yes    Seat Belt Yes    Special Diet Yes     Comment: Celiac disease    Stress Concern Yes    Weight Concern No     Social Determinants of Health      Received from [facility]    Food Insecurity    Received from [facility]    Transportation Needs   Housing Stability: Low Risk  (2022)    Received from [facility], [facility]    Housing Stability     Are you concerned about having a safe and reliable place to live?: No       Review of Systems :  Constitutional: As per HPI  Respiratory: (+) cough and shortness of breath.    Cardiovascular: Negative for chest pain and palpitations.     Positive for stated complaint: n/v  Other systems are as noted in HPI.  Constitutional and vital signs reviewed.      All other systems reviewed and negative except as noted above.    PSFH elements reviewed from today and agreed except as otherwise stated in HPI.    Physical Exam     ED Triage Vitals [24 1649]   /86    Pulse 86   Resp 16   Temp 98.3 °F (36.8 °C)   Temp src Oral   SpO2 94 %   O2 Device None (Room air)       Current:/86   Pulse 86   Temp 98.3 °F (36.8 °C) (Oral)   Resp 16   Ht 154.9 cm (5' 1\")   Wt 59 kg   LMP 07/05/2011   SpO2 94%   BMI 24.56 kg/m²         Physical Exam   Constitutional: Tachypneic/conversationally dyspenic.  HEENT: MMM.  Head: Normocephalic.   Eyes: No injection.   Cardiovascular: RRR.   Pulmonary/Chest: Tachypneic with basilar crackles, prolonged expiratory phase/expiratory wheezing.  Abdominal: Soft. Nontender.  Musculoskeletal: No gross deformity.  Neurological: Alert.   Skin: Skin is warm.   Psychiatric: Cooperative.  Nursing note and vitals reviewed.        ED Course     Labs Reviewed   CBC W/ DIFFERENTIAL - Abnormal; Notable for the following components:       Result Value    WBC 14.1 (*)     Neutrophil Absolute Prelim 10.14 (*)     Neutrophil Absolute 10.14 (*)     Monocyte Absolute 1.04 (*)     All other components within normal limits   CBC WITH DIFFERENTIAL WITH PLATELET    Narrative:     The following orders were created for panel order CBC With Differential With Platelet.  Procedure                               Abnormality         Status                     ---------                               -----------         ------                     CBC W/ DIFFERENTIAL[[id number]]          Abnormal            Final result                 Please view results for these tests on the individual orders.   COMP METABOLIC PANEL (14)   LIPASE   BNP (B TYPE NATRIURETIC PEPTIDE)   PROCALCITONIN   RAINBOW DRAW LAVENDER   RAINBOW DRAW LIGHT GREEN   RAINBOW DRAW BLUE   RAINBOW DRAW GOLD   SARS-COV-2/FLU A AND B/RSV BY PCR (GENEXPERT)     XR CHEST AP PORTABLE  (CPT=71045)    Result Date: 7/22/2024  PROCEDURE: XR CHEST AP PORTABLE  (CPT=71045) TIME: 1709 hours.   COMPARISON: [facility], XR CHEST AP PORTABLE (CPT=71045), 7/20/2024, 10:14 PM.  [facility], XR  CHEST PA + LAT CHEST (CPT=71046), 5/12/2024, 5:38 PM.  INDICATIONS: Shortness of breath and abdominal pain.  TECHNIQUE:   Single view.   FINDINGS:  CARDIAC/VASC: Stable. MEDIAST/DIANE:   Stable. LUNGS/PLEURA: Increasing bibasilar ill-defined opacities.  No pneumothorax or pleural effusion. BONES: No new osseous abnormality. OTHER: Negative.          CONCLUSION: Increasing bibasilar ill-defined opacities, differential includes worsening pulmonary edema or infection.    elm-remote    Dictated by (CST): Kyler Toth MD on 7/22/2024 at 5:34 PM     Finalized by (CST): Kyler Toth MD on 7/22/2024 at 5:38 PM          ED Course as of 07/22/24 1844  ------------------------------------------------------------  Time: 07/22 1748  Comment: Resting with improving symptoms though with ongoing tachypnea/wheezing - will admit for further management.       MDM   DIFFERENTIAL DIAGNOSIS: After history and physical exam differential diagnosis includes but is not limited to pneumothorax, PNA, IPF.    Pulse ox: 94%:Improved after treatment on NC, as independently interpreted by myself    Cardiac Monitor Interpretation:   Pulse Readings from Last 1 Encounters:   07/22/24 86   , sinus,      Medical Decision Making  Evaluation for acute on chronic cough with worsening shortness of breath now with hypoxemia requiring supplemental oxygen in setting of basilar crackles and wheezing; nebs initiated and labs noted including leukocytosis in setting of chest x-ray with concern for pneumonia for which antibiotics thereafter initiated and steroids subsequently initiated after discussion with pulmonary with patient to be admitted for further monitoring/management.  Case discussed with hospitalist coverage Dr. Linder for admission and pulmonary Dr. Middleton in consultation.    Problems Addressed:  Acute hypoxemic respiratory failure (HCC): acute illness or injury  Interstitial pulmonary fibrosis (HCC): chronic illness or injury with exacerbation,  progression, or side effects of treatment    Amount and/or Complexity of Data Reviewed  External Data Reviewed: labs, radiology, ECG and notes.     Details: OSH ED note/EKD description and labs/CXR results from day prior reviewed  Labs: ordered. Decision-making details documented in ED Course.  Radiology: ordered and independent interpretation performed. Decision-making details documented in ED Course.     Details: CXR without obvious pneumothorax as independently interpreted by myself    ECG/medicine tests: ordered and independent interpretation performed. Decision-making details documented in ED Course.  Discussion of management or test interpretation with external provider(s): Case d/w hospitalist Dr. Linder for admission and pulmonary Dr. Middleton in consultation    Risk  Prescription drug management.  Decision regarding hospitalization.    Critical Care  Total time providing critical care: 33 minutes      A total of 33 minutes of critical care time (exclusive of billable procedures) was administered to manage the patient's respiratory instability due to her acute hypoxemic respiratory failure.  This involved direct patient intervention, complex decision making, and/or extensive discussions with the patient, family, and clinical staff.      I was wearing at minimum a facemask and eye protection throughout this encounter with handwashing performed prior and after patient evaluation without personal hand/facial/oropharyngeal contact and gloves worn throughout encounter. See note and/or contact this provider for further PPE details.    Disposition and Plan     Clinical Impression:  1. Acute hypoxemic respiratory failure (HCC)    2. Interstitial pulmonary fibrosis (HCC)        Disposition:  Admit    Follow-up:  No follow-up provider specified.    Medications Prescribed:  Current Discharge Medication List

## 2024-07-23 ENCOUNTER — APPOINTMENT (OUTPATIENT)
Dept: CT IMAGING | Facility: HOSPITAL | Age: 59
End: 2024-07-23
Attending: INTERNAL MEDICINE
Payer: MEDICAID

## 2024-07-23 LAB
ANION GAP SERPL CALC-SCNC: 7 MMOL/L (ref 0–18)
BASOPHILS # BLD AUTO: 0.01 X10(3) UL (ref 0–0.2)
BASOPHILS NFR BLD AUTO: 0.2 %
BUN BLD-MCNC: 15 MG/DL (ref 9–23)
BUN/CREAT SERPL: 25.9 (ref 10–20)
C DIFF TOX B STL QL: NEGATIVE
CALCIUM BLD-MCNC: 8.4 MG/DL (ref 8.7–10.4)
CHLORIDE SERPL-SCNC: 113 MMOL/L (ref 98–112)
CO2 SERPL-SCNC: 23 MMOL/L (ref 21–32)
CREAT BLD-MCNC: 0.58 MG/DL
DEPRECATED RDW RBC AUTO: 47 FL (ref 35.1–46.3)
EGFRCR SERPLBLD CKD-EPI 2021: 105 ML/MIN/1.73M2 (ref 60–?)
EOSINOPHIL # BLD AUTO: 0 X10(3) UL (ref 0–0.7)
EOSINOPHIL NFR BLD AUTO: 0 %
ERYTHROCYTE [DISTWIDTH] IN BLOOD BY AUTOMATED COUNT: 14.2 % (ref 11–15)
GLUCOSE BLD-MCNC: 146 MG/DL (ref 70–99)
HCT VFR BLD AUTO: 33.9 %
HGB BLD-MCNC: 11.5 G/DL
IMM GRANULOCYTES # BLD AUTO: 0.04 X10(3) UL (ref 0–1)
IMM GRANULOCYTES NFR BLD: 0.6 %
L PNEUMO AG UR QL: NEGATIVE
LYMPHOCYTES # BLD AUTO: 0.91 X10(3) UL (ref 1–4)
LYMPHOCYTES NFR BLD AUTO: 14.2 %
MCH RBC QN AUTO: 30.7 PG (ref 26–34)
MCHC RBC AUTO-ENTMCNC: 33.9 G/DL (ref 31–37)
MCV RBC AUTO: 90.4 FL
MONOCYTES # BLD AUTO: 0.16 X10(3) UL (ref 0.1–1)
MONOCYTES NFR BLD AUTO: 2.5 %
NEUTROPHILS # BLD AUTO: 5.28 X10 (3) UL (ref 1.5–7.7)
NEUTROPHILS # BLD AUTO: 5.28 X10(3) UL (ref 1.5–7.7)
NEUTROPHILS NFR BLD AUTO: 82.5 %
OSMOLALITY SERPL CALC.SUM OF ELEC: 299 MOSM/KG (ref 275–295)
PLATELET # BLD AUTO: 382 10(3)UL (ref 150–450)
POTASSIUM SERPL-SCNC: 3.7 MMOL/L (ref 3.5–5.1)
RBC # BLD AUTO: 3.75 X10(6)UL
SODIUM SERPL-SCNC: 143 MMOL/L (ref 136–145)
STREP PNEUMO ANTIGEN, URINE: NEGATIVE
WBC # BLD AUTO: 6.4 X10(3) UL (ref 4–11)

## 2024-07-23 PROCEDURE — 99232 SBSQ HOSP IP/OBS MODERATE 35: CPT | Performed by: INTERNAL MEDICINE

## 2024-07-23 PROCEDURE — 71250 CT THORAX DX C-: CPT | Performed by: INTERNAL MEDICINE

## 2024-07-23 PROCEDURE — 99233 SBSQ HOSP IP/OBS HIGH 50: CPT | Performed by: HOSPITALIST

## 2024-07-23 RX ORDER — METHIMAZOLE 5 MG/1
10 TABLET ORAL DAILY
Status: DISCONTINUED | OUTPATIENT
Start: 2024-07-23 | End: 2024-07-24

## 2024-07-23 RX ORDER — PIRFENIDONE 267 MG/1
2 TABLET, FILM COATED ORAL 3 TIMES DAILY
COMMUNITY
Start: 2024-07-09 | End: 2024-07-24

## 2024-07-23 RX ORDER — MONTELUKAST SODIUM 10 MG/1
10 TABLET ORAL EVERY EVENING
Status: DISCONTINUED | OUTPATIENT
Start: 2024-07-23 | End: 2024-07-24

## 2024-07-23 RX ORDER — ASPIRIN 81 MG/1
81 TABLET, CHEWABLE ORAL DAILY
Status: DISCONTINUED | OUTPATIENT
Start: 2024-07-23 | End: 2024-07-24

## 2024-07-23 RX ORDER — HYDROXYZINE HYDROCHLORIDE 25 MG/1
25 TABLET, FILM COATED ORAL EVERY 8 HOURS PRN
Status: DISCONTINUED | OUTPATIENT
Start: 2024-07-23 | End: 2024-07-24

## 2024-07-23 RX ORDER — FLUTICASONE PROPIONATE AND SALMETEROL 100; 50 UG/1; UG/1
1 POWDER RESPIRATORY (INHALATION) 2 TIMES DAILY
Status: DISCONTINUED | OUTPATIENT
Start: 2024-07-23 | End: 2024-07-23

## 2024-07-23 RX ORDER — PANTOPRAZOLE SODIUM 20 MG/1
20 TABLET, DELAYED RELEASE ORAL
Status: DISCONTINUED | OUTPATIENT
Start: 2024-07-24 | End: 2024-07-24

## 2024-07-23 RX ORDER — LOSARTAN POTASSIUM 50 MG/1
50 TABLET ORAL DAILY
Status: DISCONTINUED | OUTPATIENT
Start: 2024-07-23 | End: 2024-07-24

## 2024-07-23 RX ORDER — ARIPIPRAZOLE 2 MG/1
2 TABLET ORAL DAILY
Status: DISCONTINUED | OUTPATIENT
Start: 2024-07-23 | End: 2024-07-24

## 2024-07-23 RX ORDER — EZETIMIBE 10 MG/1
10 TABLET ORAL NIGHTLY
Status: DISCONTINUED | OUTPATIENT
Start: 2024-07-23 | End: 2024-07-24

## 2024-07-23 RX ORDER — ALBUTEROL SULFATE 2.5 MG/3ML
2.5 SOLUTION RESPIRATORY (INHALATION) EVERY 4 HOURS PRN
Status: DISCONTINUED | OUTPATIENT
Start: 2024-07-23 | End: 2024-07-24

## 2024-07-23 RX ORDER — CETIRIZINE HYDROCHLORIDE 10 MG/1
10 TABLET ORAL DAILY
Status: DISCONTINUED | OUTPATIENT
Start: 2024-07-23 | End: 2024-07-24

## 2024-07-23 RX ORDER — PRAZOSIN HYDROCHLORIDE 1 MG/1
1 CAPSULE ORAL NIGHTLY
Status: DISCONTINUED | OUTPATIENT
Start: 2024-07-23 | End: 2024-07-24

## 2024-07-23 RX ORDER — OXYBUTYNIN CHLORIDE 5 MG/1
5 TABLET, EXTENDED RELEASE ORAL DAILY
Status: DISCONTINUED | OUTPATIENT
Start: 2024-07-23 | End: 2024-07-24

## 2024-07-23 RX ORDER — FOLIC ACID 1 MG/1
1 TABLET ORAL DAILY
Status: DISCONTINUED | OUTPATIENT
Start: 2024-07-23 | End: 2024-07-24

## 2024-07-23 NOTE — H&P
[facility]    PATIENT'S NAME: WATSON VILLASENOR   ATTENDING PHYSICIAN: Kyle June MD   PATIENT ACCOUNT#:   [id number]    LOCATION:  [address]  MEDICAL RECORD #:   [id number]       YOB: 1965  ADMISSION DATE:       07/22/2024    HISTORY AND PHYSICAL EXAMINATION    CHIEF COMPLAINT:  Pneumonia and possible aspiration.    HISTORY OF PRESENT ILLNESS:  The patient is a 59-year-old  female with known underlying history of idiopathic pulmonary fibrosis with bronchiectasis, started recently on pirfenidone for her pulmonary fibrosis.  Two days after she started medication, she started having nausea, vomiting, and progressive fatigue and chills.  Today, presented to the emergency department for evaluation.  CBC showed white blood cell count of 14.1 with left shift.  Chemistry, liver function test, lactic acid were unremarkable.  Procalcitonin 1.29.  Blood cultures were obtained.  Chest x-ray showed increased bibasilar ill-defined opacities, could be worsening pulmonary infection.  Patient was started on IV azithromycin and Unasyn,Solu-Medrol, nebulizer treatments  She will be admitted to the hospital for further management.    PAST MEDICAL HISTORY:  Idiopathic pulmonary fibrosis with bronchiectasis, hypertension, hyperthyroidism, anxiety, depression, gastroesophageal reflux disease, and hyperlipidemia.    PAST SURGICAL HISTORY:  Brain surgery, right upper partial lobectomy, tubal ligation.    MEDICATIONS:  Please see medication reconciliation list.     ALLERGIES:  No known drug allergies.    FAMILY HISTORY:  Mother had lung cancer.  Father had colon cancer.    SOCIAL HISTORY:  Ex-tobacco user.  No current tobacco, alcohol, or drug use.  Lives with her family.  Independent for basic activities of daily living.     REVIEW OF SYSTEMS:  Patient reports shortly after starting the new medication, pirfenidone, started having nausea and vomiting for last 2 to 3 days and yesterday started  having chills, cough nonproductive of phlegm, generalized body aches.  Other 12-point review of systems is negative.       PHYSICAL EXAMINATION:    GENERAL:  Alert and oriented to time, place, and person.  Moderate distress.  VITAL SIGNS:  Temperature 98.3; pulse 81; respiratory rate 20; blood pressure 92/60; pulse ox initially 88% to 90% on room air, now 96% on 2L nasal cannula oxygen.  HEENT:  Atraumatic.  Oropharynx clear.  Dry mucous membranes.  Normal hard and soft palate.  Eyes:  Anicteric sclerae.   NECK:  Supple.  No lymphadenopathy.  Trachea midline.  Full range of motion.  LUNGS:  Rales and rhonchi auscultated on both lung fields.   HEART:  Regular rate, rhythm.  S1 and S2 auscultated.  No murmur.   ABDOMEN:  Soft, nondistended.  No tenderness.  Positive bowel sounds.    EXTREMITIES:  No peripheral edema, clubbing, or cyanosis.  NEUROLOGIC:  Motor and sensory intact.     ASSESSMENT:    1.   Acute on chronic hypoxemic pulmonary failure.  Bibasilar pneumonia, possible aspiration component.  2.   Nausea and vomiting.  Could be related to pirfenidone as side effects, as documented nausea 36% and vomiting 30% on side effects profile.    PLAN:  Patient will be admitted to general medical floor.  IV Unasyn and azithromycin blood and sputum cultures.  Solu-Medrol, nebulizer treatments.  Pulmonary consult and oxygen support.  DVT prophylaxis.  Further recommendations to follow.     Dictated By Vandana Linder MD  d: 07/22/2024 19:05:18  t: 07/22/2024 19:50:39  Job [id number]  /

## 2024-07-23 NOTE — PROGRESS NOTES
[facility]  part of [facility]    Progress Note    Clemencia Kim Patient Status:  Inpatient    1965 MRN [id number]   Location [facility]5W Attending Renetta De Jesus MD   Hosp Day # 1 PCP SEAN MCMANUS MD       Subjective:   Clemencia Kim is feeling better. Nausea and vomiting has resolved.     Objective:   Blood pressure 116/81, pulse 79, temperature 97.8 °F (36.6 °C), temperature source Oral, resp. rate 20, height 5' 1\" (1.549 m), weight 126 lb 6 oz (57.3 kg), last menstrual period 2011, SpO2 96%, not currently breastfeeding.    Physical Exam:    General: No acute distress.   Respiratory: Clear to auscultation bilaterally. No wheezes. No rhonchi.  Cardiovascular: S1, S2. Regular rate and rhythm. No murmurs, rubs or gallops.   Abdomen: Soft, nontender, nondistended.  Positive bowel sounds. No rebound or guarding.  Neurologic: No focal neurological deficits.   Musculoskeletal: Moves all extremities.  Extremities: No edema.    Results:     Lab Results   Component Value Date    WBC 6.4 2024    HGB 11.5 (L) 2024    HCT 33.9 (L) 2024    .0 2024    CREATSERUM 0.58 2024    BUN 15 2024     2024    K 3.7 2024     (H) 2024    CO2 23.0 2024     (H) 2024    CA 8.4 (L) 2024    ALB 4.8 2024    ALKPHO 88 2024    BILT 0.8 2024    TP 8.0 2024    AST 17 2024    ALT 13 2024    PTT 29.5 2021    INR 0.96 2021    PT 12.7 2014    T4F 1.4 2024    TSH 0.044 (L) 2024    LIP 32 2024    DDIMER 0.60 (H) 2022    ESRML 27 (H) 2018    CRP 0.36 2018    TROP <0.046 2014    CK 55 2014    B12 1,507 (H) 05/15/2021       XR CHEST AP PORTABLE  (CPT=71045)    Result Date: 2024  CONCLUSION: Increasing bibasilar ill-defined opacities, differential includes worsening pulmonary edema or infection.     elm-remote    Dictated by (CST): Kyler Toth MD on 7/22/2024 at 5:34 PM     Finalized by (CST): Kyler Toth MD on 7/22/2024 at 5:38 PM          XR CHEST AP PORTABLE  (CPT=71045)    Result Date: 7/21/2024  CONCLUSION: Mild pulmonary edema similar to the prior exam.  No focal consolidation.  Preliminary report was given by [facility].  There are no clinically significant discrepancies.    elm-remote    Dictated by (CST): Kyler Toth MD on 7/21/2024 at 7:30 AM     Finalized by (CST): Kyler Toth MD on 7/21/2024 at 7:32 AM          CT BRAIN OR HEAD (86374)    Result Date: 7/21/2024  CONCLUSION:  1. No acute intracranial finding. 2. Right temporal and frontal encephalomalacia related to previous MCA aneurysm clipping. 3. Chronic right basal ganglionic lacunar infarct. 4. Right pterional craniotomy.  5. No major discrepancy with preliminary [facility] report.    Dictated by (CST): Magdy Grewal MD on 7/21/2024 at 5:34 AM     Finalized by (CST): Magdy Grewal MD on 7/21/2024 at 5:38 AM              aspirin  81 mg Oral Daily    ARIPiprazole  2 mg Oral Daily    cetirizine  10 mg Oral Daily    ezetimibe  10 mg Oral Nightly    folic acid  1 mg Oral Daily    losartan  50 mg Oral Daily    methIMAzole  10 mg Oral Daily    montelukast  10 mg Oral QPM    [START ON 7/24/2024] pantoprazole  20 mg Oral QAM AC    oxybutynin ER  5 mg Oral Daily    prazosin  1 mg Oral Nightly    [Held by provider] vortioxetine  20 mg Oral Daily    azithromycin  500 mg Intravenous Q24H    ampicillin-sulbactam  3 g Intravenous q6h    enoxaparin  40 mg Subcutaneous Daily    methylPREDNISolone  40 mg Intravenous Q8H    ipratropium-albuterol  3 mL Nebulization q6h    fluticasone-umeclidin-vilant  1 puff Inhalation Daily       albuterol    hydrOXYzine    acetaminophen    ondansetron    prochlorperazine    temazepam    guaiFENesin      Assessment and Plan:       Acute on chronic hypoxemic respiratory failure  Bibasilar pna Aspiraiton   -  continue on IV antibiotics  - wean oxygen     Nausea and vomiting  - resolved  - advance diet as tolerated    Pulmonary fibrosis  - discontinue offending drug  - Follow up with Pulmonologists as an outpatient    Leukocytosis  - secondary to pna  - CBC daily   - IV antibiotics           Quality:  DVT Prophylaxis: Heparin  CODE status: Full    MDM High. Time spent on chart/note review, review of labs/imaging, discussion with patient, physical exam, discussion with staff, consultants, coordinating care, writing progress note, and discussion of plan of care.       VILLA AGUIAR MD  07/23/24

## 2024-07-23 NOTE — CM/SW NOTE
07/23/24 0900   CM/SW Referral Data   Referral Source Physician   Reason for Referral Discharge planning   Informant Patient   Medical Hx   Does patient have an established PCP? Yes  (Aurora Gonzalez MD)   Significant Past Medical/Mental Health Hx Idiopathic pulmonary fibrosis with bronchiectasis, hypertension, hyperthyroidism, anxiety, depression, gastroesophageal reflux disease, and hyperlipidemia   Patient Info   Patient's Current Mental Status at Time of Assessment Alert;Oriented   Patient's Home Environment House   Patient lives with Spouse/Significant other   Patient Status Prior to Admission   Independent with ADLs and Mobility Yes   Services in place prior to admission DME/Supplies at home   Type of DME/Supplies Wheeled Walker   Discharge Needs   Anticipated D/C needs To be determined     MDO received for home health evaluation.    CM met with patient at bedside to discuss dc planning. Address on file and PCP verified.  Patient is primarily independent with ADLS and requires no assistive device at baseline.  Patient states she does own a walker but \"doesn't use it.\" Patient still drives but no longer works.  Patient states she receives disability.    Patient is not home bound and is not eligible for [facility] arrangement d/t this.  Patient states she plans on traveling to [place] for a trip once discharged from [facility].    Patient is requiring 2L supplemental oxygen - no history of home oxygen.  Nursing attempting to wean.  CM requested RN Kate complete walk test to evaluate potential need for home oxygen when medically cleared.    If walk test determines need for home oxygen, the following verbiage will need to be included in physician progress note for insurance coverage: I had a face to face visit with this patient and I evaluated the patient's O2 saturations.  The patient is mobile in their home and is in need of a portable oxygen tank.  The home oxygen will improve the patient's condition.      Patient  discussed in nursing rounds.  Per nursing, patient will likely not be cleared for dc today.  Plan for diet advancement - patient is now on clear liquids but experiencing nausea.    / to remain available for support and/or discharge planning.     Plan: Home, possibly with home oxygen (pending walk test, MD verbiage, signed order) once medically cleared    Liliam Skinner RN, BSN  Nurse   [phone]

## 2024-07-23 NOTE — PAYOR COMM NOTE
--------------  ADMISSION REVIEW     Payor: [facility]  Subscriber #:  [id number]  Authorization Number: [id number]    Admit date: 24  Admit time:  8:20 PM     Patient Seen in: [facility] Emergency Department    History     Chief Complaint   Patient presents with    Nausea/Vomiting/Diarrhea     59-year-old female with past medical history of dyslipidemia, idiopathic pulmonary fibrosis followed by [facility] and with recent initiation of pirfenidone presenting for evaluation with vomiting/diarrhea over the past several days associated with worsening shortness of breath and hypoxia at 85%.  No chest pain.  Chronic cough noted to be worse over similar duration.  No chest pain. No abdominal pain.  No sick contacts/recent travel, no new food ingestions or recent antibiotics.  No urinary complaints.    Past Medical History:    Acute, but ill-defined, cerebrovascular disease    Anxiety    Arthritis    COPD (chronic obstructive pulmonary disease) (HCC)    Depression    Disorder of thyroid    Emphysema of lung (HCC)    Fibroids    History of stomach ulcers    Human papilloma virus infection    Hyperlipidemia    Hyperthyroidism    Idiopathic pulmonary fibrosis (HCC)    Prediabetes    Diet control - no meds or blood surgar checking at home    Sexually transmitted disease    Thyroid disease    Visual impairment    glasses     Past Surgical History:   Procedure Laterality Date    Brain surgery      Colonoscopy  2018    adenoma- repeat 6-12 mo    Colonoscopy            Other      Thyroid polyp removal    Other surgical history  2021    Other surgical history      removal of parts of upper and lower lobes in right lung    Tubal ligation      Upper gi endoscopy,exam         Physical Exam     ED Triage Vitals [24 1649]   /86   Pulse 86   Resp 16   Temp 98.3 °F (36.8 °C)   Temp src Oral   SpO2 94 %   O2 Device None (Room air)     Current:/86   Pulse  86   Temp 98.3 °F (36.8 °C) (Oral)   Resp 16   Ht 154.9 cm (5' 1\")   Wt 59 kg   LMP 07/05/2011   SpO2 94%   BMI 24.56 kg/m²     Physical Exam   Constitutional: Tachypneic/conversationally dyspenic.  HEENT: MMM.  Head: Normocephalic.   Eyes: No injection.   Cardiovascular: RRR.   Pulmonary/Chest: Tachypneic with basilar crackles, prolonged expiratory phase/expiratory wheezing.  Abdominal: Soft. Nontender.  Musculoskeletal: No gross deformity.  Neurological: Alert.   Skin: Skin is warm.   Psychiatric: Cooperative.    Labs Reviewed   CBC W/ DIFFERENTIAL - Abnormal; Notable for the following components:       Result Value    WBC 14.1 (*)     Neutrophil Absolute Prelim 10.14 (*)     Neutrophil Absolute 10.14 (*)     Monocyte Absolute 1.04 (*)     All other components within normal limits   COMP METABOLIC PANEL (14)   LIPASE   BNP (B TYPE NATRIURETIC PEPTIDE)   PROCALCITONIN   SARS-COV-2/FLU A AND B/RSV BY PCR (GENEXPERT)     XR CHEST AP PORTABLE    Increasing bibasilar ill-defined opacities, differential includes worsening pulmonary edema or infection.    Time: 07/22 1748  Comment: Resting with improving symptoms though with ongoing tachypnea/wheezing - will admit for further management.     Medical Decision Making  Evaluation for acute on chronic cough with worsening shortness of breath now with hypoxemia requiring supplemental oxygen in setting of basilar crackles and wheezing; nebs initiated and labs noted including leukocytosis in setting of chest x-ray with concern for pneumonia for which antibiotics thereafter initiated and steroids subsequently initiated after discussion with pulmonary with patient to be admitted for further monitoring/management.  Case discussed with hospitalist coverage Dr. Linder for admission and pulmonary Dr. Middleton in consultation.    Problems Addressed:  Acute hypoxemic respiratory failure (HCC): acute illness or injury  Interstitial pulmonary fibrosis (HCC): chronic illness or  injury with exacerbation, progression, or side effects of treatment    Amount and/or Complexity of Data Reviewed  External Data Reviewed: labs, radiology, ECG and notes.     Details: OSH ED note/EKD description and labs/CXR results from day prior reviewed  Labs: ordered. Decision-making details documented in ED Course.  Radiology: ordered and independent interpretation performed. Decision-making details documented in ED Course.     Details: CXR without obvious pneumothorax as independently interpreted by myself    ECG/medicine tests: ordered and independent interpretation performed. Decision-making details documented in ED Course.  Discussion of management or test interpretation with external provider(s): Case d/w hospitalist Dr. Linder for admission and pulmonary Dr. Middleton in consultation    Disposition and Plan     Clinical Impression:  1. Acute hypoxemic respiratory failure (HCC)    2. Interstitial pulmonary fibrosis (HCC)          History and Physical       CHIEF COMPLAINT:  Pneumonia and possible aspiration.     HISTORY OF PRESENT ILLNESS:  The patient is a 59-year-old  female with known underlying history of idiopathic pulmonary fibrosis with bronchiectasis, started recently on pirfenidone for her pulmonary fibrosis.  Two days after she started medication, she started having nausea, vomiting, and progressive fatigue and chills.  Today, presented to the emergency department for evaluation.  CBC showed white blood cell count of 14.1 with left shift.  Chemistry, liver function test, lactic acid were unremarkable.  Procalcitonin 1.29.  Blood cultures were obtained.  Chest x-ray showed increased bibasilar ill-defined opacities, could be worsening pulmonary infection.  Patient was started on IV azithromycin and Unasyn,Solu-Medrol, nebulizer treatments  She will be admitted to the hospital for further management.     PAST MEDICAL HISTORY:  Idiopathic pulmonary fibrosis with bronchiectasis, hypertension,  hyperthyroidism, anxiety, depression, gastroesophageal reflux disease, and hyperlipidemia.     PAST SURGICAL HISTORY:  Brain surgery, right upper partial lobectomy, tubal ligation.     MEDICATIONS:  Please see medication reconciliation list.      ALLERGIES:  No known drug allergies.     FAMILY HISTORY:  Mother had lung cancer.  Father had colon cancer.     SOCIAL HISTORY:  Ex-tobacco user.  No current tobacco, alcohol, or drug use.  Lives with her family.  Independent for basic activities of daily living.      REVIEW OF SYSTEMS:  Patient reports shortly after starting the new medication, pirfenidone, started having nausea and vomiting for last 2 to 3 days and yesterday started having chills, cough nonproductive of phlegm, generalized body aches.  Other 12-point review of systems is negative.        PHYSICAL EXAMINATION:    GENERAL:  Alert and oriented to time, place, and person.  Moderate distress.  VITAL SIGNS:  Temperature 98.3; pulse 81; respiratory rate 20; blood pressure 92/60; pulse ox initially 88% to 90% on room air, now 96% on 2L nasal cannula oxygen.  HEENT:  Atraumatic.  Oropharynx clear.  Dry mucous membranes.  Normal hard and soft palate.  Eyes:  Anicteric sclerae.   NECK:  Supple.  No lymphadenopathy.  Trachea midline.  Full range of motion.  LUNGS:  Rales and rhonchi auscultated on both lung fields.   HEART:  Regular rate, rhythm.  S1 and S2 auscultated.  No murmur.   ABDOMEN:  Soft, nondistended.  No tenderness.  Positive bowel sounds.    EXTREMITIES:  No peripheral edema, clubbing, or cyanosis.  NEUROLOGIC:  Motor and sensory intact.      ASSESSMENT:    1.       Acute on chronic hypoxemic pulmonary failure.  Bibasilar pneumonia, possible aspiration component.  2.       Nausea and vomiting.  Could be related to pirfenidone as side effects, as documented nausea 36% and vomiting 30% on side effects profile.     PLAN:  Patient will be admitted to general medical floor.  IV Unasyn and azithromycin blood  and sputum cultures.  Solu-Medrol, nebulizer treatments.  Pulmonary consult and oxygen support.  DVT prophylaxis.  Further recommendations to follow.     PULM:     Assessment   1.  Acute hypoxemic respiratory failure  2.  ILD  3.  COPD with acute exacerbation  4.  Nicotine dependence  5.  Leukocytosis     Plan   -Patient is with worsening dyspnea cough some associated wheezing.  Underlying history of ILD and COPD followed by Dr. Keita at [facility].  States recently started on pirfenidone therapy within the last 2 weeks  -Chest x-ray with some bibasilar opacities.  Given clinical presentation agreeable with empiric antibiotic therapy at this time  -Will obtain CT high-resolution chest to further evaluate  -Resume ICS/LABA and LAMA  -Nebulizer treatments  -Okay to resume pirfenidone from home.  Patient previously had been on Ofev  -Wean oxygen as tolerated  -Evaluate for home oxygen prior to discharge  -Reviewed vitals, labs imaging      7/23:    PULM:      Some ongoing dyspnea cough present.     Objective:   Blood pressure 116/81, pulse 79, temperature 97.8 °F (36.6 °C), temperature source Oral, resp. rate 20, height 5' 1\" (1.549 m), weight 126 lb 6 oz (57.3 kg), last menstrual period 07/05/2011, SpO2 96%, not currently breastfeeding.      Current Hospital Medications          Current Facility-Administered Medications   Medication Dose Route Frequency    azithromycin (Zithromax) 500 mg in sodium chloride 0.9% 250mL IVPB premix  500 mg Intravenous Q24H    ampicillin-sulbactam (Unasyn) 3 g in sodium chloride 0.9% 100mL IVPB-ADD  3 g Intravenous q6h    sodium chloride 0.9% infusion   Intravenous Continuous    enoxaparin (Lovenox) 40 MG/0.4ML SUBQ injection 40 mg  40 mg Subcutaneous Daily    methylPREDNISolone sodium succinate (Solu-MEDROL) injection 40 mg  40 mg Intravenous Q8H    ipratropium-albuterol (Duoneb) 0.5-2.5 (3) MG/3ML inhalation solution 3 mL  3 mL Nebulization q6h    fluticasone-umeclidin-vilant  (Jeri Vázquez) 100-62.5-25 MCG/ACT inhaler 1 puff  1 puff Inhalation Daily          Continuous Infusions:   Medication Infusions[]Expand by Default    sodium chloride 100 mL/hr at 07/23/24 0759          Physical Exam  Constitutional: no acute distress  Eyes: PERRL  ENT: nares pateint  Neck: supple, no JVD  Cardio: RRR, S1 S2  Respiratory: Bilateral expiratory wheeze with basilar crackles  GI: abdomen soft, non tender, active bowel sounds, no organomegaly  Extremities: no clubbing, cyanosis, edema  Neurologic: no gross motor deficits  Skin: warm, dry     Lab Results   Component Value Date     WBC 6.4 07/23/2024     HGB 11.5 07/23/2024     HCT 33.9 07/23/2024     .0 07/23/2024     CREATSERUM 0.58 07/23/2024     BUN 15 07/23/2024      07/23/2024     K 3.7 07/23/2024      07/23/2024     CO2 23.0 07/23/2024      07/23/2024     CA 8.4 07/23/2024       Assessment   1.  Acute hypoxemic respiratory failure  2.  ILD  3.  COPD with acute exacerbation  4.  Nicotine dependence  5.  Leukocytosis      Plan   -Patient is with worsening dyspnea cough some associated wheezing.  Underlying history of ILD and COPD followed by Dr. Hayes at [facility].  States recently started on pirfenidone therapy within the last 2 weeks which she did not tolerate due to nausea vomiting.  Discontinued pirfenidone 3 days ago.  Patient states she is not planning on taking pirfenidone moving forward.  Patient had been on Ofev in the past which she did not tolerate.  -CT high-resolution chest on 7/23/2024 with moderate groundglass opacities basilar predominant with underlying CPFT.  Cannot rule out component of organizing pneumonia.  -Agreeable with empiric course of antibiotic therapy  -ICS/LABA/LAMA  -Nebulizer treatments  -Wean oxygen as tolerated  -Evaluate for home oxygen prior to discharge  -Patient with prior open lung biopsy consistent with chronic fibrosing lung disease with UIP pattern.  Areas of emphysema seen.   No signs of vasculitis.  Prior history of P ANCA positive eval by rheumatology with no evidence of ANCA associated vasculitis.  -Advised patient to follow-up with pulmonologist at [facility] once discharged.  -DVT prophylaxis: Lovenox     CM/ SW:  AIDAN met with patient at bedside to discuss dc planning. Address on file and PCP verified.  Patient is primarily independent with ADLS and requires no assistive device at baseline.  Patient states she does own a walker but \"doesn't use it.\" Patient still drives but no longer works.  Patient states she receives disability.     Patient is not home bound and is not eligible for [facility] arrangement d/t this.  Patient states she plans on traveling to [place] for a trip once discharged from [facility].     Patient is requiring 2L supplemental oxygen - no history of home oxygen.  Nursing attempting to wean.  CM requested FRANCOIS Love complete walk test to evaluate potential need for home oxygen when medically cleared.    MEDICATIONS ADMINISTERED IN LAST 1 DAY:  albuterol (Ventolin) (5 MG/ML) 0.5% nebulizer solution 10 mg       Date Action Dose Route User    7/22/2024 1710 Given 10 mg Nebulization Sangeeta Whipple, DERIAN          ampicillin-sulbactam (Unasyn) 3 g in sodium chloride 0.9% 100mL IVPB-ADD       Date Action Dose Route User    7/22/2024 1834 New Bag 3 g Intravenous Teresa Romo RN          ampicillin-sulbactam (Unasyn) 3 g in sodium chloride 0.9% 100mL IVPB-ADD       Date Action Dose Route User    7/23/2024 1309 New Bag 3 g Intravenous Kate Vera RN    7/23/2024 0759 New Bag 3 g Intravenous Kate Vera RN    7/23/2024 0529 New Bag 3 g Intravenous Isamar Schwartz RN    7/23/2024 0019 New Bag 3 g Intravenous Isamar Schwartz, FRANCOIS          azithromycin (Zithromax) 500 mg in sodium chloride 0.9% 250mL IVPB premix       Date Action Dose Route User    7/22/2024 1903 New Bag 500 mg Intravenous Teresa Romo, FRANCOIS          enoxaparin (Lovenox) 40 MG/0.4ML SUBQ  injection 40 mg       Date Action Dose Route User    7/23/2024 0800 Given 40 mg Subcutaneous (Left Lower Abdomen) Kate Vera RN          fluticasone-umeclidin-vilant (Trelegy Ellipta) 100-62.5-25 MCG/ACT inhaler 1 puff       Date Action Dose Route User    7/23/2024 0810 Given 1 puff Inhalation Alana Alston          ipratropium (Atrovent) 0.02 % nebulizer solution 1.5 mg       Date Action Dose Route User    7/22/2024 1710 Given 1.5 mg Nebulization Prokurat, Sangeeta, RCP          ipratropium-albuterol (Duoneb) 0.5-2.5 (3) MG/3ML inhalation solution 3 mL       Date Action Dose Route User    7/23/2024 1324 Given 3 mL Nebulization Schmudde, Alana    7/23/2024 0810 Given 3 mL Nebulization [place]villee, Alana          methylPREDNISolone sodium succinate (Solu-MEDROL) injection 40 mg       Date Action Dose Route User    7/23/2024 1244 Given 40 mg Intravenous Kate Vera RN    7/23/2024 0514 Given by Other 40 mg Intravenous Isamar Schwartz RN    7/22/2024 2208 Given 40 mg Intravenous (Left Lower Arm) Isamar Schwartz RN       prochlorperazine (Compazine) 10 MG/2ML injection 10 mg       Date Action Dose Route User    7/22/2024 1704 Given 10 mg Intravenous Teresa Romo RN          sodium chloride 0.9% infusion       Date Action Dose Route User    7/23/2024 1255 Rate/Dose Change (none) Intravenous Kate Vera RN    7/23/2024 0759 New Bag (none) Intravenous Kate Vera RN    7/22/2024 2029 New Bag (none) Intravenous Isamar Schwartz RN          sodium chloride 0.9 % IV bolus 1,000 mL       Date Action Dose Route User    7/22/2024 1652 New Bag 1,000 mL Intravenous Elvia Gutierrez RN            Vitals (last day)       Date/Time Temp Pulse Resp BP SpO2 Weight O2 Device O2 Flow Rate (L/min) [facility]    07/23/24 1242 97.8 °F (36.6 °C) 76 20 119/79 90 % -- None (Room air) --     07/23/24 0804 97.8 °F (36.6 °C) 79 20 116/81 96 % -- Nasal cannula 2 L/min     07/23/24 0509 98 °F (36.7 °C) 70 20 103/92  95 % -- Nasal cannula 4 L/min     07/23/24 0020 99.2 °F (37.3 °C) 70 20 103/76 97 % -- Nasal cannula 2 L/min     07/22/24 2023 98 °F (36.7 °C) 82 19 90/59 99 % -- Nasal cannula 2 L/min     07/22/24 2020 -- -- -- -- -- 126 lb 6 oz (57.3 kg) -- --     07/22/24 1950 -- -- -- -- 92 % -- -- 4 L/min      07/22/24 1930 -- 84 22 129/89 99 % -- Nasal cannula 2 L/min     07/22/24 1922 -- -- -- -- -- -- Nasal cannula 2 L/min     07/22/24 1915 -- 87 21 105/80 96 % -- Nasal cannula 2 L/min     07/22/24 1900 -- 81 20 111/81 96 % -- Nasal cannula 2 L/min     07/22/24 1830 -- 88 26 92/60 93 % -- Nasal cannula 2 L/min     07/22/24 1746 -- -- -- -- -- -- Nasal cannula 3 L/min     07/22/24 1745 -- 80 17 119/72 99 % -- None (Room air) --     07/22/24 1649 98.3 °F (36.8 °C) 86 16 120/86 94 % 130 lb (59 kg) None (Room air) -- [place]

## 2024-07-23 NOTE — PROGRESS NOTES
[facility]  part of [facility]     Progress Note    Clemencia Kim Patient Status:  Inpatient    1965 MRN [id number]   Location [facility]5W Attending Renetta De Jesus MD   Hosp Day # 1 PCP SEAN MCMANUS MD       Subjective:   Patient seen and examined.  Some ongoing dyspnea cough present.    Objective:   Blood pressure 116/81, pulse 79, temperature 97.8 °F (36.6 °C), temperature source Oral, resp. rate 20, height 5' 1\" (1.549 m), weight 126 lb 6 oz (57.3 kg), last menstrual period 2011, SpO2 96%, not currently breastfeeding.  Intake/Output:   Last 3 shifts: I/O last 3 completed shifts:  In: 1851.7 [I.V.:1551.7; IV PIGGYBACK:300]  Out: 0    This shift: I/O this shift:  In: 740 [P.O.:240; I.V.:500]  Out: 700 [Urine:700]     Vent Settings:      Hemodynamic parameters (last 24 hours):      Scheduled Meds:   Current Facility-Administered Medications   Medication Dose Route Frequency    azithromycin (Zithromax) 500 mg in sodium chloride 0.9% 250mL IVPB premix  500 mg Intravenous Q24H    ampicillin-sulbactam (Unasyn) 3 g in sodium chloride 0.9% 100mL IVPB-ADD  3 g Intravenous q6h    sodium chloride 0.9% infusion   Intravenous Continuous    enoxaparin (Lovenox) 40 MG/0.4ML SUBQ injection 40 mg  40 mg Subcutaneous Daily    acetaminophen (Tylenol Extra Strength) tab 500 mg  500 mg Oral Q4H PRN    ondansetron (Zofran) 4 MG/2ML injection 4 mg  4 mg Intravenous Q6H PRN    prochlorperazine (Compazine) 10 MG/2ML injection 5 mg  5 mg Intravenous Q8H PRN    temazepam (Restoril) cap 15 mg  15 mg Oral Nightly PRN    methylPREDNISolone sodium succinate (Solu-MEDROL) injection 40 mg  40 mg Intravenous Q8H    ipratropium-albuterol (Duoneb) 0.5-2.5 (3) MG/3ML inhalation solution 3 mL  3 mL Nebulization q6h    fluticasone-umeclidin-vilant (Trelegy Ellipta) 100-62.5-25 MCG/ACT inhaler 1 puff  1 puff Inhalation Daily    guaiFENesin (Robitussin) 100 MG/5 ML oral liquid 200 mg  200 mg Oral Q4H PRN        Continuous Infusions:    sodium chloride 100 mL/hr at 07/23/24 0759       Physical Exam  Constitutional: no acute distress  Eyes: PERRL  ENT: nares pateint  Neck: supple, no JVD  Cardio: RRR, S1 S2  Respiratory: Bilateral expiratory wheeze with basilar crackles  GI: abdomen soft, non tender, active bowel sounds, no organomegaly  Extremities: no clubbing, cyanosis, edema  Neurologic: no gross motor deficits  Skin: warm, dry      Results:     Lab Results   Component Value Date    WBC 6.4 07/23/2024    HGB 11.5 07/23/2024    HCT 33.9 07/23/2024    .0 07/23/2024    CREATSERUM 0.58 07/23/2024    BUN 15 07/23/2024     07/23/2024    K 3.7 07/23/2024     07/23/2024    CO2 23.0 07/23/2024     07/23/2024    CA 8.4 07/23/2024    ALB 4.8 07/22/2024    ALKPHO 88 07/22/2024    BILT 0.8 07/22/2024    TP 8.0 07/22/2024    AST 17 07/22/2024    ALT 13 07/22/2024    LIP 32 07/22/2024       XR CHEST AP PORTABLE  (CPT=71045)    Result Date: 7/22/2024  CONCLUSION: Increasing bibasilar ill-defined opacities, differential includes worsening pulmonary edema or infection.    elm-remote    Dictated by (CST): Kyler Toth MD on 7/22/2024 at 5:34 PM     Finalized by (CST): Kyler Toth MD on 7/22/2024 at 5:38 PM                 Assessment   1.  Acute hypoxemic respiratory failure  2.  ILD  3.  COPD with acute exacerbation  4.  Nicotine dependence  5.  Leukocytosis     Plan   -Patient is with worsening dyspnea cough some associated wheezing.  Underlying history of ILD and COPD followed by Dr. Hayes at [facility].  States recently started on pirfenidone therapy within the last 2 weeks which she did not tolerate due to nausea vomiting.  Discontinued pirfenidone 3 days ago.  Patient states she is not planning on taking pirfenidone moving forward.  Patient had been on Ofev in the past which she did not tolerate.  -CT high-resolution chest on 7/23/2024 with moderate groundglass opacities basilar predominant with  underlying CPFT.  Cannot rule out component of organizing pneumonia.  -Agreeable with empiric course of antibiotic therapy  -ICS/LABA/LAMA  -Nebulizer treatments  -Wean oxygen as tolerated  -Evaluate for home oxygen prior to discharge  -Patient with prior open lung biopsy consistent with chronic fibrosing lung disease with UIP pattern.  Areas of emphysema seen.  No signs of vasculitis.  Prior history of P ANCA positive eval by rheumatology with no evidence of ANCA associated vasculitis.  -Advised patient to follow-up with pulmonologist at [facility] once discharged.  -DVT prophylaxis: Shani Middleton DO  Pulmonary Critical Care Medicine  [facility]

## 2024-07-23 NOTE — PLAN OF CARE
Pt is A&Ox4 and ambulating well. Vitals signs stable. Non tele. No complaints of pain. Frequent checks on rounds. Call light within reach, bed at lowest position and bed alarm in place d/t pt being lethargic during admission. Pt currently went down to CT of chest- pending results    Problem: Patient Centered Care  Goal: Patient preferences are identified and integrated in the patient's plan of care  Description: Interventions:  - What would you like us to know as we care for you? Home with bf  - Provide timely, complete, and accurate information to patient/family  - Incorporate patient and family knowledge, values, beliefs, and cultural backgrounds into the planning and delivery of care  - Encourage patient/family to participate in care and decision-making at the level they choose  - Honor patient and family perspectives and choices  Outcome: Progressing     Problem: SAFETY ADULT - FALL  Goal: Free from fall injury  Description: INTERVENTIONS:  - Assess pt frequently for physical needs  - Identify cognitive and physical deficits and behaviors that affect risk of falls.  - [place] fall precautions as indicated by assessment.  - Educate pt/family on patient safety including physical limitations  - Instruct pt to call for assistance with activity based on assessment  - Modify environment to reduce risk of injury  - Provide assistive devices as appropriate  - Consider OT/PT consult to assist with strengthening/mobility  - Encourage toileting schedule  Outcome: Progressing     Problem: RESPIRATORY - ADULT  Goal: Achieves optimal ventilation and oxygenation  Description: INTERVENTIONS:  - Assess for changes in respiratory status  - Assess for changes in mentation and behavior  - Position to facilitate oxygenation and minimize respiratory effort  - Oxygen supplementation based on oxygen saturation or ABGs  - Provide Smoking Cessation handout, if applicable  - Encourage broncho-pulmonary hygiene including cough, deep  breathe, Incentive Spirometry  - Assess the need for suctioning and perform as needed  - Assess and instruct to report SOB or any respiratory difficulty  - Respiratory Therapy support as indicated  - Manage/alleviate anxiety  - Monitor for signs/symptoms of CO2 retention  Outcome: Progressing     Problem: METABOLIC/FLUID AND ELECTROLYTES - ADULT  Goal: Electrolytes maintained within normal limits  Description: INTERVENTIONS:  - Monitor labs and rhythm and assess patient for signs and symptoms of electrolyte imbalances  - Administer electrolyte replacement as ordered  - Monitor response to electrolyte replacements, including rhythm and repeat lab results as appropriate  - Fluid restriction as ordered  - Instruct patient on fluid and nutrition restrictions as appropriate  7/23/2024 0346 by Isamar Schwartz, RN  Outcome: Progressing  7/23/2024 0336 by Isamar Schwartz, RN  Outcome: Progressing

## 2024-07-23 NOTE — SPIRITUAL CARE NOTE
Spiritual Care Visit Note    Patient Name: Clemencia Kim Date of Spiritual Care Visit: 24   : 1965 Primary Dx: Acute hypoxemic respiratory failure (HCC)       Referred By: Referral From: Care Coordination    Spiritual Care Taxonomy:    Intended Effects: Demonstrate caring and concern    Methods: Collaborate with care team member;Offer support    Interventions: Active listening;Ask guided questions;Discuss concerns;[place]    Visit Type/Summary:     - Spiritual Care: Consulted with RN prior to visit. Offered empathic listening and emotional support. Patient and family expressed appreciation for  visit. Provided information regarding how to contact Spiritual Care and left a Spiritual Care information card. Provided support for Patient's spiritual/[place] requests. Offered prayer. Provided Patient with a rosary.     ANMOL Salinas CAMII   [id number]       Spiritual Care support can be requested via an [facility] consult. For urgent/immediate needs, please contact the On Call  at: [place]: ext [phone]

## 2024-07-23 NOTE — PLAN OF CARE
Patient alert, oriented, vitals stable. States nausea has improved, tolerating diet. Weaning o2 as tolerated, up and ambulatory, able to make needs known. Call light within reach.       Problem: Patient Centered Care  Goal: Patient preferences are identified and integrated in the patient's plan of care  Description: Interventions:  - What would you like us to know as we care for you? From home with spouse  - Provide timely, complete, and accurate information to patient/family  - Incorporate patient and family knowledge, values, beliefs, and cultural backgrounds into the planning and delivery of care  - Encourage patient/family to participate in care and decision-making at the level they choose  - Honor patient and family perspectives and choices  Outcome: Progressing     Problem: SAFETY ADULT - FALL  Goal: Free from fall injury  Description: INTERVENTIONS:  - Assess pt frequently for physical needs  - Identify cognitive and physical deficits and behaviors that affect risk of falls.  - [place] fall precautions as indicated by assessment.  - Educate pt/family on patient safety including physical limitations  - Instruct pt to call for assistance with activity based on assessment  - Modify environment to reduce risk of injury  - Provide assistive devices as appropriate  - Consider OT/PT consult to assist with strengthening/mobility  - Encourage toileting schedule  Outcome: Progressing     Problem: RESPIRATORY - ADULT  Goal: Achieves optimal ventilation and oxygenation  Description: INTERVENTIONS:  - Assess for changes in respiratory status  - Assess for changes in mentation and behavior  - Position to facilitate oxygenation and minimize respiratory effort  - Oxygen supplementation based on oxygen saturation or ABGs  - Provide Smoking Cessation handout, if applicable  - Encourage broncho-pulmonary hygiene including cough, deep breathe, Incentive Spirometry  - Assess the need for suctioning and perform as needed  -  Assess and instruct to report SOB or any respiratory difficulty  - Respiratory Therapy support as indicated  - Manage/alleviate anxiety  - Monitor for signs/symptoms of CO2 retention  Outcome: Progressing     Problem: METABOLIC/FLUID AND ELECTROLYTES - ADULT  Goal: Electrolytes maintained within normal limits  Description: INTERVENTIONS:  - Monitor labs and rhythm and assess patient for signs and symptoms of electrolyte imbalances  - Administer electrolyte replacement as ordered  - Monitor response to electrolyte replacements, including rhythm and repeat lab results as appropriate  - Fluid restriction as ordered  - Instruct patient on fluid and nutrition restrictions as appropriate  Outcome: Progressing     Problem: Patient/Family Goals  Goal: Patient/Family Long Term Goal  Description: Patient's Long Term Goal: discharge     Interventions:  - pulm consult, wean o2 as tolerated   - See additional Care Plan goals for specific interventions  Outcome: Progressing  Goal: Patient/Family Short Term Goal  Description: Patient's Short Term Goal: feel better     Interventions:   - wean o2 as tolerated, nebs per orders   - See additional Care Plan goals for specific interventions  Outcome: Progressing     Problem: DISCHARGE PLANNING  Goal: Discharge to home or other facility with appropriate resources  Description: INTERVENTIONS:  - Identify barriers to discharge w/pt and caregiver  - Include patient/family/discharge partner in discharge planning  - Arrange for needed discharge resources and transportation as appropriate  - Identify discharge learning needs (meds, wound care, etc)  - Arrange for interpreters to assist at discharge as needed  - Consider post-discharge preferences of patient/family/discharge partner  - Complete POLST form as appropriate  - Assess patient's ability to be responsible for managing their own health  - Refer to Case Management Department for coordinating discharge planning if the patient needs  post-hospital services based on physician/LIP order or complex needs related to functional status, cognitive ability or social support system  Outcome: Progressing

## 2024-07-23 NOTE — CONSULTS
[facility]  part of [facility]    Report of Consultation    Clemencia Kim Patient Status:  Emergency    1965 MRN [id number]   Location [facility] EMERGENCY DEPARTMENT Attending Kyle June MD   Hosp Day # 0 PCP SEAN MCMANUS MD     Date of Admission:  2024    Reason for Consultation:   Dyspnea    History of Present Illness:   Patient is a 59-year-old female with past medical history significant for ILD, COPD followed by pulmonologist Dr. Keita at [facility] who presents with chief complaint of progressive worsening dyspnea throughout the last several days.  Admits to some associated nausea and vomiting.  States she was started on pirfenidone approximately 2 weeks ago.  Some occasional wheezing present.  Denies fevers or chills.  Does use of home oxygen therapy.  Saturation 85% on arrival to the emergency department.  Currently on 3 L oxygen.    Past Medical History  Past Medical History:    Acute, but ill-defined, cerebrovascular disease    Anxiety    Arthritis    COPD (chronic obstructive pulmonary disease) (HCC)    Depression    Disorder of thyroid    Emphysema of lung (HCC)    Fibroids    History of stomach ulcers    Human papilloma virus infection    Hyperlipidemia    Hyperthyroidism    Idiopathic pulmonary fibrosis (HCC)    Prediabetes    Diet control - no meds or blood surgar checking at home    Sexually transmitted disease    Thyroid disease    Visual impairment    glasses       Past Surgical History  Past Surgical History:   Procedure Laterality Date    Brain surgery      Colonoscopy  2018    adenoma- repeat 6-12 mo    Colonoscopy            Other      Thyroid polyp removal    Other surgical history  2021    Other surgical history      removal of parts of upper and lower lobes in right lung    Tubal ligation      Upper gi endoscopy,exam         Family History  Family History   Problem Relation Age of Onset    Cancer Father         colon cancer     Hypertension Father     Stroke Father     Cancer Mother         lung cancer, brain cancer    Depression Mother     Hypertension Mother     Breast Cancer Maternal Grandmother     Asthma Maternal Grandmother     Depression Maternal Grandmother     Diabetes Maternal Grandmother     Infectious Disease Maternal Grandmother         Scleraderma    Cancer Paternal Grandmother         colon cancer    Breast Cancer Paternal Grandmother     Diabetes Paternal Grandmother     Hypertension Paternal Grandmother     Ovarian Cancer Maternal Cousin 50       Social History  Social History     Socioeconomic History    Marital status:    Tobacco Use    Smoking status: Former     Types: Cigarettes     Start date: 1982     Quit date: 2022     Years since quittin.8     Passive exposure: Current    Smokeless tobacco: Never    Tobacco comments:     1 cig per day   Vaping Use    Vaping status: Former    Substances: CBD   Substance and Sexual Activity    Alcohol use: Not Currently     Comment: rare - 1 drink 3 times a year    Drug use: Not Currently     Types: Cocaine     Comment: former cocaine history-Has used CBD oil    Sexual activity: Never   Other Topics Concern    Caffeine Concern No    Exercise Yes    Seat Belt Yes    Special Diet Yes     Comment: Celiac disease    Stress Concern Yes    Weight Concern No           Current Medications:  Current Facility-Administered Medications   Medication Dose Route Frequency    azithromycin (Zithromax) 500 mg in sodium chloride 0.9% 250mL IVPB premix  500 mg Intravenous Once     (Not in a hospital admission)      Allergies  No Known Allergies    Review of Systems:   Constitutional: denies fevers, chills, weakness, fatigue, recent illness  HEENT: denies headache, sore throat, vision loss  Cardio: denies chest pain, chest pressure, palpitations  Respiratory: dyspnea, cough, wheezing, denies hemoptysis   GI: denies nausea, vomiting, abdominal pain  : denies dysuria,  hematuria  Musculoskeletal: denies arthralgia, myalgia  Integumentary: denies rash, itching  Neurological: denies syncope, weakness, dizziness,   Psychiatric: denies depression, anxiety  Hematologic: denies bruising        Physical Exam:   Blood pressure 129/89, pulse 84, temperature 98.3 °F (36.8 °C), temperature source Oral, resp. rate 22, height 5' 1\" (1.549 m), weight 130 lb (59 kg), last menstrual period 07/05/2011, SpO2 99%, not currently breastfeeding.    Constitutional: no acute distress  Eyes: PERRL  ENT: nares patent  Neck: neck supple, no JVD  Cardio: RRR, S1 S2  Respiratory: Bibasilar crackles with expiratory Rales present  GI: abdomen soft, non tender, active bowel souds, no organomegaly  Extremities: no clubbing, cyanosis, edema  Neurologic: no gross motor deficits  Skin: warm, dry    Results:   Laboratory Data  Lab Results   Component Value Date    WBC 14.1 (H) 07/22/2024    HGB 12.8 07/22/2024    HCT 36.4 07/22/2024    .0 07/22/2024    CREATSERUM 0.62 07/22/2024    BUN 19 07/22/2024     07/22/2024    K 3.1 (L) 07/22/2024     07/22/2024    CO2 23.0 07/22/2024     (H) 07/22/2024    CA 9.1 07/22/2024    ALB 4.8 07/22/2024    ALKPHO 88 07/22/2024    TP 8.0 07/22/2024    AST 17 07/22/2024    ALT 13 07/22/2024    PTT 29.5 02/09/2021    INR 0.96 02/09/2021    PTP 12.7 02/09/2021    T4F 1.4 05/14/2024    TSH 0.044 (L) 05/14/2024    LIP 32 07/22/2024    DDIMER 0.60 (H) 12/26/2022    ESRML 27 (H) 08/29/2018    CRP 0.36 08/29/2018    TROP <0.046 03/26/2014    CK 55 03/26/2014    B12 1,507 (H) 05/15/2021         Imaging  XR CHEST AP PORTABLE  (CPT=71045)    Result Date: 7/22/2024  CONCLUSION: Increasing bibasilar ill-defined opacities, differential includes worsening pulmonary edema or infection.    elm-remote    Dictated by (CST): Kyler Toth MD on 7/22/2024 at 5:34 PM     Finalized by (CST): Kyler Toth MD on 7/22/2024 at 5:38 PM          XR CHEST AP PORTABLE   (CPT=71045)    Result Date: 7/21/2024  CONCLUSION: Mild pulmonary edema similar to the prior exam.  No focal consolidation.  Preliminary report was given by [facility].  There are no clinically significant discrepancies.    elm-remote    Dictated by (CST): Kyler Toth MD on 7/21/2024 at 7:30 AM     Finalized by (CST): Kyler Toth MD on 7/21/2024 at 7:32 AM          CT BRAIN OR HEAD (03057)    Result Date: 7/21/2024  CONCLUSION:  1. No acute intracranial finding. 2. Right temporal and frontal encephalomalacia related to previous MCA aneurysm clipping. 3. Chronic right basal ganglionic lacunar infarct. 4. Right pterional craniotomy.  5. No major discrepancy with preliminary [facility] report.    Dictated by (CST): Magdy Grewal MD on 7/21/2024 at 5:34 AM     Finalized by (CST): Magdy Grewal MD on 7/21/2024 at 5:38 AM           Assessment   1.  Acute hypoxemic respiratory failure  2.  ILD  3.  COPD with acute exacerbation  4.  Nicotine dependence  5.  Leukocytosis    Plan   -Patient is with worsening dyspnea cough some associated wheezing.  Underlying history of ILD and COPD followed by Dr. Kieta at [facility].  States recently started on pirfenidone therapy within the last 2 weeks  -Chest x-ray with some bibasilar opacities.  Given clinical presentation agreeable with empiric antibiotic therapy at this time  -Will obtain CT high-resolution chest to further evaluate  -Resume ICS/LABA and LAMA  -Nebulizer treatments  -Okay to resume pirfenidone from home.  Patient previously had been on Ofev  -Wean oxygen as tolerated  -Evaluate for home oxygen prior to discharge  -Reviewed vitals, labs imaging    Fish Middleton DO  Pulmonary Critical Care Medicine  formerly [facility]  7/22/2024  7:37 PM

## 2024-07-24 ENCOUNTER — TELEPHONE (OUTPATIENT)
Dept: INTERNAL MEDICINE UNIT | Facility: HOSPITAL | Age: 59
End: 2024-07-24

## 2024-07-24 VITALS
BODY MASS INDEX: 23.86 KG/M2 | OXYGEN SATURATION: 93 % | HEIGHT: 61 IN | SYSTOLIC BLOOD PRESSURE: 115 MMHG | WEIGHT: 126.38 LBS | TEMPERATURE: 98 F | HEART RATE: 83 BPM | RESPIRATION RATE: 18 BRPM | DIASTOLIC BLOOD PRESSURE: 67 MMHG

## 2024-07-24 LAB
ANION GAP SERPL CALC-SCNC: 9 MMOL/L (ref 0–18)
BUN BLD-MCNC: 16 MG/DL (ref 9–23)
BUN/CREAT SERPL: 27.1 (ref 10–20)
CALCIUM BLD-MCNC: 9 MG/DL (ref 8.7–10.4)
CHLORIDE SERPL-SCNC: 111 MMOL/L (ref 98–112)
CO2 SERPL-SCNC: 25 MMOL/L (ref 21–32)
CREAT BLD-MCNC: 0.59 MG/DL
DEPRECATED RDW RBC AUTO: 47.1 FL (ref 35.1–46.3)
EGFRCR SERPLBLD CKD-EPI 2021: 104 ML/MIN/1.73M2 (ref 60–?)
ERYTHROCYTE [DISTWIDTH] IN BLOOD BY AUTOMATED COUNT: 14 % (ref 11–15)
GLUCOSE BLD-MCNC: 146 MG/DL (ref 70–99)
HCT VFR BLD AUTO: 30.5 %
HGB BLD-MCNC: 10.1 G/DL
MCH RBC QN AUTO: 30.2 PG (ref 26–34)
MCHC RBC AUTO-ENTMCNC: 33.1 G/DL (ref 31–37)
MCV RBC AUTO: 91.3 FL
OSMOLALITY SERPL CALC.SUM OF ELEC: 304 MOSM/KG (ref 275–295)
PLATELET # BLD AUTO: 421 10(3)UL (ref 150–450)
POTASSIUM SERPL-SCNC: 3.6 MMOL/L (ref 3.5–5.1)
RBC # BLD AUTO: 3.34 X10(6)UL
SODIUM SERPL-SCNC: 145 MMOL/L (ref 136–145)
WBC # BLD AUTO: 12.4 X10(3) UL (ref 4–11)

## 2024-07-24 PROCEDURE — 99232 SBSQ HOSP IP/OBS MODERATE 35: CPT | Performed by: INTERNAL MEDICINE

## 2024-07-24 PROCEDURE — 99239 HOSP IP/OBS DSCHRG MGMT >30: CPT | Performed by: HOSPITALIST

## 2024-07-24 RX ORDER — PREDNISONE 10 MG/1
TABLET ORAL
Qty: 20 TABLET | Refills: 0 | Status: SHIPPED | OUTPATIENT
Start: 2024-07-24 | End: 2024-08-01

## 2024-07-24 RX ORDER — AMOXICILLIN AND CLAVULANATE POTASSIUM 875; 125 MG/1; MG/1
1 TABLET, FILM COATED ORAL 2 TIMES DAILY
Qty: 10 TABLET | Refills: 0 | Status: SHIPPED | OUTPATIENT
Start: 2024-07-24 | End: 2024-07-29

## 2024-07-24 NOTE — DISCHARGE SUMMARY
[facility] Hospitalist Discharge Summary   Patient ID:  Clemencia Kim  [id number]  59 year old  5/25/1965    Admit date: 7/22/2024  Discharge date: 7/24/2024  Primary Care Physician: SEAN MCMANUS MD   Attending Physician: Benita Moffett MD   Consults:   Consultants         Provider   Role Specialty     Linnea Crespo MD      Consulting Physician PULMONARY DISEASES     Manjit Fajardo MD      Consulting Physician PULMONARY DISEASES            Discharge Diagnoses:   Acute hypoxemic respiratory failure (HCC)    Reason for admission  Copied from admission H&P: The patient is a 59-year-old  female with known underlying history of idiopathic pulmonary fibrosis with bronchiectasis, started recently on pirfenidone for her pulmonary fibrosis. Two days after she started medication, she started having nausea, vomiting, and progressive fatigue and chills. Today, presented to the emergency department for evaluation. CBC showed white blood cell count of 14.1 with left shift. Chemistry, liver function test, lactic acid were unremarkable. Procalcitonin 1.29. Blood cultures were obtained. Chest x-ray showed increased bibasilar ill-defined opacities, could be worsening pulmonary infection. Patient was started on IV azithromycin and Unasyn,Solu-Medrol, nebulizer treatments She will be admitted to the hospital for further management.     Hospital Course:    Acute on chronic hypoxemic respiratory failure  Possible Bibasilar pna Aspiraiton   - IV Unasyn + azithromycin. Will send home with 5 days of PO augmentin.   - wean oxygen. Home o2 walk test pt did desaturate with exertion will need 2L with walking on discharge.   - Pulmonary on consult.   - CXR, CT chest reviewed. Sputum cx contaminated. Blood cx x 2 NGTD. Genexpert neg.      Nausea and vomiting  - resolved. [place] to be secondary to pirfenidone - discontinued.   - advance diet as tolerated     Pulmonary fibrosis  - discontinue offending drug  - Follow up with  Pulmonologists as an outpatient     Leukocytosis  - secondary to pna and steroids.   - Afebrile.   - IV Unasyn. Augmentin short course on discharge.     I had a face to face visit with this patient and I evaluated the patient's O2 saturations. The patient is mobile in their home and is in need of a portable oxygen tank. The home oxygen will improve the patient's condition.     EXAM:   GENERAL: no apparent distress, comfortable  NEURO: A/A Ox3, no focal deficits  RESP: non labored, CTAB/L  CARDIO: Regular, no murmur  ABD: soft, NT, ND  EXTREMITIES: no edema, no calf tenderness    Operative Procedures:     Discharge Instructions     Medication List        START taking these medications      fluticasone-umeclidin-vilant 100-62.5-25 MCG/ACT Aepb  Commonly known as: Trelegy Ellipta  Inhale 1 puff into the lungs daily.  Start taking on: July 25, 2024            CHANGE how you take these medications      predniSONE 10 MG Tabs  Commonly known as: Deltasone  Take 4 tablets (40 mg total) by mouth daily for 2 days, THEN 3 tablets (30 mg total) daily for 2 days, THEN 2 tablets (20 mg total) daily for 2 days, THEN 1 tablet (10 mg total) daily for 2 days.  Start taking on: July 24, 2024  What changed:   medication strength  See the new instructions.            CONTINUE taking these medications      albuterol (2.5 MG/3ML) 0.083% Nebu  Commonly known as: Ventolin  NEBULIZE 1 VIAL EVERY 4 HOURS AS NEEDED FOR WHEEZING OR SHORTNESS OF BREATH     amoxicillin clavulanate 875-125 MG Tabs  Commonly known as: Augmentin  Take 1 tablet by mouth 2 (two) times daily for 5 days.     ARIPiprazole 2 MG Tabs  Commonly known as: Abilify  TAKE 1 TABLET(2 MG) BY MOUTH DAILY     aspirin 81 MG Chew     cetirizine 10 MG Tabs  Commonly known as: ZyrTEC  Take 1 tablet (10 mg total) by mouth daily.     diclofenac 50 MG Tabs  Commonly known as: Cataflam     ezetimibe 10 MG Tabs  Commonly known as: Zetia  Take 1 tablet (10 mg total) by mouth nightly.      folic acid 1 MG Tabs  Commonly known as: Folvite  Take 1 tablet (1 mg total) by mouth daily.     hydrOXYzine 25 MG Tabs  Commonly known as: Atarax  Take 1 tablet (25 mg total) by mouth every 8 (eight) hours as needed for Anxiety.     losartan 50 MG Tabs  Commonly known as: Cozaar  TAKE 1 TABLET(50 MG) BY MOUTH DAILY     methIMAzole 5 MG Tabs  Commonly known as: Tapazole  Take 1 tablet (5 mg total) by mouth in the morning and 1 tablet (5 mg total) before bedtime.     montelukast 10 MG Tabs  Commonly known as: Singulair  Take 1 tablet (10 mg total) by mouth every evening.     Omeprazole 40 MG Cpdr     oxybutynin ER 5 MG Tb24  Commonly known as: Ditropan-XL  TAKE 1 TABLET(5 MG) BY MOUTH DAILY     prazosin 1 MG Caps  Commonly known as: Minipress  TAKE 1 CAPSULE(1 MG) BY MOUTH EVERY NIGHT     Selenium 100 MCG Tabs     Trintellix 20 MG Tabs  Generic drug: vortioxetine  TAKE 1 TABLET(20 MG) BY MOUTH DAILY     ZINC GLUCONATE OR            STOP taking these medications      azithromycin 250 MG Tabs  Commonly known as: Zithromax Z-Aguilar     Ofev 150 MG Caps  Generic drug: Nintedanib Esylate     Pirfenidone 267 MG Tabs     Symbicort 80-4.5 MCG/ACT Aero  Generic drug: Budesonide-Formoterol Fumarate               Where to Get Your Medications        These medications were sent to [id number] [place], [place] - [address], [phone], [phone]  [address] [address]      Phone: [phone]   amoxicillin clavulanate 875-125 MG Tabs  fluticasone-umeclidin-vilant 100-62.5-25 MCG/ACT Aepb  predniSONE 10 MG Tabs         Activity: activity as tolerated  Diet: regular diet  Wound Care: NA  Code Status: Full Code        Discharge Instructions         FU your PCP in 1 week.   FU with your pulmonologist in 1-2 weeks.   Cont home o2 2L           Important follow up:   Follow-up Information       Aurora Gonzalez MD Follow up in 1 week(s).    Specialty: Family  Medicine  Contact information:  [id number]  [facility] [address]  [phone]                             -PCP in [] within 7 days [] within 14 days [] other     Disposition: home  Discharged Condition: good    Hospital Discharge Diagnoses:  N/V, resp failure    Lace+ Score: 63  59-90 High Risk  29-58 Medium Risk  0-28   Low Risk.    TCM Follow-Up Recommendation:  LACE > 58: High Risk of readmission after discharge from the hospital.            Total Time Coordinating Care: Greater than 30 minutes    Patient had opportunity to ask questions, state understanding, and agree with therapeutic plan as outlined    Benita Moffett MD  Hospitalist  7/24/2024

## 2024-07-24 NOTE — PROGRESS NOTES
[facility]  part of formerly [facility]     Progress Note    Clemencia Kim Patient Status:  Inpatient    1965 MRN [id number]   Location [facility]5W Attending Renetta De Jesus MD   Hosp Day # 2 PCP SEAN MCMANUS MD       Subjective:   Patient seen and examined.  Some ongoing dyspnea cough present although improved    Objective:   Blood pressure 115/67, pulse 83, temperature 97.6 °F (36.4 °C), temperature source Oral, resp. rate 18, height 5' 1\" (1.549 m), weight 126 lb 6 oz (57.3 kg), last menstrual period 2011, SpO2 93%, not currently breastfeeding.  Intake/Output:   Last 3 shifts: I/O last 3 completed shifts:  In: 3925.5 [P.O.:478; I.V.:2497.5; IV PIGGYBACK:950]  Out: 700 [Urine:700]   This shift: No intake/output data recorded.     Vent Settings:      Hemodynamic parameters (last 24 hours):      Scheduled Meds:   Current Facility-Administered Medications   Medication Dose Route Frequency    aspirin chewable tab 81 mg  81 mg Oral Daily    albuterol (Ventolin) (2.5 MG/3ML) 0.083% nebulizer solution 2.5 mg  2.5 mg Nebulization Q4H PRN    ARIPiprazole (Abilify) tab 2 mg  2 mg Oral Daily    cetirizine (ZyrTEC) tab 10 mg  10 mg Oral Daily    ezetimibe (Zetia) tab 10 mg  10 mg Oral Nightly    folic acid (Folvite) tab 1 mg  1 mg Oral Daily    hydrOXYzine (Atarax) tab 25 mg  25 mg Oral Q8H PRN    losartan (Cozaar) tab 50 mg  50 mg Oral Daily    methIMAzole (Tapazole) tab 10 mg  10 mg Oral Daily    montelukast (Singulair) tab 10 mg  10 mg Oral QPM    pantoprazole (Protonix) DR tab 20 mg  20 mg Oral QAM AC    oxybutynin ER (Ditropan-XL) 24 hr tab 5 mg  5 mg Oral Daily    prazosin (Minipress) cap 1 mg  1 mg Oral Nightly    [Held by provider] vortioxetine (Trintellix) tab 20 mg -- Patient's Own Medication  20 mg Oral Daily    azithromycin (Zithromax) 500 mg in sodium chloride 0.9% 250mL IVPB premix  500 mg Intravenous Q24H    ampicillin-sulbactam (Unasyn) 3 g in sodium chloride 0.9% 100mL  IVPB-ADD  3 g Intravenous q6h    enoxaparin (Lovenox) 40 MG/0.4ML SUBQ injection 40 mg  40 mg Subcutaneous Daily    acetaminophen (Tylenol Extra Strength) tab 500 mg  500 mg Oral Q4H PRN    ondansetron (Zofran) 4 MG/2ML injection 4 mg  4 mg Intravenous Q6H PRN    prochlorperazine (Compazine) 10 MG/2ML injection 5 mg  5 mg Intravenous Q8H PRN    temazepam (Restoril) cap 15 mg  15 mg Oral Nightly PRN    methylPREDNISolone sodium succinate (Solu-MEDROL) injection 40 mg  40 mg Intravenous Q8H    ipratropium-albuterol (Duoneb) 0.5-2.5 (3) MG/3ML inhalation solution 3 mL  3 mL Nebulization q6h    fluticasone-umeclidin-vilant (Trelegy Ellipta) 100-62.5-25 MCG/ACT inhaler 1 puff  1 puff Inhalation Daily    guaiFENesin (Robitussin) 100 MG/5 ML oral liquid 200 mg  200 mg Oral Q4H PRN       Continuous Infusions:         Physical Exam  Constitutional: no acute distress  Eyes: PERRL  ENT: nares pateint  Neck: supple, no JVD  Cardio: RRR, S1 S2  Respiratory: Bilateral expiratory wheeze with basilar crackles  GI: abdomen soft, non tender, active bowel sounds, no organomegaly  Extremities: no clubbing, cyanosis, edema  Neurologic: no gross motor deficits  Skin: warm, dry      Results:     Lab Results   Component Value Date    WBC 12.4 07/24/2024    HGB 10.1 07/24/2024    HCT 30.5 07/24/2024    .0 07/24/2024    CREATSERUM 0.59 07/24/2024    BUN 16 07/24/2024     07/24/2024    K 3.6 07/24/2024     07/24/2024    CO2 25.0 07/24/2024     07/24/2024    CA 9.0 07/24/2024       XR CHEST AP PORTABLE  (CPT=71045)    Result Date: 7/22/2024  CONCLUSION: Increasing bibasilar ill-defined opacities, differential includes worsening pulmonary edema or infection.    elm-remote    Dictated by (CST): Kyler Toth MD on 7/22/2024 at 5:34 PM     Finalized by (CST): Kyler Toth MD on 7/22/2024 at 5:38 PM                 Assessment   1.  Acute hypoxemic respiratory failure  2.  ILD  3.  COPD with acute exacerbation  4.   Nicotine dependence  5.  Leukocytosis     Plan   -Patient is with worsening dyspnea cough some associated wheezing.  Underlying history of ILD and COPD followed by Dr. Hayes at [facility].  States recently started on pirfenidone therapy within the last 2 weeks which she did not tolerate due to nausea vomiting.  Discontinued pirfenidone 3 days ago.  Patient states she is not planning on taking pirfenidone moving forward.  Patient had been on Ofev in the past which she did not tolerate.  -CT high-resolution chest on 7/23/2024 with moderate groundglass opacities basilar predominant with underlying CPFT.  Cannot rule out component of organizing pneumonia.  -Agreeable with empiric course of antibiotic therapy  -ICS/LABA/LAMA  -Nebulizer treatments  -Wean oxygen as tolerated  -Evaluate for home oxygen prior to discharge  -Patient with prior open lung biopsy consistent with chronic fibrosing lung disease with UIP pattern.  Areas of emphysema seen.  No signs of vasculitis.  Prior history of P ANCA positive eval by rheumatology with no evidence of ANCA associated vasculitis.  -Advised patient to follow-up with pulmonologist at [facility] once discharged.  -DVT prophylaxis: Lovenox  -OK for DC from pulm standpoint with abx and tapering steroids.    Fish Middleton, DO  Pulmonary Critical Care Medicine  [facility]

## 2024-07-24 NOTE — PAYOR COMM NOTE
--------------  DISCHARGE REVIEW    Payor: [facility]  Subscriber #:  [id number]  Authorization Number: [id number]    Admit date: 7/22/24  Admit time:   8:20 PM  Discharge Date: 7/24/2024  1:37 PM           [place] Hospitalist Discharge Summary   Patient ID:  Clemencia Kim  [id number]  59 year old  5/25/1965    Admit date: 7/22/2024  Discharge date: 7/24/2024  Primary Care Physician: SEAN MCMANUS MD   Attending Physician: Benita Moffett MD   Consults:   Consultants         Provider   Role Specialty     Linnea Crespo MD      Consulting Physician PULMONARY DISEASES     Manjit Fajardo MD      Consulting Physician PULMONARY DISEASES            Discharge Diagnoses:   Acute hypoxemic respiratory failure (HCC)    Reason for admission  Copied from admission H&P: The patient is a 59-year-old  female with known underlying history of idiopathic pulmonary fibrosis with bronchiectasis, started recently on pirfenidone for her pulmonary fibrosis. Two days after she started medication, she started having nausea, vomiting, and progressive fatigue and chills. Today, presented to the emergency department for evaluation. CBC showed white blood cell count of 14.1 with left shift. Chemistry, liver function test, lactic acid were unremarkable. Procalcitonin 1.29. Blood cultures were obtained. Chest x-ray showed increased bibasilar ill-defined opacities, could be worsening pulmonary infection. Patient was started on IV azithromycin and Unasyn,Solu-Medrol, nebulizer treatments She will be admitted to the hospital for further management.     Hospital Course:    Acute on chronic hypoxemic respiratory failure  Possible Bibasilar pna Aspiraiton   - IV Unasyn + azithromycin. Will send home with 5 days of PO augmentin.   - wean oxygen. Home o2 walk test pt did desaturate with exertion will need 2L with walking on discharge.   - Pulmonary on consult.   - CXR, CT chest reviewed. Sputum cx  contaminated. Blood cx x 2 NGTD. Genexpert neg.      Nausea and vomiting  - resolved. [place] to be secondary to pirfenidone - discontinued.   - advance diet as tolerated     Pulmonary fibrosis  - discontinue offending drug  - Follow up with Pulmonologists as an outpatient     Leukocytosis  - secondary to pna and steroids.   - Afebrile.   - IV Unasyn. Augmentin short course on discharge.     I had a face to face visit with this patient and I evaluated the patient's O2 saturations. The patient is mobile in their home and is in need of a portable oxygen tank. The home oxygen will improve the patient's condition.     EXAM:   GENERAL: no apparent distress, comfortable  NEURO: A/A Ox3, no focal deficits  RESP: non labored, CTAB/L  CARDIO: Regular, no murmur  ABD: soft, NT, ND  EXTREMITIES: no edema, no calf tenderness    Operative Procedures:     Discharge Instructions     Medication List        START taking these medications      fluticasone-umeclidin-vilant 100-62.5-25 MCG/ACT Aepb  Commonly known as: Trelegy Ellipta  Inhale 1 puff into the lungs daily.  Start taking on: July 25, 2024            CHANGE how you take these medications      predniSONE 10 MG Tabs  Commonly known as: Deltasone  Take 4 tablets (40 mg total) by mouth daily for 2 days, THEN 3 tablets (30 mg total) daily for 2 days, THEN 2 tablets (20 mg total) daily for 2 days, THEN 1 tablet (10 mg total) daily for 2 days.  Start taking on: July 24, 2024  What changed:   medication strength  See the new instructions.            CONTINUE taking these medications      albuterol (2.5 MG/3ML) 0.083% Nebu  Commonly known as: Ventolin  NEBULIZE 1 VIAL EVERY 4 HOURS AS NEEDED FOR WHEEZING OR SHORTNESS OF BREATH     amoxicillin clavulanate 875-125 MG Tabs  Commonly known as: Augmentin  Take 1 tablet by mouth 2 (two) times daily for 5 days.     ARIPiprazole 2 MG Tabs  Commonly known as: Abilify  TAKE 1 TABLET(2 MG) BY MOUTH DAILY     aspirin 81 MG Chew     cetirizine 10  MG Tabs  Commonly known as: ZyrTEC  Take 1 tablet (10 mg total) by mouth daily.     diclofenac 50 MG Tabs  Commonly known as: Cataflam     ezetimibe 10 MG Tabs  Commonly known as: Zetia  Take 1 tablet (10 mg total) by mouth nightly.     folic acid 1 MG Tabs  Commonly known as: Folvite  Take 1 tablet (1 mg total) by mouth daily.     hydrOXYzine 25 MG Tabs  Commonly known as: Atarax  Take 1 tablet (25 mg total) by mouth every 8 (eight) hours as needed for Anxiety.     losartan 50 MG Tabs  Commonly known as: Cozaar  TAKE 1 TABLET(50 MG) BY MOUTH DAILY     methIMAzole 5 MG Tabs  Commonly known as: Tapazole  Take 1 tablet (5 mg total) by mouth in the morning and 1 tablet (5 mg total) before bedtime.     montelukast 10 MG Tabs  Commonly known as: Singulair  Take 1 tablet (10 mg total) by mouth every evening.     Omeprazole 40 MG Cpdr     oxybutynin ER 5 MG Tb24  Commonly known as: Ditropan-XL  TAKE 1 TABLET(5 MG) BY MOUTH DAILY     prazosin 1 MG Caps  Commonly known as: Minipress  TAKE 1 CAPSULE(1 MG) BY MOUTH EVERY NIGHT     Selenium 100 MCG Tabs     Trintellix 20 MG Tabs  Generic drug: vortioxetine  TAKE 1 TABLET(20 MG) BY MOUTH DAILY     ZINC GLUCONATE OR            STOP taking these medications      azithromycin 250 MG Tabs  Commonly known as: Zithromax Z-Aguilar     Ofev 150 MG Caps  Generic drug: Nintedanib Esylate     Pirfenidone 267 MG Tabs     Symbicort 80-4.5 MCG/ACT Aero  Generic drug: Budesonide-Formoterol Fumarate               Where to Get Your Medications        These medications were sent to [id number] [place], [place] - [address], [phone], [phone]  [address] [address]      Phone: [phone]   amoxicillin clavulanate 875-125 MG Tabs  fluticasone-umeclidin-vilant 100-62.5-25 MCG/ACT Aepb  predniSONE 10 MG Tabs         Activity: activity as tolerated  Diet: regular diet  Wound Care: NA  Code Status: Full Code        Discharge  Instructions         FU your PCP in 1 week.   FU with your pulmonologist in 1-2 weeks.   Cont home o2 2L           Important follow up:   Follow-up Information       Aurora Gonzalez MD Follow up in 1 week(s).    Specialty: Family Medicine  Contact information:  [id number]  [facility] [id number] [phone]                             -PCP in [] within 7 days [] within 14 days [] other     Disposition: home  Discharged Condition: good    Hospital Discharge Diagnoses:  N/V, resp failure    Lace+ Score: 63  59-90 High Risk  29-58 Medium Risk  0-28   Low Risk.    TCM Follow-Up Recommendation:  LACE > 58: High Risk of readmission after discharge from the hospital.            Total Time Coordinating Care: Greater than 30 minutes    Patient had opportunity to ask questions, state understanding, and agree with therapeutic plan as outlined    Benita Moffett MD  Hospitalist  7/24/2024        Electronically signed by Benita Moffett MD on 7/24/2024  9:57 AM         REVIEWER COMMENTS

## 2024-07-24 NOTE — PROGRESS NOTES
02 walk test   07/24/24 0920   Mobility   O2 walk? Yes   SPO2% on Room Air at Rest 92   SPO2% Ambulation on Room Air 88   SPO2% Ambulation on Oxygen 94   Ambulation oxygen flow (liters per minute) 2

## 2024-07-24 NOTE — CM/SW NOTE
07/24/24 1000   Discharge disposition   Expected discharge disposition Home or Self   DME/Infusion Providers [facility]  (Home O2)   Discharge transportation Private car     Pt discussed during nursing rounds. Pt is stable for dc today. MD dc order entered.  Pt will require home O2 at dc. DME referral sent to [facility] in [facility]. Walk rest/verbiage/O2 order obtained and sent in [facility]. Liaison Justine confirmed patient has been accepted and that she will coordinate O2 tank delivery to room and O2 concentrator to home.    1120:  notified that patient's new order for Trelegy inhaler is not covered by patient's insurance. RN and MD made aware.    1140: MD confirmed patient can resume symbicort/spiriva inhaler which was ordered prior to admission.       Plan: Home w/spouse with [facility] for home O2.    / to remain available for support and/or discharge planning.     TRINA Herron    [phone]

## 2024-07-24 NOTE — DISCHARGE INSTRUCTIONS
FU your PCP in 1 week.   FU with your pulmonologist in 1-2 weeks.   Cont home o2 2L     Home oxygen services will be provided by:    [facility]  [address] [place] [address]  Phone: [phone]  Fax: [phone]    During you hospital stay, you had a oxygen walk test. This test demonstrated you need oxygen at home. You will need 0 Liters of oxygen at rest. When you are walking, you will need 2 liters. After your walk, wait 1-2 minutes or until your breathing has improved and turn the oxygen back down.

## 2024-07-24 NOTE — TELEPHONE ENCOUNTER
Received notification from pharmacy that Jeri Vázquez not covered by patient insurance and is requesting alternative.  David Rodriguez [phone] notified.

## 2024-07-24 NOTE — PLAN OF CARE
Problem: Patient Centered Care  Goal: Patient preferences are identified and integrated in the patient's plan of care  Description: Interventions:  - What would you like us to know as we care for you? From home with spouse  - Provide timely, complete, and accurate information to patient/family  - Incorporate patient and family knowledge, values, beliefs, and cultural backgrounds into the planning and delivery of care  - Encourage patient/family to participate in care and decision-making at the level they choose  - Honor patient and family perspectives and choices  Outcome: Adequate for Discharge     Problem: SAFETY ADULT - FALL  Goal: Free from fall injury  Description: INTERVENTIONS:  - Assess pt frequently for physical needs  - Identify cognitive and physical deficits and behaviors that affect risk of falls.  - [place] fall precautions as indicated by assessment.  - Educate pt/family on patient safety including physical limitations  - Instruct pt to call for assistance with activity based on assessment  - Modify environment to reduce risk of injury  - Provide assistive devices as appropriate  - Consider OT/PT consult to assist with strengthening/mobility  - Encourage toileting schedule  Outcome: Adequate for Discharge     Problem: RESPIRATORY - ADULT  Goal: Achieves optimal ventilation and oxygenation  Description: INTERVENTIONS:  - Assess for changes in respiratory status  - Assess for changes in mentation and behavior  - Position to facilitate oxygenation and minimize respiratory effort  - Oxygen supplementation based on oxygen saturation or ABGs  - Provide Smoking Cessation handout, if applicable  - Encourage broncho-pulmonary hygiene including cough, deep breathe, Incentive Spirometry  - Assess the need for suctioning and perform as needed  - Assess and instruct to report SOB or any respiratory difficulty  - Respiratory Therapy support as indicated  - Manage/alleviate anxiety  - Monitor for signs/symptoms  of CO2 retention  Outcome: Adequate for Discharge     Problem: METABOLIC/FLUID AND ELECTROLYTES - ADULT  Goal: Electrolytes maintained within normal limits  Description: INTERVENTIONS:  - Monitor labs and rhythm and assess patient for signs and symptoms of electrolyte imbalances  - Administer electrolyte replacement as ordered  - Monitor response to electrolyte replacements, including rhythm and repeat lab results as appropriate  - Fluid restriction as ordered  - Instruct patient on fluid and nutrition restrictions as appropriate  Outcome: Adequate for Discharge     Problem: Patient/Family Goals  Goal: Patient/Family Long Term Goal  Description: Patient's Long Term Goal: discharge     Interventions:  - pulm consult, wean o2 as tolerated   - See additional Care Plan goals for specific interventions  Outcome: Adequate for Discharge  Goal: Patient/Family Short Term Goal  Description: Patient's Short Term Goal: feel better     Interventions:   - wean o2 as tolerated, nebs per orders   - See additional Care Plan goals for specific interventions  Outcome: Adequate for Discharge     Problem: DISCHARGE PLANNING  Goal: Discharge to home or other facility with appropriate resources  Description: INTERVENTIONS:  - Identify barriers to discharge w/pt and caregiver  - Include patient/family/discharge partner in discharge planning  - Arrange for needed discharge resources and transportation as appropriate  - Identify discharge learning needs (meds, wound care, etc)  - Arrange for interpreters to assist at discharge as needed  - Consider post-discharge preferences of patient/family/discharge partner  - Complete POLST form as appropriate  - Assess patient's ability to be responsible for managing their own health  - Refer to Case Management Department for coordinating discharge planning if the patient needs post-hospital services based on physician/LIP order or complex needs related to functional status, cognitive ability or social  support system  Outcome: Adequate for Discharge      Plan for fluid Tylenol Motrin, Zofran, reassess Patient well appearing.  Does not meet current COVID-19 admission criteria. Advised home quarantine  for 14-days.  Anticipatory guidance provided and supportive care recommended. Advised immediate return if worsening symptoms, especially if short of breath. Plan for fluid Tylenol Motrin, Zofran, reassess

## 2024-07-24 NOTE — PLAN OF CARE
Pt is A&Ox4 and ambulating well. Vitals signs stable. Non-tele. No c/o pain.  Frequent checks on rounds. Call light within reach, bed at lowest position. Non skid socks on.      Problem: Patient Centered Care  Goal: Patient preferences are identified and integrated in the patient's plan of care  Description: Interventions:  - What would you like us to know as we care for you? From home with spouse  - Provide timely, complete, and accurate information to patient/family  - Incorporate patient and family knowledge, values, beliefs, and cultural backgrounds into the planning and delivery of care  - Encourage patient/family to participate in care and decision-making at the level they choose  - Honor patient and family perspectives and choices  Outcome: Progressing     Problem: SAFETY ADULT - FALL  Goal: Free from fall injury  Description: INTERVENTIONS:  - Assess pt frequently for physical needs  - Identify cognitive and physical deficits and behaviors that affect risk of falls.  - [place] fall precautions as indicated by assessment.  - Educate pt/family on patient safety including physical limitations  - Instruct pt to call for assistance with activity based on assessment  - Modify environment to reduce risk of injury  - Provide assistive devices as appropriate  - Consider OT/PT consult to assist with strengthening/mobility  - Encourage toileting schedule  Outcome: Progressing     Problem: RESPIRATORY - ADULT  Goal: Achieves optimal ventilation and oxygenation  Description: INTERVENTIONS:  - Assess for changes in respiratory status  - Assess for changes in mentation and behavior  - Position to facilitate oxygenation and minimize respiratory effort  - Oxygen supplementation based on oxygen saturation or ABGs  - Provide Smoking Cessation handout, if applicable  - Encourage broncho-pulmonary hygiene including cough, deep breathe, Incentive Spirometry  - Assess the need for suctioning and perform as needed  - Assess and  instruct to report SOB or any respiratory difficulty  - Respiratory Therapy support as indicated  - Manage/alleviate anxiety  - Monitor for signs/symptoms of CO2 retention  Outcome: Progressing     Problem: Patient/Family Goals  Goal: Patient/Family Long Term Goal  Description: Patient's Long Term Goal: discharge     Interventions:  - pulm consult, wean o2 as tolerated   - See additional Care Plan goals for specific interventions  Outcome: Progressing  Goal: Patient/Family Short Term Goal  Description: Patient's Short Term Goal: feel better     Interventions:   - wean o2 as tolerated, nebs per orders   - See additional Care Plan goals for specific interventions  Outcome: Progressing     Problem: DISCHARGE PLANNING  Goal: Discharge to home or other facility with appropriate resources  Description: INTERVENTIONS:  - Identify barriers to discharge w/pt and caregiver  - Include patient/family/discharge partner in discharge planning  - Arrange for needed discharge resources and transportation as appropriate  - Identify discharge learning needs (meds, wound care, etc)  - Arrange for interpreters to assist at discharge as needed  - Consider post-discharge preferences of patient/family/discharge partner  - Complete POLST form as appropriate  - Assess patient's ability to be responsible for managing their own health  - Refer to Case Management Department for coordinating discharge planning if the patient needs post-hospital services based on physician/LIP order or complex needs related to functional status, cognitive ability or social support system  Outcome: Progressing

## 2024-07-25 ENCOUNTER — PATIENT OUTREACH (OUTPATIENT)
Dept: CASE MANAGEMENT | Age: 59
End: 2024-07-25

## 2024-07-25 NOTE — PROGRESS NOTES
Attempted to reach the patient to complete a Hospital follow up call. The phone rang multiple times without an answer. The patient's voicemail aldo was full and nurse care manager was unable to leave a message. Nurse care manager will to try reach the patient again later.

## 2024-07-27 ENCOUNTER — PATIENT MESSAGE (OUTPATIENT)
Dept: FAMILY MEDICINE CLINIC | Facility: CLINIC | Age: 59
End: 2024-07-27

## 2024-07-29 NOTE — TELEPHONE ENCOUNTER
Medication(s) to Refill:   Requested Prescriptions     Pending Prescriptions Disp Refills    vortioxetine (TRINTELLIX) 20 MG Oral Tab 90 tablet 0     Sig: Take 1 tablet (20 mg total) by mouth daily.         Reason for Medication Refill being sent to Provider / Reason Protocol Failed:  [] 90 day refill has already been granted  [] Blood Pressure out of range  [] Labs Abnormal/over due  [] Medication not previously prescribed by Provider  [] Non-Protocol Medication  [] Controlled Substance   [] Due for appointment- no future appointment scheduled  [] No Follow up specified      Last Time Medication was Filled:  4/24/24      Last Office Visit with PCP: 5/15/24    When Patient was Due Back to the Office:    (from when PCP last addressed condition)    Future Appointments:  Future Appointments   Date Time Provider Department Center   8/27/2024 12:30 PM Aurora Gonzalez MD [facility] [place]         Last Blood Pressures:  BP Readings from Last 2 Encounters:   07/24/24 115/67   07/21/24 119/85         Action taken:  [] Refill approved per protocol  [] Routing to provider for approval

## 2024-07-29 NOTE — TELEPHONE ENCOUNTER
From: Clemencia Kim  To: SEAN MCMANUS  Sent: 7/27/2024 2:00 AM CDT  Subject: Trintellix    Can someone please respond to the [facility] Request to refill this medication, they are saying a prior authorization is needed. They sent you 3 different requests and now I am out of medication.

## 2024-07-30 NOTE — TELEPHONE ENCOUNTER
Prior authorization approved  Payer: [employer] [facility] Case ID: [id number]    [phone]  Note from payer: The case has been Approved from  [id number]  Approval Details    Authorized from April 30, 2024 to July 29, 2025  Electronic appeal: Not supported  View History    [employer]

## 2024-08-12 RX ORDER — LOSARTAN POTASSIUM 50 MG/1
50 TABLET ORAL DAILY
Qty: 90 TABLET | Refills: 0 | Status: SHIPPED | OUTPATIENT
Start: 2024-08-12

## 2024-08-19 RX ORDER — ARIPIPRAZOLE 2 MG/1
2 TABLET ORAL DAILY
Qty: 30 TABLET | Refills: 1 | Status: SHIPPED | OUTPATIENT
Start: 2024-08-19

## 2024-08-19 RX ORDER — ARIPIPRAZOLE 2 MG/1
2 TABLET ORAL DAILY
Qty: 30 TABLET | Refills: 1 | OUTPATIENT
Start: 2024-08-19

## 2024-08-20 DIAGNOSIS — J44.1 COPD WITH ACUTE EXACERBATION (HCC): ICD-10-CM

## 2024-08-20 RX ORDER — METHIMAZOLE 10 MG/1
20 TABLET ORAL DAILY
COMMUNITY
Start: 2024-07-18

## 2024-08-20 RX ORDER — FAMOTIDINE 40 MG/1
40 TABLET, FILM COATED ORAL 2 TIMES DAILY
COMMUNITY

## 2024-08-20 RX ORDER — VARENICLINE TARTRATE 1 MG/1
TABLET, FILM COATED ORAL
COMMUNITY
Start: 2024-06-12

## 2024-08-20 RX ORDER — CETIRIZINE HYDROCHLORIDE 10 MG/1
10 TABLET ORAL DAILY
Qty: 90 TABLET | Refills: 2 | Status: SHIPPED | OUTPATIENT
Start: 2024-08-20

## 2024-08-20 RX ORDER — PIRFENIDONE 267 MG/1
TABLET, FILM COATED ORAL
COMMUNITY
Start: 2024-08-19

## 2024-08-20 RX ORDER — OXYBUTYNIN CHLORIDE 5 MG/1
5 TABLET, EXTENDED RELEASE ORAL DAILY
Qty: 90 TABLET | Refills: 0 | Status: SHIPPED | OUTPATIENT
Start: 2024-08-20

## 2024-08-20 RX ORDER — HYDROXYZINE HYDROCHLORIDE 25 MG/1
25 TABLET, FILM COATED ORAL EVERY 8 HOURS PRN
Qty: 180 TABLET | Refills: 0 | Status: SHIPPED | OUTPATIENT
Start: 2024-08-20

## 2024-08-20 RX ORDER — POLYETHYLENE GLYCOL-3350 AND ELECTROLYTES 236; 6.74; 5.86; 2.97; 22.74 G/274.31G; G/274.31G; G/274.31G; G/274.31G; G/274.31G
POWDER, FOR SOLUTION ORAL
COMMUNITY
Start: 2024-07-12

## 2024-08-20 RX ORDER — METHIMAZOLE 5 MG/1
5 TABLET ORAL 2 TIMES DAILY
COMMUNITY
Start: 2024-06-03

## 2024-08-20 NOTE — TELEPHONE ENCOUNTER
Medication(s) to Refill:   Requested Prescriptions     Pending Prescriptions Disp Refills    hydrOXYzine 25 MG Oral Tab 180 tablet 0     Sig: Take 1 tablet (25 mg total) by mouth every 8 (eight) hours as needed for Anxiety.     Signed Prescriptions Disp Refills    cetirizine 10 MG Oral Tab 90 tablet 2     Sig: Take 1 tablet (10 mg total) by mouth daily.     Authorizing Provider: SEAN GONZALEZ     Ordering User: GERMAN DUFFY         Reason for Medication Refill being sent to Provider / Reason Protocol Failed:  [] 90 day refill has already been granted  [] Blood Pressure out of range  [] Labs Abnormal/over due  [] Medication not previously prescribed by Provider  [] Non-Protocol Medication  [] Controlled Substance   [] Due for appointment- no future appointment scheduled  [] No Follow up specified      Last Time Medication was Filled:  3/7/24      Last Office Visit with PCP: 5/15/24    When Patient was Due Back to the Office:    (from when PCP last addressed condition)  4 weeks  Future Appointments:  Future Appointments   Date Time Provider Department Center   8/27/2024 12:30 PM Sean Gonzalez MD [facility] [place]         Last Blood Pressures:  BP Readings from Last 2 Encounters:   07/24/24 115/67   07/21/24 119/85         Recent Labs:        Action taken:  [] Refill approved per protocol  [] Routing to provider for approval

## 2024-08-20 NOTE — TELEPHONE ENCOUNTER
Medication(s) to Refill:   Requested Prescriptions     Pending Prescriptions Disp Refills    oxybutynin ER 5 MG Oral Tablet 24 Hr 90 tablet 0     Sig: Take 1 tablet (5 mg total) by mouth daily.         Reason for Medication Refill being sent to Provider / Reason Protocol Failed:  [] 90 day refill has already been granted  [] Blood Pressure out of range  [] Labs Abnormal/over due  [] Medication not previously prescribed by Provider  [] Non-Protocol Medication  [] Controlled Substance   [] Due for appointment- no future appointment scheduled  [] No Follow up specified      Last Time Medication was Filled:  6/3/24      Last Office Visit with PCP: 5/15/24    When Patient was Due Back to the Office:    (from when PCP last addressed condition)  4 weeks   Future Appointments:  Future Appointments   Date Time Provider Department Center   8/27/2024 12:30 PM Aurora Gonzalez MD [facility] [place]         Last Blood Pressures:  BP Readings from Last 2 Encounters:   07/24/24 115/67   07/21/24 119/85         Recent Labs:        Action taken:  [] Refill approved per protocol  [] Routing to provider for approval

## 2024-08-21 NOTE — TELEPHONE ENCOUNTER
Medication(s) to Refill:   Requested Prescriptions     Pending Prescriptions Disp Refills    diclofenac 50 MG Oral Tab  0     Sig: Take 1 tablet (50 mg total) by mouth 2 (two) times daily.         Reason for Medication Refill being sent to Provider / Reason Protocol Failed:  [] 90 day refill has already been granted  [] Blood Pressure out of range  [] Labs Abnormal/over due  [] Medication not previously prescribed by Provider  [] Non-Protocol Medication  [] Controlled Substance   [] Due for appointment- no future appointment scheduled  [] No Follow up specified      Last Time Medication was Filled: 4/13/21      Last Office Visit with PCP: 5/15/24    When Patient was Due Back to the Office:    (from when PCP last addressed condition)  4 weeks   Future Appointments:  Future Appointments   Date Time Provider Department Center   8/27/2024 12:30 PM Aurora Gonzalez MD [facility] [place]         Last Blood Pressures:  BP Readings from Last 2 Encounters:   07/24/24 115/67   07/21/24 119/85         Recent Labs:        Action taken:  [] Refill approved per protocol  [] Routing to provider for approval

## 2024-08-22 RX ORDER — DICLOFENAC POTASSIUM 50 MG/1
50 TABLET, FILM COATED ORAL 2 TIMES DAILY
Qty: 60 TABLET | Refills: 0 | Status: SHIPPED | OUTPATIENT
Start: 2024-08-22 | End: 2024-09-21

## 2024-08-27 ENCOUNTER — OFFICE VISIT (OUTPATIENT)
Dept: FAMILY MEDICINE CLINIC | Facility: CLINIC | Age: 59
End: 2024-08-27
Payer: MEDICAID

## 2024-08-27 ENCOUNTER — LAB ENCOUNTER (OUTPATIENT)
Dept: LAB | Age: 59
End: 2024-08-27
Attending: FAMILY MEDICINE
Payer: MEDICAID

## 2024-08-27 VITALS
BODY MASS INDEX: 24.55 KG/M2 | RESPIRATION RATE: 16 BRPM | SYSTOLIC BLOOD PRESSURE: 100 MMHG | WEIGHT: 130 LBS | OXYGEN SATURATION: 97 % | HEART RATE: 79 BPM | DIASTOLIC BLOOD PRESSURE: 60 MMHG | HEIGHT: 61 IN

## 2024-08-27 DIAGNOSIS — R23.2 HOT FLASHES: Primary | ICD-10-CM

## 2024-08-27 DIAGNOSIS — R14.0 BLOATING: ICD-10-CM

## 2024-08-27 DIAGNOSIS — J84.10 PULMONARY FIBROSIS (HCC): ICD-10-CM

## 2024-08-27 DIAGNOSIS — E05.90 HYPERTHYROIDISM: ICD-10-CM

## 2024-08-27 DIAGNOSIS — R23.2 HOT FLASHES: ICD-10-CM

## 2024-08-27 DIAGNOSIS — J18.9 PNEUMONIA DUE TO INFECTIOUS ORGANISM, UNSPECIFIED LATERALITY, UNSPECIFIED PART OF LUNG: ICD-10-CM

## 2024-08-27 DIAGNOSIS — K59.04 CHRONIC IDIOPATHIC CONSTIPATION: ICD-10-CM

## 2024-08-27 DIAGNOSIS — F43.10 PTSD (POST-TRAUMATIC STRESS DISORDER): ICD-10-CM

## 2024-08-27 DIAGNOSIS — F33.2 SEVERE EPISODE OF RECURRENT MAJOR DEPRESSIVE DISORDER, WITHOUT PSYCHOTIC FEATURES (HCC): ICD-10-CM

## 2024-08-27 LAB
ALBUMIN SERPL-MCNC: 4.5 G/DL (ref 3.2–4.8)
ALBUMIN/GLOB SERPL: 1.7 {RATIO} (ref 1–2)
ALP LIVER SERPL-CCNC: 88 U/L
ALT SERPL-CCNC: 17 U/L
ANION GAP SERPL CALC-SCNC: 2 MMOL/L (ref 0–18)
AST SERPL-CCNC: 19 U/L (ref ?–34)
BASOPHILS # BLD AUTO: 0.04 X10(3) UL (ref 0–0.2)
BASOPHILS NFR BLD AUTO: 0.3 %
BILIRUB SERPL-MCNC: 0.3 MG/DL (ref 0.3–1.2)
BUN BLD-MCNC: 20 MG/DL (ref 9–23)
CALCIUM BLD-MCNC: 9.3 MG/DL (ref 8.7–10.4)
CHLORIDE SERPL-SCNC: 109 MMOL/L (ref 98–112)
CO2 SERPL-SCNC: 30 MMOL/L (ref 21–32)
CREAT BLD-MCNC: 0.69 MG/DL
CRP SERPL-MCNC: 1.1 MG/DL (ref ?–0.5)
EGFRCR SERPLBLD CKD-EPI 2021: 101 ML/MIN/1.73M2 (ref 60–?)
EOSINOPHIL # BLD AUTO: 0.1 X10(3) UL (ref 0–0.7)
EOSINOPHIL NFR BLD AUTO: 0.7 %
ERYTHROCYTE [DISTWIDTH] IN BLOOD BY AUTOMATED COUNT: 15 %
FASTING STATUS PATIENT QL REPORTED: YES
GLOBULIN PLAS-MCNC: 2.6 G/DL (ref 2–3.5)
GLUCOSE BLD-MCNC: 103 MG/DL (ref 70–99)
HAV IGM SER QL: NONREACTIVE
HBV CORE IGM SER QL: NONREACTIVE
HBV SURFACE AG SERPL QL IA: NONREACTIVE
HCT VFR BLD AUTO: 34.1 %
HCV AB SERPL QL IA: NONREACTIVE
HGB BLD-MCNC: 11.4 G/DL
IMM GRANULOCYTES # BLD AUTO: 0.06 X10(3) UL (ref 0–1)
IMM GRANULOCYTES NFR BLD: 0.4 %
LYMPHOCYTES # BLD AUTO: 2.59 X10(3) UL (ref 1–4)
LYMPHOCYTES NFR BLD AUTO: 18.6 %
MAGNESIUM SERPL-MCNC: 1.9 MG/DL (ref 1.6–2.6)
MCH RBC QN AUTO: 30.9 PG (ref 26–34)
MCHC RBC AUTO-ENTMCNC: 33.4 G/DL (ref 31–37)
MCV RBC AUTO: 92.4 FL
MONOCYTES # BLD AUTO: 0.64 X10(3) UL (ref 0.1–1)
MONOCYTES NFR BLD AUTO: 4.6 %
NEUTROPHILS # BLD AUTO: 10.49 X10 (3) UL (ref 1.5–7.7)
NEUTROPHILS # BLD AUTO: 10.49 X10(3) UL (ref 1.5–7.7)
NEUTROPHILS NFR BLD AUTO: 75.4 %
OSMOLALITY SERPL CALC.SUM OF ELEC: 295 MOSM/KG (ref 275–295)
PHOSPHATE SERPL-MCNC: 3.5 MG/DL (ref 2.4–5.1)
PLATELET # BLD AUTO: 363 10(3)UL (ref 150–450)
POTASSIUM SERPL-SCNC: 4 MMOL/L (ref 3.5–5.1)
PROT SERPL-MCNC: 7.1 G/DL (ref 5.7–8.2)
RBC # BLD AUTO: 3.69 X10(6)UL
SODIUM SERPL-SCNC: 141 MMOL/L (ref 136–145)
T4 FREE SERPL-MCNC: 0.9 NG/DL (ref 0.8–1.7)
TSI SER-ACNC: 2.69 MIU/ML (ref 0.55–4.78)
WBC # BLD AUTO: 13.9 X10(3) UL (ref 4–11)

## 2024-08-27 PROCEDURE — 84439 ASSAY OF FREE THYROXINE: CPT

## 2024-08-27 PROCEDURE — 80074 ACUTE HEPATITIS PANEL: CPT

## 2024-08-27 PROCEDURE — 83735 ASSAY OF MAGNESIUM: CPT

## 2024-08-27 PROCEDURE — 84100 ASSAY OF PHOSPHORUS: CPT

## 2024-08-27 PROCEDURE — 87389 HIV-1 AG W/HIV-1&-2 AB AG IA: CPT

## 2024-08-27 PROCEDURE — 99214 OFFICE O/P EST MOD 30 MIN: CPT | Performed by: FAMILY MEDICINE

## 2024-08-27 PROCEDURE — 85025 COMPLETE CBC W/AUTO DIFF WBC: CPT

## 2024-08-27 PROCEDURE — 84443 ASSAY THYROID STIM HORMONE: CPT

## 2024-08-27 PROCEDURE — 80053 COMPREHEN METABOLIC PANEL: CPT

## 2024-08-27 PROCEDURE — 86140 C-REACTIVE PROTEIN: CPT

## 2024-08-27 PROCEDURE — 36415 COLL VENOUS BLD VENIPUNCTURE: CPT

## 2024-08-27 RX ORDER — ALBUTEROL SULFATE 0.83 MG/ML
2.5 SOLUTION RESPIRATORY (INHALATION) EVERY 4 HOURS PRN
Qty: 90 ML | Refills: 1 | Status: SHIPPED | OUTPATIENT
Start: 2024-08-27

## 2024-08-27 RX ORDER — FEZOLINETANT 45 MG/1
45 TABLET, FILM COATED ORAL DAILY
Qty: 30 TABLET | Refills: 5 | Status: SHIPPED | OUTPATIENT
Start: 2024-08-27

## 2024-08-27 RX ORDER — LINACLOTIDE 72 UG/1
72 CAPSULE, GELATIN COATED ORAL EVERY OTHER DAY
Qty: 30 CAPSULE | Refills: 0 | Status: SHIPPED | OUTPATIENT
Start: 2024-08-27 | End: 2024-09-26

## 2024-08-27 NOTE — PATIENT INSTRUCTIONS
Supplements to with abdominal pain & bloating   - MCT oil   - ashwagandha   - pre & probiotic   - milk thistle     Prescription for hot flashes (due to menopause)   Veozah

## 2024-08-27 NOTE — PROGRESS NOTES
Subjective:   Clemencia Kim is a 59 year old female who presents for Follow - Up     Pnuemonia - treated with IV abx     Pulm fibrosis - on pirfenidone. Using supplemental O2 if sats under 90    Was also having n/v - pirfenidone stopped by hosptal team, then restarted by pulm     Also with hot flashes   Last use of cocaine about 18mo ago     Abd pain - every morning, followed by BM   Complains of abd bloating   Saw GI - colon prep cleanse every other day x3 doses   Taking miralax daily     Anxiety/insomnia   Medical cannabis helps - drops SL bid       History/Other:    Chief Complaint Reviewed and Verified  No Further Nursing Notes to   Review  Tobacco Reviewed  Allergies Reviewed  Medications Reviewed    Problem List Reviewed  Medical History Reviewed  Surgical History   Reviewed  OB Status Reviewed  Family History Reviewed         Tobacco:  She smoked tobacco in the past but quit greater than 12 months ago.  Social History     Tobacco Use   Smoking Status Former    Types: Cigarettes    Start date: 1982    Quit date: 2022    Years since quittin.9    Passive exposure: Current   Smokeless Tobacco Never   Tobacco Comments    1 cig per day        Current Outpatient Medications   Medication Sig Dispense Refill    albuterol (2.5 MG/3ML) 0.083% Inhalation Nebu Soln Take 3 mL (2.5 mg total) by nebulization every 4 (four) hours as needed for Wheezing or Shortness of Breath. 90 mL 1    Fezolinetant (VEOZAH) 45 MG Oral Tab Take 45 mg by mouth daily. 30 tablet 5    linaCLOtide (LINZESS) 72 MCG Oral Cap Take 72 mcg by mouth every other day. 30 capsule 0    diclofenac 50 MG Oral Tab Take 1 tablet (50 mg total) by mouth 2 (two) times daily. With food 60 tablet 0    oxybutynin ER 5 MG Oral Tablet 24 Hr Take 1 tablet (5 mg total) by mouth daily. 90 tablet 0    cetirizine 10 MG Oral Tab Take 1 tablet (10 mg total) by mouth daily. 90 tablet 2    hydrOXYzine 25 MG Oral Tab Take 1 tablet (25 mg total) by  mouth every 8 (eight) hours as needed for Anxiety. 180 tablet 0    famotidine 40 MG Oral Tab Take 1 tablet (40 mg total) by mouth 2 (two) times daily.      methIMAzole 10 MG Oral Tab Take 2 tablets (20 mg total) by mouth daily.      methIMAzole 5 MG Oral Tab Take 1 tablet (5 mg total) by mouth 2 (two) times daily. EVERY MORNING AND AT BEDTIME      GAVILYTE-G 236 g Oral Recon Soln DRINK CONTENTS BY MOUTH ONCE DAILY FOR 1 DOSE      Pirfenidone 267 MG Oral Tab       varenicline 1 MG Oral Tab       ARIPIPRAZOLE 2 MG Oral Tab TAKE 1 TABLET(2 MG) BY MOUTH DAILY 30 tablet 1    LOSARTAN 50 MG Oral Tab TAKE 1 TABLET(50 MG) BY MOUTH DAILY 90 tablet 0    vortioxetine (TRINTELLIX) 20 MG Oral Tab Take 1 tablet (20 mg total) by mouth daily. 90 tablet 1    PRAZOSIN 1 MG Oral Cap TAKE 1 CAPSULE(1 MG) BY MOUTH EVERY NIGHT 90 capsule 1    ezetimibe 10 MG Oral Tab Take 1 tablet (10 mg total) by mouth nightly. 90 tablet 1    folic acid 1 MG Oral Tab Take 1 tablet (1 mg total) by mouth daily. 90 tablet 3    montelukast 10 MG Oral Tab Take 1 tablet (10 mg total) by mouth every evening. 90 tablet 2    Omeprazole 40 MG Oral Capsule Delayed Release Take 1 capsule (40 mg total) by mouth 2 (two) times daily.      SYMBICORT 80-4.5 MCG/ACT Inhalation Aerosol Inhale 2 puffs into the lungs 2 (two) times daily.      aspirin 81 MG Oral Chew Tab Chew 1 tablet (81 mg total) by mouth daily.      ZINC GLUCONATE OR Take by mouth daily.      Selenium 100 MCG Oral Tab Take 1 tablet (100 mcg total) by mouth 2 (two) times daily.           Review of Systems:  Review of Systems   Constitutional:  Positive for diaphoresis. Negative for chills and fever.   HENT:  Negative for rhinorrhea and sinus pressure.    Eyes:  Negative for pain and visual disturbance.   Respiratory:  Negative for cough and shortness of breath.    Cardiovascular:  Negative for chest pain and palpitations.   Gastrointestinal:  Positive for abdominal pain, constipation and nausea. Negative  for vomiting.   Endocrine: Positive for heat intolerance.   Genitourinary:  Negative for dysuria, frequency and urgency.   Musculoskeletal:  Negative for arthralgias and myalgias.   Skin:  Negative for pallor and rash.   Neurological:  Negative for dizziness and headaches.       Objective:   /60   Pulse 79   Resp 16   Ht 5' 1\" (1.549 m)   Wt 130 lb (59 kg)   LMP 07/05/2011   SpO2 97%   BMI 24.56 kg/m²  Estimated body mass index is 24.56 kg/m² as calculated from the following:    Height as of this encounter: 5' 1\" (1.549 m).    Weight as of this encounter: 130 lb (59 kg).  Physical Exam  Constitutional:       Appearance: Normal appearance. She is well-developed and well-groomed.   Cardiovascular:      Rate and Rhythm: Normal rate and regular rhythm.   Pulmonary:      Effort: Pulmonary effort is normal.      Breath sounds: Normal breath sounds.   Abdominal:      General: Abdomen is flat. Bowel sounds are decreased.      Palpations: Abdomen is soft. There is no mass.      Tenderness: There is no abdominal tenderness.   Neurological:      Mental Status: She is alert and oriented to person, place, and time.   Psychiatric:         Mood and Affect: Mood and affect normal.         Behavior: Behavior is cooperative.           Assessment & Plan:   1. Hot flashes (Primary)  -     Veozah; Take 45 mg by mouth daily.  Dispense: 30 tablet; Refill: 5  -     TSH and Free T4; Future; Expected date: 08/27/2024  -     C-Reactive Protein; Future; Expected date: 08/27/2024  -     CBC With Differential With Platelet; Future; Expected date: 08/27/2024  -     HIV Ag/Ab Combo; Future; Expected date: 08/27/2024  -     Hepatitis Panel, Acute (4); Future; Expected date: 08/27/2024    2. Pulmonary fibrosis (HCC)  - stable  - cont present management per pulm     3. Bloating  -     C-Reactive Protein; Future; Expected date: 08/27/2024  -     Comp Metabolic Panel (14); Future; Expected date: 08/27/2024  -     Phosphorus; Future;  Expected date: 08/27/2024  -     Magnesium; Future; Expected date: 08/27/2024  Add pre & pro biotic   Try MCT oil or ashwagandha     4. Hyperthyroidism  Orders:  -     TSH and Free T4; Future; Expected date: 08/27/2024  Cont follow up with endo - check about dosing - 10mg daily or 20mg?     5. Pneumonia due to infectious organism, unspecified laterality, unspecified part of lung  Resolved     6. Chronic idiopathic constipation  -     Linzess; Take 72 mcg by mouth every other day.  Dispense: 30 capsule; Refill: 0  Negative colonoscopy   Cont follow up with GI     7. Severe episode of recurrent major depressive disorder, without psychotic features (HCC)  -     [facility] (Substance Use Disorder & Self-Pay/Medicaid)  Stable     8. PTSD (post-traumatic stress disorder)  -     [facility] (Substance Use Disorder & Self-Pay/Medicaid)  Stable   Rec regular counseling     Other orders  -     Albuterol Sulfate; Take 3 mL (2.5 mg total) by nebulization every 4 (four) hours as needed for Wheezing or Shortness of Breath.  Dispense: 90 mL; Refill: 1        Return in about 3 months (around 11/27/2024).    SEAN MCMANUS MD, 8/27/2024, 12:42 PM

## 2024-09-12 ENCOUNTER — TELEPHONE (OUTPATIENT)
Dept: FAMILY MEDICINE CLINIC | Facility: CLINIC | Age: 59
End: 2024-09-12

## 2024-09-16 RX ORDER — PRAZOSIN HYDROCHLORIDE 1 MG/1
1 CAPSULE ORAL NIGHTLY
Qty: 90 CAPSULE | Refills: 1 | Status: SHIPPED | OUTPATIENT
Start: 2024-09-16

## 2024-09-16 NOTE — TELEPHONE ENCOUNTER
Medication(s) to Refill:   Requested Prescriptions     Pending Prescriptions Disp Refills    prazosin 1 MG Oral Cap 90 capsule 1     Sig: Take 1 capsule (1 mg total) by mouth nightly.         Reason for Medication Refill being sent to Provider / Reason Protocol Failed:  [] 90 day refill has already been granted  [] Blood Pressure out of range  [] Labs Abnormal/over due  [] Medication not previously prescribed by Provider  [] Non-Protocol Medication  [] Controlled Substance   [] Due for appointment- no future appointment scheduled  [] No Follow up specified      Last Time Medication was Filled: 6/3/24      Last Office Visit with PCP: 8/27/24    When Patient was Due Back to the Office:    (from when PCP last addressed condition)  3 months   Future Appointments:  No future appointments.      Last Blood Pressures:  BP Readings from Last 2 Encounters:   08/27/24 100/60   07/24/24 115/67         Recent Labs:        Action taken:  [] Refill approved per protocol  [] Routing to provider for approval

## 2024-09-25 RX ORDER — DICLOFENAC POTASSIUM 50 MG/1
50 TABLET, FILM COATED ORAL 2 TIMES DAILY PRN
Qty: 60 TABLET | Refills: 3 | Status: SHIPPED | OUTPATIENT
Start: 2024-09-25

## 2024-10-02 RX ORDER — LOSARTAN POTASSIUM 50 MG/1
50 TABLET ORAL DAILY
Qty: 90 TABLET | Refills: 0 | Status: SHIPPED | OUTPATIENT
Start: 2024-10-02 | End: 2024-10-04

## 2024-10-04 ENCOUNTER — HOSPITAL ENCOUNTER (EMERGENCY)
Facility: HOSPITAL | Age: 59
Discharge: HOME | End: 2024-10-04
Attending: EMERGENCY MEDICINE
Payer: MEDICAID

## 2024-10-04 ENCOUNTER — APPOINTMENT (OUTPATIENT)
Dept: CT IMAGING | Facility: HOSPITAL | Age: 59
End: 2024-10-04
Attending: EMERGENCY MEDICINE
Payer: MEDICAID

## 2024-10-04 ENCOUNTER — APPOINTMENT (OUTPATIENT)
Dept: GENERAL RADIOLOGY | Facility: HOSPITAL | Age: 59
End: 2024-10-04
Attending: EMERGENCY MEDICINE
Payer: MEDICAID

## 2024-10-04 ENCOUNTER — TELEPHONE (OUTPATIENT)
Dept: INTERNAL MEDICINE CLINIC | Facility: CLINIC | Age: 59
End: 2024-10-04

## 2024-10-04 ENCOUNTER — PATIENT MESSAGE (OUTPATIENT)
Dept: FAMILY MEDICINE CLINIC | Facility: CLINIC | Age: 59
End: 2024-10-04

## 2024-10-04 VITALS
DIASTOLIC BLOOD PRESSURE: 81 MMHG | OXYGEN SATURATION: 99 % | SYSTOLIC BLOOD PRESSURE: 122 MMHG | HEART RATE: 53 BPM | TEMPERATURE: 98 F | HEIGHT: 62 IN | RESPIRATION RATE: 19 BRPM | WEIGHT: 130 LBS | BODY MASS INDEX: 23.92 KG/M2

## 2024-10-04 DIAGNOSIS — J84.10 PULMONARY FIBROSIS (HCC): ICD-10-CM

## 2024-10-04 DIAGNOSIS — R51.9 NONINTRACTABLE EPISODIC HEADACHE, UNSPECIFIED HEADACHE TYPE: Primary | ICD-10-CM

## 2024-10-04 DIAGNOSIS — R07.89 CHEST PAIN, ATYPICAL: ICD-10-CM

## 2024-10-04 LAB
ALBUMIN SERPL-MCNC: 4.8 G/DL (ref 3.2–4.8)
ALBUMIN/GLOB SERPL: 1.7 {RATIO} (ref 1–2)
ALP LIVER SERPL-CCNC: 101 U/L
ALT SERPL-CCNC: 14 U/L
ANION GAP SERPL CALC-SCNC: 9 MMOL/L (ref 0–18)
APTT PPP: 26.9 SECONDS (ref 23–36)
AST SERPL-CCNC: 16 U/L (ref ?–34)
BASOPHILS # BLD AUTO: 0.03 X10(3) UL (ref 0–0.2)
BASOPHILS NFR BLD AUTO: 0.3 %
BILIRUB SERPL-MCNC: 0.3 MG/DL (ref 0.3–1.2)
BUN BLD-MCNC: 9 MG/DL (ref 9–23)
CALCIUM BLD-MCNC: 9.7 MG/DL (ref 8.7–10.4)
CHLORIDE SERPL-SCNC: 108 MMOL/L (ref 98–112)
CO2 SERPL-SCNC: 22 MMOL/L (ref 21–32)
CREAT BLD-MCNC: 0.6 MG/DL
EGFRCR SERPLBLD CKD-EPI 2021: 104 ML/MIN/1.73M2 (ref 60–?)
EOSINOPHIL # BLD AUTO: 0.16 X10(3) UL (ref 0–0.7)
EOSINOPHIL NFR BLD AUTO: 1.4 %
ERYTHROCYTE [DISTWIDTH] IN BLOOD BY AUTOMATED COUNT: 13.8 %
FLUAV + FLUBV RNA SPEC NAA+PROBE: NEGATIVE
FLUAV + FLUBV RNA SPEC NAA+PROBE: NEGATIVE
GLOBULIN PLAS-MCNC: 2.9 G/DL (ref 2–3.5)
GLUCOSE BLD-MCNC: 100 MG/DL (ref 70–99)
HCT VFR BLD AUTO: 37.9 %
HGB BLD-MCNC: 13 G/DL
IMM GRANULOCYTES # BLD AUTO: 0.04 X10(3) UL (ref 0–1)
IMM GRANULOCYTES NFR BLD: 0.3 %
INR BLD: 0.97 (ref 0.8–1.2)
LYMPHOCYTES # BLD AUTO: 2.33 X10(3) UL (ref 1–4)
LYMPHOCYTES NFR BLD AUTO: 19.8 %
MCH RBC QN AUTO: 31.2 PG (ref 26–34)
MCHC RBC AUTO-ENTMCNC: 34.3 G/DL (ref 31–37)
MCV RBC AUTO: 90.9 FL
MONOCYTES # BLD AUTO: 0.68 X10(3) UL (ref 0.1–1)
MONOCYTES NFR BLD AUTO: 5.8 %
NEUTROPHILS # BLD AUTO: 8.54 X10 (3) UL (ref 1.5–7.7)
NEUTROPHILS # BLD AUTO: 8.54 X10(3) UL (ref 1.5–7.7)
NEUTROPHILS NFR BLD AUTO: 72.4 %
NT-PROBNP SERPL-MCNC: <35 PG/ML (ref ?–125)
OSMOLALITY SERPL CALC.SUM OF ELEC: 287 MOSM/KG (ref 275–295)
PLATELET # BLD AUTO: 408 10(3)UL (ref 150–450)
POTASSIUM SERPL-SCNC: 3.6 MMOL/L (ref 3.5–5.1)
PROCALCITONIN SERPL-MCNC: 0.05 NG/ML (ref ?–0.05)
PROT SERPL-MCNC: 7.7 G/DL (ref 5.7–8.2)
PROTHROMBIN TIME: 13 SECONDS (ref 11.6–14.8)
RBC # BLD AUTO: 4.17 X10(6)UL
RSV RNA SPEC NAA+PROBE: NEGATIVE
SARS-COV-2 RNA RESP QL NAA+PROBE: NOT DETECTED
SODIUM SERPL-SCNC: 139 MMOL/L (ref 136–145)
TROPONIN I SERPL HS-MCNC: <3 NG/L
TROPONIN I SERPL HS-MCNC: <3 NG/L
WBC # BLD AUTO: 11.8 X10(3) UL (ref 4–11)

## 2024-10-04 PROCEDURE — 93010 ELECTROCARDIOGRAM REPORT: CPT

## 2024-10-04 PROCEDURE — 0241U SARS-COV-2/FLU A AND B/RSV BY PCR (GENEXPERT): CPT | Performed by: EMERGENCY MEDICINE

## 2024-10-04 PROCEDURE — 85730 THROMBOPLASTIN TIME PARTIAL: CPT | Performed by: EMERGENCY MEDICINE

## 2024-10-04 PROCEDURE — 36415 COLL VENOUS BLD VENIPUNCTURE: CPT

## 2024-10-04 PROCEDURE — 83880 ASSAY OF NATRIURETIC PEPTIDE: CPT | Performed by: EMERGENCY MEDICINE

## 2024-10-04 PROCEDURE — 99285 EMERGENCY DEPT VISIT HI MDM: CPT

## 2024-10-04 PROCEDURE — 70496 CT ANGIOGRAPHY HEAD: CPT | Performed by: EMERGENCY MEDICINE

## 2024-10-04 PROCEDURE — 71045 X-RAY EXAM CHEST 1 VIEW: CPT | Performed by: EMERGENCY MEDICINE

## 2024-10-04 PROCEDURE — 85610 PROTHROMBIN TIME: CPT | Performed by: EMERGENCY MEDICINE

## 2024-10-04 PROCEDURE — 93005 ELECTROCARDIOGRAM TRACING: CPT

## 2024-10-04 PROCEDURE — 84145 PROCALCITONIN (PCT): CPT | Performed by: EMERGENCY MEDICINE

## 2024-10-04 PROCEDURE — 84484 ASSAY OF TROPONIN QUANT: CPT | Performed by: EMERGENCY MEDICINE

## 2024-10-04 PROCEDURE — 80053 COMPREHEN METABOLIC PANEL: CPT | Performed by: EMERGENCY MEDICINE

## 2024-10-04 PROCEDURE — 85025 COMPLETE CBC W/AUTO DIFF WBC: CPT | Performed by: EMERGENCY MEDICINE

## 2024-10-04 RX ORDER — ACETAMINOPHEN 500 MG
1000 TABLET ORAL ONCE
Status: COMPLETED | OUTPATIENT
Start: 2024-10-04 | End: 2024-10-04

## 2024-10-04 RX ORDER — LOSARTAN POTASSIUM 50 MG/1
50 TABLET ORAL DAILY
COMMUNITY
Start: 2024-10-04

## 2024-10-04 RX ORDER — CHLORTHALIDONE 25 MG/1
25 TABLET ORAL DAILY
Qty: 30 TABLET | Refills: 0 | Status: SHIPPED | OUTPATIENT
Start: 2024-10-04 | End: 2024-10-07

## 2024-10-04 NOTE — ED INITIAL ASSESSMENT (HPI)
Patient complains of left sided chest pain and vomiting that began approx. 1 hour PTA. Patient also states she took her b/p earlier today and systolic was 180; patient states she took 100 mg Losartan. Patient complains of SOB as well, but states, \"I have pulmonary fibrosis, so this is pretty normal for me.\" Patient states she has vomited twice.

## 2024-10-04 NOTE — TELEPHONE ENCOUNTER
Received call from pt. Patient stated her BP readings were going to be faxed to Dr. Gonzalez yesterday, pt stated her BP has been elevated and she is looking for further direction. Unsure if fax was received, asked if pt can send photo of readings via . Patient stated she will do this now. Stated she has had HA's at times, but no current sxs. Last BP reading 148/127. Informed pt this BP is very elevated, confirmed not having any sxs. Patient said she would be agreeable to ED if that is what Dr. Gonzalez recommends.     Dr. Gonzalez- have you received pt's BP readings via fax? Agree with ED? Patient wanting your recommendation.

## 2024-10-04 NOTE — TELEPHONE ENCOUNTER
No fax received.     Inc losartan to 100mg daily   Will send rx for chlorthalidone as well - start in 1-2 weeks if diastolic is over 100 and then follow up in clinic    What is anxiety level? Last time she took hydroxyzine?

## 2024-10-04 NOTE — TELEPHONE ENCOUNTER
Patient responded to [employer] message reporting that she was directed to go into the ED and is there now.

## 2024-10-04 NOTE — TELEPHONE ENCOUNTER
Called and spoke to pt. Patient stated that she has worsened since previous conversation, having nausea and pain in chest. Patients anxiety has been high, taking hydroxyzine daily. Advised to be seen in ED asap, have someone drive her. Patient stated understanding and agreed to plan. Follow-up set up for pt on Monday, but she will be going to [facility] today.     LISA Gonzalez

## 2024-10-04 NOTE — TELEPHONE ENCOUNTER
Patient sent [employer] message asking if she was able to get her blood pressure readings to us a different way, other than trying to attach a file that's too big.

## 2024-10-04 NOTE — TELEPHONE ENCOUNTER
From: Clemencia Kim  To: SEAN MCMANUS  Sent: 10/4/2024 1:17 PM CDT  Subject: Bp    I am unable to attach the record of my readit's saying the file is too big

## 2024-10-05 LAB
ATRIAL RATE: 58 BPM
P AXIS: 52 DEGREES
P-R INTERVAL: 132 MS
Q-T INTERVAL: 450 MS
QRS DURATION: 86 MS
QTC CALCULATION (BEZET): 441 MS
R AXIS: -29 DEGREES
T AXIS: 35 DEGREES
VENTRICULAR RATE: 58 BPM

## 2024-10-05 NOTE — ED PROVIDER NOTES
Patient Seen in: [facility] Emergency Department      History     Chief Complaint   Patient presents with    Chest Pain Angina     Stated Complaint: Chest pain    Subjective:     This is a 59-year-old female with multiple medical problems including history of COPD, MCA aneurysm status post clipping, presents emergency room with multiple complaints.  Patient reports that her blood pressure has been elevated the last few days, reports intermittent headache which she describes as dull sometimes pressure-like to the front of the head.  States that she has been very concerned about the symptoms due to her prior history of aneurysm.  Denies any thunderclap type headache denies visual change, denies difficulty speaking or swallowing, denies numbness tingling weakness of the extremities.  Patient also reports that she fell earlier today nauseous and did vomit, vomit nonbloody nonbilious.  Denies feeling nauseous at this time.  Also reports an episode of chest tightness earlier today while lying in bed, denies ever having chest discomfort with exertion.  Patient reports he does have chronic shortness of breath due to pulmonary fibrosis but denies any change in the symptoms.  Denies any fevers or chills.  Denies lower extremity swelling or calf pain denies PND or orthopnea.  Denies urinary symptoms.    Objective:     Past Medical History:    Acute, but ill-defined, cerebrovascular disease    Anxiety    Arthritis    COPD (chronic obstructive pulmonary disease) (HCC)    Depression    Disorder of thyroid    Emphysema of lung (HCC)    Fibroids    History of stomach ulcers    Human papilloma virus infection    Hyperlipidemia    Hyperthyroidism    Idiopathic pulmonary fibrosis (HCC)    Prediabetes    Diet control - no meds or blood surgar checking at home    Sexually transmitted disease    Thyroid disease    Visual impairment    glasses              Past Surgical History:   Procedure Laterality Date    Brain surgery       Colonoscopy  2018    adenoma- repeat 6-12 mo    Colonoscopy            Other      Thyroid polyp removal    Other surgical history  2021    Other surgical history      removal of parts of upper and lower lobes in right lung    Tubal ligation      Upper gi endoscopy,exam                  Social History     Socioeconomic History    Marital status:    Tobacco Use    Smoking status: Former     Types: Cigarettes     Start date: 1982     Quit date: 2022     Years since quittin.0     Passive exposure: Current    Smokeless tobacco: Never    Tobacco comments:     1 cig per day   Vaping Use    Vaping status: Former    Substances: CBD   Substance and Sexual Activity    Alcohol use: Not Currently     Comment: rare - 1 drink 3 times a year    Drug use: Not Currently     Types: Cocaine     Comment: former cocaine history-Has used CBD oil    Sexual activity: Never   Other Topics Concern    Caffeine Concern No    Exercise Yes    Seat Belt Yes    Special Diet Yes     Comment: Celiac disease    Stress Concern Yes    Weight Concern No     Social Determinants of Health     Food Insecurity: No Food Insecurity (2024)    Food Insecurity     Food Insecurity: Never true   Transportation Needs: No Transportation Needs (2024)    Transportation Needs     Lack of Transportation: No   Housing Stability: Low Risk  (2024)    Housing Stability     Housing Instability: No                  Physical Exam     ED Triage Vitals [10/04/24 1456]   BP (!) 142/107   Pulse 70   Resp 18   Temp 98.3 °F (36.8 °C)   Temp src Temporal   SpO2 96 %   O2 Device None (Room air)       Current Vitals:   Vital Signs  BP: 122/81  Pulse: 53  Resp: 19  Temp: 98.3 °F (36.8 °C)  Temp src: Temporal  MAP (mmHg): 95    Oxygen Therapy  SpO2: 99 %  O2 Device: None (Room air)        Physical Exam  GENERAL: Patient is awake, alert, well-appearing, in no acute distress.  HEENT: Pupils equal round reactive to light, extraocular muscles are  intact, there is no scleral icterus.  Mucous membranes are moist, oropharynx is clear.  No tenderness of the bilateral temples. scalp is atraumatic.  NECK: Neck is supple, there is no nuchal rigidity.    HEART: Regular rate and rhythm, no murmurs.  LUNGS: Coarse breath sounds bilaterally.  No Rales, no rhonchi, no wheezing, no stridor.  ABDOMEN: Soft, nondistended,non tender, bowel sounds are present, no rebound, no rigidity, no guarding.no pulsatile masses. No CVA tenderness  EXTREMITIES: No peripheral edema, no calf tenderness  NEUROLOGIC EXAM: Tongue midline, no facial drooping, no ptosis, muscle strength +5/5 bilateral upper and lower extremities cerebellar finger to nose intact, no pronator drift, sensation intact.        ED Course     Labs Reviewed   CBC WITH DIFFERENTIAL WITH PLATELET - Abnormal; Notable for the following components:       Result Value    WBC 11.8 (*)     Neutrophil Absolute Prelim 8.54 (*)     Neutrophil Absolute 8.54 (*)     All other components within normal limits   COMP METABOLIC PANEL (14) - Abnormal; Notable for the following components:    Glucose 100 (*)     All other components within normal limits   PROCALCITONIN - Abnormal; Notable for the following components:    Procalcitonin 0.05 (*)     All other components within normal limits   TROPONIN I HIGH SENSITIVITY - Normal   PRO BETA NATRIURETIC PEPTIDE - Normal   PROTHROMBIN TIME (PT) - Normal   PTT, ACTIVATED - Normal   TROPONIN I HIGH SENSITIVITY - Normal   SARS-COV-2/FLU A AND B/RSV BY PCR (GENEXPERT) - Normal    Narrative:     This test is intended for the qualitative detection and differentiation of SARS-CoV-2, influenza A, influenza B, and respiratory syncytial virus (RSV) viral RNA in nasopharyngeal or nares swabs from individuals suspected of respiratory viral infection consistent with COVID-19 by their healthcare provider. Signs and symptoms of respiratory viral infection due to SARS-CoV-2, influenza, and RSV can be  similar.    Test performed using the Xpert Xpress SARS-CoV-2/FLU/RSV (real time RT-PCR)  assay on the GeneXpert instrument, [employer], [place] [address].   This test is being used under the Food and Drug Administration's Emergency Use Authorization.    The authorized Fact Sheet for Healthcare Providers for this assay is available upon request from the laboratory.   RAINBOW DRAW LAVENDER   RAINBOW DRAW LIGHT GREEN   RAINBOW DRAW BLUE     EKG    Rate, intervals and axes as noted on EKG Report.  Rate: 58  Rhythm: Sinus Rhythm  Reading: Sinus bradycardia.  No ST elevation.  No change compared to EKG dated 5/15/2021                 MDM        Differential diagnosis before testing includes but not limited to intracranial aneurysm, intracranial hemorrhage, coronary syndrome, pneumonia, pneumothorax, electrolyte abnormality, anemia, dysrhythmia, which is a medical condition that poses a threat to life/function    Past Medical History/comorbidities-as noted in HPI    Radiographic images  I personally reviewed the radiographs and my individual interpretation shows chest x-ray no pneumothorax  I also reviewed the official reports that showed chest x-ray diffuse reticular densities at the periphery of both lungs, CTA brain no acute intracranial pathology.  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      Course of Events during Emergency Room Visit include IV established blood work obtained.  CBC white count 11.8 hemoglobin 13.0 platelet 408.  Troponin less than 3 x 2 sets.  BNP was unremarkable.  Chest x-ray with findings consistent of interstitial fibrosis, CT brain without acute findings.  Patient blood pressure has come down to 122/81 without any intervention.  Reports she is feeling well currently with no symptoms.  I do recommend follow-up with primary care, her pulmonologist, it was given referral to cardiology as she did have episode of chest discomfort however this does seem atypical as it was while lying in  bed and is not exertional.  Patient agrees with plan, continue all medicines as previously prescribed and was discharged good condition with friend    Shared decision making was utilized           Disposition:      Discharge  I have discussed with the patient the results of test, differential diagnosis, treatment plan, warning signs and symptoms which should prompt immediate return.  They expressed understanding of these instructions and agrees to the following plan provided.  They were given written discharge instructions and agrees to return for any concerns and voiced understanding and all questions were answered.    Note to patient: The 21st Century Cures Act makes medical notes like these available to patients in the interest of transparency. However, this is a medical document intended as peer to peer communication. It is written in medical language and may contain abbreviations or verbiage that are unfamiliar. It may appear blunt or direct. Medical documents are intended to carry relevant information, facts as evident, and the clinical opinion of the practitioner.               Medical Decision Making      Disposition and Plan     Clinical Impression:  1. Nonintractable episodic headache, unspecified headache type    2. Chest pain, atypical    3. Pulmonary fibrosis (HCC)         Disposition:  Discharge  10/4/2024  8:26 pm    Follow-up:  Aurora Gonzalez MD  [id number]  [facility] [address]  [phone]    Follow up in 2 day(s)      [address] [address] [phone]  Call in 1 day(s)            Medications Prescribed:  Discharge Medication List as of 10/4/2024  8:27 PM              Supplementary Documentation:

## 2024-10-07 RX ORDER — CHLORTHALIDONE 25 MG/1
25 TABLET ORAL DAILY
Qty: 90 TABLET | Refills: 0 | OUTPATIENT
Start: 2024-10-07

## 2024-10-07 RX ORDER — ARIPIPRAZOLE 2 MG/1
2 TABLET ORAL DAILY
Qty: 30 TABLET | Refills: 1 | Status: CANCELLED | OUTPATIENT
Start: 2024-10-07

## 2024-10-07 NOTE — TELEPHONE ENCOUNTER
chlorthalidone 25 MG Oral Tab 30 tablet 0 10/4/2024 --    Sig - Route: Take 1 tablet (25 mg total) by mouth daily. - Oral    Sent to pharmacy as: Chlorthalidone 25 MG Oral Tablet ([place]ygro[place])    E-Prescribing Status: Receipt confirmed by pharmacy (10/4/2024  1:12 PM CDT)      Pharmacy    [id number] - [place] [address] AT [facility], [phone], [phone]

## 2024-10-16 DIAGNOSIS — J44.1 COPD WITH ACUTE EXACERBATION (HCC): ICD-10-CM

## 2024-10-17 RX ORDER — MONTELUKAST SODIUM 10 MG/1
10 TABLET ORAL EVERY EVENING
Qty: 90 TABLET | Refills: 2 | Status: SHIPPED | OUTPATIENT
Start: 2024-10-17

## 2024-10-17 NOTE — TELEPHONE ENCOUNTER
Medication(s) to Refill:   Requested Prescriptions     Pending Prescriptions Disp Refills    MONTELUKAST 10 MG Oral Tab [Pharmacy Med Name: MONTELUKAST 10MG TABLETS] 90 tablet 2     Sig: TAKE 1 TABLET(10 MG) BY MOUTH EVERY EVENING         Reason for Medication Refill being sent to Provider / Reason Protocol Failed:  [] 90 day refill has already been granted  [] Blood Pressure out of range  [] Labs Abnormal/over due  [] Medication not previously prescribed by Provider  [] Non-Protocol Medication  [] Controlled Substance   [] Due for appointment- no future appointment scheduled  [] No Follow up specified      Last Time Medication was Filled:        Last Office Visit with PCP: 10/7/24    When Patient was Due Back to the Office:  4 weeks  (from when PCP last addressed condition)    Future Appointments:  No future appointments.      Last Blood Pressures:  BP Readings from Last 2 Encounters:   10/07/24 130/90   10/04/24 122/81         Action taken:  [] Refill approved per protocol  [] Routing to provider for approval

## 2024-10-28 NOTE — LETTER
02/15/18        [place] [address]      Dear Taras Lara,    [facility] records indicate that you have outstanding lab work and or testing that was ordered for you and has not yet been completed:          [place], Direct, Reflex To Male

## 2024-11-15 RX ORDER — EZETIMIBE 10 MG/1
10 TABLET ORAL NIGHTLY
Qty: 90 TABLET | Refills: 1 | Status: SHIPPED | OUTPATIENT
Start: 2024-11-15

## 2024-12-17 NOTE — TELEPHONE ENCOUNTER
Event Note From: [facility]  To: Latasha Andrews MD  Sent: 9/13/2021 7:28 PM CDT  Subject: Covid vaccination    Covid vaccination First shot of Jessie Mcardle on September 1 at [place] Event Note

## 2024-12-30 ENCOUNTER — PATIENT MESSAGE (OUTPATIENT)
Dept: FAMILY MEDICINE CLINIC | Facility: CLINIC | Age: 59
End: 2024-12-30

## 2025-01-02 ENCOUNTER — NURSE TRIAGE (OUTPATIENT)
Dept: FAMILY MEDICINE CLINIC | Facility: CLINIC | Age: 60
End: 2025-01-02

## 2025-01-02 NOTE — TELEPHONE ENCOUNTER
Action Requested: Summary for Provider     []  Critical Lab, Recommendations Needed  [] Need Additional Advice  []   FYI    []   Need Orders  [] Need Medications Sent to Pharmacy  []  Other     SUMMARY: Scheduled apt for Monday. Pt agreeable to plan.     Future Appointments   Date Time Provider Department Center   1/6/2025 12:45 PM Madeline Armstrong, APRN [facility]     Reason for call: Skin Problem  Onset: 3 weeks   Reason for Disposition   Patient wants to be seen    Protocols used: Skin Lesion - Moles or Growths-A-OH    Noticed black spot to toe and finger  Toe one has gone away  Still notes black spot to finger pad. Pea size  Denies swelling, pain, bleeding, numbness/tingling  Denies injuries  States area is hard/thickened   Noticed 3 weeks ago  Worried as grandmother had scleroderma and was diagnosed due to black spot  Appointment scheduled

## 2025-01-07 ENCOUNTER — MED REC SCAN ONLY (OUTPATIENT)
Dept: FAMILY MEDICINE CLINIC | Facility: CLINIC | Age: 60
End: 2025-01-07

## 2025-02-02 ENCOUNTER — HOSPITAL ENCOUNTER (EMERGENCY)
Facility: HOSPITAL | Age: 60
Discharge: HOME | End: 2025-02-02
Payer: MEDICARE

## 2025-02-02 VITALS
HEIGHT: 62 IN | WEIGHT: 115 LBS | TEMPERATURE: 97 F | BODY MASS INDEX: 21.16 KG/M2 | RESPIRATION RATE: 18 BRPM | OXYGEN SATURATION: 98 % | HEART RATE: 62 BPM | SYSTOLIC BLOOD PRESSURE: 125 MMHG | DIASTOLIC BLOOD PRESSURE: 80 MMHG

## 2025-02-02 DIAGNOSIS — S61.011A LACERATION OF RIGHT THUMB WITHOUT FOREIGN BODY WITHOUT DAMAGE TO NAIL, INITIAL ENCOUNTER: Primary | ICD-10-CM

## 2025-02-02 PROCEDURE — 12001 RPR S/N/AX/GEN/TRNK 2.5CM/<: CPT

## 2025-02-02 PROCEDURE — 99283 EMERGENCY DEPT VISIT LOW MDM: CPT

## 2025-02-02 NOTE — ED INITIAL ASSESSMENT (HPI)
Lac to distal R thumb w/ mandolin slicer   Sts last tdap approx 5yrs ago  Pt applying pressure, given clean gauze to replace her napkin

## 2025-02-03 NOTE — ED PROVIDER NOTES
Patient Seen in: [facility] Emergency Department      History     Chief Complaint   Patient presents with    Laceration     Stated Complaint: thumb  lac    Subjective:   60yo/f reports w a right thumb laceration/partial avulsion. She was using a mandolin, her hand slipped and struck the mandolin blade. It created a flap which is still attached. Bleeding controlled w simple dressing. No numbness, tingling, weakness. No abrasions. No nail bed or nail involvement.               Objective:     No pertinent past medical history.            No pertinent past surgical history.              No pertinent social history.                Physical Exam     ED Triage Vitals [02/02/25 1720]   /87   Pulse 68   Resp 16   Temp 96.8 °F (36 °C)   Temp src Temporal   SpO2 98 %   O2 Device None (Room air)       Current Vitals:   Vital Signs  BP: 131/87  Pulse: 68  Resp: 16  Temp: 96.8 °F (36 °C)  Temp src: Temporal  MAP (mmHg): (!) 101    Oxygen Therapy  SpO2: 98 %  O2 Device: None (Room air)        Physical Exam  Vitals and nursing note reviewed.   Constitutional:       General: She is not in acute distress.     Appearance: She is well-developed.   HENT:      Head: Normocephalic and atraumatic.      Nose: Nose normal.      Mouth/Throat:      Mouth: Mucous membranes are moist.   Eyes:      Conjunctiva/sclera: Conjunctivae normal.      Pupils: Pupils are equal, round, and reactive to light.   Cardiovascular:      Rate and Rhythm: Normal rate and regular rhythm.      Heart sounds: Normal heart sounds.   Pulmonary:      Effort: Pulmonary effort is normal.      Breath sounds: Normal breath sounds.   Abdominal:      General: Bowel sounds are normal.      Palpations: Abdomen is soft.   Musculoskeletal:         General: Tenderness and signs of injury present. No deformity. Normal range of motion.      Cervical back: Normal range of motion and neck supple.      Comments: R lateral thumb pad with 3cm flap avulsion. No bleeding,  gaping, no nail involvement, not over joint space, full active rom   Skin:     General: Skin is warm and dry.      Capillary Refill: Capillary refill takes less than 2 seconds.      Findings: No rash.      Comments: Normal color   Neurological:      General: No focal deficit present.      Mental Status: She is alert and oriented to person, place, and time.      GCS: GCS eye subscore is 4. GCS verbal subscore is 5. GCS motor subscore is 6.      Cranial Nerves: No cranial nerve deficit.      Gait: Gait normal.             ED Course   Labs Reviewed - No data to display     Lac repair  Flap repair  LETS  2 x 5-0 sutures  Closed  Full active rom  No edema  No crepitus           MDM              Medical Decision Making  58yo/f w hx and exam as stated; thumb laceration    Full active rom  No edema  No crepitus  No weakness  Overall stable    Tdap 2022  No nail or nailbed involvement    Plan  Dc to home  Sutures out in 1 week      Risk  OTC drugs.  Prescription drug management.        Disposition and Plan     Clinical Impression:  1. Laceration of right thumb without foreign body without damage to nail, initial encounter         Disposition:  Discharge  2/2/2025  6:54 pm    Follow-up:  Aurora Gonzalez MD  [id number]  [facility] [address]  [phone]    Follow up in 1 week(s)  For wound re-check, For suture removal          Medications Prescribed:  Current Discharge Medication List              Supplementary Documentation:

## 2025-02-05 ENCOUNTER — HOSPITAL ENCOUNTER (EMERGENCY)
Facility: HOSPITAL | Age: 60
Discharge: HOME | End: 2025-02-05
Attending: EMERGENCY MEDICINE
Payer: MEDICARE

## 2025-02-05 ENCOUNTER — PATIENT MESSAGE (OUTPATIENT)
Dept: FAMILY MEDICINE CLINIC | Facility: CLINIC | Age: 60
End: 2025-02-05

## 2025-02-05 VITALS
TEMPERATURE: 98 F | DIASTOLIC BLOOD PRESSURE: 66 MMHG | RESPIRATION RATE: 16 BRPM | SYSTOLIC BLOOD PRESSURE: 109 MMHG | HEART RATE: 63 BPM | OXYGEN SATURATION: 96 %

## 2025-02-05 DIAGNOSIS — Z51.89 VISIT FOR WOUND CHECK: Primary | ICD-10-CM

## 2025-02-05 PROCEDURE — 99281 EMR DPT VST MAYX REQ PHY/QHP: CPT

## 2025-02-05 NOTE — ED INITIAL ASSESSMENT (HPI)
Pt to ED via triage c/o worsening wound to right thumb. Was here a few days ago and got stiches. Thumb is discolored, pt was sent by PCP for possible necrosis.

## 2025-02-05 NOTE — TELEPHONE ENCOUNTER
Lm for pt (Ok per HIPAA) to inform f/u on [employer] message regarding thumb injury, will send [employer] message. To call back at the office to further discuss.

## 2025-02-05 NOTE — TELEPHONE ENCOUNTER
Patient returned call. She reports thumb turning black after stitches 3 days ago. Patient sent photo while on the phone. Advised patient to return to ER to evaluate for possible necrotic tissue. She is agreeable and will go.

## 2025-02-06 NOTE — ED PROVIDER NOTES
Patient Seen in: [facility] Emergency Department    History     Chief Complaint   Patient presents with    Wound Recheck       HPI    Patient presents to the ED complaining of some worsening to the wound on her right thumb.  She states that she sustained a laceration to the right thumb and had sutures several days ago.  Now feels like the flap is turning slightly \"dark\".  Denies other complaints.    History reviewed.   Past Medical History:    Acute, but ill-defined, cerebrovascular disease    Anxiety    Arthritis    COPD (chronic obstructive pulmonary disease) (HCC)    Depression    Disorder of thyroid    Emphysema of lung (HCC)    Fibroids    History of stomach ulcers    Human papilloma virus infection    Hyperlipidemia    Hyperthyroidism    Idiopathic pulmonary fibrosis (HCC)    Prediabetes    Diet control - no meds or blood surgar checking at home    Sexually transmitted disease    Thyroid disease    Visual impairment    glasses       History reviewed.   Past Surgical History:   Procedure Laterality Date    Brain surgery      Colonoscopy  2018    adenoma- repeat 6-12 mo    Colonoscopy            Other      Thyroid polyp removal    Other surgical history  2021    Other surgical history      removal of parts of upper and lower lobes in right lung    Tubal ligation      Upper gi endoscopy,exam           Medications :  Prescriptions Prior to Admission[1]     Family History   Problem Relation Age of Onset    Cancer Father         colon cancer    Hypertension Father     Stroke Father     Cancer Mother         lung cancer, brain cancer    Depression Mother     Hypertension Mother     Breast Cancer Maternal Grandmother     Asthma Maternal Grandmother     Depression Maternal Grandmother     Diabetes Maternal Grandmother     Infectious Disease Maternal Grandmother         Scleraderma    Cancer Paternal Grandmother         colon cancer    Breast Cancer Paternal Grandmother     Diabetes Paternal  Grandmother     Hypertension Paternal Grandmother     Ovarian Cancer Maternal Cousin 50       Smoking Status:   Social History     Socioeconomic History    Marital status:    Tobacco Use    Smoking status: Every Day     Types: Cigarettes     Start date: 1982     Last attempt to quit: 2022     Years since quittin.3     Passive exposure: Current    Smokeless tobacco: Never    Tobacco comments:     1 cig per day   Vaping Use    Vaping status: Former    Substances: CBD   Substance and Sexual Activity    Alcohol use: Not Currently     Comment: rare - 1 drink 3 times a year    Drug use: Not Currently     Types: Cocaine     Comment: former cocaine history-Has used CBD oil    Sexual activity: Never   Other Topics Concern    Caffeine Concern No    Exercise Yes    Seat Belt Yes    Special Diet Yes     Comment: Celiac disease    Stress Concern Yes    Weight Concern Yes       Constitutional and vital signs reviewed.      Social History and Family History elements reviewed from today, pertinent positives to the presenting problem noted.    Physical Exam     ED Triage Vitals [25 1435]   /80   Pulse 76   Resp 18   Temp 98 °F (36.7 °C)   Temp src Temporal   SpO2 99 %   O2 Device None (Room air)       All measures to prevent infection transmission during my interaction with the patient were taken. Handwashing was performed prior to and after the exam.  Stethoscope and any equipment used during my examination was cleaned with super sani-cloth germicidal wipes following the exam.     Physical Exam  Constitutional:       Appearance: Normal appearance.   Pulmonary:      Effort: Pulmonary effort is normal. No respiratory distress.   Skin:     General: Skin is warm and dry.      Comments: Superficial flap laceration to the distal right thumb.  This has been sutured back in place and the distal third of the flap appears to be slightly necrotic at this point.   Neurological:      Mental Status: She is  alert. Mental status is at baseline.   Psychiatric:         Mood and Affect: Mood normal.         Behavior: Behavior normal.         ED Course      Labs Reviewed - No data to display    As Interpreted by me    Imaging Results Available and Reviewed while in ED: No results found.  ED Medications Administered: Medications - No data to display      MDM     Vitals:    02/05/25 1435 02/05/25 1623   BP: 119/80 109/66   Pulse: 76 63   Resp: 18 16   Temp: 98 °F (36.7 °C)    TempSrc: Temporal    SpO2: 99% 96%     *I personally reviewed and interpreted all ED vitals.    Pulse Ox: 96%, Room air, Normal     Differential Diagnosis/ Diagnostic Considerations: Wound check, other    Complicating Factors: The patient already has does not have any pertinent problems on file. to contribute to the complexity of this ED evaluation.    Medical Decision Making  Patient presents to the ED for a wound check.  Small area of her right distal thumb laceration skin flap appears to be nonviable.  Patient advised on supportive care and stable for discharge.    Problems Addressed:  Visit for wound check: acute illness or injury        Condition upon leaving the department: Stable    Disposition and Plan     Clinical Impression:  1. Visit for wound check        Disposition:  Discharge    Follow-up:  Aurora Gonzalez MD  [id number]  [facility] [address]  [phone]    Schedule an appointment as soon as possible for a visit  As needed      Medications Prescribed:  Discharge Medication List as of 2/5/2025  4:25 PM                           [1] (Not in a hospital admission)

## 2025-02-12 ENCOUNTER — TELEMEDICINE (OUTPATIENT)
Dept: FAMILY MEDICINE CLINIC | Facility: CLINIC | Age: 60
End: 2025-02-12
Payer: COMMERCIAL

## 2025-02-12 DIAGNOSIS — G47.9 DIFFICULTY SLEEPING: ICD-10-CM

## 2025-02-12 DIAGNOSIS — G47.19 EXCESSIVE DAYTIME SLEEPINESS: Primary | ICD-10-CM

## 2025-02-12 DIAGNOSIS — F33.1 MODERATE EPISODE OF RECURRENT MAJOR DEPRESSIVE DISORDER (HCC): ICD-10-CM

## 2025-02-12 DIAGNOSIS — E05.90 HYPERTHYROIDISM: ICD-10-CM

## 2025-02-12 DIAGNOSIS — I10 HYPERTENSION, UNSPECIFIED TYPE: ICD-10-CM

## 2025-02-12 DIAGNOSIS — J43.9 COMBINED PULMONARY FIBROSIS AND EMPHYSEMA (CPFE) (HCC): ICD-10-CM

## 2025-02-12 DIAGNOSIS — J84.10 COMBINED PULMONARY FIBROSIS AND EMPHYSEMA (CPFE) (HCC): ICD-10-CM

## 2025-02-12 PROBLEM — J18.9 PNEUMONIA: Status: RESOLVED | Noted: 2024-07-22 | Resolved: 2025-02-12

## 2025-02-12 PROBLEM — F17.210 CIGARETTE NICOTINE DEPENDENCE WITHOUT COMPLICATION: Status: ACTIVE | Noted: 2023-08-31

## 2025-02-12 PROBLEM — J96.01 ACUTE HYPOXEMIC RESPIRATORY FAILURE (HCC): Status: RESOLVED | Noted: 2024-07-22 | Resolved: 2025-02-12

## 2025-02-12 PROBLEM — R51.9 NONINTRACTABLE HEADACHE: Status: RESOLVED | Noted: 2020-12-30 | Resolved: 2025-02-12

## 2025-02-12 PROBLEM — R79.89 AZOTEMIA: Status: RESOLVED | Noted: 2020-12-30 | Resolved: 2025-02-12

## 2025-02-12 PROBLEM — I27.20 PULMONARY HYPERTENSION (HCC): Status: ACTIVE | Noted: 2025-02-12

## 2025-02-12 PROBLEM — J44.9 CHRONIC OBSTRUCTIVE PULMONARY DISEASE, UNSPECIFIED (HCC): Status: RESOLVED | Noted: 2022-05-10 | Resolved: 2025-02-12

## 2025-02-12 PROBLEM — R51.9 NONINTRACTABLE HEADACHE, UNSPECIFIED CHRONICITY PATTERN, UNSPECIFIED HEADACHE TYPE: Status: RESOLVED | Noted: 2020-12-30 | Resolved: 2025-02-12

## 2025-02-12 PROBLEM — K21.00 GERD WITH ESOPHAGITIS: Status: ACTIVE | Noted: 2023-05-24

## 2025-02-12 PROBLEM — K57.32 DIVERTICULITIS OF COLON: Status: ACTIVE | Noted: 2023-05-24

## 2025-02-12 PROBLEM — R19.4 ENCOUNTER FOR DIAGNOSTIC COLONOSCOPY DUE TO CHANGE IN BOWEL HABITS: Status: RESOLVED | Noted: 2017-12-19 | Resolved: 2025-02-12

## 2025-02-12 PROCEDURE — 98006 SYNCH AUDIO-VIDEO EST MOD 30: CPT | Performed by: FAMILY MEDICINE

## 2025-02-12 RX ORDER — PRAZOSIN HYDROCHLORIDE 1 MG/1
1 CAPSULE ORAL NIGHTLY
Qty: 30 CAPSULE | Refills: 1 | Status: SHIPPED | OUTPATIENT
Start: 2025-02-12

## 2025-02-12 RX ORDER — DILTIAZEM HYDROCHLORIDE 60 MG/1
2 TABLET, FILM COATED ORAL 2 TIMES DAILY
Qty: 10.2 G | Refills: 3 | Status: SHIPPED | OUTPATIENT
Start: 2025-02-12

## 2025-02-12 RX ORDER — ALBUTEROL SULFATE 0.83 MG/ML
2.5 SOLUTION RESPIRATORY (INHALATION) EVERY 4 HOURS PRN
Qty: 90 ML | Refills: 1 | Status: SHIPPED | OUTPATIENT
Start: 2025-02-12

## 2025-02-12 NOTE — PROGRESS NOTES
Subjective:   Clemencia Kim is a 59 year old female who presents for Follow - Up (Sleep issues)       Has weaned herself off all medications except inhalers. She still felt still depressed   Has trouble focusing   Since she stopped medication she has more energy and interest in activities.   But not sleeping well. Tried prazosin - which helped her night terrors from PTSD.   She cannot stay asleep. Feels very tired during the day       Has appt to complete TMS for depression tx ([place])   Weight has been good/stable. Current weight is 125 lb   Appetite is normal.   Pain is minimal   Urination and BM are normal     Mario disease   Taking inhalers   Last PFT showed lung function improved         History/Other:    Chief Complaint Reviewed and Verified  Nursing Notes Reviewed and   Verified  Tobacco Reviewed  Allergies Reviewed  Medications Reviewed    Problem List Reviewed  Medical History Reviewed  Surgical History   Reviewed  Family History Reviewed         Tobacco:  Social History     Tobacco Use   Smoking Status Every Day    Types: Cigarettes    Start date: 1982    Last attempt to quit: 2022    Years since quittin.4    Passive exposure: Current   Smokeless Tobacco Never   Tobacco Comments    1 cig per day     E-Cigarettes/Vaping       Questions Responses    E-Cigarette Use Former User          E-Cigarette/Vaping Substances       Questions Responses    CBD Yes           Tobacco cessation counseling for <3 minutes.      Current Outpatient Medications   Medication Sig Dispense Refill    prazosin 1 MG Oral Cap Take 1 capsule (1 mg total) by mouth nightly. 30 capsule 1    albuterol (2.5 MG/3ML) 0.083% Inhalation Nebu Soln Take 3 mL (2.5 mg total) by nebulization every 4 (four) hours as needed for Wheezing or Shortness of Breath. 90 mL 1    SYMBICORT 80-4.5 MCG/ACT Inhalation Aerosol Inhale 2 puffs into the lungs 2 (two) times daily. 10.2 g 3    SPIRIVA RESPIMAT 2.5 MCG/ACT Inhalation Aero  Soln Inhale 2 puffs into the lungs daily.      cetirizine 10 MG Oral Tab Take 1 tablet (10 mg total) by mouth daily. 90 tablet 2    famotidine 40 MG Oral Tab Take 1 tablet (40 mg total) by mouth 2 (two) times daily.      folic acid 1 MG Oral Tab Take 1 tablet (1 mg total) by mouth daily. 90 tablet 3         Review of Systems:  Review of Systems   Constitutional:  Negative for chills and fever.   HENT:  Negative for rhinorrhea and sinus pressure.    Eyes:  Negative for pain and visual disturbance.   Respiratory:  Negative for cough and shortness of breath.    Cardiovascular:  Negative for chest pain and palpitations.   Gastrointestinal:  Negative for abdominal pain, nausea and vomiting.   Genitourinary:  Negative for dysuria, frequency and urgency.   Musculoskeletal:  Negative for arthralgias and myalgias.   Skin:  Negative for pallor and rash.   Neurological:  Negative for dizziness and headaches.   Psychiatric/Behavioral:  Positive for sleep disturbance.          Objective:   [facility] 07/05/2011  Estimated body mass index is 21.03 kg/m² as calculated from the following:    Height as of 2/2/25: 5' 2\" (1.575 m).    Weight as of 2/2/25: 115 lb (52.2 kg).  Physical Exam  Constitutional:       General: She is not in acute distress.     Appearance: Normal appearance.   Pulmonary:      Effort: Pulmonary effort is normal. No respiratory distress.   Skin:     Coloration: Skin is not jaundiced or pale.   Neurological:      Mental Status: She is alert.   Psychiatric:         Mood and Affect: Mood normal.         Behavior: Behavior normal.           Assessment & Plan:   1. Excessive daytime sleepiness (Primary)  -     Diagnostic Sleep Study  -     General sleep study; Future; Expected date: 03/12/2025  -     Iron And Tibc; Future; Expected date: 02/12/2025  -     Ferritin; Future; Expected date: 02/12/2025  -     Vitamin B12; Future; Expected date: 02/12/2025  -     Vitamin D; Future; Expected date: 02/12/2025  -     Folic Acid  Serum (Folate); Future; Expected date: 02/12/2025  2. Difficulty sleeping  -     Diagnostic Sleep Study  3. Hyperthyroidism  Overview:  Secondary to nodules.01/11/18: TSH suppressed, FT4 1.3.  08/21/18: TSH suppressed, FT4 2.4. TRAb Negative  09/24/18: TSH suppressed, FT4 0.8, on 100 mg PTU 3 times a day.  01/16/18: Thyroid US: Right mid pole 1.1 cm, inferior pole 0.8 cm.  Left superior pole 2.5 cm and mid pole 1.4 cm and inferior pole 1.7 cm nodules.  Orders:  -     TSH and Free T4; Future; Expected date: 02/12/2025  4. Moderate episode of recurrent major depressive disorder (HCC)  -     Comp Metabolic Panel (14); Future; Expected date: 02/12/2025  5. 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 Hypertension, unspecified type  -     Comp Metabolic Panel (14); Future; Expected date: 02/12/2025  -     CBC With Differential With Platelet; Future; Expected date: 02/12/2025  -     Lipid Panel; Future; Expected date: 02/12/2025  Other orders  -     Prazosin HCl; Take 1 capsule (1 mg total) by mouth nightly.  Dispense: 30 capsule; Refill: 1  -     Albuterol Sulfate; Take 3 mL (2.5 mg total) by nebulization every 4 (four) hours as needed for Wheezing or Shortness of Breath.  Dispense: 90 mL; Refill: 1  -     Symbicort; Inhale 2 puffs into the lungs 2 (two) times daily.  Dispense: 10.2 g; Refill: 3        Try prazosin given hx of depression and PTSD. Also with hx of substance abuse, will avoid habit forming medication like benzo's and zolpidem.   Will get sleep study and labs   Continue follow up with pulmonology - [facility]   BP controlled without medication - cont to monitor         Return in about 3 months (around 5/12/2025).    SEAN MCMANUS MD, 2/12/2025, 1:42 PM

## 2025-02-13 ENCOUNTER — TELEPHONE (OUTPATIENT)
Dept: FAMILY MEDICINE CLINIC | Facility: CLINIC | Age: 60
End: 2025-02-13

## 2025-02-13 ENCOUNTER — TELEPHONE (OUTPATIENT)
Dept: SLEEP CENTER | Age: 60
End: 2025-02-13

## 2025-02-14 NOTE — TELEPHONE ENCOUNTER
Approved    Prior authorization approved  Payer: [employer] [facility] Case ID: [id number]    [phone]    [phone]  Approval Details    Authorized from January 1, 2025 to December 31, 2025

## 2025-02-17 ENCOUNTER — TELEPHONE (OUTPATIENT)
Dept: SLEEP CENTER | Age: 60
End: 2025-02-17

## 2025-02-20 ENCOUNTER — LAB ENCOUNTER (OUTPATIENT)
Dept: LAB | Age: 60
End: 2025-02-20
Attending: FAMILY MEDICINE
Payer: MEDICARE

## 2025-02-20 DIAGNOSIS — F33.1 MODERATE EPISODE OF RECURRENT MAJOR DEPRESSIVE DISORDER (HCC): ICD-10-CM

## 2025-02-20 DIAGNOSIS — G47.19 EXCESSIVE DAYTIME SLEEPINESS: ICD-10-CM

## 2025-02-20 DIAGNOSIS — E05.90 HYPERTHYROIDISM: ICD-10-CM

## 2025-02-20 DIAGNOSIS — J84.10 COMBINED PULMONARY FIBROSIS AND EMPHYSEMA (CPFE) (HCC): ICD-10-CM

## 2025-02-20 DIAGNOSIS — I10 HYPERTENSION, UNSPECIFIED TYPE: ICD-10-CM

## 2025-02-20 DIAGNOSIS — J43.9 COMBINED PULMONARY FIBROSIS AND EMPHYSEMA (CPFE) (HCC): ICD-10-CM

## 2025-02-20 LAB
ALBUMIN SERPL-MCNC: 4.5 G/DL (ref 3.2–4.8)
ALBUMIN/GLOB SERPL: 1.5 {RATIO} (ref 1–2)
ALP LIVER SERPL-CCNC: 107 U/L
ALT SERPL-CCNC: 15 U/L
ANION GAP SERPL CALC-SCNC: 10 MMOL/L (ref 0–18)
AST SERPL-CCNC: 17 U/L (ref ?–34)
BASOPHILS # BLD AUTO: 0.06 X10(3) UL (ref 0–0.2)
BASOPHILS NFR BLD AUTO: 0.5 %
BILIRUB SERPL-MCNC: 0.4 MG/DL (ref 0.3–1.2)
BUN BLD-MCNC: 13 MG/DL (ref 9–23)
BUN/CREAT SERPL: 22.8 (ref 10–20)
CALCIUM BLD-MCNC: 9.4 MG/DL (ref 8.7–10.4)
CHLORIDE SERPL-SCNC: 101 MMOL/L (ref 98–112)
CO2 SERPL-SCNC: 27 MMOL/L (ref 21–32)
CREAT BLD-MCNC: 0.57 MG/DL
DEPRECATED HBV CORE AB SER IA-ACNC: 42 NG/ML
DEPRECATED RDW RBC AUTO: 50.2 FL (ref 35.1–46.3)
EGFRCR SERPLBLD CKD-EPI 2021: 105 ML/MIN/1.73M2 (ref 60–?)
EOSINOPHIL # BLD AUTO: 0.1 X10(3) UL (ref 0–0.7)
EOSINOPHIL NFR BLD AUTO: 0.9 %
ERYTHROCYTE [DISTWIDTH] IN BLOOD BY AUTOMATED COUNT: 15.4 % (ref 11–15)
FASTING STATUS PATIENT QL REPORTED: NO
FOLATE SERPL-MCNC: 16.2 NG/ML (ref 5.4–?)
GLOBULIN PLAS-MCNC: 3 G/DL (ref 2–3.5)
GLUCOSE BLD-MCNC: 92 MG/DL (ref 70–99)
HCT VFR BLD AUTO: 40.1 %
HGB BLD-MCNC: 13.2 G/DL
IMM GRANULOCYTES # BLD AUTO: 0.04 X10(3) UL (ref 0–1)
IMM GRANULOCYTES NFR BLD: 0.3 %
IRON SATN MFR SERPL: 32 %
IRON SERPL-MCNC: 112 UG/DL
LYMPHOCYTES # BLD AUTO: 3.55 X10(3) UL (ref 1–4)
LYMPHOCYTES NFR BLD AUTO: 30.9 %
MCH RBC QN AUTO: 29.1 PG (ref 26–34)
MCHC RBC AUTO-ENTMCNC: 32.9 G/DL (ref 31–37)
MCV RBC AUTO: 88.3 FL
MONOCYTES # BLD AUTO: 0.88 X10(3) UL (ref 0.1–1)
MONOCYTES NFR BLD AUTO: 7.7 %
NEUTROPHILS # BLD AUTO: 6.86 X10 (3) UL (ref 1.5–7.7)
NEUTROPHILS # BLD AUTO: 6.86 X10(3) UL (ref 1.5–7.7)
NEUTROPHILS NFR BLD AUTO: 59.7 %
OSMOLALITY SERPL CALC.SUM OF ELEC: 286 MOSM/KG (ref 275–295)
PLATELET # BLD AUTO: 441 10(3)UL (ref 150–450)
POTASSIUM SERPL-SCNC: 3.6 MMOL/L (ref 3.5–5.1)
PROT SERPL-MCNC: 7.5 G/DL (ref 5.7–8.2)
RBC # BLD AUTO: 4.54 X10(6)UL
SODIUM SERPL-SCNC: 138 MMOL/L (ref 136–145)
T4 FREE SERPL-MCNC: 1 NG/DL (ref 0.8–1.7)
TOTAL IRON BINDING CAPACITY: 350 UG/DL (ref 250–425)
TRANSFERRIN SERPL-MCNC: 278 MG/DL (ref 250–380)
TSI SER-ACNC: 0.19 UIU/ML (ref 0.55–4.78)
VIT B12 SERPL-MCNC: 406 PG/ML (ref 211–911)
VIT D+METAB SERPL-MCNC: 27.6 NG/ML (ref 30–100)
WBC # BLD AUTO: 11.5 X10(3) UL (ref 4–11)

## 2025-02-20 PROCEDURE — 85025 COMPLETE CBC W/AUTO DIFF WBC: CPT

## 2025-02-20 PROCEDURE — 84443 ASSAY THYROID STIM HORMONE: CPT

## 2025-02-20 PROCEDURE — 84439 ASSAY OF FREE THYROXINE: CPT

## 2025-02-20 PROCEDURE — 36415 COLL VENOUS BLD VENIPUNCTURE: CPT

## 2025-02-20 PROCEDURE — 83540 ASSAY OF IRON: CPT

## 2025-02-20 PROCEDURE — 80053 COMPREHEN METABOLIC PANEL: CPT

## 2025-02-20 PROCEDURE — 84466 ASSAY OF TRANSFERRIN: CPT

## 2025-02-20 PROCEDURE — 82306 VITAMIN D 25 HYDROXY: CPT

## 2025-02-20 PROCEDURE — 82746 ASSAY OF FOLIC ACID SERUM: CPT

## 2025-02-20 PROCEDURE — 82607 VITAMIN B-12: CPT

## 2025-02-20 PROCEDURE — 82728 ASSAY OF FERRITIN: CPT

## 2025-02-21 ENCOUNTER — PATIENT MESSAGE (OUTPATIENT)
Dept: FAMILY MEDICINE CLINIC | Facility: CLINIC | Age: 60
End: 2025-02-21

## 2025-02-21 DIAGNOSIS — I67.848 CEREBRAL VASOSPASM: Primary | ICD-10-CM

## 2025-02-21 DIAGNOSIS — J84.9 ILD (INTERSTITIAL LUNG DISEASE) (HCC): ICD-10-CM

## 2025-02-21 NOTE — TELEPHONE ENCOUNTER
Dr. Gonzalez - are we able to order this? Unable to find order under \"MTHFR\" is there another name for this?

## 2025-02-24 ENCOUNTER — LAB ENCOUNTER (OUTPATIENT)
Dept: LAB | Age: 60
End: 2025-02-24
Attending: FAMILY MEDICINE
Payer: MEDICARE

## 2025-02-24 DIAGNOSIS — I10 HYPERTENSION, UNSPECIFIED TYPE: ICD-10-CM

## 2025-02-24 LAB
CHOLEST SERPL-MCNC: 216 MG/DL (ref ?–200)
FASTING PATIENT LIPID ANSWER: YES
HDLC SERPL-MCNC: 59 MG/DL (ref 40–59)
LDLC SERPL CALC-MCNC: 135 MG/DL (ref ?–100)
NONHDLC SERPL-MCNC: 157 MG/DL (ref ?–130)
TRIGL SERPL-MCNC: 122 MG/DL (ref 30–149)
VLDLC SERPL CALC-MCNC: 22 MG/DL (ref 0–30)

## 2025-02-24 PROCEDURE — 80061 LIPID PANEL: CPT

## 2025-02-24 PROCEDURE — 36415 COLL VENOUS BLD VENIPUNCTURE: CPT

## 2025-02-25 ENCOUNTER — TELEPHONE (OUTPATIENT)
Dept: FAMILY MEDICINE CLINIC | Facility: CLINIC | Age: 60
End: 2025-02-25

## 2025-02-25 NOTE — TELEPHONE ENCOUNTER
Patient called reporting generalized weakness, and increase fatigue. Last office visit 2/12/25 reporting excessive daytime sleepiness. Patient has sleep study schedule 3/25.     Patient requesting lab results. Reports continued weakness and fatigue.    Bed in low position, brakes on/Orientation to room

## 2025-02-25 NOTE — TELEPHONE ENCOUNTER
We no longer test for MTHFR mutations - evidence no longer supports this.     If there is a genetic mutation, the risks assoc with it are only concern if there is hyperhomocysteinemia.     Homocysteine level can be checked.

## 2025-02-26 NOTE — TELEPHONE ENCOUNTER
Spoke with patient regarding  lab recommendations. Patient verbalizes understanding and appreciative.

## 2025-02-26 NOTE — TELEPHONE ENCOUNTER
Patient called to follow up on message from yesterday. Advised Dr. Gonzalez has not viewed message yet however will send another message. States her symptoms are the same, not worse but would like lab results and further recommendations.    Dr. Gonzalez - patient requesting lab results and further recommendations for continued fatigue/weakness (extremely tired/can't stay asleep)

## 2025-02-26 NOTE — TELEPHONE ENCOUNTER
There is no obvious explanation of fatigue. Labs are normal overall - ferritin is low, but iron normal. Thyroid is controlled. Cholesterol elevated - but not related to fatigue.   She can add ferrous sulfate or ferrous gluconate otc - one tablet per day.   Fatigue could be ptsd related - would encourage her to see therapist

## 2025-02-26 NOTE — TELEPHONE ENCOUNTER
Labs are normal overall - ferritin is low, but iron normal. Thyroid is controlled.     Cholesterol elevated - but not related to fatigue.   Await sleep study results

## 2025-02-28 ENCOUNTER — LAB ENCOUNTER (OUTPATIENT)
Dept: LAB | Age: 60
End: 2025-02-28
Attending: FAMILY MEDICINE
Payer: MEDICARE

## 2025-02-28 DIAGNOSIS — I67.848 CEREBRAL VASOSPASM: ICD-10-CM

## 2025-02-28 DIAGNOSIS — J84.9 ILD (INTERSTITIAL LUNG DISEASE) (HCC): ICD-10-CM

## 2025-02-28 LAB — HCYS SERPL-SCNC: 11.2 UMOL/L (ref 3.7–13.9)

## 2025-02-28 PROCEDURE — 83090 ASSAY OF HOMOCYSTEINE: CPT

## 2025-02-28 PROCEDURE — 36415 COLL VENOUS BLD VENIPUNCTURE: CPT

## 2025-03-14 ENCOUNTER — TELEPHONE (OUTPATIENT)
Dept: SLEEP CENTER | Age: 60
End: 2025-03-14

## 2025-03-17 ENCOUNTER — OFFICE VISIT (OUTPATIENT)
Dept: SLEEP CENTER | Age: 60
End: 2025-03-17
Attending: FAMILY MEDICINE
Payer: MEDICARE

## 2025-03-17 DIAGNOSIS — G47.9 DIFFICULTY SLEEPING: ICD-10-CM

## 2025-03-17 DIAGNOSIS — G47.19 EXCESSIVE DAYTIME SLEEPINESS: Primary | ICD-10-CM

## 2025-03-17 PROCEDURE — 95810 POLYSOM 6/> YRS 4/> PARAM: CPT

## 2025-03-19 ENCOUNTER — ORDER TRANSCRIPTION (OUTPATIENT)
Dept: SLEEP CENTER | Age: 60
End: 2025-03-19

## 2025-03-19 DIAGNOSIS — G47.9 DIFFICULTY SLEEPING: ICD-10-CM

## 2025-03-19 DIAGNOSIS — G47.19 EXCESSIVE DAYTIME SLEEPINESS: Primary | ICD-10-CM

## 2025-03-25 ENCOUNTER — PATIENT MESSAGE (OUTPATIENT)
Dept: FAMILY MEDICINE CLINIC | Facility: CLINIC | Age: 60
End: 2025-03-25

## 2025-03-25 DIAGNOSIS — H57.89 THYROID EYE DISEASE: ICD-10-CM

## 2025-03-25 DIAGNOSIS — H53.2 DIPLOPIA: ICD-10-CM

## 2025-03-25 DIAGNOSIS — E05.90 HYPERTHYROIDISM: Primary | ICD-10-CM

## 2025-03-25 DIAGNOSIS — E07.9 THYROID EYE DISEASE: ICD-10-CM

## 2025-03-27 NOTE — TELEPHONE ENCOUNTER
17 PSRs - please update patient's insurance in chart. Back to triage once complete so we can pend new referral.  Thanks.

## 2025-04-18 ENCOUNTER — APPOINTMENT (OUTPATIENT)
Dept: GENERAL RADIOLOGY | Facility: HOSPITAL | Age: 60
End: 2025-04-18
Attending: EMERGENCY MEDICINE
Payer: MEDICARE

## 2025-04-18 ENCOUNTER — HOSPITAL ENCOUNTER (EMERGENCY)
Facility: HOSPITAL | Age: 60
Discharge: HOME | End: 2025-04-18
Attending: EMERGENCY MEDICINE
Payer: MEDICARE

## 2025-04-18 VITALS
HEART RATE: 70 BPM | SYSTOLIC BLOOD PRESSURE: 101 MMHG | TEMPERATURE: 98 F | DIASTOLIC BLOOD PRESSURE: 87 MMHG | RESPIRATION RATE: 23 BRPM | BODY MASS INDEX: 22.08 KG/M2 | OXYGEN SATURATION: 98 % | WEIGHT: 120 LBS | HEIGHT: 62 IN

## 2025-04-18 DIAGNOSIS — R07.9 CHEST PAIN OF UNCERTAIN ETIOLOGY: Primary | ICD-10-CM

## 2025-04-18 DIAGNOSIS — R42 DIZZINESS: ICD-10-CM

## 2025-04-18 LAB
ANION GAP SERPL CALC-SCNC: 9 MMOL/L (ref 0–18)
ATRIAL RATE: 74 BPM
BASOPHILS # BLD AUTO: 0.03 X10(3) UL (ref 0–0.2)
BASOPHILS NFR BLD AUTO: 0.3 %
BUN BLD-MCNC: 12 MG/DL (ref 9–23)
BUN/CREAT SERPL: 21.4 (ref 10–20)
CALCIUM BLD-MCNC: 9.1 MG/DL (ref 8.7–10.4)
CHLORIDE SERPL-SCNC: 108 MMOL/L (ref 98–112)
CO2 SERPL-SCNC: 24 MMOL/L (ref 21–32)
CREAT BLD-MCNC: 0.56 MG/DL (ref 0.55–1.02)
DEPRECATED RDW RBC AUTO: 45 FL (ref 35.1–46.3)
EGFRCR SERPLBLD CKD-EPI 2021: 106 ML/MIN/1.73M2 (ref 60–?)
EOSINOPHIL # BLD AUTO: 0.17 X10(3) UL (ref 0–0.7)
EOSINOPHIL NFR BLD AUTO: 1.7 %
ERYTHROCYTE [DISTWIDTH] IN BLOOD BY AUTOMATED COUNT: 13.9 % (ref 11–15)
GLUCOSE BLD-MCNC: 132 MG/DL (ref 70–99)
HCT VFR BLD AUTO: 37.3 % (ref 35–48)
HGB BLD-MCNC: 13 G/DL (ref 12–16)
IMM GRANULOCYTES # BLD AUTO: 0.04 X10(3) UL (ref 0–1)
IMM GRANULOCYTES NFR BLD: 0.4 %
LYMPHOCYTES # BLD AUTO: 3.81 X10(3) UL (ref 1–4)
LYMPHOCYTES NFR BLD AUTO: 37.2 %
MAGNESIUM SERPL-MCNC: 1.9 MG/DL (ref 1.6–2.6)
MCH RBC QN AUTO: 30.9 PG (ref 26–34)
MCHC RBC AUTO-ENTMCNC: 34.9 G/DL (ref 31–37)
MCV RBC AUTO: 88.6 FL (ref 80–100)
MONOCYTES # BLD AUTO: 0.71 X10(3) UL (ref 0.1–1)
MONOCYTES NFR BLD AUTO: 6.9 %
NEUTROPHILS # BLD AUTO: 5.49 X10 (3) UL (ref 1.5–7.7)
NEUTROPHILS # BLD AUTO: 5.49 X10(3) UL (ref 1.5–7.7)
NEUTROPHILS NFR BLD AUTO: 53.5 %
OSMOLALITY SERPL CALC.SUM OF ELEC: 294 MOSM/KG (ref 275–295)
P AXIS: 54 DEGREES
P-R INTERVAL: 132 MS
PLATELET # BLD AUTO: 368 10(3)UL (ref 150–450)
POTASSIUM SERPL-SCNC: 3.5 MMOL/L (ref 3.5–5.1)
Q-T INTERVAL: 396 MS
QRS DURATION: 88 MS
QTC CALCULATION (BEZET): 439 MS
R AXIS: -18 DEGREES
RBC # BLD AUTO: 4.21 X10(6)UL (ref 3.8–5.3)
SODIUM SERPL-SCNC: 141 MMOL/L (ref 136–145)
T AXIS: 36 DEGREES
T3FREE SERPL-MCNC: 4.03 PG/ML (ref 2.4–4.2)
T4 FREE SERPL-MCNC: 1.1 NG/DL (ref 0.8–1.7)
TROPONIN I SERPL HS-MCNC: <3 NG/L (ref ?–34)
TSI SER-ACNC: 0.05 UIU/ML (ref 0.55–4.78)
VENTRICULAR RATE: 74 BPM
WBC # BLD AUTO: 10.3 X10(3) UL (ref 4–11)

## 2025-04-18 PROCEDURE — 84484 ASSAY OF TROPONIN QUANT: CPT | Performed by: EMERGENCY MEDICINE

## 2025-04-18 PROCEDURE — 84443 ASSAY THYROID STIM HORMONE: CPT | Performed by: EMERGENCY MEDICINE

## 2025-04-18 PROCEDURE — 83735 ASSAY OF MAGNESIUM: CPT | Performed by: EMERGENCY MEDICINE

## 2025-04-18 PROCEDURE — 36415 COLL VENOUS BLD VENIPUNCTURE: CPT

## 2025-04-18 PROCEDURE — 99285 EMERGENCY DEPT VISIT HI MDM: CPT

## 2025-04-18 PROCEDURE — 85025 COMPLETE CBC W/AUTO DIFF WBC: CPT | Performed by: EMERGENCY MEDICINE

## 2025-04-18 PROCEDURE — 84439 ASSAY OF FREE THYROXINE: CPT | Performed by: EMERGENCY MEDICINE

## 2025-04-18 PROCEDURE — 93005 ELECTROCARDIOGRAM TRACING: CPT

## 2025-04-18 PROCEDURE — 80048 BASIC METABOLIC PNL TOTAL CA: CPT | Performed by: EMERGENCY MEDICINE

## 2025-04-18 PROCEDURE — 84481 FREE ASSAY (FT-3): CPT | Performed by: EMERGENCY MEDICINE

## 2025-04-18 PROCEDURE — 99284 EMERGENCY DEPT VISIT MOD MDM: CPT

## 2025-04-18 PROCEDURE — 93010 ELECTROCARDIOGRAM REPORT: CPT

## 2025-04-18 PROCEDURE — 71045 X-RAY EXAM CHEST 1 VIEW: CPT | Performed by: EMERGENCY MEDICINE

## 2025-04-18 NOTE — ED PROVIDER NOTES
Patient Seen in: [facility] Emergency Department    History     Chief Complaint   Patient presents with    Arrythmia/Palpitations       HPI    59-year-old female presents ER with complaint of palpitations and left-sided chest discomfort since yesterday evening.  Patient with a past medical history of pulmonary fibrosis.  Patient states that she no longer feels her heart racing but states she went to come to the ER to \"make sure everything is okay.\"  Patient denies any chest pain at this time    History reviewed. Past Medical History[1]    History reviewed. Past Surgical History[2]      Medications :  Prescriptions Prior to Admission[3]     Family History[4]    Smoking Status: Social Hx on file[5]    ROS  All pertinent positives for the review of systems are mentioned in the HPI  All other organ systems are reviewed and are negative.    Constitutional and vital signs reviewed.      Social History and Family History elements reviewed from today, pertinent positives to the presenting problem noted.    Physical Exam     ED Triage Vitals   BP 04/18/25 0019 116/80   Pulse 04/18/25 0019 73   Resp 04/18/25 0019 20   Temp 04/18/25 0020 98 °F (36.7 °C)   Temp src --    SpO2 04/18/25 0019 97 %   O2 Device 04/18/25 0019 None (Room air)       All measures to prevent infection transmission during my interaction with the patient were taken. The patient was already wearing a droplet mask on my arrival to the room. Personal protective equipment including droplet mask, eye protection, and gloves were worn throughout the duration of the exam.  Handwashing was performed prior to and after the exam.  Stethoscope and any equipment used during my examination was cleaned with super sani-cloth germicidal wipes following the exam.     Physical Exam  Vitals and nursing note reviewed.   Constitutional:       Appearance: She is well-developed.   HENT:      Head: Normocephalic and atraumatic.      Right Ear: External ear normal.      Left  Ear: External ear normal.      Nose: Nose normal.   Eyes:      Conjunctiva/sclera: Conjunctivae normal.      Pupils: Pupils are equal, round, and reactive to light.   Cardiovascular:      Rate and Rhythm: Normal rate and regular rhythm.      Heart sounds: Normal heart sounds.   Pulmonary:      Effort: Pulmonary effort is normal.      Breath sounds: Normal breath sounds.   Abdominal:      General: Bowel sounds are normal.      Palpations: Abdomen is soft.   Musculoskeletal:         General: Normal range of motion.      Cervical back: Normal range of motion and neck supple.   Skin:     General: Skin is warm and dry.   Neurological:      General: No focal deficit present.      Mental Status: She is alert and oriented to person, place, and time. Mental status is at baseline.      Cranial Nerves: No cranial nerve deficit.      Sensory: No sensory deficit.      Motor: No weakness.      Coordination: Coordination normal.      Deep Tendon Reflexes: Reflexes are normal and symmetric.   Psychiatric:         Behavior: Behavior normal.         Thought Content: Thought content normal.         Judgment: Judgment normal.         ED Course        Labs Reviewed   BASIC METABOLIC PANEL (8) - Abnormal; Notable for the following components:       Result Value    Glucose 132 (*)     BUN/CREA Ratio 21.4 (*)     All other components within normal limits   TSH W REFLEX TO FREE T4 - Abnormal; Notable for the following components:    TSH 0.053 (*)     All other components within normal limits   TROPONIN I HIGH SENSITIVITY - Normal   MAGNESIUM - Normal   T4, FREE (S) - Normal   CBC WITH DIFFERENTIAL WITH PLATELET   FREE T3 (TRIIODOTHYRONINE)     EKG    Rate, intervals and axes as noted on EKG Report.  Rate: 74  Rhythm: Sinus Rhythm  Reading: no ST deviation, normal axis, unchanged previous EKG on 10/4/2024             Imaging Results Available and Reviewed while in ED: X-RAY CHEST 1 VIEW 0056 HRS.     Comparison  7/22/2024      IMPRESSION:  -No evidence of acute cardiopulmonary disease.    -Redemonstration of the fibrotic changes in the lungs.    ED Medications Administered: Medications - No data to display      MDM     Vitals:    04/18/25 0019 04/18/25 0020 04/18/25 0030   BP: 116/80  109/81   Pulse: 73  68   Resp: 20  17   Temp:  98 °F (36.7 °C)    SpO2: 97%  95%   Weight: 54.4 kg     Height: 157.5 cm (5' 2\")       *I personally reviewed and interpreted all ED vitals.  I also personally reviewed all labs and imaging if ordered    Pulse Ox: 95%, Room air, Normal     Monitor Interpretation:   normal sinus rhythm    Differential Diagnosis/ Diagnostic Considerations: Chest pain, chest wall pain, anxiety, tachycardia, electrolyte abnormality    Medical Record Review: I personally reviewed available prior medical records for any recent pertinent discharge summaries, testing, and procedures and reviewed those reports.    Complicating Factors: The patient already has does not have any pertinent problems on file. to contribute to the complexity of this ED evaluation.    Medical Decision Making  59-year-old female presents ER with complaint of palpitations and lightheadedness.  Patient's blood work within normal limits.  Patient with stable vital signs.  Patient not tachycardic with normal EKG.  After further discussing with the patient she states she has been going through a lot of stress at home and could be the cause of her complaints.  Patient okay to be discharged home instructed follow-up with PCP if symptoms continue    HEART score for chest pain  History- (Highly suspicious 2pt, Mod 1pt, slightly 0pt)        0  ECG- (significant ST deviation 2pt, Non spec 1pt, nl 0pt)  0  AGE- (>65 2pt, 45-64 1 pt, Under 45 0 pt)    1  Risk Factors- ( DM,HTN,Chol, fhx CAD, BMI>30, hx CAD)  ( >3 or hx CAD 2pt, 1-2 risks 1pt, None 0pt)  0  Troponin- ( 3 times nl 2pt, 2 times nl 1pt, nl 0pt   0         TOTAL  1    SCORE 0-3: 2.5% MACE over next  6 weeks consider D/C outpatient F/U  SCORE 4-6: 20.3% MACE over next 6 weeks consider admit  SCORE 7-10:72.7% MACE over next 6 weeks consider early invasive strategy.    Patient has a HEART score of 1, plan will be discharge.       Problems Addressed:  Chest pain of uncertain etiology: acute illness or injury  Dizziness: acute illness or injury    Amount and/or Complexity of Data Reviewed  Labs: ordered. Decision-making details documented in ED Course.  Radiology: ordered and independent interpretation performed. Decision-making details documented in ED Course.     Details: Chest x-ray reviewed by myself shows no acute cardiopulmonary issues.        Condition upon leaving the department: Stable    Disposition and Plan     Clinical Impression:  1. Chest pain of uncertain etiology    2. Dizziness        Disposition:  Discharge    Follow-up:  Aurora Gonzalez MD  [id number]  [facility] [address]  [phone]    Schedule an appointment as soon as possible for a visit  If symptoms worsen      Medications Prescribed:  Current Discharge Medication List                 [1]   Past Medical History:   Acute, but ill-defined, cerebrovascular disease    Anxiety    Arthritis    COPD (chronic obstructive pulmonary disease) (HCC)    Depression    Disorder of thyroid    Emphysema of lung (HCC)    Fibroids    History of stomach ulcers    Human papilloma virus infection    Hyperlipidemia    Hyperthyroidism    Idiopathic pulmonary fibrosis (HCC)    Prediabetes    Diet control - no meds or blood surgar checking at home    Sexually transmitted disease    Thyroid disease    Visual impairment    glasses   [2]   Past Surgical History:  Procedure Laterality Date    Brain surgery      Colonoscopy  2018    adenoma- repeat 6-12 mo    Colonoscopy            Other      Thyroid polyp removal    Other surgical history  2021    Other surgical history      removal of parts of upper and lower lobes in right lung    Tubal ligation      Upper  gi endoscopy,exam     [3] (Not in a hospital admission)   [4]   Family History  Problem Relation Age of Onset    Cancer Father         colon cancer    Hypertension Father     Stroke Father     Cancer Mother         lung cancer, brain cancer    Depression Mother     Hypertension Mother     Breast Cancer Maternal Grandmother     Asthma Maternal Grandmother     Depression Maternal Grandmother     Diabetes Maternal Grandmother     Infectious Disease Maternal Grandmother         Scleraderma    Cancer Paternal Grandmother         colon cancer    Breast Cancer Paternal Grandmother     Diabetes Paternal Grandmother     Hypertension Paternal Grandmother     Ovarian Cancer Maternal Cousin 50   [5]   Social History  Socioeconomic History    Marital status:    Tobacco Use    Smoking status: Every Day     Types: Cigarettes     Start date: 1982     Last attempt to quit: 2022     Years since quittin.5     Passive exposure: Current    Smokeless tobacco: Never    Tobacco comments:     1 cig per day   Vaping Use    Vaping status: Former    Substances: CBD   Substance and Sexual Activity    Alcohol use: Not Currently     Comment: rare - 1 drink 3 times a year    Drug use: Not Currently     Types: Cocaine     Comment: former cocaine history-Has used CBD oil    Sexual activity: Never   Other Topics Concern    Caffeine Concern No    Exercise Yes    Seat Belt Yes    Special Diet Yes     Comment: Celiac disease    Stress Concern Yes    Weight Concern Yes

## 2025-04-18 NOTE — ED INITIAL ASSESSMENT (HPI)
Pt arrives to triage with c/o Dizziness, heart palpitations, ANTELMO that started today.    Hx of fibrosis of the lung, COPD, and emphysema

## 2025-04-18 NOTE — DISCHARGE INSTRUCTIONS
Please rest and take it easy the next few days.    Increase fluid hydration to help with your lightheadedness.

## 2025-04-25 ENCOUNTER — HOSPITAL ENCOUNTER (EMERGENCY)
Facility: HOSPITAL | Age: 60
Discharge: HOME | End: 2025-04-25
Payer: MEDICARE

## 2025-04-25 VITALS
HEART RATE: 84 BPM | OXYGEN SATURATION: 98 % | DIASTOLIC BLOOD PRESSURE: 79 MMHG | WEIGHT: 118 LBS | BODY MASS INDEX: 21.71 KG/M2 | SYSTOLIC BLOOD PRESSURE: 119 MMHG | TEMPERATURE: 97 F | RESPIRATION RATE: 18 BRPM | HEIGHT: 62 IN

## 2025-04-25 DIAGNOSIS — L03.116 CELLULITIS OF LEFT LOWER EXTREMITY: Primary | ICD-10-CM

## 2025-04-25 PROCEDURE — 87147 CULTURE TYPE IMMUNOLOGIC: CPT | Performed by: NURSE PRACTITIONER

## 2025-04-25 PROCEDURE — 87070 CULTURE OTHR SPECIMN AEROBIC: CPT | Performed by: NURSE PRACTITIONER

## 2025-04-25 PROCEDURE — 87205 SMEAR GRAM STAIN: CPT | Performed by: NURSE PRACTITIONER

## 2025-04-25 PROCEDURE — 99283 EMERGENCY DEPT VISIT LOW MDM: CPT

## 2025-04-25 PROCEDURE — 99284 EMERGENCY DEPT VISIT MOD MDM: CPT

## 2025-04-25 RX ORDER — MUPIROCIN CALCIUM 20 MG/G
1 CREAM TOPICAL ONCE
Status: COMPLETED | OUTPATIENT
Start: 2025-04-25 | End: 2025-04-25

## 2025-04-25 RX ORDER — DOXYCYCLINE 100 MG/1
100 CAPSULE ORAL 2 TIMES DAILY
Qty: 14 CAPSULE | Refills: 0 | Status: SHIPPED | OUTPATIENT
Start: 2025-04-25 | End: 2025-05-02

## 2025-04-25 RX ORDER — MUPIROCIN 20 MG/G
1 OINTMENT TOPICAL 3 TIMES DAILY
Qty: 22 G | Refills: 0 | Status: SHIPPED | OUTPATIENT
Start: 2025-04-25 | End: 2025-05-09

## 2025-04-25 RX ORDER — DOXYCYCLINE 100 MG/1
100 CAPSULE ORAL ONCE
Status: COMPLETED | OUTPATIENT
Start: 2025-04-25 | End: 2025-04-25

## 2025-04-25 NOTE — ED PROVIDER NOTES
Patient Seen in: [facility] Emergency Department      History     Chief Complaint   Patient presents with    Skin Problem     Stated Complaint: Right leg pain, insect bite    Subjective:   60yo/f w hx of COPD, depression, pulmonary fibrosis reports w left upper thigh redness, wound. Small \"bite\" 3-4 days ago. Now red around edges, painful. No discharge. +scabbing. Denies injury. Denies drainage. No fever. No recent abx use. No known injury.           History of Present Illness               Objective:     Past Medical History:    Acute, but ill-defined, cerebrovascular disease    Anxiety    Arthritis    COPD (chronic obstructive pulmonary disease) (HCC)    Depression    Disorder of thyroid    Emphysema of lung (HCC)    Fibroids    History of stomach ulcers    Human papilloma virus infection    Hyperlipidemia    Hyperthyroidism    Idiopathic pulmonary fibrosis (HCC)    Prediabetes    Diet control - no meds or blood surgar checking at home    Sexually transmitted disease    Thyroid disease    Visual impairment    glasses              Past Surgical History:   Procedure Laterality Date    Brain surgery      Colonoscopy  2018    adenoma- repeat 6-12 mo    Colonoscopy            Other      Thyroid polyp removal    Other surgical history  2021    Other surgical history      removal of parts of upper and lower lobes in right lung    Tubal ligation      Upper gi endoscopy,exam                  Social History     Socioeconomic History    Marital status:    Tobacco Use    Smoking status: Every Day     Types: Cigarettes     Start date: 1982     Last attempt to quit: 2022     Years since quittin.5     Passive exposure: Current    Smokeless tobacco: Never    Tobacco comments:     1 cig per day   Vaping Use    Vaping status: Former    Substances: CBD   Substance and Sexual Activity    Alcohol use: Not Currently     Comment: rare - 1 drink 3 times a year    Drug use: Not Currently     Types:  Cocaine     Comment: former cocaine history-Has used CBD oil    Sexual activity: Never   Other Topics Concern    Caffeine Concern No    Exercise Yes    Seat Belt Yes    Special Diet Yes     Comment: Celiac disease    Stress Concern Yes    Weight Concern Yes     Social Drivers of Health     Food Insecurity: No Food Insecurity (7/22/2024)    Food Insecurity     Food Insecurity: Never true   Transportation Needs: No Transportation Needs (7/22/2024)    Transportation Needs     Lack of Transportation: No   Housing Stability: Low Risk  (7/22/2024)    Housing Stability     Housing Instability: No                                Physical Exam     ED Triage Vitals [04/25/25 1831]   /79   Pulse 84   Resp 18   Temp 97.1 °F (36.2 °C)   Temp src Temporal   SpO2 98 %   O2 Device        Current Vitals:   Vital Signs  BP: 119/79  Pulse: 84  Resp: 18  Temp: 97.1 °F (36.2 °C)  Temp src: Temporal    Oxygen Therapy  SpO2: 98 %        Physical Exam  Vitals and nursing note reviewed.   Constitutional:       General: She is not in acute distress.     Appearance: She is well-developed.   HENT:      Head: Normocephalic and atraumatic.      Nose: Nose normal.      Mouth/Throat:      Mouth: Mucous membranes are moist.   Eyes:      Conjunctiva/sclera: Conjunctivae normal.      Pupils: Pupils are equal, round, and reactive to light.   Cardiovascular:      Rate and Rhythm: Normal rate and regular rhythm.      Heart sounds: Normal heart sounds.   Pulmonary:      Effort: Pulmonary effort is normal.      Breath sounds: Normal breath sounds.   Abdominal:      General: Bowel sounds are normal.      Palpations: Abdomen is soft.   Musculoskeletal:         General: No tenderness or deformity. Normal range of motion.      Cervical back: Normal range of motion and neck supple.   Skin:     General: Skin is warm and dry.      Capillary Refill: Capillary refill takes less than 2 seconds.      Findings: No rash.      Comments: Left upper thigh with  circular 0.75 cm scabbing/wound, tender, non fluctuance with 2cm circular area around it that is indurated. No crepitus. No streaking. No crepitus.    Neurological:      General: No focal deficit present.      Mental Status: She is alert and oriented to person, place, and time.      GCS: GCS eye subscore is 4. GCS verbal subscore is 5. GCS motor subscore is 6.      Cranial Nerves: No cranial nerve deficit.      Gait: Gait normal.           Physical Exam                ED Course     Labs Reviewed   AEROBIC BACTERIAL CULTURE          Results                                 [facility]              Medical Decision Making  60yo/f w hx and exam as stated; leg redness    No fever  No drainage  Non circumferential  No recent abx use  No streaking  Overall stable    Plan  Doxy  Bactroban  2 days wound check      Amount and/or Complexity of Data Reviewed  Labs:  Decision-making details documented in ED Course.    Risk  OTC drugs.  Prescription drug management.        Disposition and Plan     Clinical Impression:  1. Cellulitis of left lower extremity         Disposition:  Discharge  4/25/2025  6:55 pm    Follow-up:  Aurora Gonzalez MD  [id number]  [facility] [address]  [phone]    Follow up in 2 day(s)  For wound re-check          Medications Prescribed:  Current Discharge Medication List        START taking these medications    Details   mupirocin 2 % External Ointment Apply 1 Application topically 3 (three) times daily for 14 days.  Qty: 22 g, Refills: 0      doxycycline 100 MG Oral Cap Take 1 capsule (100 mg total) by mouth 2 (two) times daily for 7 days.  Qty: 14 capsule, Refills: 0             Supplementary Documentation:

## 2025-04-28 ENCOUNTER — E-VISIT (OUTPATIENT)
Dept: TELEHEALTH | Age: 60
End: 2025-04-28
Payer: MEDICARE

## 2025-04-28 DIAGNOSIS — Z02.9 ADMINISTRATIVE ENCOUNTER: Primary | ICD-10-CM

## 2025-06-03 ENCOUNTER — PATIENT MESSAGE (OUTPATIENT)
Dept: FAMILY MEDICINE CLINIC | Facility: CLINIC | Age: 60
End: 2025-06-03

## 2025-06-05 NOTE — TELEPHONE ENCOUNTER
Outpatient Medication Detail     Disp Refills Start End    diclofenac 50 MG Oral Tab (Discontinued) 60 tablet 3 9/25/2024 2/12/2025    Sig - Route: Take 1 tablet (50 mg total) by mouth 2 (two) times daily as needed (musculoskeletal pain). Take with food. - Oral    Sent to pharmacy as: Diclofenac Potassium 50 MG Oral Tablet (C[place]flam)    Reason for Discontinue:  Stop This Medication    E-Prescribing Status: Receipt confirmed by pharmacy (9/25/2024  3:14 PM CDT)    E-Cancel Status: Request approved by pharmacy (2/12/2025  1:48 PM CST)       E-Cancel Status Note: Some or all of Rx dispensed; Last fill:09/27/24      This Order Has Been Discontinued    Order Status Reason By On   Discontinued  Stop This Medication Aurora Gonzalez MD 2/12/25 [id number]       Dr. Gonzalez- Medication was discontinued, however patient asking for refill. Please advise

## 2025-06-09 ENCOUNTER — TELEPHONE (OUTPATIENT)
Dept: FAMILY MEDICINE CLINIC | Facility: CLINIC | Age: 60
End: 2025-06-09

## 2025-06-09 DIAGNOSIS — Z00.00 ROUTINE GENERAL MEDICAL EXAMINATION AT A HEALTH CARE FACILITY: Primary | ICD-10-CM

## 2025-06-12 PROBLEM — Z86.79 HISTORY OF CEREBRAL ANEURYSM: Status: ACTIVE | Noted: 2025-06-12

## 2025-06-12 PROBLEM — I67.1 INTRACEREBRAL ANEURYSM (HCC): Status: RESOLVED | Noted: 2020-12-30 | Resolved: 2025-06-12

## 2025-06-16 ENCOUNTER — TELEPHONE (OUTPATIENT)
Dept: FAMILY MEDICINE CLINIC | Facility: CLINIC | Age: 60
End: 2025-06-16

## 2025-06-16 ENCOUNTER — LAB ENCOUNTER (OUTPATIENT)
Dept: LAB | Age: 60
End: 2025-06-16
Attending: FAMILY MEDICINE
Payer: MEDICARE

## 2025-06-16 ENCOUNTER — OFFICE VISIT (OUTPATIENT)
Dept: FAMILY MEDICINE CLINIC | Facility: CLINIC | Age: 60
End: 2025-06-16
Payer: MEDICARE

## 2025-06-16 VITALS
WEIGHT: 109 LBS | DIASTOLIC BLOOD PRESSURE: 82 MMHG | OXYGEN SATURATION: 99 % | SYSTOLIC BLOOD PRESSURE: 120 MMHG | HEART RATE: 88 BPM | BODY MASS INDEX: 20.06 KG/M2 | HEIGHT: 62 IN

## 2025-06-16 DIAGNOSIS — J84.10 COMBINED PULMONARY FIBROSIS AND EMPHYSEMA (CPFE) (HCC): ICD-10-CM

## 2025-06-16 DIAGNOSIS — Z12.31 ENCOUNTER FOR SCREENING MAMMOGRAM FOR MALIGNANT NEOPLASM OF BREAST: ICD-10-CM

## 2025-06-16 DIAGNOSIS — Z00.00 ENCOUNTER FOR ANNUAL HEALTH EXAMINATION: Primary | ICD-10-CM

## 2025-06-16 DIAGNOSIS — Z00.00 ROUTINE GENERAL MEDICAL EXAMINATION AT A HEALTH CARE FACILITY: ICD-10-CM

## 2025-06-16 DIAGNOSIS — Z59.87 MATERIAL HARDSHIP DUE TO LIMITED FINANCIAL RESOURCES, NOT ELSEWHERE CLASSIFIED: ICD-10-CM

## 2025-06-16 DIAGNOSIS — E05.90 HYPERTHYROIDISM: ICD-10-CM

## 2025-06-16 DIAGNOSIS — Z63.0 PROBLEMS IN RELATIONSHIP WITH SPOUSE OR PARTNER: ICD-10-CM

## 2025-06-16 DIAGNOSIS — F19.11 HISTORY OF SUBSTANCE ABUSE (HCC): ICD-10-CM

## 2025-06-16 DIAGNOSIS — Z12.4 SCREENING FOR CERVICAL CANCER: ICD-10-CM

## 2025-06-16 DIAGNOSIS — Z59.10 INADEQUATE HOUSING, UNSPECIFIED: ICD-10-CM

## 2025-06-16 DIAGNOSIS — Z59.41 FOOD INSECURITY: ICD-10-CM

## 2025-06-16 DIAGNOSIS — H91.91 HEARING LOSS OF RIGHT EAR, UNSPECIFIED HEARING LOSS TYPE: ICD-10-CM

## 2025-06-16 DIAGNOSIS — Z12.11 COLON CANCER SCREENING: ICD-10-CM

## 2025-06-16 DIAGNOSIS — J43.9 COMBINED PULMONARY FIBROSIS AND EMPHYSEMA (CPFE) (HCC): ICD-10-CM

## 2025-06-16 LAB
ALBUMIN SERPL-MCNC: 4.5 G/DL (ref 3.2–4.8)
ALBUMIN/GLOB SERPL: 1.6 {RATIO} (ref 1–2)
ALP LIVER SERPL-CCNC: 101 U/L (ref 46–118)
ALT SERPL-CCNC: 12 U/L (ref 10–49)
ANION GAP SERPL CALC-SCNC: 7 MMOL/L (ref 0–18)
AST SERPL-CCNC: 17 U/L (ref ?–34)
BASOPHILS # BLD AUTO: 0.04 X10(3) UL (ref 0–0.2)
BASOPHILS NFR BLD AUTO: 0.5 %
BILIRUB SERPL-MCNC: 0.3 MG/DL (ref 0.3–1.2)
BUN BLD-MCNC: 12 MG/DL (ref 9–23)
BUN/CREAT SERPL: 18.2 (ref 10–20)
CALCIUM BLD-MCNC: 9.5 MG/DL (ref 8.7–10.4)
CHLORIDE SERPL-SCNC: 107 MMOL/L (ref 98–112)
CHOLEST SERPL-MCNC: 193 MG/DL (ref ?–200)
CO2 SERPL-SCNC: 26 MMOL/L (ref 21–32)
CREAT BLD-MCNC: 0.66 MG/DL (ref 0.55–1.02)
DEPRECATED RDW RBC AUTO: 49.8 FL (ref 35.1–46.3)
EGFRCR SERPLBLD CKD-EPI 2021: 101 ML/MIN/1.73M2 (ref 60–?)
EOSINOPHIL # BLD AUTO: 0.14 X10(3) UL (ref 0–0.7)
EOSINOPHIL NFR BLD AUTO: 1.9 %
ERYTHROCYTE [DISTWIDTH] IN BLOOD BY AUTOMATED COUNT: 14.2 % (ref 11–15)
FASTING PATIENT LIPID ANSWER: YES
FASTING STATUS PATIENT QL REPORTED: YES
GLOBULIN PLAS-MCNC: 2.9 G/DL (ref 2–3.5)
GLUCOSE BLD-MCNC: 83 MG/DL (ref 70–99)
HCT VFR BLD AUTO: 40.4 % (ref 35–48)
HDLC SERPL-MCNC: 58 MG/DL (ref 40–59)
HGB BLD-MCNC: 13.3 G/DL (ref 12–16)
IMM GRANULOCYTES # BLD AUTO: 0.02 X10(3) UL (ref 0–1)
IMM GRANULOCYTES NFR BLD: 0.3 %
LDLC SERPL CALC-MCNC: 120 MG/DL (ref ?–100)
LYMPHOCYTES # BLD AUTO: 2.17 X10(3) UL (ref 1–4)
LYMPHOCYTES NFR BLD AUTO: 29.8 %
MCH RBC QN AUTO: 31.1 PG (ref 26–34)
MCHC RBC AUTO-ENTMCNC: 32.9 G/DL (ref 31–37)
MCV RBC AUTO: 94.6 FL (ref 80–100)
MONOCYTES # BLD AUTO: 0.56 X10(3) UL (ref 0.1–1)
MONOCYTES NFR BLD AUTO: 7.7 %
NEUTROPHILS # BLD AUTO: 4.35 X10 (3) UL (ref 1.5–7.7)
NEUTROPHILS # BLD AUTO: 4.35 X10(3) UL (ref 1.5–7.7)
NEUTROPHILS NFR BLD AUTO: 59.8 %
NONHDLC SERPL-MCNC: 135 MG/DL (ref ?–130)
OSMOLALITY SERPL CALC.SUM OF ELEC: 289 MOSM/KG (ref 275–295)
PLATELET # BLD AUTO: 446 10(3)UL (ref 150–450)
POTASSIUM SERPL-SCNC: 3.7 MMOL/L (ref 3.5–5.1)
PROT SERPL-MCNC: 7.4 G/DL (ref 5.7–8.2)
RBC # BLD AUTO: 4.27 X10(6)UL (ref 3.8–5.3)
SODIUM SERPL-SCNC: 140 MMOL/L (ref 136–145)
T3FREE SERPL-MCNC: 2.94 PG/ML (ref 2.4–4.2)
T4 FREE SERPL-MCNC: 1.1 NG/DL (ref 0.8–1.7)
TRIGL SERPL-MCNC: 84 MG/DL (ref 30–149)
TSI SER-ACNC: 0.23 UIU/ML (ref 0.55–4.78)
VLDLC SERPL CALC-MCNC: 15 MG/DL (ref 0–30)
WBC # BLD AUTO: 7.3 X10(3) UL (ref 4–11)

## 2025-06-16 PROCEDURE — 84481 FREE ASSAY (FT-3): CPT

## 2025-06-16 PROCEDURE — 84443 ASSAY THYROID STIM HORMONE: CPT

## 2025-06-16 PROCEDURE — 87624 HPV HI-RISK TYP POOLED RSLT: CPT | Performed by: FAMILY MEDICINE

## 2025-06-16 PROCEDURE — 80053 COMPREHEN METABOLIC PANEL: CPT

## 2025-06-16 PROCEDURE — 88175 CYTOPATH C/V AUTO FLUID REDO: CPT | Performed by: FAMILY MEDICINE

## 2025-06-16 PROCEDURE — 84439 ASSAY OF FREE THYROXINE: CPT

## 2025-06-16 PROCEDURE — 85025 COMPLETE CBC W/AUTO DIFF WBC: CPT

## 2025-06-16 PROCEDURE — 36415 COLL VENOUS BLD VENIPUNCTURE: CPT

## 2025-06-16 PROCEDURE — 80061 LIPID PANEL: CPT

## 2025-06-16 SDOH — ECONOMIC STABILITY - INCOME SECURITY: MATERIAL HARDSHIP DUE TO LIMITED FINANCIAL RESOURCES, NOT ELSEWHERE CLASSIFIED: Z59.87

## 2025-06-16 SDOH — SOCIAL STABILITY - SOCIAL INSECURITY: PROBLEMS IN RELATIONSHIP WITH SPOUSE OR PARTNER: Z63.0

## 2025-06-16 SDOH — ECONOMIC STABILITY - HOUSING INSECURITY: INADEQUATE HOUSING UNSPECIFIED: Z59.10

## 2025-06-16 SDOH — ECONOMIC STABILITY - FOOD INSECURITY: FOOD INSECURITY: Z59.41

## 2025-06-16 NOTE — TELEPHONE ENCOUNTER
Patient called stating that she saw  today and she was supposed to be prescribed Zoloft. Only Diclofenac was prescribed.     , patient states that she was supposed to be prescribed Zoloft at todays visit.

## 2025-06-16 NOTE — PROGRESS NOTES
The following individual(s) verbally consented to be recorded using ambient AI listening technology and understand that they can each withdraw their consent to this listening technology at any point by asking the clinician to turn off or pause the recording:    Patient name: Clemencia Westmegan  Additional names:

## 2025-06-17 LAB — HPV E6+E7 MRNA CVX QL NAA+PROBE: NEGATIVE

## 2025-06-24 ENCOUNTER — OFFICE VISIT (OUTPATIENT)
Dept: OTOLARYNGOLOGY | Facility: CLINIC | Age: 60
End: 2025-06-24
Payer: MEDICARE

## 2025-06-24 ENCOUNTER — OFFICE VISIT (OUTPATIENT)
Dept: AUDIOLOGY | Facility: CLINIC | Age: 60
End: 2025-06-24
Payer: MEDICARE

## 2025-06-24 DIAGNOSIS — H90.3 SENSORINEURAL HEARING LOSS (SNHL) OF BOTH EARS: ICD-10-CM

## 2025-06-24 DIAGNOSIS — H90.3 SENSORINEURAL HEARING LOSS (SNHL) OF BOTH EARS: Primary | ICD-10-CM

## 2025-06-24 DIAGNOSIS — H61.22 EXCESSIVE CERUMEN IN EAR CANAL, LEFT: Primary | ICD-10-CM

## 2025-06-24 PROCEDURE — 92557 COMPREHENSIVE HEARING TEST: CPT | Performed by: AUDIOLOGIST

## 2025-06-24 PROCEDURE — 92567 TYMPANOMETRY: CPT | Performed by: AUDIOLOGIST

## 2025-06-24 PROCEDURE — G0268 REMOVAL OF IMPACTED WAX MD: HCPCS | Performed by: STUDENT IN AN ORGANIZED HEALTH CARE EDUCATION/TRAINING PROGRAM

## 2025-06-24 PROCEDURE — 99203 OFFICE O/P NEW LOW 30 MIN: CPT | Performed by: STUDENT IN AN ORGANIZED HEALTH CARE EDUCATION/TRAINING PROGRAM

## 2025-06-24 NOTE — PROGRESS NOTES
Clemencia Kim is a 60 year old female.   Chief Complaint   Patient presents with    Hearing Loss     Patient having trouble with her hearing, ear cleaning     HPI:   60-year-old presents with being told she is having hearing issues from the people she lives with.  Says she has the TV up louder than it should be    Current Medications[1]   Past Medical History[2]   Social History:  Short Social Hx on File[3]   Past Surgical History[4]      EXAM:   LMP 07/05/2011     System Details   Skin Inspection - Normal.   Constitutional Overall appearance - Normal.   Head/Face Symmetric, TMJ tenderness not present    Eyes EOMI, PERRL   Right ear:  Canal clear, TM intact, no REBECCA   Left ear:  Canal with cerumen, TM intact, no REBECCA   Nose: Septum midline, inferior turbinates not enlarged, nasal valves without collapse    Oral cavity/Oropharynx: No lesions or masses on inspection or palpation, tonsils symmetric    Neck: Soft without LAD, thyroid not enlarged  Voice clear/ no stridor   Other:      SCOPES AND PROCEDURES:     Canals:  Left: Canal with cerumen preventing adequate view of TM, debrided with instrumentation    Tympanic Membranes:  Left: Normal tympanic membrane.     TM Visualized Method:   Left TM examined via otomicroscopy.      PROCEDURE:   Removal of cerumen impaction   The cerumen impaction was completely removed on the left side using microscopy as necessary.   Removal was completed by using a curette and suction.     AUDIOGRAM AND IMAGING:         IMPRESSION:   1. Excessive cerumen in ear canal, left    2. Sensorineural hearing loss (SNHL) of both ears       Recommendations:  - Audiogram with a mild sensorineural hearing loss in each ear largely symmetric.  Normal tympanograms.  Fairly symmetric word discrimination score  - Discussed that she does have a little bit of hearing loss in each ear that could be affecting her communication  - Not currently interested in hearing aids we will recheck her hearing  periodically    The patient indicates understanding of these issues and agrees to the plan.      Malick Gutiérrez MD  2025  1:37 PM       [1]   Current Outpatient Medications   Medication Sig Dispense Refill    diclofenac 50 MG Oral Tab EC Take 1 tablet (50 mg total) by mouth 2 (two) times daily. 180 tablet 1    sertraline 50 MG Oral Tab Take 1 tablet (50 mg total) by mouth daily for 14 days, THEN 2 tablets (100 mg total) daily. 166 tablet 0    prazosin 1 MG Oral Cap Take 1 capsule (1 mg total) by mouth nightly. 30 capsule 1    albuterol (2.5 MG/3ML) 0.083% Inhalation Nebu Soln Take 3 mL (2.5 mg total) by nebulization every 4 (four) hours as needed for Wheezing or Shortness of Breath. 90 mL 1    SYMBICORT 80-4.5 MCG/ACT Inhalation Aerosol Inhale 2 puffs into the lungs 2 (two) times daily. 10.2 g 3    SPIRIVA RESPIMAT 2.5 MCG/ACT Inhalation Aero Soln Inhale 2 puffs into the lungs daily.      folic acid 1 MG Oral Tab Take 1 tablet (1 mg total) by mouth daily. 90 tablet 3   [2]   Past Medical History:   Acute, but ill-defined, cerebrovascular disease    Anxiety    Arthritis    COPD (chronic obstructive pulmonary disease) (HCC)    Depression    Disorder of thyroid    Emphysema of lung (HCC)    Fatigue    Fibroids    Headache disorder    History of stomach ulcers    Human papilloma virus infection    Hyperlipidemia    Hyperthyroidism    Idiopathic pulmonary fibrosis (HCC)    Prediabetes    Diet control - no meds or blood surgar checking at home    Sexually transmitted disease    Thyroid disease    Visual impairment    glasses   [3]   Social History  Socioeconomic History    Marital status:    Tobacco Use    Smoking status: Every Day     Types: Cigarettes     Start date: 1982     Last attempt to quit: 2022     Years since quittin.7     Passive exposure: Current    Smokeless tobacco: Never    Tobacco comments:     1 cig per day   Vaping Use    Vaping status: Never Used   Substance and Sexual Activity     Alcohol use: Not Currently     Comment: rare - 1 drink 3 times a year    Drug use: Not Currently     Types: Cocaine     Comment: former cocaine history-Has used CBD oil    Sexual activity: Never   Other Topics Concern    Caffeine Concern No    Exercise Yes    Seat Belt Yes    Special Diet Yes     Comment: Celiac disease    Stress Concern Yes    Weight Concern Yes     Social Drivers of Health     Food Insecurity: Food Insecurity Present (2025)    NCSS - Food Insecurity     Worried About Running Out of Food in the Last Year: Yes     Ran Out of Food in the Last Year: Yes   Transportation Needs: No Transportation Needs (2025)    NCSS - Transportation     Lack of Transportation: No   Housing Stability: At Risk (2025)    NCSS - Housing/Utilities     Has Housing: No     Worried About Losing Housing: Yes     Unable to Get Utilities: Yes   [4]   Past Surgical History:  Procedure Laterality Date    Brain surgery      Colonoscopy  2018    adenoma- repeat 6-12 mo    Colonoscopy            Other      Thyroid polyp removal    Other surgical history  2021    Other surgical history      removal of parts of upper and lower lobes in right lung    Tubal ligation      Upper gi endoscopy,exam

## 2025-06-25 ENCOUNTER — PATIENT OUTREACH (OUTPATIENT)
Age: 60
End: 2025-06-25

## 2025-06-30 PROBLEM — H91.91 HEARING LOSS OF RIGHT EAR: Status: ACTIVE | Noted: 2025-06-30

## 2025-07-01 NOTE — PROGRESS NOTES
Subjective:   Clemencia Kim is a 60 year old female who presents for a MA AHA (Medicare Advantage Annual Health Assessment) and Initial Annual Wellness Visit (outside the first 12 months of Medicare eligibility, no prior AWV) and stable chronic illnesses (not addressed in visit).   History of Present Illness        History/Other:   Fall Risk Assessment:   She has been screened for Falls and is High Risk. Fall Prevention information provided to patient in After Visit Summary.    Do you feel unsteady when standing or walking?: (Patient-Rptd) Yes  Do you worry about falling?: (Patient-Rptd) Yes     Cognitive Assessment:   She had a completely normal cognitive assessment - see flowsheet entries     Functional Ability/Status:   Clemencia Kim has some abnormal functions as listed below:  She has Hearing problems based on screening of functional status.She has Vision problems based on screening of functional status. She has Walking problems based on screening of functional status. She has problems with Daily Activities based on screening of functional status. She has problems with Memory based on screening of functional status.       Depression Screening (PHQ):  PHQ-9 TOTAL SCORE: 16  , done 6/16/2025   Thoughts that you would be better off dead, or of hurting yourself in some way: 1    Little interest or pleasure in doing things: 2    Feeling down, depressed, or hopeless: 2    Trouble falling or staying asleep, or sleeping too much: 2     Feeling tired or having little energy: 1    Feeling bad about yourself - or that you are a failure or have let yourself or your family down: 3    Trouble concentrating on things, such as reading the newspaper or watching television: 3    Moving or speaking so slowly that other people could have noticed. Or the opposite - being so fidgety or restless that you have been moving around a lot more than usual: 2    If you checked off any problems, how difficult have these problems  made it for you to do your work, take care of things at home, or get along with other people?: Very difficult            Advanced Directives:   She does NOT have a Living Will. [Do you have a living will?: (Patient-Rptd) No]  She does NOT have a Power of  for Health Care. [Do you have a healthcare power of ?: (Patient-Rptd) No]  Discussed Advance Care Planning with patient (and family/surrogate if present). Standard forms made available to patient in After Visit Summary.      Problem List[1]  Allergies:  She has no active allergies.    Current Medications:  Active Meds, Sig Only[2]    Medical History:  She  has a past medical history of Acute, but ill-defined, cerebrovascular disease (), Anxiety, Arthritis, COPD (chronic obstructive pulmonary disease) (HCC), Depression, Disorder of thyroid, Emphysema of lung (HCC), Fatigue, Fibroids, Headache disorder (21), History of stomach ulcers, Human papilloma virus infection, Hyperlipidemia (2021), Hyperthyroidism, Idiopathic pulmonary fibrosis (), Prediabetes, Sexually transmitted disease, Thyroid disease, and Visual impairment.  Surgical History:  She  has a past surgical history that includes tubal ligation; colonoscopy (2018); upper gi endoscopy,exam; other; colonoscopy; ; other surgical history (2021); Brain Surgery (); and other surgical history.   Family History:  Her family history includes Anxiety in her maternal grandmother and mother; Asthma in her maternal grandmother; Breast Cancer in her maternal grandmother and paternal grandmother; Cancer in her father, maternal grandfather, mother, paternal grandfather, and paternal grandmother; Depression in her maternal grandmother and mother; Diabetes in her maternal grandmother and paternal grandmother; Heart Disease in her maternal grandmother; Hypertension in her father, mother, and paternal grandmother; Infectious Disease in her maternal grandmother; Ovarian Cancer (age of  onset: 50) in her maternal cousin; Stroke in her father.  Social History:  She  reports that she has been smoking cigarettes. She started smoking about 42 years ago. She has been exposed to tobacco smoke. She has never used smokeless tobacco. She reports that she does not currently use alcohol. She reports that she does not currently use drugs after having used the following drugs: Cocaine.    Tobacco:  Tobacco Use[3]  E-Cigarettes/Vaping       Questions Responses    E-Cigarette Use Never User          E-Cigarette/Vaping Substances       Questions Responses    CBD No           Tobacco cessation counseling for <3 minutes.      CAGE Alcohol Screen:   CAGE screening score of 0 on 6/13/2025, showing low risk of alcohol abuse.      Patient Care Team:  Aurora Gonzalez MD as PCP - General (Family Medicine)  Edison Morel MD (GASTROENTEROLOGY)  Saul Cao MD (OBSTETRICS & GYNECOLOGY)  Jaime Fernandez MD as Consulting Physician (NEUROSURGERY)  Alana Felton, PT as Physical Therapist (Physical Therapy)  Jeremy Etienne PA as Consulting Physician (Physician Assistant)  Laina Stanton as PT Student (Physical Therapy)  Helena Mendieta    Review of Systems   Constitutional:  Positive for fatigue. Negative for chills, diaphoresis and fever.   HENT:  Negative for hearing loss, rhinorrhea and sinus pressure.    Eyes:  Negative for pain, redness and visual disturbance.   Respiratory:  Negative for cough, shortness of breath and wheezing.    Cardiovascular:  Negative for chest pain and palpitations.   Gastrointestinal:  Negative for abdominal pain, constipation, diarrhea, nausea and vomiting.   Endocrine: Negative for polydipsia, polyphagia and polyuria.   Genitourinary:  Negative for dysuria and frequency.   Musculoskeletal:  Positive for arthralgias and back pain. Negative for myalgias.   Skin:  Negative for rash.   Neurological:  Negative for dizziness, light-headedness and headaches.   Hematological:  Does not  bruise/bleed easily.   Psychiatric/Behavioral:  Negative for dysphoric mood. The patient is not nervous/anxious.           Objective:   Physical Exam  Constitutional:       General: She is not in acute distress.     Appearance: She is well-developed. She is cachectic.   HENT:      Right Ear: Tympanic membrane and ear canal normal.      Left Ear: Tympanic membrane and ear canal normal.      Mouth/Throat:      Mouth: Mucous membranes are moist.      Pharynx: Oropharynx is clear. No posterior oropharyngeal erythema.   Eyes:      General: No scleral icterus.     Conjunctiva/sclera: Conjunctivae normal.      Pupils: Pupils are equal, round, and reactive to light.   Cardiovascular:      Rate and Rhythm: Normal rate and regular rhythm.      Heart sounds: No murmur heard.  Pulmonary:      Effort: Pulmonary effort is normal.      Breath sounds: Normal breath sounds.   Chest:   Breasts:     Breasts are symmetrical.      Right: No mass, nipple discharge, skin change or tenderness.      Left: No mass, nipple discharge, skin change or tenderness.   Abdominal:      General: Bowel sounds are normal.      Palpations: Abdomen is soft. There is no mass.      Tenderness: There is no abdominal tenderness. There is no guarding.   Genitourinary:     Labia:         Right: No rash or lesion.         Left: No rash or lesion.       Vagina: Normal. No vaginal discharge or lesions.      Cervix: Normal. No cervical motion tenderness or discharge.      Uterus: Normal. Not tender.       Adnexa: Right adnexa normal and left adnexa normal.        Right: No tenderness or fullness.          Left: No tenderness or fullness.     Musculoskeletal:      Cervical back: Neck supple.   Lymphadenopathy:      Cervical: No cervical adenopathy.      Upper Body:      Right upper body: No supraclavicular or axillary adenopathy.      Left upper body: No supraclavicular or axillary adenopathy.   Skin:     Capillary Refill: Capillary refill takes less than 2 seconds.       Findings: No bruising or rash.   Neurological:      General: No focal deficit present.      Mental Status: She is alert and oriented to person, place, and time.   Psychiatric:         Mood and Affect: Mood normal.         Behavior: Behavior normal.         Thought Content: Thought content normal.            /82   Pulse 88   Ht 5' 2\" (1.575 m)   Wt 109 lb (49.4 kg)   LMP 07/05/2011   SpO2 99%   BMI 19.94 kg/m²  Estimated body mass index is 19.94 kg/m² as calculated from the following:    Height as of this encounter: 5' 2\" (1.575 m).    Weight as of this encounter: 109 lb (49.4 kg).    Medicare Hearing Assessment:   Hearing Screening    Time taken: 6/16/2025  1:47 PM  Screening Method: Finger Rub  Finger Rub Result: Pass         Visual Acuity:   Right Eye Visual Acuity: Corrected Right Eye Chart Acuity: 20/30   Left Eye Visual Acuity: Corrected Left Eye Chart Acuity: 20/30   Both Eyes Visual Acuity: Corrected Both Eyes Chart Acuity: 20/30   Able To Tolerate Visual Acuity: Yes        Assessment & Plan:   Clemencia Kim is a 60 year old female who presents for a Medicare Assessment.     1. Encounter for annual health examination (Primary)    2. Colon cancer screening  Due later this year - follow up with GI     3. Encounter for screening mammogram for malignant neoplasm of breast  -     [facility] APRIL 2D+3D SCREENING BILAT (CPT=77067/05490); Future; Expected date: 06/16/2025    4. Screening for cervical cancer  -     Hpv High Risk , Thin Prep Collect  -     THINPREP PAP SMEAR ONLY    5. 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 Continue present management.  Cont tx per pulmonology         6. History of substance abuse (HCC)  - stable  - cont present management    7. Hyperthyroidism  Overview:  Secondary to nodules.01/11/18: TSH suppressed, FT4 1.3.  08/21/18: TSH suppressed, FT4 2.4. TRAb Negative  09/24/18: TSH suppressed, FT4  0.8, on 100 mg PTU 3 times a day.  01/16/18: Thyroid US: Right mid pole 1.1 cm, inferior pole 0.8 cm.  Left superior pole 2.5 cm and mid pole 1.4 cm and inferior pole 1.7 cm nodules.  - stable  - cont present management    8. Food insecurity  Community referrals placed     9. Inadequate housing, unspecified  Community referrals placed     10. Material hardship due to limited financial resources, not elsewhere classified  Community referrals placed     11. Problems in relationship with spouse or partner  Community referrals placed     12. Hearing loss of right ear, unspecified hearing loss type  -     ENT Referral - In Network  Progressive     Other orders  -     Diclofenac Sodium; Take 1 tablet (50 mg total) by mouth 2 (two) times daily.  Dispense: 180 tablet; Refill: 1    The patient indicates understanding of these issues and agrees to the plan.  Reinforced healthy diet, lifestyle, and exercise.      No follow-ups on file.     SEAN MCMANUS MD, 6/30/2025     Supplementary Documentation:   General Health:  In the past six months, have you lost more than 10 pounds without trying?: (Patient-Rptd) 2 - No  Has your appetite been poor?: (Patient-Rptd) Yes  Type of Diet: (Patient-Rptd) Balanced  How does the patient maintain a good energy level?: (Patient-Rptd) Daily Walks  How would you describe your daily physical activity?: (Patient-Rptd) Light  How would you describe your current health state?: (Patient-Rptd) Poor  On a scale of 0 to 10, with 0 being no pain and 10 being severe pain, what is your pain level?: (Patient-Rptd) 3 - (Mild)  In the past six months, have you experienced urine leakage?: (Patient-Rptd) 0-No  At any time do you feel concerned for the safety/well-being of yourself and/or your children, in your home or elsewhere?: (Patient-Rptd) Yes  Have you had any immunizations at another office such as Influenza, Hepatitis B, Tetanus, or Pneumococcal?: (Patient-Rptd) No    Health Maintenance   Topic Date Due     Zoster Vaccines (1 of 2) Never done    Colorectal Cancer Screening  09/10/2023    Annual Well Visit  Never done    Mammogram  07/09/2025    COVID-19 Vaccine (3 - 2024-25 season) 07/16/2025 (Originally 9/1/2024)    Influenza Vaccine (Season Ended) 10/01/2025    Pap Smear  06/16/2028    DTaP,Tdap,and Td Vaccines (2 - Td or Tdap) 04/18/2032    Annual Depression Screening  Completed    Tobacco Cessation Counseling  Completed    Pneumococcal Vaccine: 50+ Years  Completed    Meningococcal B Vaccine  Aged Out            [1]   Patient Active Problem List  Diagnosis    Depression    Family history of colon cancer requiring screening colonoscopy    Tubulovillous adenoma of colon    Hyperthyroidism    Thyroid nodule    Papanicolaou smear of cervix with high grade squamous intraepithelial lesion (HGSIL)    Cervical high risk human papillomavirus (HPV) DNA test positive    S/P craniotomy    Cerebral vasospasm    Slow transit constipation    History of substance abuse (HCC)    Celiac disease (HCC)    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    prazosin 1 MG Oral Cap Take 1 capsule (1 mg total) by mouth nightly.    albuterol (2.5 MG/3ML) 0.083% Inhalation Nebu Soln Take 3 mL (2.5 mg total) by nebulization every 4 (four) hours as needed for Wheezing or Shortness of Breath.    SYMBICORT 80-4.5 MCG/ACT Inhalation Aerosol Inhale 2 puffs into the lungs 2 (two) times daily.    SPIRIVA RESPIMAT 2.5 MCG/ACT Inhalation Aero Soln Inhale 2 puffs into the lungs daily.    folic acid 1 MG Oral Tab Take 1 tablet (1 mg  total) by mouth daily.   [3]   Social History  Tobacco Use   Smoking Status Every Day    Types: Cigarettes    Start date: 1982    Last attempt to quit: 2022    Years since quittin.7    Passive exposure: Current   Smokeless Tobacco Never   Tobacco Comments    1 cig per day

## 2025-07-09 ENCOUNTER — PATIENT OUTREACH (OUTPATIENT)
Age: 60
End: 2025-07-09

## 2025-07-09 NOTE — PROGRESS NOTES
07/09/25 0948   Call Details   Call Attempt # 3   SDOH Domain(s) with needs Food Insecurity;Housing Instability;Interpersonal Safety   SDOH Free Text Details   Food Insecurity Details Resources provided but unable to follow-up.   Housing Instability Details Resources provided but unable to follow-up.   Utilities Concern Details Patient declined.   Interpersonal Safety Details Resources provided but unable to follow-up.   Resources Provided   Resources Provided findhelp   Resource Follow-up   Were resources received by patient? Select domain to document Food Insecurity;Housing Instability;Interpersonal Safety   Outreach Outcome   SDOH Overall Outreach Outcome Resources Provided but unable to follow-up       Second resources follow-up call- unable to reach patient.

## 2025-07-17 ENCOUNTER — HOSPITAL ENCOUNTER (OUTPATIENT)
Dept: MAMMOGRAPHY | Facility: HOSPITAL | Age: 60
Discharge: HOME | End: 2025-07-17
Attending: FAMILY MEDICINE
Payer: MEDICARE

## 2025-07-17 DIAGNOSIS — Z12.31 ENCOUNTER FOR SCREENING MAMMOGRAM FOR MALIGNANT NEOPLASM OF BREAST: ICD-10-CM

## 2025-07-17 PROCEDURE — 77063 BREAST TOMOSYNTHESIS BI: CPT | Performed by: FAMILY MEDICINE

## 2025-07-17 PROCEDURE — 77067 SCR MAMMO BI INCL CAD: CPT | Performed by: FAMILY MEDICINE

## 2025-07-23 ENCOUNTER — PATIENT MESSAGE (OUTPATIENT)
Dept: FAMILY MEDICINE CLINIC | Facility: CLINIC | Age: 60
End: 2025-07-23

## 2025-07-23 DIAGNOSIS — Z80.3 FAMILY HISTORY OF BREAST CANCER IN FEMALE: ICD-10-CM

## 2025-07-23 DIAGNOSIS — R92.333 HETEROGENEOUSLY DENSE TISSUE OF BOTH BREASTS ON MAMMOGRAPHY: Primary | ICD-10-CM

## 2025-07-23 DIAGNOSIS — R92.2 INCONCLUSIVE MAMMOGRAM: ICD-10-CM

## 2025-08-02 ENCOUNTER — HOSPITAL ENCOUNTER (EMERGENCY)
Facility: HOSPITAL | Age: 60
Discharge: HOME | End: 2025-08-02

## 2025-08-02 ENCOUNTER — APPOINTMENT (OUTPATIENT)
Dept: GENERAL RADIOLOGY | Facility: HOSPITAL | Age: 60
End: 2025-08-02
Attending: NURSE PRACTITIONER

## 2025-08-02 VITALS
TEMPERATURE: 98 F | DIASTOLIC BLOOD PRESSURE: 82 MMHG | OXYGEN SATURATION: 96 % | RESPIRATION RATE: 14 BRPM | BODY MASS INDEX: 19 KG/M2 | HEART RATE: 53 BPM | SYSTOLIC BLOOD PRESSURE: 120 MMHG | WEIGHT: 105 LBS

## 2025-08-02 DIAGNOSIS — M54.32 SCIATICA OF LEFT SIDE: Primary | ICD-10-CM

## 2025-08-02 LAB
BILIRUB UR QL: NEGATIVE
COLOR UR: YELLOW
GLUCOSE UR-MCNC: NORMAL MG/DL
HGB UR QL STRIP.AUTO: NEGATIVE
KETONES UR-MCNC: NEGATIVE MG/DL
LEUKOCYTE ESTERASE UR QL STRIP.AUTO: 500
PH UR: 6 (ref 5–8)
SP GR UR STRIP: 1.02 (ref 1–1.03)
UROBILINOGEN UR STRIP-ACNC: NORMAL
WBC #/AREA URNS AUTO: >50 /HPF

## 2025-08-02 PROCEDURE — 87077 CULTURE AEROBIC IDENTIFY: CPT | Performed by: NURSE PRACTITIONER

## 2025-08-02 PROCEDURE — 99284 EMERGENCY DEPT VISIT MOD MDM: CPT

## 2025-08-02 PROCEDURE — 72100 X-RAY EXAM L-S SPINE 2/3 VWS: CPT | Performed by: NURSE PRACTITIONER

## 2025-08-02 PROCEDURE — 87186 SC STD MICRODIL/AGAR DIL: CPT | Performed by: NURSE PRACTITIONER

## 2025-08-02 PROCEDURE — 81001 URINALYSIS AUTO W/SCOPE: CPT | Performed by: NURSE PRACTITIONER

## 2025-08-02 PROCEDURE — 87086 URINE CULTURE/COLONY COUNT: CPT | Performed by: NURSE PRACTITIONER

## 2025-08-02 RX ORDER — HYDROCODONE BITARTRATE AND ACETAMINOPHEN 7.5; 325 MG/1; MG/1
1-2 TABLET ORAL EVERY 6 HOURS PRN
Qty: 10 TABLET | Refills: 0 | Status: SHIPPED | OUTPATIENT
Start: 2025-08-02 | End: 2025-08-10

## 2025-08-02 RX ORDER — HYDROCODONE BITARTRATE AND ACETAMINOPHEN 5; 325 MG/1; MG/1
1 TABLET ORAL ONCE
Refills: 0 | Status: COMPLETED | OUTPATIENT
Start: 2025-08-02 | End: 2025-08-02

## 2025-08-02 RX ORDER — METHYLPREDNISOLONE 4 MG/1
TABLET ORAL
Qty: 1 EACH | Refills: 0 | Status: SHIPPED | OUTPATIENT
Start: 2025-08-02 | End: 2025-08-10

## 2025-08-03 ENCOUNTER — PATIENT MESSAGE (OUTPATIENT)
Dept: FAMILY MEDICINE CLINIC | Facility: CLINIC | Age: 60
End: 2025-08-03

## 2025-08-04 ENCOUNTER — HOSPITAL ENCOUNTER (EMERGENCY)
Facility: HOSPITAL | Age: 60
Discharge: HOME | End: 2025-08-04
Attending: EMERGENCY MEDICINE

## 2025-08-04 ENCOUNTER — TELEPHONE (OUTPATIENT)
Dept: FAMILY MEDICINE CLINIC | Facility: CLINIC | Age: 60
End: 2025-08-04

## 2025-08-04 VITALS
OXYGEN SATURATION: 98 % | RESPIRATION RATE: 17 BRPM | HEART RATE: 52 BPM | HEIGHT: 61 IN | TEMPERATURE: 98 F | WEIGHT: 105 LBS | BODY MASS INDEX: 19.83 KG/M2 | DIASTOLIC BLOOD PRESSURE: 90 MMHG | SYSTOLIC BLOOD PRESSURE: 154 MMHG

## 2025-08-04 DIAGNOSIS — M54.31 SCIATICA OF RIGHT SIDE: Primary | ICD-10-CM

## 2025-08-04 PROCEDURE — 99283 EMERGENCY DEPT VISIT LOW MDM: CPT

## 2025-08-04 PROCEDURE — 96372 THER/PROPH/DIAG INJ SC/IM: CPT

## 2025-08-04 RX ORDER — DIAZEPAM 5 MG/1
5 TABLET ORAL ONCE
Status: COMPLETED | OUTPATIENT
Start: 2025-08-04 | End: 2025-08-04

## 2025-08-04 RX ORDER — DIAZEPAM 5 MG/1
5 TABLET ORAL 3 TIMES DAILY PRN
Qty: 12 TABLET | Refills: 0 | Status: SHIPPED | OUTPATIENT
Start: 2025-08-04 | End: 2025-08-10

## 2025-08-04 RX ORDER — CEPHALEXIN 500 MG/1
500 CAPSULE ORAL 2 TIMES DAILY
Qty: 10 CAPSULE | Refills: 0 | Status: SHIPPED | OUTPATIENT
Start: 2025-08-04 | End: 2025-08-10

## 2025-08-04 RX ORDER — KETOROLAC TROMETHAMINE 30 MG/ML
30 INJECTION, SOLUTION INTRAMUSCULAR; INTRAVENOUS ONCE
Status: COMPLETED | OUTPATIENT
Start: 2025-08-04 | End: 2025-08-04

## 2025-08-04 RX ORDER — KETOROLAC TROMETHAMINE 10 MG/1
10 TABLET, FILM COATED ORAL EVERY 6 HOURS PRN
Qty: 14 TABLET | Refills: 0 | Status: SHIPPED | OUTPATIENT
Start: 2025-08-04 | End: 2025-08-10

## 2025-08-05 ENCOUNTER — SPINE CENTER NAVIGATION (OUTPATIENT)
Age: 60
End: 2025-08-05

## 2025-08-05 DIAGNOSIS — M54.50 CHRONIC BILATERAL LOW BACK PAIN, UNSPECIFIED WHETHER SCIATICA PRESENT: Primary | ICD-10-CM

## 2025-08-05 DIAGNOSIS — G89.29 CHRONIC BILATERAL LOW BACK PAIN, UNSPECIFIED WHETHER SCIATICA PRESENT: Primary | ICD-10-CM

## 2025-08-06 ENCOUNTER — APPOINTMENT (OUTPATIENT)
Dept: CT IMAGING | Facility: HOSPITAL | Age: 60
End: 2025-08-06
Attending: EMERGENCY MEDICINE

## 2025-08-06 ENCOUNTER — HOSPITAL ENCOUNTER (OUTPATIENT)
Facility: HOSPITAL | Age: 60
Setting detail: OBSERVATION
LOS: 1 days | Discharge: HOME HEALTH | End: 2025-08-10
Attending: EMERGENCY MEDICINE

## 2025-08-06 DIAGNOSIS — G89.29 ACUTE ON CHRONIC LOW BACK PAIN: ICD-10-CM

## 2025-08-06 DIAGNOSIS — M54.50 ACUTE ON CHRONIC LOW BACK PAIN: ICD-10-CM

## 2025-08-06 DIAGNOSIS — Z79.899 POLYPHARMACY: ICD-10-CM

## 2025-08-06 DIAGNOSIS — T50.905A ADVERSE EFFECT OF DRUG, INITIAL ENCOUNTER: Primary | ICD-10-CM

## 2025-08-06 DIAGNOSIS — M54.17 LUMBOSACRAL RADICULOPATHY: ICD-10-CM

## 2025-08-06 LAB — GLUCOSE BLDC GLUCOMTR-MCNC: 128 MG/DL (ref 70–99)

## 2025-08-06 PROCEDURE — 72131 CT LUMBAR SPINE W/O DYE: CPT | Performed by: EMERGENCY MEDICINE

## 2025-08-06 PROCEDURE — 70450 CT HEAD/BRAIN W/O DYE: CPT | Performed by: EMERGENCY MEDICINE

## 2025-08-06 RX ORDER — SODIUM CHLORIDE 9 MG/ML
INJECTION, SOLUTION INTRAVENOUS ONCE
Status: COMPLETED | OUTPATIENT
Start: 2025-08-06 | End: 2025-08-06

## 2025-08-07 PROBLEM — G89.29 ACUTE ON CHRONIC LOW BACK PAIN: Status: ACTIVE | Noted: 2025-08-07

## 2025-08-07 PROBLEM — M54.50 ACUTE ON CHRONIC LOW BACK PAIN: Status: ACTIVE | Noted: 2025-08-07

## 2025-08-07 PROBLEM — T50.905A ADVERSE EFFECT OF DRUG, INITIAL ENCOUNTER: Status: ACTIVE | Noted: 2025-08-07

## 2025-08-07 LAB
ALBUMIN SERPL-MCNC: 3.6 G/DL (ref 3.2–4.8)
ALBUMIN/GLOB SERPL: 1.6 (ref 1–2)
ALP LIVER SERPL-CCNC: 86 U/L (ref 46–118)
ALT SERPL-CCNC: 21 U/L (ref 10–49)
AMPHET UR QL SCN: NEGATIVE
ANION GAP SERPL CALC-SCNC: 6 MMOL/L (ref 0–18)
ANION GAP SERPL CALC-SCNC: 8 MMOL/L (ref 0–18)
AST SERPL-CCNC: 19 U/L (ref ?–34)
ATRIAL RATE: 62 BPM
BARBITURATES UR QL SCN: NEGATIVE
BASOPHILS # BLD AUTO: 0.05 X10(3) UL (ref 0–0.2)
BASOPHILS # BLD: 0 X10(3) UL (ref 0–0.2)
BASOPHILS NFR BLD AUTO: 0.4 %
BASOPHILS NFR BLD: 0 %
BILIRUB SERPL-MCNC: 0.2 MG/DL (ref 0.3–1.2)
BILIRUB UR QL: NEGATIVE
BUN BLD-MCNC: 20 MG/DL (ref 9–23)
BUN BLD-MCNC: 25 MG/DL (ref 9–23)
BUN/CREAT SERPL: 33.9 (ref 10–20)
BUN/CREAT SERPL: 34.2 (ref 10–20)
CALCIUM BLD-MCNC: 8.3 MG/DL (ref 8.7–10.4)
CALCIUM BLD-MCNC: 8.9 MG/DL (ref 8.7–10.4)
CHLORIDE SERPL-SCNC: 107 MMOL/L (ref 98–112)
CHLORIDE SERPL-SCNC: 112 MMOL/L (ref 98–112)
CLARITY UR: CLEAR
CO2 SERPL-SCNC: 26 MMOL/L (ref 21–32)
CO2 SERPL-SCNC: 28 MMOL/L (ref 21–32)
CREAT BLD-MCNC: 0.59 MG/DL (ref 0.55–1.02)
CREAT BLD-MCNC: 0.73 MG/DL (ref 0.55–1.02)
CREAT UR-SCNC: 234.7 MG/DL
DEPRECATED RDW RBC AUTO: 49.8 FL (ref 35.1–46.3)
DEPRECATED RDW RBC AUTO: 50.5 FL (ref 35.1–46.3)
EGFRCR SERPLBLD CKD-EPI 2021: 104 ML/MIN/1.73M2 (ref 60–?)
EGFRCR SERPLBLD CKD-EPI 2021: 95 ML/MIN/1.73M2 (ref 60–?)
EOSINOPHIL # BLD AUTO: 0.22 X10(3) UL (ref 0–0.7)
EOSINOPHIL # BLD: 0.28 X10(3) UL (ref 0–0.7)
EOSINOPHIL NFR BLD AUTO: 2 %
EOSINOPHIL NFR BLD: 2 %
ERYTHROCYTE [DISTWIDTH] IN BLOOD BY AUTOMATED COUNT: 14.6 % (ref 11–15)
ERYTHROCYTE [DISTWIDTH] IN BLOOD BY AUTOMATED COUNT: 14.7 % (ref 11–15)
ETHANOL SERPL-MCNC: <3 MG/DL (ref ?–3)
FENTANYL UR QL SCN: NEGATIVE
GLOBULIN PLAS-MCNC: 2.2 G/DL (ref 2–3.5)
GLUCOSE BLD-MCNC: 103 MG/DL (ref 70–99)
GLUCOSE BLD-MCNC: 130 MG/DL (ref 70–99)
GLUCOSE UR-MCNC: NORMAL MG/DL
HCT VFR BLD AUTO: 32.6 % (ref 35–48)
HCT VFR BLD AUTO: 35 % (ref 35–48)
HGB BLD-MCNC: 11.2 G/DL (ref 12–16)
HGB BLD-MCNC: 11.9 G/DL (ref 12–16)
HGB UR QL STRIP.AUTO: NEGATIVE
IMM GRANULOCYTES # BLD AUTO: 0.04 X10(3) UL (ref 0–1)
IMM GRANULOCYTES NFR BLD: 0.4 %
KETONES UR-MCNC: NEGATIVE MG/DL
LEUKOCYTE ESTERASE UR QL STRIP.AUTO: NEGATIVE
LYMPHOCYTES # BLD AUTO: 5.02 X10(3) UL (ref 1–4)
LYMPHOCYTES NFR BLD AUTO: 44.7 %
LYMPHOCYTES NFR BLD: 35 %
LYMPHOCYTES NFR BLD: 4.87 X10(3) UL (ref 1–4)
MCH RBC QN AUTO: 31.4 PG (ref 26–34)
MCH RBC QN AUTO: 31.6 PG (ref 26–34)
MCHC RBC AUTO-ENTMCNC: 34 G/DL (ref 31–37)
MCHC RBC AUTO-ENTMCNC: 34.4 G/DL (ref 31–37)
MCV RBC AUTO: 92.1 FL (ref 80–100)
MCV RBC AUTO: 92.3 FL (ref 80–100)
MDMA UR QL SCN: NEGATIVE
METHADONE UR QL SCN: NEGATIVE
MONOCYTES # BLD AUTO: 0.96 X10(3) UL (ref 0.1–1)
MONOCYTES # BLD: 0.97 X10(3) UL (ref 0.1–1)
MONOCYTES NFR BLD AUTO: 8.5 %
MONOCYTES NFR BLD: 7 %
MORPHOLOGY: NORMAL
NEUTROPHILS # BLD AUTO: 4.94 X10 (3) UL (ref 1.5–7.7)
NEUTROPHILS # BLD AUTO: 4.94 X10(3) UL (ref 1.5–7.7)
NEUTROPHILS # BLD AUTO: 7.37 X10 (3) UL (ref 1.5–7.7)
NEUTROPHILS NFR BLD AUTO: 44 %
NEUTROPHILS NFR BLD: 56 %
NEUTS HYPERSEG # BLD: 7.78 X10(3) UL (ref 1.5–7.7)
NITRITE UR QL STRIP.AUTO: NEGATIVE
OSMOLALITY SERPL CALC.SUM OF ELEC: 301 MOSM/KG (ref 275–295)
OSMOLALITY SERPL CALC.SUM OF ELEC: 302 MOSM/KG (ref 275–295)
OXYCODONE UR QL SCN: NEGATIVE
P AXIS: 67 DEGREES
P-R INTERVAL: 132 MS
PCP UR QL SCN: NEGATIVE
PH UR: 6 (ref 5–8)
PLATELET # BLD AUTO: 373 10(3)UL (ref 150–450)
PLATELET # BLD AUTO: 385 10(3)UL (ref 150–450)
PLATELET MORPHOLOGY: NORMAL
POTASSIUM SERPL-SCNC: 3 MMOL/L (ref 3.5–5.1)
POTASSIUM SERPL-SCNC: 3.8 MMOL/L (ref 3.5–5.1)
POTASSIUM SERPL-SCNC: 4.1 MMOL/L (ref 3.5–5.1)
PROT SERPL-MCNC: 5.8 G/DL (ref 5.7–8.2)
PROT UR-MCNC: NEGATIVE MG/DL
Q-T INTERVAL: 428 MS
QRS DURATION: 86 MS
QTC CALCULATION (BEZET): 434 MS
R AXIS: -35 DEGREES
RBC # BLD AUTO: 3.54 X10(6)UL (ref 3.8–5.3)
RBC # BLD AUTO: 3.79 X10(6)UL (ref 3.8–5.3)
SODIUM SERPL-SCNC: 143 MMOL/L (ref 136–145)
SODIUM SERPL-SCNC: 144 MMOL/L (ref 136–145)
SP GR UR STRIP: 1.01 (ref 1–1.03)
T AXIS: 32 DEGREES
TOTAL CELLS COUNTED BLD: 100
TSI SER-ACNC: 3.35 UIU/ML (ref 0.55–4.78)
UROBILINOGEN UR STRIP-ACNC: NORMAL
VENTRICULAR RATE: 62 BPM
WBC # BLD AUTO: 11.2 X10(3) UL (ref 4–11)
WBC # BLD AUTO: 13.9 X10(3) UL (ref 4–11)

## 2025-08-07 PROCEDURE — 99221 1ST HOSP IP/OBS SF/LOW 40: CPT | Performed by: ANESTHESIOLOGY

## 2025-08-07 PROCEDURE — 99223 1ST HOSP IP/OBS HIGH 75: CPT

## 2025-08-07 RX ORDER — ALBUTEROL SULFATE 0.83 MG/ML
2.5 SOLUTION RESPIRATORY (INHALATION) EVERY 4 HOURS PRN
Status: DISCONTINUED | OUTPATIENT
Start: 2025-08-07 | End: 2025-08-10

## 2025-08-07 RX ORDER — POTASSIUM CHLORIDE 1500 MG/1
40 TABLET, EXTENDED RELEASE ORAL ONCE
Status: DISCONTINUED | OUTPATIENT
Start: 2025-08-07 | End: 2025-08-10

## 2025-08-07 RX ORDER — SODIUM CHLORIDE, SODIUM LACTATE, POTASSIUM CHLORIDE, CALCIUM CHLORIDE 600; 310; 30; 20 MG/100ML; MG/100ML; MG/100ML; MG/100ML
INJECTION, SOLUTION INTRAVENOUS CONTINUOUS
Status: ACTIVE | OUTPATIENT
Start: 2025-08-07 | End: 2025-08-07

## 2025-08-07 RX ORDER — CEPHALEXIN 500 MG/1
500 CAPSULE ORAL 2 TIMES DAILY
Status: COMPLETED | OUTPATIENT
Start: 2025-08-07 | End: 2025-08-09

## 2025-08-07 RX ORDER — POTASSIUM CHLORIDE 14.9 MG/ML
20 INJECTION INTRAVENOUS ONCE
Status: COMPLETED | OUTPATIENT
Start: 2025-08-07 | End: 2025-08-07

## 2025-08-07 RX ORDER — METHIMAZOLE 5 MG/1
5 TABLET ORAL 2 TIMES DAILY
COMMUNITY

## 2025-08-07 RX ORDER — IBUPROFEN 200 MG
400 TABLET ORAL EVERY 6 HOURS PRN
Status: DISCONTINUED | OUTPATIENT
Start: 2025-08-07 | End: 2025-08-08

## 2025-08-07 RX ORDER — ORPHENADRINE CITRATE 30 MG/ML
30 INJECTION INTRAMUSCULAR; INTRAVENOUS EVERY 8 HOURS PRN
Status: DISCONTINUED | OUTPATIENT
Start: 2025-08-07 | End: 2025-08-10

## 2025-08-07 RX ORDER — HEPARIN SODIUM 5000 [USP'U]/ML
5000 INJECTION, SOLUTION INTRAVENOUS; SUBCUTANEOUS EVERY 8 HOURS SCHEDULED
Status: DISCONTINUED | OUTPATIENT
Start: 2025-08-07 | End: 2025-08-09

## 2025-08-07 RX ORDER — PRAZOSIN HYDROCHLORIDE 1 MG/1
1 CAPSULE ORAL NIGHTLY
Status: DISCONTINUED | OUTPATIENT
Start: 2025-08-07 | End: 2025-08-10

## 2025-08-07 RX ORDER — DEXTROSE MONOHYDRATE AND SODIUM CHLORIDE 5; .45 G/100ML; G/100ML
INJECTION, SOLUTION INTRAVENOUS ONCE
Status: COMPLETED | OUTPATIENT
Start: 2025-08-07 | End: 2025-08-07

## 2025-08-07 RX ORDER — FLUTICASONE PROPIONATE AND SALMETEROL 100; 50 UG/1; UG/1
1 POWDER RESPIRATORY (INHALATION) 2 TIMES DAILY
Status: DISCONTINUED | OUTPATIENT
Start: 2025-08-07 | End: 2025-08-10

## 2025-08-08 ENCOUNTER — APPOINTMENT (OUTPATIENT)
Dept: MRI IMAGING | Facility: HOSPITAL | Age: 60
End: 2025-08-08
Attending: HOSPITALIST

## 2025-08-08 ENCOUNTER — TELEPHONE (OUTPATIENT)
Dept: FAMILY MEDICINE CLINIC | Facility: CLINIC | Age: 60
End: 2025-08-08

## 2025-08-08 PROCEDURE — 72148 MRI LUMBAR SPINE W/O DYE: CPT | Performed by: HOSPITALIST

## 2025-08-08 PROCEDURE — 99233 SBSQ HOSP IP/OBS HIGH 50: CPT | Performed by: HOSPITALIST

## 2025-08-08 RX ORDER — MORPHINE SULFATE 2 MG/ML
2 INJECTION, SOLUTION INTRAMUSCULAR; INTRAVENOUS ONCE
Refills: 0 | Status: COMPLETED | OUTPATIENT
Start: 2025-08-08 | End: 2025-08-08

## 2025-08-08 RX ORDER — FOLIC ACID 1 MG/1
1 TABLET ORAL DAILY
Status: DISCONTINUED | OUTPATIENT
Start: 2025-08-08 | End: 2025-08-10

## 2025-08-08 RX ORDER — TRAMADOL HYDROCHLORIDE 50 MG/1
50 TABLET ORAL EVERY 6 HOURS PRN
Status: DISCONTINUED | OUTPATIENT
Start: 2025-08-08 | End: 2025-08-10

## 2025-08-08 RX ORDER — METHIMAZOLE 5 MG/1
5 TABLET ORAL 2 TIMES DAILY
Status: DISCONTINUED | OUTPATIENT
Start: 2025-08-08 | End: 2025-08-10

## 2025-08-08 RX ORDER — KETOROLAC TROMETHAMINE 30 MG/ML
30 INJECTION, SOLUTION INTRAMUSCULAR; INTRAVENOUS ONCE
Status: DISCONTINUED | OUTPATIENT
Start: 2025-08-08 | End: 2025-08-08

## 2025-08-08 RX ORDER — SERTRALINE HYDROCHLORIDE 100 MG/1
100 TABLET, FILM COATED ORAL DAILY
Status: DISCONTINUED | OUTPATIENT
Start: 2025-08-08 | End: 2025-08-10

## 2025-08-09 LAB
ANION GAP SERPL CALC-SCNC: 7 MMOL/L (ref 0–18)
BASOPHILS # BLD AUTO: 0.05 X10(3) UL (ref 0–0.2)
BASOPHILS NFR BLD AUTO: 0.5 %
BUN BLD-MCNC: 18 MG/DL (ref 9–23)
BUN/CREAT SERPL: 31 (ref 10–20)
CALCIUM BLD-MCNC: 8.9 MG/DL (ref 8.7–10.4)
CHLORIDE SERPL-SCNC: 105 MMOL/L (ref 98–112)
CO2 SERPL-SCNC: 28 MMOL/L (ref 21–32)
CREAT BLD-MCNC: 0.58 MG/DL (ref 0.55–1.02)
DEPRECATED RDW RBC AUTO: 50.7 FL (ref 35.1–46.3)
EGFRCR SERPLBLD CKD-EPI 2021: 104 ML/MIN/1.73M2 (ref 60–?)
EOSINOPHIL # BLD AUTO: 0.14 X10(3) UL (ref 0–0.7)
EOSINOPHIL NFR BLD AUTO: 1.5 %
ERYTHROCYTE [DISTWIDTH] IN BLOOD BY AUTOMATED COUNT: 14.6 % (ref 11–15)
GLUCOSE BLD-MCNC: 93 MG/DL (ref 70–99)
HCT VFR BLD AUTO: 35.3 % (ref 35–48)
HGB BLD-MCNC: 11.9 G/DL (ref 12–16)
IMM GRANULOCYTES # BLD AUTO: 0.05 X10(3) UL (ref 0–1)
IMM GRANULOCYTES NFR BLD: 0.5 %
LYMPHOCYTES # BLD AUTO: 3.75 X10(3) UL (ref 1–4)
LYMPHOCYTES NFR BLD AUTO: 39.8 %
MCH RBC QN AUTO: 31.5 PG (ref 26–34)
MCHC RBC AUTO-ENTMCNC: 33.7 G/DL (ref 31–37)
MCV RBC AUTO: 93.4 FL (ref 80–100)
MONOCYTES # BLD AUTO: 0.74 X10(3) UL (ref 0.1–1)
MONOCYTES NFR BLD AUTO: 7.8 %
NEUTROPHILS # BLD AUTO: 4.7 X10 (3) UL (ref 1.5–7.7)
NEUTROPHILS # BLD AUTO: 4.7 X10(3) UL (ref 1.5–7.7)
NEUTROPHILS NFR BLD AUTO: 49.9 %
OSMOLALITY SERPL CALC.SUM OF ELEC: 292 MOSM/KG (ref 275–295)
PLATELET # BLD AUTO: 378 10(3)UL (ref 150–450)
POTASSIUM SERPL-SCNC: 3.8 MMOL/L (ref 3.5–5.1)
RBC # BLD AUTO: 3.78 X10(6)UL (ref 3.8–5.3)
SODIUM SERPL-SCNC: 140 MMOL/L (ref 136–145)
WBC # BLD AUTO: 9.4 X10(3) UL (ref 4–11)

## 2025-08-09 PROCEDURE — 99232 SBSQ HOSP IP/OBS MODERATE 35: CPT | Performed by: STUDENT IN AN ORGANIZED HEALTH CARE EDUCATION/TRAINING PROGRAM

## 2025-08-09 PROCEDURE — 99233 SBSQ HOSP IP/OBS HIGH 50: CPT | Performed by: HOSPITALIST

## 2025-08-09 RX ORDER — KETOROLAC TROMETHAMINE 15 MG/ML
15 INJECTION, SOLUTION INTRAMUSCULAR; INTRAVENOUS ONCE
Status: COMPLETED | OUTPATIENT
Start: 2025-08-09 | End: 2025-08-09

## 2025-08-09 RX ORDER — MORPHINE SULFATE 2 MG/ML
4 INJECTION, SOLUTION INTRAMUSCULAR; INTRAVENOUS ONCE
Refills: 0 | Status: COMPLETED | OUTPATIENT
Start: 2025-08-09 | End: 2025-08-09

## 2025-08-09 RX ORDER — MORPHINE SULFATE 2 MG/ML
2 INJECTION, SOLUTION INTRAMUSCULAR; INTRAVENOUS ONCE
Refills: 0 | Status: COMPLETED | OUTPATIENT
Start: 2025-08-09 | End: 2025-08-09

## 2025-08-09 RX ORDER — GABAPENTIN 100 MG/1
100 CAPSULE ORAL 3 TIMES DAILY
Status: DISCONTINUED | OUTPATIENT
Start: 2025-08-09 | End: 2025-08-10

## 2025-08-09 RX ORDER — PANTOPRAZOLE SODIUM 40 MG/1
40 TABLET, DELAYED RELEASE ORAL
Status: DISCONTINUED | OUTPATIENT
Start: 2025-08-10 | End: 2025-08-10

## 2025-08-10 ENCOUNTER — APPOINTMENT (OUTPATIENT)
Dept: GENERAL RADIOLOGY | Facility: HOSPITAL | Age: 60
End: 2025-08-10
Attending: ANESTHESIOLOGY

## 2025-08-10 VITALS
TEMPERATURE: 98 F | SYSTOLIC BLOOD PRESSURE: 131 MMHG | BODY MASS INDEX: 20.93 KG/M2 | OXYGEN SATURATION: 97 % | HEIGHT: 61 IN | WEIGHT: 110.88 LBS | RESPIRATION RATE: 18 BRPM | HEART RATE: 68 BPM | DIASTOLIC BLOOD PRESSURE: 94 MMHG

## 2025-08-10 PROBLEM — M54.10 BACK PAIN WITH RADICULOPATHY: Status: ACTIVE | Noted: 2025-08-10

## 2025-08-10 PROBLEM — M54.17 LUMBOSACRAL RADICULOPATHY: Status: ACTIVE | Noted: 2025-08-10

## 2025-08-10 PROCEDURE — 62323 NJX INTERLAMINAR LMBR/SAC: CPT | Performed by: ANESTHESIOLOGY

## 2025-08-10 PROCEDURE — 99233 SBSQ HOSP IP/OBS HIGH 50: CPT | Performed by: HOSPITALIST

## 2025-08-10 RX ORDER — SODIUM CHLORIDE 9 MG/ML
INJECTION, SOLUTION INTRAMUSCULAR; INTRAVENOUS; SUBCUTANEOUS AS NEEDED
Status: DISCONTINUED | OUTPATIENT
Start: 2025-08-10 | End: 2025-08-10

## 2025-08-10 RX ORDER — GABAPENTIN 300 MG/1
300 CAPSULE ORAL 3 TIMES DAILY
Status: DISCONTINUED | OUTPATIENT
Start: 2025-08-10 | End: 2025-08-10

## 2025-08-10 RX ORDER — LIDOCAINE HYDROCHLORIDE 10 MG/ML
INJECTION, SOLUTION EPIDURAL; INFILTRATION; INTRACAUDAL; PERINEURAL AS NEEDED
Status: DISCONTINUED | OUTPATIENT
Start: 2025-08-10 | End: 2025-08-10

## 2025-08-10 RX ORDER — GABAPENTIN 300 MG/1
300 CAPSULE ORAL 3 TIMES DAILY
Qty: 90 CAPSULE | Refills: 0 | Status: SHIPPED | OUTPATIENT
Start: 2025-08-10 | End: 2025-09-09

## 2025-08-10 RX ORDER — METHOCARBAMOL 750 MG/1
750 TABLET, FILM COATED ORAL 3 TIMES DAILY PRN
Status: DISCONTINUED | OUTPATIENT
Start: 2025-08-10 | End: 2025-08-10

## 2025-08-10 RX ORDER — IOPAMIDOL 408 MG/ML
INJECTION, SOLUTION INTRATHECAL AS NEEDED
Status: DISCONTINUED | OUTPATIENT
Start: 2025-08-10 | End: 2025-08-10

## 2025-08-10 RX ORDER — TRAMADOL HYDROCHLORIDE 50 MG/1
50 TABLET ORAL EVERY 6 HOURS PRN
Qty: 20 TABLET | Refills: 0 | Status: SHIPPED | OUTPATIENT
Start: 2025-08-10

## 2025-08-10 RX ORDER — METHYLPREDNISOLONE ACETATE 40 MG/ML
INJECTION, SUSPENSION INTRA-ARTICULAR; INTRALESIONAL; INTRAMUSCULAR; SOFT TISSUE AS NEEDED
Status: DISCONTINUED | OUTPATIENT
Start: 2025-08-10 | End: 2025-08-10

## 2025-08-11 ENCOUNTER — PATIENT OUTREACH (OUTPATIENT)
Age: 60
End: 2025-08-11

## 2025-08-11 ENCOUNTER — PATIENT OUTREACH (OUTPATIENT)
Dept: CASE MANAGEMENT | Age: 60
End: 2025-08-11

## 2025-08-11 ENCOUNTER — TELEPHONE (OUTPATIENT)
Dept: FAMILY MEDICINE CLINIC | Facility: CLINIC | Age: 60
End: 2025-08-11

## 2025-08-12 ENCOUNTER — NURSE TRIAGE (OUTPATIENT)
Dept: FAMILY MEDICINE CLINIC | Facility: CLINIC | Age: 60
End: 2025-08-12

## 2025-08-18 ENCOUNTER — LAB ENCOUNTER (OUTPATIENT)
Dept: LAB | Age: 60
End: 2025-08-18
Attending: FAMILY MEDICINE

## 2025-08-18 DIAGNOSIS — M54.16 LUMBAR RADICULAR PAIN: ICD-10-CM

## 2025-08-18 DIAGNOSIS — G89.29 CHRONIC BILATERAL LOW BACK PAIN, UNSPECIFIED WHETHER SCIATICA PRESENT: ICD-10-CM

## 2025-08-18 DIAGNOSIS — M54.50 CHRONIC BILATERAL LOW BACK PAIN, UNSPECIFIED WHETHER SCIATICA PRESENT: ICD-10-CM

## 2025-08-18 DIAGNOSIS — Z79.891 CHRONIC PRESCRIPTION OPIATE USE: ICD-10-CM

## 2025-08-18 LAB
AMPHET UR QL SCN: NEGATIVE
BENZODIAZ UR QL SCN: NEGATIVE
BILIRUB UR QL STRIP.AUTO: NEGATIVE
CLARITY UR REFRACT.AUTO: CLEAR
CREAT UR-SCNC: 28.2 MG/DL
FENTANYL UR QL SCN: NEGATIVE
GLUCOSE UR STRIP.AUTO-MCNC: NORMAL MG/DL
KETONES UR STRIP.AUTO-MCNC: NEGATIVE MG/DL
LEUKOCYTE ESTERASE UR QL STRIP.AUTO: NEGATIVE
MDMA UR QL SCN: NEGATIVE
NITRITE UR QL STRIP.AUTO: NEGATIVE
OPIATES UR QL SCN: NEGATIVE
OXYCODONE UR QL SCN: NEGATIVE
PH UR STRIP.AUTO: 5 (ref 5–8)
PROT UR STRIP.AUTO-MCNC: NEGATIVE MG/DL
RBC UR QL AUTO: NEGATIVE
SP GR UR STRIP.AUTO: 1.01 (ref 1–1.03)
UROBILINOGEN UR STRIP.AUTO-MCNC: NORMAL MG/DL

## 2025-08-18 PROCEDURE — 81003 URINALYSIS AUTO W/O SCOPE: CPT

## 2025-08-18 PROCEDURE — 80307 DRUG TEST PRSMV CHEM ANLYZR: CPT

## 2025-08-18 PROCEDURE — 80349 CANNABINOIDS NATURAL: CPT

## 2025-08-18 PROCEDURE — 80353 DRUG SCREENING COCAINE: CPT

## 2025-08-22 LAB
CARBOXY THC GCMS UR: 686 NG/MG CREAT
CARBOXY THC GCMS UR: 686 NG/MG CREAT

## 2025-08-28 ENCOUNTER — TELEPHONE (OUTPATIENT)
Dept: FAMILY MEDICINE CLINIC | Facility: CLINIC | Age: 60
End: 2025-08-28

## 2025-08-28 ENCOUNTER — OFFICE VISIT (OUTPATIENT)
Dept: SURGERY | Facility: CLINIC | Age: 60
End: 2025-08-28

## 2025-08-28 VITALS
SYSTOLIC BLOOD PRESSURE: 106 MMHG | WEIGHT: 108 LBS | HEART RATE: 66 BPM | OXYGEN SATURATION: 98 % | BODY MASS INDEX: 19.88 KG/M2 | HEIGHT: 62 IN | DIASTOLIC BLOOD PRESSURE: 71 MMHG

## 2025-08-28 DIAGNOSIS — M47.26 OTHER SPONDYLOSIS WITH RADICULOPATHY, LUMBAR REGION: Primary | ICD-10-CM

## 2025-08-28 PROCEDURE — 99205 OFFICE O/P NEW HI 60 MIN: CPT | Performed by: NEUROLOGICAL SURGERY

## 2025-08-28 PROCEDURE — 3008F BODY MASS INDEX DOCD: CPT | Performed by: NEUROLOGICAL SURGERY

## 2025-08-28 PROCEDURE — 3078F DIAST BP <80 MM HG: CPT | Performed by: NEUROLOGICAL SURGERY

## 2025-08-28 PROCEDURE — 3074F SYST BP LT 130 MM HG: CPT | Performed by: NEUROLOGICAL SURGERY

## 2025-08-28 RX ORDER — VIT C/HESPERIDIN/BIOFLAVONOIDS 500-100 MG
TABLET ORAL
COMMUNITY

## 2025-08-28 RX ORDER — ARIPIPRAZOLE 10 MG/1
TABLET ORAL
COMMUNITY

## 2025-08-28 RX ORDER — ASPIRIN 81 MG/1
TABLET, CHEWABLE ORAL
COMMUNITY

## 2025-08-28 RX ORDER — AMOXICILLIN 500 MG/1
TABLET, FILM COATED ORAL
COMMUNITY
Start: 2025-07-03

## 2025-08-28 RX ORDER — EZETIMIBE 10 MG/1
TABLET ORAL
COMMUNITY

## 2025-08-28 RX ORDER — LOSARTAN POTASSIUM 50 MG/1
TABLET ORAL
COMMUNITY

## 2025-08-28 RX ORDER — OMEPRAZOLE 20 MG/1
CAPSULE, DELAYED RELEASE ORAL
COMMUNITY

## 2025-08-29 ENCOUNTER — NURSE ONLY (OUTPATIENT)
Age: 60
End: 2025-08-29

## 2025-08-29 DIAGNOSIS — Z71.9 ENCOUNTER FOR EDUCATION: Primary | ICD-10-CM

## 2025-08-29 LAB — COCAINE MET CLASS UR: POSITIVE

## 2025-08-29 RX ORDER — RIBOFLAVIN (VITAMIN B2) 100 MG
100 TABLET ORAL DAILY
COMMUNITY

## (undated) DIAGNOSIS — J44.1 COPD WITH ACUTE EXACERBATION (HCC): ICD-10-CM

## (undated) DEVICE — DRAIN SILICONE RND 1/8

## (undated) DEVICE — REDUCER FITTING (NON-STERILE) 7/8 IN (22 MM) TO 1/4 IN (6.4 MM): Brand: CONMED BUFFALO FILTER

## (undated) DEVICE — CODMAN® SURGICAL PATTIES 1/4" X 1/4" (0.64CM X 0.64CM): Brand: CODMAN®

## (undated) DEVICE — SUTURE VICRYL 2-0 CP-2

## (undated) DEVICE — GLOVE SURG SENSICARE SZ 7

## (undated) DEVICE — CODMAN® DISPOSABLE PERFORATOR 14MM: Brand: CODMAN®

## (undated) DEVICE — DRILL SRG OIL CRTDG MAESTRO

## (undated) DEVICE — GLOVE SUR 6.5 SENSICARE PI PIP CRM PWD F

## (undated) DEVICE — PROBE DOPPLER MIZUHO 07-150-07

## (undated) DEVICE — #11 STERILE BLADE: Brand: POLYMER COATED BLADES

## (undated) DEVICE — 1200CC GUARDIAN II: Brand: GUARDIAN

## (undated) DEVICE — ELECTRODE BALL 5MM DBL-511

## (undated) DEVICE — SUTURE VICRYL PLUS 4-0 PS-2

## (undated) DEVICE — DRAPE MICROSCOPE NEURO PENTERO

## (undated) DEVICE — STERILE POLYISOPRENE POWDER-FREE SURGICAL GLOVES: Brand: PROTEXIS

## (undated) DEVICE — REM POLYHESIVE ADULT PATIENT RETURN ELECTRODE: Brand: VALLEYLAB

## (undated) DEVICE — SUTURE NUROLON 4-0 TF

## (undated) DEVICE — CAUTERY PENCIL

## (undated) DEVICE — FILTERLINE NASAL ADULT O2/CO2

## (undated) DEVICE — NEEDLE CONTRAST INTERJECT 25G

## (undated) DEVICE — PAD SACRAL SPAN AID

## (undated) DEVICE — FLOSEAL HEMOSTATIC MATRIX, 5ML: Brand: FLOSEAL HEMOSTATIC MATRIX

## (undated) DEVICE — CAUTERY CORD BIPOLAR YASARGIL

## (undated) DEVICE — TRAP 4 CPTR CHMBR N EZ INLN

## (undated) DEVICE — 11.1-MULTIMODALITY IOM KIT

## (undated) DEVICE — 3M™ RED DOT™ MONITORING ELECTRODE WITH FOAM TAPE AND STICKY GEL, 50/BAG, 20/CASE, 72/PLT 2570: Brand: RED DOT™

## (undated) DEVICE — SPECIMEN CONTAINER,POSITIVE SEAL INDICATOR, OR PACKAGED: Brand: PRECISION

## (undated) DEVICE — KENDALL SCD EXPRESS SLEEVES, KNEE LENGTH, MEDIUM: Brand: KENDALL SCD

## (undated) DEVICE — TRAY EPI NDL 18GA L3.5IN 5ML GLS LUERLOCK LOR

## (undated) DEVICE — SOL  .9 1000ML BTL

## (undated) DEVICE — SPECIMEN TRAP LUKI

## (undated) DEVICE — MARKER SUR SKIN ST GENTIAN VLT STD REG TIP

## (undated) DEVICE — VIAL LABORATORY SPY

## (undated) DEVICE — SOL  .9 1000ML BAG

## (undated) DEVICE — CODMAN® SURGICAL PATTIES 1/2" X 1/2" (1.27CM X 1.27CM): Brand: CODMAN®

## (undated) DEVICE — SYRINGE FLSH 6ML BOLD GRAD 0.2ML LUERLOCK TIP

## (undated) DEVICE — SUTURE VICRYL 0 CT-2

## (undated) DEVICE — SWAB PROCTO 16\" NON-STERILE

## (undated) DEVICE — DRAIN RESERVOIR RELIAVAC 100CC

## (undated) DEVICE — D-58 TAPERED ROUTER

## (undated) DEVICE — Device: Brand: SPOT EX ENDOSCOPIC TATTOO

## (undated) DEVICE — GOWN,SIRUS,FABRIC-REINFORCED,X-LARGE: Brand: MEDLINE

## (undated) DEVICE — ELECTRODE LOOP WHITE

## (undated) DEVICE — CRANIOTOMY CDS: Brand: MEDLINE INDUSTRIES, INC.

## (undated) DEVICE — SYRINGE MED 5ML STD CLR PLAS LL TIP N CTRL

## (undated) DEVICE — CLIP RESOLUTION 235CM

## (undated) DEVICE — MARKER SKIN PREP RESIST STRL

## (undated) DEVICE — LIGHT HANDLE

## (undated) DEVICE — ACUSNARE POLYPECTOMY SNARE: Brand: ACUSNARE

## (undated) DEVICE — Device: Brand: INTELLICART™

## (undated) DEVICE — DISPOSABLE DISTAL ATTACHMENT: Brand: DISPOSABLE DISTAL ATTACHMENT

## (undated) DEVICE — BANDAGE ADH 1.5X3IN FAB KNCK CURAD

## (undated) DEVICE — COVER,MAYO STAND,STERILE: Brand: MEDLINE

## (undated) DEVICE — CODMAN® INTEGRATED BIPOLAR CORD AND TUBING SET FLYING LEADS, ROTARY PUMP: Brand: CODMAN®

## (undated) DEVICE — PREMIUM WET SKIN PREP TRAY: Brand: MEDLINE INDUSTRIES, INC.

## (undated) DEVICE — DRILL NEURO SOFT TOUCH 3.0X3.8

## (undated) DEVICE — GYN CDS: Brand: MEDLINE INDUSTRIES, INC.

## (undated) DEVICE — 3.0MM PRECISION ROUND

## (undated) DEVICE — APPLICATOR PREP 10.5ML ORNG CHG 2% ISO ALC

## (undated) DEVICE — NEEDLE SPINAL 22X3-1/2 BLK

## (undated) DEVICE — BATTERY PACK FOR VARISPEED: Brand: STRYKER VARISPEED

## (undated) DEVICE — BANDAGE ROLL,100% COTTON, 6 PLY, LARGE: Brand: KERLIX

## (undated) DEVICE — DERMABOND LIQUID ADHESIVE

## (undated) DEVICE — GAMMEX® PI HYBRID SIZE 7, STERILE POWDER-FREE SURGICAL GLOVE, POLYISOPRENE AND NEOPRENE BLEND: Brand: GAMMEX

## (undated) DEVICE — Device: Brand: DEFENDO AIR/WATER/SUCTION AND BIOPSY VALVE

## (undated) NOTE — LETTER
21        RE: Philmore Sandifer     : 1965    Dear Dr. Anette Flores,    This letter is to inform you that your patient is being scheduled for surgery with Jakub Goyal and Albertina Lyons on 21 at [facility]. We have asked the patient to contact yo

## (undated) NOTE — LETTER
Date & Time: 11/23/2021, 4:43 PM  Patient: Onelia Murray  Encounter Provider(s):    Jt Velazquez MD       To Whom It May Concern:    Sandra Patterson was seen and treated in our department on 11/23/2021.  She should not return to work until 11/25/

## (undated) NOTE — LETTER
09/05/19        [facility] [address] [place] [id number]      Dear Marquise De,    [facility] records indicate that you have outstanding lab work and or testing that was ordered for you and has not yet been completed:  Orders Placed This Encount

## (undated) NOTE — LETTER
22      To whom it may concern:     Patient: Emilee Paget  : 1965    The patient has had several ER visits and/or hospitalizations over the last year for lung disease, specifically between 2021 and 2022, she had 3 ER visits related to her chronic lung disease and acute exacerbations. In addition to the physical symptoms she has related to pulmonary emphysema and pulmonary fibrosis, she has a history of depression and mood disorder which worsens her fatigue and endurance. She has been on multiple medications for this and still symptoms are not in remission.            Gertrude Aguilar MD

## (undated) NOTE — LETTER
Date & Time: 12/27/2022, 3:04 AM  Patient: Braulio Gorman  Encounter Provider(s):    Taya Covington MD       To Whom It May Concern:    Vidal Liang was seen and treated in our department on 12/26/2022. She should not return to work until 12/29/22.     If you have any questions or concerns, please do not hesitate to call.        _____________________________  Physician/APC Signature

## (undated) NOTE — LETTER
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 Grazyna Martínez.  Per patients reques

## (undated) NOTE — LETTER
Date & Time: 7/5/2021, 10:28 PM  Patient: Jovanni Challenger  Encounter Provider(s):    Saji Mario DO       To Whom It May Concern:    Devang Nielsen was seen and treated in our department on 7/5/2021. She may return to work on 7/7/21.     If you have

## (undated) NOTE — LETTER
Date & Time: 4/18/2025, 1:58 AM  Patient: Clemencia Kim  Encounter Provider(s):    Artemio Martin DO       To Whom It May Concern:    Clemencia Kim was seen and treated in our department on 4/18/2025. She should not return to work until 04/21/2025 .    If you have any questions or concerns, please do not hesitate to call.        _____________________________  Physician/APC Signature

## (undated) NOTE — Clinical Note
I think she is having a reaction to divalproex and states she was supposed to wean off this rx - so we are stopping now; she had been down to 250mg daily for 1 month.

## (undated) NOTE — LETTER
Date & Time: 8/22/2019, 8:46 PM  Patient: Home Dixon  Encounter Provider(s):    Carson Vazquez MD  [place], [place]       To Whom It May Concern:    Luca Trevino was seen and treated in our department on 8/22/2019.  She should not return to

## (undated) NOTE — Clinical Note
Can we please have this patient see ALLEY at the same time that she sees me in 3 months? She needs to set up a follow-up diagnostic angiogram at that time. She is s/p aneurysm clipping.

## (undated) NOTE — LETTER
Date: 8/29/2018    Patient Name: Onelia Murray          To Whom it may concern: The above patient was seen at the [facility] for treatment of a medical condition.     This patient should be excused from attending work 8/29/18 - 8/30/

## (undated) NOTE — LETTER
02/22/18        Eladia Leon [id number]      Dear Aries Norton,    [facility] records indicate that you have outstanding lab work and or testing that was ordered for you and has not yet been completed:          Vitamin B12 [E]      Fo

## (undated) NOTE — LETTER
10/8/2018          Sophia Santos Chris [id number]    Dear Alexandru Payne,       Your flexible sigmoidoscopy a polyp in the colon that was removed completely.     Here are the  biopsy/pathology findings from your recent Colonoscopy

## (undated) NOTE — LETTER
Date & Time: 4/4/2022, 7:29 PM  Patient: Blane Serrato  Encounter Provider(s): Karla Santana MD       To Whom It May Concern:    Aston Mckeon was seen and treated in our department on 4/4/2022. She can return to work 4/5/2022.     If you have any questions or concerns, please do not hesitate to call.        _____________________________  Physician/APC Signature

## (undated) NOTE — ED AVS SNAPSHOT
Sandee Arteaga   MRN: [id number]    Department:  [address] [facility] in [place]   Date of Visit:  9/25/2019           Disclosure     Insurance plans vary and the physician(s) referred by the ER may not be covered by your plan.  Please con tell this physician (or your personal doctor if your instructions are to return to your personal doctor) about any new or lasting problems. The primary care or specialist physician will see patients referred from the [facility] Emergency Department.  Manny Chase

## (undated) NOTE — LETTER
Date: 5/7/2020    Patient Name: Vee Moreno          To Whom it may concern: The above patient was evaluated by the [facility] for treatment of a medical condition.     This patient should be excused from attending work from 5/7/20 t

## (undated) NOTE — LETTER
Date: 2/1/2023    Patient Name: Yue Pepper          To Dr. Mani Romano:    The above patient is ok to have local anesthesia for dental procedure. If sedation is needed, she should check with her pulmonologist. No prophylactic antibiotics are required.          Sincerely,        Rolando Caceres MD

## (undated) NOTE — LETTER
01/10/18        Parrish Gutierrez [id number]      Dear Daphney Moreira,    [address] records indicate that you have outstanding lab work and or testing that was ordered for you and has not yet been completed:          CBC W Differential W Pl

## (undated) NOTE — LETTER
Clemencia Kim, :1965    CONSENT FOR PROCEDURE/SEDATION    1. I authorize the performance upon Nevaehava Reyes  the following: Colposcopy with biopsy and Endocervical curettage    2.  I authorize Dr. Ulises Rose MD (and whomever is desig Relationship to patient: ____________________________________________    Witness: _________________________________________ Date:___________     Physician Signature: _______________________________ Date:___________

## (undated) NOTE — ED AVS SNAPSHOT
Francesco Rene   MRN: [id number]    Department:  [facility] Emergency Department in [place]   Date of Visit:  8/22/2019           Disclosure     Insurance plans vary and the physician(s) referred by the ER may not be covered by your plan.  Please con tell this physician (or your personal doctor if your instructions are to return to your personal doctor) about any new or lasting problems. The primary care or specialist physician will see patients referred from the [facility] Emergency Department.  Larry Chiu

## (undated) NOTE — LETTER
Date: 10/16/2019    Patient Name: Sam Sanchez          To Whom it may concern: The above patient was seen at the [facility] for treatment of a medical condition. She was seen in our office on 9/30/19 and 10/16/19.      She is ok to

## (undated) NOTE — LETTER
[facility] EMERGENCY DEPARTMENT IN [facility]  [phone]          Patient: Vee Moreno   YOB: 1965   Date of Visit: 5/24/2020       Dear Employer,         May 24, 2020    At [id number] it is at all feasible for them to do so. COVID-19 is especially risky for high-risk patients (including, but not limited to, age over 61, immunosuppressed status due to disease or medication, chronic respiratory or heart conditions and diabetes).     · Nguyen Bhandari

## (undated) NOTE — LETTER
Date & Time: 5/13/2023, 3:16 AM  Patient: Brian Singh  Encounter Provider(s):    Kate Rueda, DO       To Whom It May Concern:    Dylon Epstein was seen and treated in our department on 5/13/2023. She should not return to work until 5/14/23 .     If you have any questions or concerns, please do not hesitate to call.        _____________________________  Physician/APC Signature

## (undated) NOTE — LETTER
[address] [facility], [address] [place] [address] [phone]                20      Holley Youssef MD  [address] [facility], [facility]    Patient: Silvia Crisostomo  : 1965      Dr. Anselmo Rose,

## (undated) NOTE — LETTER
01/18/19        Carlos Cohen [id number]      Dear Michelle Partida,    [facility] records indicate that you have outstanding lab work and or testing that was ordered for you and has not yet been completed:  Orders Placed This Encount

## (undated) NOTE — LETTER
Date & Time: 4/18/2022, 7:49 PM  Patient: Khalif Cui  Encounter Provider(s):    Shefali Oglesby., ARNIE       To Whom It May Concern:    Kay Ellis was seen and treated in our department on 4/18/2022. She can return to work with these limitations: No immersing left hand in water until wound is healed. .    If you have any questions or concerns, please do not hesitate to call.        _____________________________  Physician/APC Signature

## (undated) NOTE — LETTER
Date: 9/30/2019    Patient Name: Te Carlos        To Whom it may concern: The above patient was seen at the [facility] for treatment of a medical condition.     This patient should be excused from attending work on 9/23/2019 ronn

## (undated) NOTE — LETTER
18    Patient: Sandee Arteaga  : 1965 Visit date: 3/20/2018    Dear  Dr. Patricia Petersen MD,    Thank you for referring Sandee Jenni to my practice. Please find my assessment and plan below.                Assessment   Tubulovillous adenoma

## (undated) NOTE — LETTER
Date & Time: 11/23/2021, 4:41 PM  Patient: Hoem Budliz  Encounter Provider(s):    Roxana Olsen MD       To Whom It May Concern:    Luca Trevino was seen and treated in our department on 11/23/2021.  She should not return to work until 11/25/

## (undated) NOTE — LETTER
Date & Time: 1/11/2022, 3:39 PM  Patient: Raoul Horan  Encounter Provider(s):    Daylin Richey MD  [place], [place]       To Whom It May Concern:    Simeon Lorne was seen and treated in our department on 1/11/202

## (undated) NOTE — LETTER
Alaina Rudd [address]  [address] [facility] [address], [address] [facility]  Authorization for Surgical Operation and Procedure     Date:___________                                                                                                         Time:__________ 4.   Should the need arise during my operation or immediate post-operative period, I also consent to the administration of blood and/or blood products.   Further, I understand that despite careful testing and screening of blood or blood products by monica 8.   I recognize that in the event my procedure results in extended X-Ray/fluoroscopy time, I may develop a skin reaction. 9.  If I have a Do Not Attempt Resuscitation (DNAR) order in place, that status will be suspended while in the operating room, proc 1. IAnais agree to be cared for by an anesthesiologist, who is specially trained to monitor me and give me medicine to put me to sleep or keep me comfortable during my procedure    I understand that my anesthesiologist is not an employee or 5. My doctor has explained to me other choices available to me for my care (alternatives).   6. Pregnant Patients (“epidural”):  I understand that the risks of having an epidural (medicine given into my back to help control pain during labor), include itchi

## (undated) NOTE — LETTER
Date: 7/26/2019    Patient Name: Nela Rothman          To Whom it may concern: This letter has been written at the patient's request. The above patient was seen at the [facility] for treatment of a medical condition.     This patient

## (undated) NOTE — LETTER
Date: 4/28/2021    Patient Name: Sandee Arteaga          To Whom it may concern: This letter has been written at the patient's request. The above patient was seen at the [facility] for treatment of a medical condition.     This patient

## (undated) NOTE — LETTER
Date: 10/3/2019    Patient Name: Anais Garcia          To Whom it may concern: The above patient was seen at the [facility] for treatment of a medical condition.     This patient should be excused from attending work from 09/23/19 thr

## (undated) NOTE — LETTER
17    Patient: Itzel Leung  : 1965 Visit date: 2017    Dear  Dr. Peter Lira MD,    Thank you for referring Itzel Leung to my practice. Please find my assessment and plan below.             Assessment   Encounter for diagnostic

## (undated) NOTE — LETTER
[facility]  Migel Monteiro 62 [address] [facility], [address] [place] [address]    Consent for Operation    Date: __________________    Time: _______________    1.  I authorize the performance upon Serena Primes the following operation:    Procedure(s):  Flexible Sigmoido videotape. The [facility] will not be responsible for storage or maintenance of this tape. 6. For the purpose of advancing medical education, I consent to the admittance of observers to the Operating Room.     7. I authorize the use of any specimen, organs Signature of Patient:   ___________________________    When the patient is a minor or mentally incompetent to give consent:  Signature of person authorized to consent for patient: ___________________________   Relationship to patient: _____________________ drugs/illegal medications). Failure to inform my anesthesiologist about these medicines may increase my risk of anesthetic complications. · If I am allergic to anything or have had a reaction to anesthesia before.     3. I understand how the anesthesia med I have discussed the procedure and information above with the patient (or patient’s representative) and answered their questions. The patient or their representative has agreed to have anesthesia services.     _______________________________________________